# Patient Record
Sex: FEMALE | Race: WHITE | Employment: OTHER | URBAN - METROPOLITAN AREA
[De-identification: names, ages, dates, MRNs, and addresses within clinical notes are randomized per-mention and may not be internally consistent; named-entity substitution may affect disease eponyms.]

---

## 2017-01-02 ENCOUNTER — TELEPHONE (OUTPATIENT)
Dept: SURGERY | Facility: CLINIC | Age: 67
End: 2017-01-02

## 2017-01-02 NOTE — TELEPHONE ENCOUNTER
GENERAL SURGERY NURSE PHONE TRIAGE     Zoey Armstrong      MRN# [id number]  AGE:  66 year old     YOB: 1950  [phone] (home)     Surgeon: Dr. Cabrera      Surgery type: Right lumpectomy, with seed localization.  Kimberly node biopsy with sentinel node mapping.   POSTOPERATIVE DIAGNOSIS: Right breast Invasive ductal cancer       Surgery Date: December / 02 / 2016     POD: 31     CHIEF CONCERN:  Oncology appointment      HISTORY OF PRESENT ILLNESS:   Patient is calling to ask about oncology appointment.  Patient was last seen in clinic with Dr. Cabrera on 112/14/16.  At that time plan was for patient to follow- up with Oncology -Dr. Spencer and radiation oncology after that.    Spoke with Dr. Spencer's clinic, patient has not been seen by them in the past.  will follow up with Surgery Scheduling.      PLAN:   Discuss with surgery scheduling.  Cielo Dunn RN

## 2017-01-04 ENCOUNTER — OFFICE VISIT (OUTPATIENT)
Dept: INTERNAL MEDICINE | Facility: CLINIC | Age: 67
End: 2017-01-04
Payer: MEDICARE

## 2017-01-04 ENCOUNTER — TRANSFERRED RECORDS (OUTPATIENT)
Dept: SURGERY | Facility: CLINIC | Age: 67
End: 2017-01-04

## 2017-01-04 VITALS
DIASTOLIC BLOOD PRESSURE: 78 MMHG | WEIGHT: 156 LBS | BODY MASS INDEX: 26.63 KG/M2 | SYSTOLIC BLOOD PRESSURE: 106 MMHG | HEIGHT: 64 IN | HEART RATE: 88 BPM | TEMPERATURE: 97.8 F | OXYGEN SATURATION: 94 %

## 2017-01-04 DIAGNOSIS — Z01.818 PREOP GENERAL PHYSICAL EXAM: Primary | ICD-10-CM

## 2017-01-04 LAB
BASOPHILS # BLD AUTO: 0.1 10E9/L (ref 0–0.2)
BASOPHILS NFR BLD AUTO: 0.6 %
DIFFERENTIAL METHOD BLD: ABNORMAL
EOSINOPHIL # BLD AUTO: 0.2 10E9/L (ref 0–0.7)
EOSINOPHIL NFR BLD AUTO: 2.9 %
ERYTHROCYTE [DISTWIDTH] IN BLOOD BY AUTOMATED COUNT: 15.1 % (ref 10–15)
HCT VFR BLD AUTO: 45.2 % (ref 35–47)
HGB BLD-MCNC: 14.6 G/DL (ref 11.7–15.7)
LYMPHOCYTES # BLD AUTO: 1.8 10E9/L (ref 0.8–5.3)
LYMPHOCYTES NFR BLD AUTO: 21.3 %
MCH RBC QN AUTO: 31.1 PG (ref 26.5–33)
MCHC RBC AUTO-ENTMCNC: 32.3 G/DL (ref 31.5–36.5)
MCV RBC AUTO: 96 FL (ref 78–100)
MONOCYTES # BLD AUTO: 0.7 10E9/L (ref 0–1.3)
MONOCYTES NFR BLD AUTO: 8.1 %
NEUTROPHILS # BLD AUTO: 5.6 10E9/L (ref 1.6–8.3)
NEUTROPHILS NFR BLD AUTO: 67.1 %
PLATELET # BLD AUTO: 329 10E9/L (ref 150–450)
RBC # BLD AUTO: 4.7 10E12/L (ref 3.8–5.2)
WBC # BLD AUTO: 8.4 10E9/L (ref 4–11)

## 2017-01-04 PROCEDURE — 99214 OFFICE O/P EST MOD 30 MIN: CPT | Performed by: INTERNAL MEDICINE

## 2017-01-04 PROCEDURE — 85025 COMPLETE CBC W/AUTO DIFF WBC: CPT | Performed by: INTERNAL MEDICINE

## 2017-01-04 PROCEDURE — 36415 COLL VENOUS BLD VENIPUNCTURE: CPT | Performed by: INTERNAL MEDICINE

## 2017-01-04 PROCEDURE — 80048 BASIC METABOLIC PNL TOTAL CA: CPT | Performed by: INTERNAL MEDICINE

## 2017-01-04 NOTE — PROGRESS NOTES
[place] [address]  [facility] [address]  [phone]  Dept: [phone]    PRE-OP EVALUATION:  Today's date: 2017    Zoey Armstrong (: 1950) presents for pre-operative evaluation assessment as requested by Dr. Matias.  She requires evaluation and anesthesia risk assessment prior to undergoing surgery/procedure for treatment of rt foot .  Proposed procedure:   REMOVE HARDWARE FOOT Right Monitor Anesthesia Care   Hardware Removal Right Foot                 Date of Surgery/ Procedure: 17  Time of Surgery/ Procedure: 7:30am  Hospital/Surgical Facility: [facility]    Primary Physician: Marisa Hoover  Type of Anesthesia Anticipated: monitor anesthesia care    Patient has a Health Care Directive or Living Will:  NO    1. NO - Do you have a history of heart attack, stroke, stent, bypass or surgery on an artery in the head, neck, heart or legs?  2. NO - Do you ever have any pain or discomfort in your chest?  3. NO - Do you have a history of  Heart Failure?  4. NO - Are you troubled by shortness of breath when: walking on the level, up a slight hill or at night?  5. NO - Do you currently have a cold, bronchitis or other respiratory infection?  6. NO - Do you have a cough, shortness of breath or wheezing?  7. NO - Do you sometimes get pains in the calves of your legs when you walk?  8. NO - Do you or anyone in your family have previous history of blood clots?  9. NO - Do you or does anyone in your family have a serious bleeding problem such as prolonged bleeding following surgeries or cuts?  10. NO - Have you ever had problems with anemia or been told to take iron pills?  11. NO - Have you had any abnormal blood loss such as black, tarry or bloody stools, or abnormal vaginal bleeding?  12. NO - Have you ever had a blood transfusion?  13. NO - Have you or any of your relatives ever had problems with anesthesia?  14. NO - Do you have sleep apnea, excessive snoring  or daytime drowsiness?  15. NO - Do you have any prosthetic heart valves?  16. NO - Do you have prosthetic joints?  17. NO - Is there any chance that you may be pregnant?      HPI:                                                      Brief HPI related to upcoming procedure:       HYPERTENSION - Patient has longstanding history of mod-severe HTN , currently denies any symptoms referable to elevated blood pressure. Specifically denies chest pain, palpitations, dyspnea, orthopnea, PND or peripheral edema. Blood pressure readings have been in normal range. Current medication regimen is as listed below. Patient denies any side effects of medication.                                                                                                                                                                                          .  HYPERLIPIDEMIA - Patient has a long history of significant Hyperlipidemia requiring medication for treatment with recent good control. Patient reports no problems or side effects with the medication.                                                                                                                                                       .    MEDICAL HISTORY:                                                      Patient Active Problem List    Diagnosis Date Noted     Falling episodes 11/08/2016     Priority: Medium     CAD (coronary artery disease)      Priority: Medium     Chronic bronchitis (H) 02/21/2016     Priority: Medium     Abnormal electrocardiogram 07/20/2015     Priority: Medium     Fatigue 06/26/2015     Priority: Medium     Hyperlipidemia      Priority: Medium     Mild major depression (H) 06/06/2013     Priority: Medium     Preventative health care 03/29/2013     Priority: Medium     HTN (hypertension) 03/29/2013     Priority: Medium     Tobacco use 03/29/2013     Priority: Medium     Chronic rhinitis 03/29/2013     Priority: Medium     Advance Care Planning 03/29/2013      Priority: Medium     Advance Care Planning 9/8/2016: Receipt of ACP document:  Received: invalid HCD document dated 8-27-14.  Document not previously scanned. Validation form completed indicating invalid document. Copy sent to client with information and facilitation resources. Validation form sent to be scanned as notation of invalid document received.   Confirmed/documented designated decision maker(s).  Added by Mckenzie Weaver RN Advance Care Planning Liaison with Titi Yost  Advance Care Planning: ACD information given to patient.              Past Medical History   Diagnosis Date     Hypertension      Hyperlipidemia      Smoker      Depression      Back pain      CAD (coronary artery disease)      Past Surgical History   Procedure Laterality Date     Orthopedic surgery  1994     left foot surgery / bunion     Angiogram  10-     Moderate non-obstructive coronary disease involving all 3 vessels. LAD and RCA lesions are non-flow-limiting. Recommend aggressive risk factor management and possible pulmonary evaluation for dyspnea     Head & neck surgery       Jaw surgery     Reconstruct forefoot with metatarsophalangeal (mtp) fusion Right 2/24/2016     Procedure: RECONSTRUCT FOREFOOT WITH METATARSOPHALANGEAL (MTP) FUSION;  Surgeon: Levar Matias DPM;  Location: [facility]     Osteotomy foot Right 2/24/2016     Procedure: OSTEOTOMY FOOT;  Surgeon: Levar Matias DPM;  Location: [facility]     Arthroplasty toe(s) Right 2/24/2016     Procedure: ARTHROPLASTY TOE(S);  Surgeon: Levar Matias DPM;  Location: [facility]     Arthrotomy shoulder, rotator cuff repair, combined Right 7/12/2016     Procedure: COMBINED ARTHROTOMY SHOULDER, ROTATOR CUFF REPAIR;  Surgeon: Reggie Cisse MD;  Location:  OR     Lumpectomy breast, seed localization, sentinel node Right 12/2/2016     Procedure: LUMPECTOMY BREAST, SEED LOCALIZATION, SENTINEL NODE;  Surgeon: Tiffanie Cabrera MD;  Location:  OR     Current  Outpatient Prescriptions   Medication Sig Dispense Refill     FLUoxetine (PROZAC) 20 MG capsule Take 3 capsules (60 mg) by mouth daily 90 capsule 3     HYDROcodone-acetaminophen (NORCO) 5-325 MG per tablet Take 1-2 tablets by mouth every 4 hours as needed for other (Moderate to Severe Pain) 30 tablet 0     buPROPion (WELLBUTRIN SR) 150 MG 12 hr tablet Take 1 tablet (150 mg) by mouth 2 times daily 180 tablet 1     ibuprofen (ADVIL,MOTRIN) 600 MG tablet Take 1 tablet (600 mg) by mouth every 6 hours as needed for moderate pain 30 tablet 1     Amlodipine-Olmesartan (AMANDA) 10-40 MG TABS Take 1 tablet by mouth daily 90 tablet 3     rosuvastatin (CRESTOR) 10 MG tablet Take 1 tablet (10 mg) by mouth daily 90 tablet 3     senna-docusate (SENOKOT-S;PERICOLACE) 8.6-50 MG per tablet Take 1-2 tablets by mouth 2 times daily as needed for constipation Take while on oral narcotics to prevent or treat constipation. 30 tablet 0     tiotropium (SPIRIVA HANDIHALER) 18 MCG inhalation capsule Inhale contents of one capsule daily. 90 capsule 1     fluticasone-salmeterol (ADVAIR) 250-50 MCG/DOSE diskus inhaler Inhale 1 puff into the lungs 2 times daily 1 Inhaler 1     aspirin 81 MG tablet Take 81 mg by mouth daily       Multiple Vitamin CHEW Take 1 tablet by mouth daily        calcium 500 MG CHEW Take by mouth daily        OTC products: None, except as noted above    Allergies   Allergen Reactions     Betadine [Povidone Iodine] Hives     Iodine-131      No Clinical Screening - See Comments Swelling     Water softener salts     Soap      Perfumed soaps      Latex Allergy: NO    Social History   Substance Use Topics     Smoking status: Former Smoker -- 0.50 packs/day for 30 years     Types: Cigarettes     Quit date: 07/15/2015     Smokeless tobacco: Never Used     Alcohol Use: 0.0 oz/week     0 Standard drinks or equivalent per week      Comment: 3/ week     History   Drug Use No       REVIEW OF SYSTEMS:                                        "             C: NEGATIVE for fever, chills, change in weight  I: NEGATIVE for worrisome rashes, moles or lesions  E: NEGATIVE for vision changes or irritation  E/M: NEGATIVE for ear, mouth and throat problems  R: NEGATIVE for significant cough or SOB  B: NEGATIVE for masses, tenderness or discharge  CV: NEGATIVE for chest pain, palpitations or peripheral edema  GI: NEGATIVE for nausea, abdominal pain, heartburn, or change in bowel habits  : NEGATIVE for frequency, dysuria, or hematuria  M: NEGATIVE for significant arthralgias or myalgia  N: NEGATIVE for weakness, dizziness or paresthesias  E: NEGATIVE for temperature intolerance, skin/hair changes  H: NEGATIVE for bleeding problems  P: NEGATIVE for changes in mood or affect    EXAM:                                                    There were no vitals taken for this visit.   /78 mmHg  Pulse 88  Temp(Src) 97.8  F (36.6  C) (Oral)  Ht 5' 4\" (1.626 m)  Wt 156 lb (70.761 kg)  BMI 26.76 kg/m2  SpO2 94%      GENERAL APPEARANCE: healthy, alert and no distress     EYES: EOMI,- PERRL     HENT: ear canals and TM's normal and nose and mouth without ulcers or lesions     NECK: no adenopathy, no asymmetry, masses, or scars and thyroid normal to palpation     RESP: lungs clear to auscultation - no rales, rhonchi or wheezes     CV: regular rates and rhythm, normal S1 S2, no S3 or S4 and no murmur, click or rub -     ABDOMEN:  soft, nontender, no HSM or masses and bowel sounds normal     MS: extremities normal- no gross deformities noted, no evidence of inflammation in joints, FROM in all extremities.     SKIN: no suspicious lesions or rashes     NEURO: Normal strength and tone, sensory exam grossly normal, mentation intact and speech normal     PSYCH: mentation appears normal. and affect normal/bright      DIAGNOSTICS:                                                    EKG: appears normal, NSR, normal axis, normal intervals, no acute ST/T changes c/w ischemia, no " LVH by voltage criteria, unchanged from previous tracings    Recent Labs   Lab Test  11/29/16   1552  10/25/16   1117  07/11/16   1125  04/16/16   0002   09/29/15   1352   03/27/11   0750   HGB  15.1   --   14.0  14.3   < >   --    < >  15.0   PLT  321   --    --   337   < >   --    < >  283   INR   --    --    --    --    --   0.99   --   0.93   NA  140  140   --   138   < >   --    < >  148*   POTASSIUM  4.3  4.1  4.0  3.8   < >   --    < >  4.2   CR  1.03  0.92  0.86  1.02   < >   --    < >  0.63    < > = values in this interval not displayed.        IMPRESSION:                                                    Reason for surgery/procedure:   Hardware Removal Right Foot     Diagnosis/reason for consult: risk assessment    The proposed surgical procedure is considered LOW risk.    REVISED CARDIAC RISK INDEX  The patient has the following serious cardiovascular risks for perioperative complications such as (MI, PE, VFib and 3  AV Block):  No serious cardiac risks  INTERPRETATION: 0 risks: Class I (very low risk - 0.4% complication rate)    The patient has the following additional risks for perioperative complications:  No identified additional risks    No diagnosis found.    RECOMMENDATIONS:                                                        --Patient is to take all scheduled medications on the day of surgery EXCEPT for modifications listed below.  Aspirin and ibuprofen    APPROVAL GIVEN to proceed with proposed procedure, without further diagnostic evaluation       Signed Electronically by: Marisa Hoover MD    Copy of this evaluation report is provided to requesting physician.    [place] Preop Guidelines

## 2017-01-04 NOTE — NURSING NOTE
"Chief Complaint   Patient presents with     Pre-Op Exam     initial /78 mmHg  Pulse 88  Temp(Src) 97.8  F (36.6  C) (Oral)  Ht 5' 4\" (1.626 m)  Wt 156 lb (70.761 kg)  BMI 26.76 kg/m2  SpO2 94% Estimated body mass index is 26.76 kg/(m^2) as calculated from the following:    Height as of this encounter: 5' 4\" (1.626 m).    Weight as of this encounter: 156 lb (70.761 kg)..  bp completed using cuff size regular  WIILAN CORRAL LPN  "

## 2017-01-05 LAB
ANION GAP SERPL CALCULATED.3IONS-SCNC: 9 MMOL/L (ref 3–14)
BUN SERPL-MCNC: 25 MG/DL (ref 7–30)
CALCIUM SERPL-MCNC: 10 MG/DL (ref 8.5–10.1)
CHLORIDE SERPL-SCNC: 106 MMOL/L (ref 94–109)
CO2 SERPL-SCNC: 26 MMOL/L (ref 20–32)
CREAT SERPL-MCNC: 1.06 MG/DL (ref 0.52–1.04)
GFR SERPL CREATININE-BSD FRML MDRD: 51 ML/MIN/1.7M2
GLUCOSE SERPL-MCNC: 85 MG/DL (ref 70–99)
POTASSIUM SERPL-SCNC: 4.4 MMOL/L (ref 3.4–5.3)
SODIUM SERPL-SCNC: 141 MMOL/L (ref 133–144)

## 2017-01-09 ENCOUNTER — TRANSFERRED RECORDS (OUTPATIENT)
Dept: SURGERY | Facility: CLINIC | Age: 67
End: 2017-01-09

## 2017-01-18 ENCOUNTER — ANESTHESIA (OUTPATIENT)
Dept: SURGERY | Facility: CLINIC | Age: 67
End: 2017-01-18
Payer: MEDICARE

## 2017-01-18 ENCOUNTER — APPOINTMENT (OUTPATIENT)
Dept: GENERAL RADIOLOGY | Facility: CLINIC | Age: 67
End: 2017-01-18
Attending: PODIATRIST
Payer: MEDICARE

## 2017-01-18 ENCOUNTER — ANESTHESIA EVENT (OUTPATIENT)
Dept: SURGERY | Facility: CLINIC | Age: 67
End: 2017-01-18
Payer: MEDICARE

## 2017-01-18 PROCEDURE — 25800025 ZZH RX 258: Performed by: ANESTHESIOLOGY

## 2017-01-18 PROCEDURE — 25000125 ZZHC RX 250: Performed by: PODIATRIST

## 2017-01-18 PROCEDURE — 25000125 ZZHC RX 250: Performed by: NURSE ANESTHETIST, CERTIFIED REGISTERED

## 2017-01-18 PROCEDURE — 40000940 XR FOOT PORT RT 2 VW: Mod: RT

## 2017-01-18 RX ORDER — FENTANYL CITRATE 50 UG/ML
INJECTION, SOLUTION INTRAMUSCULAR; INTRAVENOUS PRN
Status: DISCONTINUED | OUTPATIENT
Start: 2017-01-18 | End: 2017-01-18

## 2017-01-18 RX ORDER — ONDANSETRON 2 MG/ML
INJECTION INTRAMUSCULAR; INTRAVENOUS PRN
Status: DISCONTINUED | OUTPATIENT
Start: 2017-01-18 | End: 2017-01-18

## 2017-01-18 RX ORDER — PROPOFOL 10 MG/ML
INJECTION, EMULSION INTRAVENOUS PRN
Status: DISCONTINUED | OUTPATIENT
Start: 2017-01-18 | End: 2017-01-18

## 2017-01-18 RX ORDER — PROPOFOL 10 MG/ML
INJECTION, EMULSION INTRAVENOUS CONTINUOUS PRN
Status: DISCONTINUED | OUTPATIENT
Start: 2017-01-18 | End: 2017-01-18

## 2017-01-18 RX ADMIN — PROPOFOL 50 MCG/KG/MIN: 10 INJECTION, EMULSION INTRAVENOUS at 07:35

## 2017-01-18 RX ADMIN — SODIUM CHLORIDE, POTASSIUM CHLORIDE, SODIUM LACTATE AND CALCIUM CHLORIDE: 600; 310; 30; 20 INJECTION, SOLUTION INTRAVENOUS at 07:23

## 2017-01-18 RX ADMIN — MIDAZOLAM HYDROCHLORIDE 1 MG: 1 INJECTION, SOLUTION INTRAMUSCULAR; INTRAVENOUS at 07:25

## 2017-01-18 RX ADMIN — ONDANSETRON 4 MG: 2 INJECTION INTRAMUSCULAR; INTRAVENOUS at 07:38

## 2017-01-18 RX ADMIN — FENTANYL CITRATE 25 MCG: 50 INJECTION, SOLUTION INTRAMUSCULAR; INTRAVENOUS at 07:29

## 2017-01-18 RX ADMIN — PROPOFOL 20 MG: 10 INJECTION, EMULSION INTRAVENOUS at 07:32

## 2017-01-18 RX ADMIN — MIDAZOLAM HYDROCHLORIDE 1 MG: 1 INJECTION, SOLUTION INTRAMUSCULAR; INTRAVENOUS at 07:27

## 2017-01-18 RX ADMIN — CEFAZOLIN SODIUM 2 G: 2 INJECTION, SOLUTION INTRAVENOUS at 07:23

## 2017-01-18 ASSESSMENT — LIFESTYLE VARIABLES: TOBACCO_USE: 1

## 2017-01-18 NOTE — ANESTHESIA CARE TRANSFER NOTE
Patient: Zoey Armstrong    REMOVE HARDWARE FOOT (Right Foot)  Additional InformationProcedure(s):  Hardware Removal Right Foot  - Wound Class: I-Clean    Diagnosis: painful hardware   Diagnosis Additional Information: No value filed.    Anesthesia Type:   MAC     Note:  Airway :Room Air  Patient transferred to:PACU  Comments: Patient meets criteria for phase 2 recovery, no RN available, to PACU, patient awake and alert, VSS.      Vitals: (Last set prior to Anesthesia Care Transfer)              Electronically Signed By: DUGLAS Fish CRNA  January 18, 2017  8:09 AM

## 2017-01-18 NOTE — ANESTHESIA POSTPROCEDURE EVALUATION
Patient: Zoey Armstrong    REMOVE HARDWARE FOOT (Right Foot)  Additional InformationProcedure(s):  Hardware Removal Right Foot  - Wound Class: I-Clean    Diagnosis:painful hardware   Diagnosis Additional Information: Pre-operative diagnosis:  painful hardware    Post-operative diagnosis  sp hardware removal right foot     Procedure:  Procedure(s):  Hardware Removal Right Foot  - Wound Class: I-Clean   Surgeon(s):  Surgeon(s) and Role:     * Levar Matias D, PM - Primary   Estimated blood loss:  0 mL       Specimens:  * No specimens in log *   Findings:  Lag screw not symptomatic, left intact.  Painful prominent dorsal plate removed without complication.                   Anesthesia Type:  MAC    Note:  Anesthesia Post Evaluation    Patient location during evaluation: PACU  Patient participation: Able to fully participate in evaluation  Level of consciousness: awake  Pain management: adequate  Airway patency: patent  Cardiovascular status: acceptable  Respiratory status: acceptable  Hydration status: acceptable  PONV: none     Anesthetic complications: None          Last vitals:  Filed Vitals:    01/18/17 0820 01/18/17 0835 01/18/17 0905   BP: 121/60 120/70 142/77   Pulse:      Temp:   97  F (36.1  C)   Resp: 16 16 16   SpO2: 93% 94% 95%       Electronically Signed By: Jim Gary MD  January 18, 2017  11:56 AM

## 2017-01-18 NOTE — ANESTHESIA PREPROCEDURE EVALUATION
Anesthesia Evaluation     . Pt has had prior anesthetic.       ROS/MED HX    ENT/Pulmonary:     (+)tobacco use, Past use , . .    Neurologic:       Cardiovascular:     (+) Dyslipidemia, hypertension--CAD, --. : . . . :. .       METS/Exercise Tolerance:     Hematologic:         Musculoskeletal:         GI/Hepatic:         Renal/Genitourinary:         Endo:         Psychiatric:     (+) psychiatric history depression      Infectious Disease:         Malignancy:         Other:               Physical Exam  Normal systems: cardiovascular and pulmonary    Airway   Mallampati: II  TM distance: >3 FB  Neck ROM: full    Dental     Cardiovascular       Pulmonary                     Anesthesia Plan      History & Physical Review  History and physical reviewed and following examination; no interval change.    ASA Status:  3 .    NPO Status:  > 8 hours    Plan for MAC Reason for MAC:  Deep or markedly invasive procedure (G8) and Chronic cardiopulmonary disease (G9)         Postoperative Care  Postoperative pain management:  IV analgesics.      Consents  Anesthetic plan, risks, benefits and alternatives discussed with:  Patient.  Use of blood products discussed: Yes.   .                          .

## 2017-01-19 ENCOUNTER — TELEPHONE (OUTPATIENT)
Dept: PODIATRY | Facility: CLINIC | Age: 67
End: 2017-01-19

## 2017-01-19 NOTE — TELEPHONE ENCOUNTER
Is patient elevating above heart level?  y  Is patient icing behind the knee?  Only some  Any abdominal pain?  no  How is pts pain/how often are they taking pain meds? Every 6 hours, 2 tablets of each. Pt feels that top of foot is hurting more than she thought it would, we discussed adjusting ace wrap as if this is too tight it could be contributing to her pain, she will try this.  Pt informed ace wrap can be loosened to help with pain.  Any questions?  Not at this time.  Pt has the Triage phone number in case of questions.  Discussed the above with Dr. Matias.

## 2017-01-24 ENCOUNTER — OFFICE VISIT (OUTPATIENT)
Dept: PODIATRY | Facility: CLINIC | Age: 67
End: 2017-01-24
Payer: MEDICARE

## 2017-01-24 VITALS
BODY MASS INDEX: 26.46 KG/M2 | WEIGHT: 155 LBS | SYSTOLIC BLOOD PRESSURE: 132 MMHG | DIASTOLIC BLOOD PRESSURE: 92 MMHG | HEIGHT: 64 IN

## 2017-01-24 DIAGNOSIS — Z98.890 S/P FOOT SURGERY, RIGHT: Primary | ICD-10-CM

## 2017-01-24 PROCEDURE — 99024 POSTOP FOLLOW-UP VISIT: CPT | Performed by: PODIATRIST

## 2017-01-24 NOTE — PROGRESS NOTES
"Foot & Ankle Surgery  January 24, 2017    S:  Patient in today approx 6 days sp hardware removal right foot.  Pain levels minimal.  Heel WB in surgical shoe without issue.  Got dressing wet yesterday in the shower, they removed the wet bandage and applied a new one.  Following post-op instructions    /92 mmHg  Ht 5' 4\" (1.626 m)  Wt 155 lb (70.308 kg)  BMI 26.59 kg/m2      ROS - positive for CC.  Patient denies current nausea, vomiting, chills, fevers, belly pain, calf pain, chest pain or SOB.  Complete remainder of ROS is otherwise neg.    PE - sutures intact, skin margins well coapted.  Healing well.  No drainage/SOI.  Minimal edema.  No change in hallux alignment.  Skin shows no trophic, color or temperature changes otherwise.  No calf redness, swelling or pain noted otherwise.    A/P - 66 year old yo patient approx 6 days sp above procedure  -reviewed procedure and surgical findings  -redressed foot  -continue all post-op orders without change; reviewed    Follow up  -  1 week for suture removal       Body mass index is 26.59 kg/(m^2).  Weight management plan: Patient was referred to their PCP to discuss a diet and exercise plan.      Levar Matias DPM   Podiatric Foot & Ankle Surgeon  [facility]  [phone]    "

## 2017-01-24 NOTE — Clinical Note
Good morning  I saw Maxine today, 6 days sp hardware removal right foot.  She's doing well.  The foot was redressed, and she'll follow up in 1 week for suture removal and transition back to regular shoe gear.  Thanks  Levar

## 2017-01-24 NOTE — NURSING NOTE
"Chief Complaint   Patient presents with     Surgical Followup       Initial /92 mmHg  Ht 5' 4\" (1.626 m)  Wt 155 lb (70.308 kg)  BMI 26.59 kg/m2 Estimated body mass index is 26.59 kg/(m^2) as calculated from the following:    Height as of this encounter: 5' 4\" (1.626 m).    Weight as of this encounter: 155 lb (70.308 kg).  BP completed using cuff size: larissa Payne CMA        "

## 2017-01-26 ENCOUNTER — TRANSFERRED RECORDS (OUTPATIENT)
Dept: HEALTH INFORMATION MANAGEMENT | Facility: CLINIC | Age: 67
End: 2017-01-26

## 2017-01-27 ENCOUNTER — RADIANT APPOINTMENT (OUTPATIENT)
Dept: BONE DENSITY | Facility: CLINIC | Age: 67
End: 2017-01-27
Attending: INTERNAL MEDICINE
Payer: MEDICARE

## 2017-01-27 DIAGNOSIS — Z78.0 ASYMPTOMATIC MENOPAUSAL STATE: ICD-10-CM

## 2017-01-27 PROCEDURE — 77080 DXA BONE DENSITY AXIAL: CPT | Performed by: INTERNAL MEDICINE

## 2017-01-31 ENCOUNTER — OFFICE VISIT (OUTPATIENT)
Dept: PODIATRY | Facility: CLINIC | Age: 67
End: 2017-01-31
Payer: MEDICARE

## 2017-01-31 ENCOUNTER — OFFICE VISIT (OUTPATIENT)
Dept: CARDIOLOGY | Facility: CLINIC | Age: 67
End: 2017-01-31
Attending: INTERNAL MEDICINE
Payer: MEDICARE

## 2017-01-31 VITALS — RESPIRATION RATE: 16 BRPM | BODY MASS INDEX: 26.46 KG/M2 | HEART RATE: 86 BPM | WEIGHT: 155 LBS | HEIGHT: 64 IN

## 2017-01-31 VITALS
HEIGHT: 64 IN | DIASTOLIC BLOOD PRESSURE: 84 MMHG | BODY MASS INDEX: 26.96 KG/M2 | WEIGHT: 157.9 LBS | HEART RATE: 96 BPM | SYSTOLIC BLOOD PRESSURE: 116 MMHG

## 2017-01-31 DIAGNOSIS — E78.2 MIXED HYPERLIPIDEMIA: ICD-10-CM

## 2017-01-31 DIAGNOSIS — R29.6 FALLING EPISODES: ICD-10-CM

## 2017-01-31 DIAGNOSIS — Z98.890 S/P HARDWARE REMOVAL: ICD-10-CM

## 2017-01-31 DIAGNOSIS — I25.10 CORONARY ARTERY DISEASE INVOLVING NATIVE CORONARY ARTERY OF NATIVE HEART WITHOUT ANGINA PECTORIS: ICD-10-CM

## 2017-01-31 DIAGNOSIS — Z98.890 S/P FOOT SURGERY, RIGHT: Primary | ICD-10-CM

## 2017-01-31 PROCEDURE — 99024 POSTOP FOLLOW-UP VISIT: CPT | Performed by: PODIATRIST

## 2017-01-31 PROCEDURE — 99214 OFFICE O/P EST MOD 30 MIN: CPT | Mod: 24 | Performed by: NURSE PRACTITIONER

## 2017-01-31 NOTE — Clinical Note
"1/31/2017    Marisa Hoover MD  [facility]   [address] [facility] [address]    RE: Zoey Armstrong       Dear Colleague,    I had the pleasure of seeing Zoey Armstrong in the [facility].    This is a 66-year-old female who presents to [facility] today for a followup visit.  She is a patient of Dr. Rutledge's seen in our clinic for hypertension, hyperlipidemia and coronary artery disease.      Zoey first saw Dr. Rutledge in 07/2015 after she was complaining of some intermittent lightheadedness, wobbliness and leg tingling.  Over the past year she has had unpredictable lightheadedness and multiple falls with unclear etiology.  She has not experienced syncope.  In 10/2015, she underwent coronary angiography revealing moderate nonobstructive coronary artery disease.  Subsequently, she has been followed by Neurology.  CT scan of her head showed possibility of NPH and a followup MRI of her brain has been ordered.  It was noted that she was having symptoms suggestive of neurogenic claudication.  In addition, Zoey has chronic back pain and evidence of severe central stenosis in L4-L5.  Two months ago she was also recently diagnosed with stage I breast cancer and underwent a lumpectomy and is currently undergoing radiation.  She returns today for a 3-month reassessment.      Overall, Zoey tells me she is doing well.  She has been seen at [facility] which has helped her with her gait instability.  She is now told to walk slowly and turn slowly.  Since then she has not had any recurrent falls.  Recently, she underwent a spinal injection and does have an orthopedic surgeon consultation in regards to her severe L4-L5 stenosis.  She is yet to make her followup brain MRI appointment and is trying to tackle \"one thing at a time.\"  She is near completing her radiation for her breast cancer.      All in all, Zoey tells me she has " no chest pain with activity or at rest.  She is not short of breath.  She denies any palpitations or near-syncope.  She denies orthopnea or peripheral edema.      PHYSICAL EXAMINATION:  Her blood pressure today is 116/84, heart rate is 96 beats per minute and is regular.  Her lungs are clear.  There is no peripheral edema.  Her weight is stable at 157 pounds.   Outpatient Encounter Prescriptions as of 1/31/2017   Medication Sig Dispense Refill     FLUoxetine (PROZAC) 20 MG capsule Take 3 capsules (60 mg) by mouth daily 90 capsule 3     buPROPion (WELLBUTRIN SR) 150 MG 12 hr tablet Take 1 tablet (150 mg) by mouth 2 times daily 180 tablet 1     Amlodipine-Olmesartan (AMANDA) 10-40 MG TABS Take 1 tablet by mouth daily 90 tablet 3     rosuvastatin (CRESTOR) 10 MG tablet Take 1 tablet (10 mg) by mouth daily 90 tablet 3     Multiple Vitamin CHEW Take 1 tablet by mouth daily        calcium 500 MG CHEW Take by mouth daily        aspirin 81 MG tablet Take 81 mg by mouth daily       No facility-administered encounter medications on file as of 1/31/2017.      IMPRESSION AND PLAN:   1.  Unpredictable lightheadedness and multiple falls for the past 2 years.  Likely neurological cause, with possible concern of NPH, in addition to severe central lumbar stenosis.   2.  Moderate nonobstructive coronary artery disease.  She is free from any angina.   3.  Hypertension.  Blood pressure well-controlled on Amanda.   4.  Treated hyperlipidemia.      Thank you for allowing me to participate in this patient's care.  We will have her return for followup with Dr. Rutledge in the future.  She is to notify our clinic with a sustained systolic blood pressure over 140 mmHg or other concerns she may have during the interim.     Sincerely,    DUGLAS Chen [facility]

## 2017-01-31 NOTE — PROGRESS NOTES
"Foot & Ankle Surgery  January 31, 2017    S:  Patient in today approx 2 weeks sp hardware removal right foot.  Pain levels minimal.  Following post-op instructions    Pulse 86  Resp 16  Ht 5' 4\" (1.626 m)  Wt 155 lb (70.308 kg)  BMI 26.59 kg/m2      ROS - positive for CC.  Patient denies current nausea, vomiting, chills, fevers, belly pain, calf pain, chest pain or SOB.  Complete remainder of ROS is otherwise neg.    PE - sutures removed, incision healing quite well, no s-s needed.  Minimal edema, no drainage, no SOI.  No 1st MPJ pain.  Skin shows no trophic, color or temperature changes otherwise.  No calf redness, swelling or pain noted otherwise.      A/P - 66 year old yo patient approx 2 weeks sp above procedure  -sutures out, no s-s needed  -continue - compression, tensogrip dispensed; DVT exercises until back to regular shoe gear  -change - ok to return to regular shoe gear as tolerated; ice/elevation prn; ok to get the foot wet starting tomorrow    Follow up  -  4 weeks for final check/films     Body mass index is 26.59 kg/(m^2).        Levar Matias DPM   Podiatric Foot & Ankle Surgeon  [facility]  [phone]    "

## 2017-01-31 NOTE — NURSING NOTE
"Chief Complaint   Patient presents with     Surgical Followup     DOS 1/18/17 R foot hrdwr removal, 2 weeks, suture removal       Initial Pulse 86  Resp 16  Ht 5' 4\" (1.626 m)  Wt 155 lb (70.308 kg)  BMI 26.59 kg/m2 Estimated body mass index is 26.59 kg/(m^2) as calculated from the following:    Height as of this encounter: 5' 4\" (1.626 m).    Weight as of this encounter: 155 lb (70.308 kg).    Ivan Farias CMA ([facility])  Podiatry/Foot & Ankle Surgery  [facility]      "

## 2017-01-31 NOTE — PROGRESS NOTES
"HISTORY OF PRESENT ILLNESS:  This is a 66-year-old female who presents to [facility] today for a followup visit.  She is a patient of Dr. Rutledge's seen in our clinic for hypertension, hyperlipidemia and coronary artery disease.      Zoey first saw Dr. Rutledge in 07/2015 after she was complaining of some intermittent lightheadedness, wobbliness and leg tingling.  Over the past year she has had unpredictable lightheadedness and multiple falls with unclear etiology.  She has not experienced syncope.  In 10/2015, she underwent coronary angiography revealing moderate nonobstructive coronary artery disease.  Subsequently, she has been followed by Neurology.  CT scan of her head showed possibility of NPH and a followup MRI of her brain has been ordered.  It was noted that she was having symptoms suggestive of neurogenic claudication.  In addition, Zoey has chronic back pain and evidence of severe central stenosis in L4-L5.  Two months ago she was also recently diagnosed with stage I breast cancer and underwent a lumpectomy and is currently undergoing radiation.  She returns today for a 3-month reassessment.      Overall, Zoey tells me she is doing well.  She has been [facility] which has helped her with her gait instability.  She is now told to walk slowly and turn slowly.  Since then she has not had any recurrent falls.  Recently, she underwent a spinal injection and does have an orthopedic surgeon consultation in regards to her severe L4-L5 stenosis.  She is yet to make her followup brain MRI appointment and is trying to tackle \"one thing at a time.\"  She is near completing her radiation for her breast cancer.      All in all, Zoey tells me she has no chest pain with activity or at rest.  She is not short of breath.  She denies any palpitations or near-syncope.  She denies orthopnea or peripheral edema.      PHYSICAL EXAMINATION:  Her blood pressure today is " 116/84, heart rate is 96 beats per minute and is regular.  Her lungs are clear.  There is no peripheral edema.  Her weight is stable at 157 pounds.      IMPRESSION AND PLAN:   1.  Unpredictable lightheadedness and multiple falls for the past 2 years.  Likely neurological cause, with possible concern of NPH, in addition to severe central lumbar stenosis.   2.  Moderate nonobstructive coronary artery disease.  She is free from any angina.   3.  Hypertension.  Blood pressure well-controlled on Cesar.   4.  Treated hyperlipidemia.      Thank you for allowing me to participate in this patient's care.  We will have her return for followup with Dr. Rutledge in the future.  She is to notify our clinic with a sustained systolic blood pressure over 140 mmHg or other concerns she may have during the interim.         DUGLAS POSADA, CNP             D: 2017 15:49   T: 2017 16:42   MT: al      Name:     ROSY CANNON   MRN:      [id number]        Account:      [id number]   :      1950           Service Date: 2017      Document: [id number]

## 2017-01-31 NOTE — Clinical Note
Good afternoon  I saw Zoey today, 2 weeks sp hardware removal right foot.  She's been doing quite well.  The sutures were removed, and she'll be returning to regular shoe gear as tolerated.  She'll follow up in 4 weeks for a final check and for final films.  Thanks  Levar

## 2017-01-31 NOTE — PROGRESS NOTES
HPI and Plan: #[id number]  See dictation    No orders of the defined types were placed in this encounter.       No orders of the defined types were placed in this encounter.       There are no discontinued medications.      Encounter Diagnoses   Name Primary?     Coronary artery disease involving native coronary artery of native heart without angina pectoris      Mixed hyperlipidemia      Falling episodes        CURRENT MEDICATIONS:  Current Outpatient Prescriptions   Medication Sig Dispense Refill     FLUoxetine (PROZAC) 20 MG capsule Take 3 capsules (60 mg) by mouth daily 90 capsule 3     buPROPion (WELLBUTRIN SR) 150 MG 12 hr tablet Take 1 tablet (150 mg) by mouth 2 times daily 180 tablet 1     Amlodipine-Olmesartan (AMANDA) 10-40 MG TABS Take 1 tablet by mouth daily 90 tablet 3     rosuvastatin (CRESTOR) 10 MG tablet Take 1 tablet (10 mg) by mouth daily 90 tablet 3     Multiple Vitamin CHEW Take 1 tablet by mouth daily        calcium 500 MG CHEW Take by mouth daily        aspirin 81 MG tablet Take 81 mg by mouth daily         ALLERGIES     Allergies   Allergen Reactions     Betadine [Povidone Iodine] Hives     Iodine-131      No Clinical Screening - See Comments Swelling     Water softener salts     Soap      Perfumed soaps       PAST MEDICAL HISTORY:  Past Medical History   Diagnosis Date     Hypertension      Hyperlipidemia      Smoker      Depression      Back pain      CAD (coronary artery disease)        PAST SURGICAL HISTORY:  Past Surgical History   Procedure Laterality Date     Orthopedic surgery  1994     left foot surgery / bunion     Angiogram  10-     Moderate non-obstructive coronary disease involving all 3 vessels. LAD and RCA lesions are non-flow-limiting. Recommend aggressive risk factor management and possible pulmonary evaluation for dyspnea     Head & neck surgery       Jaw surgery     Reconstruct forefoot with metatarsophalangeal (mtp) fusion Right 2/24/2016     Procedure: RECONSTRUCT  FOREFOOT WITH METATARSOPHALANGEAL (MTP) FUSION;  Surgeon: Levar Matias DPM;  Location: [facility]     Osteotomy foot Right 2/24/2016     Procedure: OSTEOTOMY FOOT;  Surgeon: Levar Matias DPM;  Location: [facility]     Arthroplasty toe(s) Right 2/24/2016     Procedure: ARTHROPLASTY TOE(S);  Surgeon: Levar Matias DPM;  Location: [facility]     Arthrotomy shoulder, rotator cuff repair, combined Right 7/12/2016     Procedure: COMBINED ARTHROTOMY SHOULDER, ROTATOR CUFF REPAIR;  Surgeon: Reggie Cisse MD;  Location: [facility]     Lumpectomy breast, seed localization, sentinel node Right 12/2/2016     Procedure: LUMPECTOMY BREAST, SEED LOCALIZATION, SENTINEL NODE;  Surgeon: Tiffanie Cabrera MD;  Location: [facility]     Remove hardware foot Right 1/18/2017     Procedure: REMOVE HARDWARE FOOT;  Surgeon: Levar Matias DPM;  Location: [facility]       FAMILY HISTORY:  Family History   Problem Relation Age of Onset     Breast Cancer Mother      C.A.D. Father      MI     Lipids Father      Coronary Artery Disease Father      Hyperlipidemia Father      Breast Cancer Other      maternal aunt     CANCER Other      skin       SOCIAL HISTORY:  Social History     Social History     Marital Status:      Spouse Name: N/A     Number of Children: N/A     Years of Education: N/A     Social History Main Topics     Smoking status: Former Smoker -- 0.50 packs/day for 30 years     Types: Cigarettes     Quit date: 07/15/2015     Smokeless tobacco: Never Used     Alcohol Use: 0.0 oz/week     0 Standard drinks or equivalent per week      Comment: 3/ week     Drug Use: No     Sexual Activity:     Partners: Male     Other Topics Concern     Caffeine Concern No     1 diet coke, coffee occ     Sleep Concern No     Special Diet No     regular     Exercise No     none     Social History Narrative       Review of Systems:  Skin:  Negative       Eyes:  Negative      ENT:  Negative      Respiratory:  Positive for dyspnea on exertion    "  Cardiovascular:    dizziness;lightheadedness;fatigue;Positive for tingling feeling in both knees up to the hips, dizzinesss follows episodes -  has had only 2 episodes in last  5 days - since getting injection in lower back   Gastroenterology: Negative      Genitourinary:  Positive for retention \"lighter in amount of  urination as the day goes on\" per pt  Musculoskeletal:  Positive for back pain back pain decreased since injection 1/27/17  Neurologic:  Negative      Psychiatric:  Positive for anxiety;depression    Heme/Lymph/Imm:  Positive for allergies RX allergies   Endocrine:  Negative        Physical Exam:  Vitals: /84 mmHg  Pulse 96  Ht 1.626 m (5' 4\")  Wt 71.623 kg (157 lb 14.4 oz)  BMI 27.09 kg/m2    Constitutional:  cooperative;alert and oriented;well developed   sit: BP = 114/   ,  1 minute standing  BP = 114,   standing 2  minutes = 118/  .    Skin:  warm and dry to the touch        Head:  normocephalic        Eyes:  pupils equal and round        ENT:  no pallor or cyanosis        Neck:  JVP normal;no carotid bruit        Chest:  clear to auscultation;normal symmetry;normal respiratory excursion          Cardiac: regular rhythm;normal S1 and S2     no presence of murmur            Abdomen:  abdomen soft;non-tender;no bruits        Vascular: pulses full and equal                                        Extremities and Back:  no deformities, clubbing, cyanosis, erythema observed;no edema              Neurological:  affect appropriate, oriented to time, person and place;no gross motor deficits   normal mild nystagmus, wobbly with walking          CC  Gabriele Rutledge MD   [facility]  [address]  [place] [id number]                    "

## 2017-02-03 ENCOUNTER — TELEPHONE (OUTPATIENT)
Dept: BONE DENSITY | Facility: CLINIC | Age: 67
End: 2017-02-03

## 2017-02-03 NOTE — TELEPHONE ENCOUNTER
dexa scan and report mailed to [facility] [address] [facility] [address] pn

## 2017-02-07 ENCOUNTER — TRANSFERRED RECORDS (OUTPATIENT)
Dept: SURGERY | Facility: CLINIC | Age: 67
End: 2017-02-07

## 2017-02-23 ENCOUNTER — MEDICAL CORRESPONDENCE (OUTPATIENT)
Dept: HEALTH INFORMATION MANAGEMENT | Facility: CLINIC | Age: 67
End: 2017-02-23

## 2017-02-28 ENCOUNTER — OFFICE VISIT (OUTPATIENT)
Dept: PODIATRY | Facility: CLINIC | Age: 67
End: 2017-02-28
Payer: MEDICARE

## 2017-02-28 ENCOUNTER — RADIANT APPOINTMENT (OUTPATIENT)
Dept: GENERAL RADIOLOGY | Facility: CLINIC | Age: 67
End: 2017-02-28
Attending: PODIATRIST
Payer: MEDICARE

## 2017-02-28 VITALS — HEIGHT: 64 IN | BODY MASS INDEX: 26.8 KG/M2 | WEIGHT: 157 LBS | RESPIRATION RATE: 14 BRPM

## 2017-02-28 DIAGNOSIS — Z98.890 POST-OPERATIVE STATE: Primary | ICD-10-CM

## 2017-02-28 DIAGNOSIS — Z98.890 POST-OPERATIVE STATE: ICD-10-CM

## 2017-02-28 PROCEDURE — 73630 X-RAY EXAM OF FOOT: CPT | Mod: RT

## 2017-02-28 PROCEDURE — 99024 POSTOP FOLLOW-UP VISIT: CPT | Performed by: PODIATRIST

## 2017-02-28 NOTE — Clinical Note
Good morning  I saw Zoey today, 6 weeks sp plate removal from her right foot.  She's doing well, has returned to shoes/activities.  Films show no changes to the previous fusion site.  She'll follow up prn.  Thanks  Levar

## 2017-02-28 NOTE — MR AVS SNAPSHOT
After Visit Summary   2/28/2017    Zoey Armstrong    MRN: [id number]           Patient Information     Date Of Birth          1950        Visit Information        Provider Department      2/28/2017 8:00 AM Levar Matias DPM [facility]        Today's Diagnoses     Post-operative state    -  1      Care Instructions    Follow Up -     Dr. Matias's Clinic Locations:         Monday Tuesday   [facility]   [address] [address] [address], [address]   [place] [address] [place] [address]   [phone]       Wednesday:  Surgery Day    Surgery Scheduling Line - [phone]       Thursday Morning Thursday Afternoon   [facility]   [address]   [place] [address] [place], [place] [address] [phone]       Friday Morning To Schedule an Appointment    [facility] Call: [phone] [address] [place] [address]  [phone] PLEASE FAX ALL FORMS TO: [phone]     Body Mass Index (BMI)    Many things can cause foot and ankle problems. Foot structure, activity level, foot mechanics and injuries are common causes of pain.    One very important issue that often goes unmentioned, is body weight.  Extra weight can cause increased stress on muscles, ligaments, bones and tendons. Sometimes just a few extra pounds is all it takes to put one over her/his threshold. Without reducing that stress, it can be difficult to alleviate pain.      Some people are uncomfortable addressing this issue, but we feel it is important for you to think about it. As Foot & Ankle specialists, our job is addressing the lower extremity problem and possible causes.     Regarding extra body weight, we encourage patients to discuss diet and weight management plans with their primary care doctors. It is this team approach that  "gives you the best opportunity for pain relief and getting you back on your feet.            Follow-ups after your visit        Your next 10 appointments already scheduled     May 04, 2017  8:15 AM CDT   Return Visit with Gabriele Rutledge MD   [facility] [place] ([facility])    [address]  [facility] [address] [phone]              Who to contact     If you have questions or need follow up information about today's clinic visit or your schedule please contact [facility] directly at [phone].  Normal or non-critical lab and imaging results will be communicated to you by MyChart, letter or phone within 4 business days after the clinic has received the results. If you do not hear from us within 7 days, please contact the clinic through [employer] or phone. If you have a critical or abnormal lab result, we will notify you by phone as soon as possible.  Submit refill requests through [employer] or call your pharmacy and they will forward the refill request to us. Please allow 3 business days for your refill to be completed.          Additional Information About Your Visit        MyChart Information     [employer] gives you secure access to your electronic health record. If you see a primary care provider, you can also send messages to your care team and make appointments. If you have questions, please call your primary care clinic.  If you do not have a primary care provider, please call [phone] and they will assist you.        Care EveryWhere ID     This is your Care EveryWhere ID. This could be used by other organizations to access your [place] medical records  [id number]        Your Vitals Were     Respirations Height BMI (Body Mass Index)             14 5' 4\" (1.626 m) 26.95 kg/m2          Blood Pressure from Last 3 Encounters:   01/31/17 116/84   01/24/17 (!) 132/92   01/18/17 142/77    Weight from Last 3 Encounters:   02/28/17 157 " lb (71.2 kg)   01/31/17 157 lb 14.4 oz (71.6 kg)   01/31/17 155 lb (70.3 kg)               Primary Care Provider Office Phone # Fax #    Marisa Hoover [phone]       [facility] [address]  [facility] [address]        Thank you!     Thank you for choosing [facility]  for your care. Our goal is always to provide you with excellent care. Hearing back from our patients is one way we can continue to improve our services. Please take a few minutes to complete the written survey that you may receive in the mail after your visit with us. Thank you!             Your Updated Medication List - Protect others around you: Learn how to safely use, store and throw away your medicines at www.disposemymeds.org.          This list is accurate as of: 2/28/17  8:58 AM.  Always use your most recent med list.                   Brand Name Dispense Instructions for use    aspirin 81 MG tablet      Take 81 mg by mouth daily       AMANDA 10-40 MG Tabs   Generic drug:  Amlodipine-Olmesartan     90 tablet    Take 1 tablet by mouth daily       buPROPion 150 MG 12 hr tablet    WELLBUTRIN SR    180 tablet    Take 1 tablet (150 mg) by mouth 2 times daily       calcium 500 MG Chew      Take by mouth daily       FLUoxetine 20 MG capsule    PROzac    90 capsule    Take 3 capsules (60 mg) by mouth daily       Multiple Vitamin Chew      Take 1 tablet by mouth daily       rosuvastatin 10 MG tablet    CRESTOR    90 tablet    Take 1 tablet (10 mg) by mouth daily

## 2017-02-28 NOTE — PROGRESS NOTES
"Foot & Ankle Surgery  February 28, 2017    S:  Patient in today approx 6 weeks sp hardware removal R foot.  Pain levels minimal.  Back to shoes/activities.  Can swell with increased activities    Resp 14  Ht 5' 4\" (1.626 m)  Wt 157 lb (71.2 kg)  BMI 26.95 kg/m2      ROS - positive for CC.  Patient denies current nausea, vomiting, chills, fevers, belly pain, calf pain, chest pain or SOB.  Complete remainder of ROS is otherwise neg.    PE - stressing of fusion site shows no pain; \"I can feel it\".  Skin shows no trophic, color or temperature changes otherwise.  No calf redness, swelling or pain noted otherwise.    Imaging - 4 views WB shows removed plate, fusion site fully healed without change in alignment      A/P - 67 year old yo patient approx 6 weeks sp above procedure  -personally reviewed imaging  -shoes/activities as tolerated  -RICE/NSAID prn    Follow up  -  Prn        Body mass index is 26.95 kg/(m^2).  Weight management plan: Patient was referred to their PCP to discuss a diet and exercise plan.      Levar Matias DPM   Podiatric Foot & Ankle Surgeon  [facility]  [phone]    "

## 2017-02-28 NOTE — NURSING NOTE
"Chief Complaint   Patient presents with     Surgical Followup       Initial Resp 14  Ht 5' 4\" (1.626 m)  Wt 157 lb (71.2 kg)  BMI 26.95 kg/m2 Estimated body mass index is 26.95 kg/(m^2) as calculated from the following:    Height as of this encounter: 5' 4\" (1.626 m).    Weight as of this encounter: 157 lb (71.2 kg).  Medication Reconciliation: complete  "

## 2017-02-28 NOTE — PATIENT INSTRUCTIONS
Follow Up -     Dr. Matias's Clinic Locations:         Monday Tuesday   [facility]   [address] [address] [address]   [place] [address] [place] [address]   [phone]       Wednesday:  Surgery Day    Surgery Scheduling Line - [phone]       Thursday Morning Thursday Afternoon   [facility]   [address] [facility] [address]   [place] [address] [place] [address]   [phone]       Friday Morning To Schedule an Appointment    [facility] Call: [phone] [address] [place] [address]  [phone] PLEASE FAX ALL FORMS TO: [phone]     Body Mass Index (BMI)    Many things can cause foot and ankle problems. Foot structure, activity level, foot mechanics and injuries are common causes of pain.    One very important issue that often goes unmentioned, is body weight.  Extra weight can cause increased stress on muscles, ligaments, bones and tendons. Sometimes just a few extra pounds is all it takes to put one over her/his threshold. Without reducing that stress, it can be difficult to alleviate pain.      Some people are uncomfortable addressing this issue, but we feel it is important for you to think about it. As Foot & Ankle specialists, our job is addressing the lower extremity problem and possible causes.     Regarding extra body weight, we encourage patients to discuss diet and weight management plans with their primary care doctors. It is this team approach that gives you the best opportunity for pain relief and getting you back on your feet.

## 2017-03-08 ENCOUNTER — TRANSFERRED RECORDS (OUTPATIENT)
Dept: SURGERY | Facility: CLINIC | Age: 67
End: 2017-03-08

## 2017-03-14 DIAGNOSIS — I10 ESSENTIAL HYPERTENSION: Primary | ICD-10-CM

## 2017-03-14 NOTE — TELEPHONE ENCOUNTER
Amlodipine      Last Written Prescription Date: 05/18/16  Last Fill Quantity: 90, # refills: 3    Last Office Visit with [facility] or [facility] prescribing provider:  01/13/17   Future Office Visit:    Next 5 appointments (look out 90 days)     May 04, 2017  8:15 AM CDT   Return Visit with Gabriele Rutledge MD   [facility] [place] ([facility])    [address] [address] [address] [phone]                    BP Readings from Last 3 Encounters:   01/31/17 116/84   01/24/17 (!) 132/92   01/18/17 142/77

## 2017-03-15 ENCOUNTER — TELEPHONE (OUTPATIENT)
Dept: INTERNAL MEDICINE | Facility: CLINIC | Age: 67
End: 2017-03-15

## 2017-03-15 DIAGNOSIS — I10 ESSENTIAL HYPERTENSION: Primary | ICD-10-CM

## 2017-03-15 RX ORDER — AMLODIPINE AND OLMESARTAN MEDOXOMIL 10; 40 MG/1; MG/1
1 TABLET ORAL DAILY
Qty: 90 TABLET | Refills: 3 | Status: SHIPPED | OUTPATIENT
Start: 2017-03-15 | End: 2017-03-17 | Stop reason: ALTCHOICE

## 2017-03-15 NOTE — TELEPHONE ENCOUNTER
Prior Auth request faxed from [employer] in [place] for Amlodipine  [employer]: [phone]    Plan: [phone]     i.d: [id number]    No alternatives given.   Copy of PA in folder    Please advise

## 2017-03-16 NOTE — TELEPHONE ENCOUNTER
Patient will call insurance company to find out what is covered and call us back with information.  KATHRYN MICHAEL CMA

## 2017-03-17 RX ORDER — AMLODIPINE BESYLATE 10 MG/1
10 TABLET ORAL DAILY
Qty: 90 TABLET | Refills: 1 | Status: SHIPPED | OUTPATIENT
Start: 2017-03-17 | End: 2017-12-15

## 2017-03-17 RX ORDER — OLMESARTAN MEDOXOMIL 40 MG/1
40 TABLET ORAL DAILY
Qty: 90 TABLET | Refills: 1 | Status: SHIPPED | OUTPATIENT
Start: 2017-03-17 | End: 2017-12-15

## 2017-03-17 NOTE — TELEPHONE ENCOUNTER
Pt called. Stated she called her ins comp and got the name of 3 meds that are formulary-      1. Olmesartan  2. Desolate  3. Medoxomil

## 2017-03-17 NOTE — TELEPHONE ENCOUNTER
The medication is a combination of olmesartan and amlodipine so she has to take them separately. Orders done.

## 2017-03-21 ENCOUNTER — TRANSFERRED RECORDS (OUTPATIENT)
Dept: HEALTH INFORMATION MANAGEMENT | Facility: CLINIC | Age: 67
End: 2017-03-21

## 2017-04-13 ENCOUNTER — PRE VISIT (OUTPATIENT)
Dept: NEUROLOGY | Facility: CLINIC | Age: 67
End: 2017-04-13

## 2017-04-13 NOTE — TELEPHONE ENCOUNTER
1.  Date/reason for appt:  4/20/17   Normal Pressure Hydrocephalus    2.  Referring provider:  Dr Stahl, [facility]    3.  Call to patient (Yes / No - short description):  Yes, left pt vmsg to return call.    [facility] OV 11/10/16 is in EPIC; MRI L Spine and CT Head are in PACS  Did she have any other visits anywhere or have any other imaging done not listed above?

## 2017-04-17 ASSESSMENT — ENCOUNTER SYMPTOMS
MUSCLE CRAMPS: 0
WEIGHT GAIN: 0
HEADACHES: 0
ARTHRALGIAS: 0
ALTERED TEMPERATURE REGULATION: 0
POLYPHAGIA: 0
LOSS OF CONSCIOUSNESS: 1
CHILLS: 0
DECREASED APPETITE: 0
MEMORY LOSS: 1
POLYDIPSIA: 0
SPEECH CHANGE: 0
SEIZURES: 0
DIZZINESS: 1
HALLUCINATIONS: 0
PARALYSIS: 0
STIFFNESS: 0
NIGHT SWEATS: 0
TREMORS: 0
WEIGHT LOSS: 0
DISTURBANCES IN COORDINATION: 0
BACK PAIN: 1
INCREASED ENERGY: 0
MYALGIAS: 0
NUMBNESS: 1
FATIGUE: 1
WEAKNESS: 1
TINGLING: 1
JOINT SWELLING: 0
FEVER: 0
MUSCLE WEAKNESS: 0
NECK PAIN: 0

## 2017-04-18 NOTE — TELEPHONE ENCOUNTER
Yes, pt has also been to [facility].  Records are in [facility].

## 2017-04-20 ENCOUNTER — OFFICE VISIT (OUTPATIENT)
Dept: NEUROLOGY | Facility: CLINIC | Age: 67
End: 2017-04-20

## 2017-04-20 VITALS — HEART RATE: 79 BPM | SYSTOLIC BLOOD PRESSURE: 144 MMHG | HEIGHT: 64 IN | DIASTOLIC BLOOD PRESSURE: 79 MMHG

## 2017-04-20 DIAGNOSIS — R42 DIZZINESS: Primary | ICD-10-CM

## 2017-04-20 DIAGNOSIS — G91.2 NPH (NORMAL PRESSURE HYDROCEPHALUS) (H): ICD-10-CM

## 2017-04-20 ASSESSMENT — PAIN SCALES - GENERAL: PAINLEVEL: NO PAIN (0)

## 2017-04-20 NOTE — PROGRESS NOTES
Answers for HPI/ROS submitted by the patient on 4/17/2017   General Symptoms: Yes  Skin Symptoms: No  HENT Symptoms: No  EYE SYMPTOMS: No  HEART SYMPTOMS: No  LUNG SYMPTOMS: No  INTESTINAL SYMPTOMS: No  URINARY SYMPTOMS: No  GYNECOLOGIC SYMPTOMS: No  BREAST SYMPTOMS: No  SKELETAL SYMPTOMS: Yes  BLOOD SYMPTOMS: No  NERVOUS SYSTEM SYMPTOMS: Yes  MENTAL HEALTH SYMPTOMS: No  Fever: No  Loss of appetite: No  Weight loss: No  Weight gain: No  Fatigue: Yes  Night sweats: No  Chills: No  Increased stress: Yes  Excessive hunger: No  Excessive thirst: No  Feeling hot or cold when others believe the temperature is normal: No  Loss of height: No  Post-operative complications: No  Surgical site pain: No  Hallucinations: No  Change in or Loss of Energy: No  Hyperactivity: No  Confusion: No  Back pain: Yes  Muscle aches: No  Neck pain: No  Swollen joints: No  Joint pain: No  Bone pain: No  Muscle cramps: No  Muscle weakness: No  Joint stiffness: No  Bone fracture: No  Trouble with coordination: No  Dizziness or trouble with balance: Yes  Fainting or black-out spells: Yes  Memory loss: Yes  Headache: No  Seizures: No  Speech problems: No  Tingling: Yes  Tremor: No  Weakness: Yes  Difficulty walking: Yes  Paralysis: No  Numbness: Yes

## 2017-04-20 NOTE — NURSING NOTE
Chief Complaint   Patient presents with     Consult     new pressure hydrocephalus    Dejuan Lamar CMA

## 2017-04-20 NOTE — MR AVS SNAPSHOT
After Visit Summary   4/20/2017    Zoey Armstrong    MRN: [id number]           Patient Information     Date Of Birth          1950        Visit Information        Provider Department      4/20/2017 12:50 PM Praveen Arzola MD [facility] Neurology        Today's Diagnoses     Dizziness    -  1    NPH (normal pressure hydrocephalus)           Follow-ups after your visit        Additional Services     PHYSICAL THERAPY REFERRAL       *This therapy referral will be filtered to a centralized scheduling office at [facility] and the patient will receive a call to schedule an appointment at a [facility] location most convenient for them. *     [facility] provides Physical Therapy evaluation and treatment and many specialty services across the [facility] system.  If requesting a specialty program, please choose from the list below.    If you have not heard from the scheduling office within 2 business days, please call [phone] for all locations, with the exception of [place], please call [phone].  Treatment: Evaluation & Treatment  Special Instructions/Modalities: Pre and post-lumbar puncture to evaluate possible NPH  Special Programs: Balance/Vestibular    Please be aware that coverage of these services is subject to the terms and limitations of your health insurance plan.  Call member services at your health plan with any benefit or coverage questions.      **Note to Provider:  If you are referring outside of [facility] for the therapy appointment, please list the name of the location in the  special instructions  above, print the referral and give to the patient to schedule the appointment.                  Your next 10 appointments already scheduled     May 04, 2017  8:15 AM CDT   Return Visit with Gabriele Rutledge MD   [facility] PHYSICIANS [facility] AT [place] ([facility])    [address] [place] [id number]    [phone]            May 12, 2017  8:30 AM CDT   (Arrive by 8:15 AM)   Lumbar Puncture with DUGLAS Burgos [facility] Neurology ([facility] [place])    [address]  [address] [facility] [address] [phone]           Lumbar Puncture  A lumbar puncture is also called a spinal tap. A lumbar puncture may be used to look for problems in your brain, spinal cord, and related structures.   What Is a Lumbar Puncture?  A needle is used to remove and test cerebrospinal (spinal) fluid from the sac that contains your spinal cord. The spinal cord runs through most of your spine, and carries messages between your brain and the rest of your body. A lumbar puncture is done near the base of your spine, below where the spinal cord has ended. The needle enters the sac but does not touch the spinal cord. There is a risk of headache and backache, and a very slight risk of bleeding or infection. There can also be a risk of transient post-spinal tap headache, but this risk can be decreased with bedrest and adequate hydration. But the benefits of a lumbar puncture far outweigh the risks, and it is essential for diagnosing multiple neurologic conditions.   Before Your Lumbar Puncture  Prepare for your lumbar puncture as instructed. After you check in, you will need to sign a form stating that the procedure has been explained to you. If you have questions, be sure to ask them before you sign the form. You may also be asked to put on a hospital gown. Your procedure will take 30-60 minutes. But allow extra time to check in.   For your safety and the success of your procedure, tell the doctor or nurse if you: Have any bleeding disorders Take blood thinners  *You will need to be off blood thinners for 5 days prior to your lumbar puncture. Be sure to check with whichever physician is prescribing the blood thinner to make sure it is safe to be off this medication and if so,  directions on how to safely stop and restart the blood thinner. Have any immune system problems Have had any back surgery Are allergic to medications or iodine    0041-2033 [employer]. [address] [place] [address], [place] [address]. All rights reserved. This information is not intended as a substitute for professional medical care. Always follow your healthcare professional's instructions.               Future tests that were ordered for you today     Open Future Orders        Priority Expected Expires Ordered    MRI Brain w & w/o contrast Routine  4/20/2018 4/20/2017            Who to contact     Please call your clinic at [phone] to:    Ask questions about your health    Make or cancel appointments    Discuss your medicines    Learn about your test results    Speak to your doctor   If you have compliments or concerns about an experience at your clinic, or if you wish to file a complaint, please contact [facility] Patient Relations at [phone] or email us at [email or web address]         Additional Information About Your Visit        [employer] Information     [employer] gives you secure access to your electronic health record. If you see a primary care provider, you can also send messages to your care team and make appointments. If you have questions, please call your primary care clinic.  If you do not have a primary care provider, please call [phone] and they will assist you.      [employer] is an electronic gateway that provides easy, online access to your medical records. With [employer], you can request a clinic appointment, read your test results, renew a prescription or communicate with your care team.     To access your existing account, please contact your [facility] or call [phone] for assistance.        Care EveryWhere ID     This is your Care EveryWhere ID. This could be used by other organizations to access your  "[facility] medical records  [id number]        Your Vitals Were     Pulse Height                79 1.626 m (5' 4\")           Blood Pressure from Last 3 Encounters:   04/20/17 144/79   01/31/17 116/84   01/24/17 (!) 132/92    Weight from Last 3 Encounters:   02/28/17 71.2 kg (157 lb)   01/31/17 71.6 kg (157 lb 14.4 oz)   01/31/17 70.3 kg (155 lb)              We Performed the Following     Cell count with differential CSF: Tube 1     CSF Culture Aerobic Bacterial     Glucose CSF: Tube 3     Gram stain     PHYSICAL THERAPY REFERRAL     Protein total CSF: Tube 1     SPINAL PUNCTURE, LUMBAR DIAGNOSTIC        Primary Care Provider Office Phone # Fax #    Marisa Hoover [phone]       [facility] [address] [place] [address]        Thank you!     Thank you for choosing [facility] NEUROLOGY  for your care. Our goal is always to provide you with excellent care. Hearing back from our patients is one way we can continue to improve our services. Please take a few minutes to complete the written survey that you may receive in the mail after your visit with us. Thank you!             Your Updated Medication List - Protect others around you: Learn how to safely use, store and throw away your medicines at www.disposemymeds.org.          This list is accurate as of: 4/20/17  2:30 PM.  Always use your most recent med list.                   Brand Name Dispense Instructions for use    amLODIPine 10 MG tablet    NORVASC    90 tablet    Take 1 tablet (10 mg) by mouth daily       aspirin 81 MG tablet      Take 81 mg by mouth daily       buPROPion 150 MG 12 hr tablet    WELLBUTRIN SR    180 tablet    Take 1 tablet (150 mg) by mouth 2 times daily       calcium 500 MG Chew      Take by mouth daily       FLUoxetine 20 MG capsule    PROzac    90 capsule    Take 3 capsules (60 mg) by mouth daily       Multiple Vitamin Chew      Take 1 tablet by mouth daily       olmesartan 40 MG tablet    BENICAR "    90 tablet    Take 1 tablet (40 mg) by mouth daily       rosuvastatin 10 MG tablet    CRESTOR    90 tablet    Take 1 tablet (10 mg) by mouth daily

## 2017-04-20 NOTE — LETTER
2017       RE: Zoey Armstrong  [address]  [place] [address]     Dear Colleague,    Thank you for referring your patient, Zoey Armstrong, to the [facility] NEUROLOGY at [facility]. Please see a copy of my visit note below.    DEPARTMENT OF NEUROLOGY    Patient Name:  Zoey Armstrong  MRN:  [id number]    :  1950  Date of Clinic Visit:  2017  Primary Care Provider:  Marisa Hoover      REASON FOR CONSULTATION:  Abnormal MRI.      HISTORY OF PRESENT ILLNESS:  Ms. Armstrong is a 66-year-old woman who presents via referral for an abnormal MRI.  She states that the concern is for potential normal pressure hydrocephalus; however, she has not officially been given this diagnosis.  She states that roughly 1 year ago she began having the onset of dizziness.  She describes the dizziness as a potential lightheadedness or presyncopal feeling.  She says that this has led to several falls, whereby she does not think she completely loses consciousness, however, she is unable to account for the period during which she will be walking or standing and the period at which she finds herself on the ground mere seconds later.  She does state that she feels that this has been progressive, however, she realized in the last couple of months that sharp turns of her head can bring this on.  She also noticed that if she pushes herself, particularly hard, in an exercise capacity that this may also bring on this dizziness.  These symptoms as well as neurogenic claudication which is discussed further below led her to the care of a neurologist.  Both the CT and MRI were obtained that noted enlarged ventricles that could indicate normal pressure hydrocephalus.  She says that she has not noted any personality changes or incontinence.  In fact, she states the opposite that she actually has difficulty with voiding.  Her  is present and states also that he has noted no  personality changes, no memory changes and no other changes of any kind aside from the dizziness,      The patient has known neurogenic claudication.  She says she has had pain in her legs going back 8 years.  She is followed by Neurosurgery and is in fact scheduled to have a surgical intervention in June.      The patient denies any other symptoms at this visit.  She denies fever, nausea, vomiting, constipation, diarrhea, chest pain, difficulties breathing, heat or cold intolerance.      REVIEW OF SYMPTOMS:  A 10-point review of symptoms was negative except as indicated above or in the patient's self-reported answers located at the end of this note.       ALLERGIES:  Allergies   Allergen Reactions     Betadine [Povidone Iodine] Hives     Iodine-131      No Clinical Screening - See Comments Swelling     Water softener salts     Soap      Perfumed soaps     MEDICATIONS:  Current Outpatient Prescriptions   Medication Sig Dispense Refill     amLODIPine (NORVASC) 10 MG tablet Take 1 tablet (10 mg) by mouth daily 90 tablet 1     olmesartan (BENICAR) 40 MG tablet Take 1 tablet (40 mg) by mouth daily 90 tablet 1     FLUoxetine (PROZAC) 20 MG capsule Take 3 capsules (60 mg) by mouth daily 90 capsule 3     buPROPion (WELLBUTRIN SR) 150 MG 12 hr tablet Take 1 tablet (150 mg) by mouth 2 times daily 180 tablet 1     rosuvastatin (CRESTOR) 10 MG tablet Take 1 tablet (10 mg) by mouth daily 90 tablet 3     aspirin 81 MG tablet Take 81 mg by mouth daily       Multiple Vitamin CHEW Take 1 tablet by mouth daily        calcium 500 MG CHEW Take by mouth daily        PAST MEDICAL HISTORY:  Past Medical History:   Diagnosis Date     Back pain      CAD (coronary artery disease)      Depression      Hyperlipidemia      Hypertension      Smoker      PAST SURGICAL HISTORY:  Past Surgical History:   Procedure Laterality Date     ANGIOGRAM  10-    Moderate non-obstructive coronary disease involving all 3 vessels. LAD and RCA lesions are  non-flow-limiting. Recommend aggressive risk factor management and possible pulmonary evaluation for dyspnea     ARTHROPLASTY TOE(S) Right 2/24/2016    Procedure: ARTHROPLASTY TOE(S);  Surgeon: Levar Matias DPM;  Location: [facility]     ARTHROTOMY SHOULDER, ROTATOR CUFF REPAIR, COMBINED Right 7/12/2016    Procedure: COMBINED ARTHROTOMY SHOULDER, ROTATOR CUFF REPAIR;  Surgeon: Reggie Cisse MD;  Location:  OR     HEAD & NECK SURGERY      Jaw surgery     LUMPECTOMY BREAST, SEED LOCALIZATION, SENTINEL NODE Right 12/2/2016    Procedure: LUMPECTOMY BREAST, SEED LOCALIZATION, SENTINEL NODE;  Surgeon: Tiffanie Cabrera MD;  Location:  OR     ORTHOPEDIC SURGERY  1994    left foot surgery / bunion     OSTEOTOMY FOOT Right 2/24/2016    Procedure: OSTEOTOMY FOOT;  Surgeon: Levar Matias DPM;  Location: [facility]     RECONSTRUCT FOREFOOT WITH METATARSOPHALANGEAL (MTP) FUSION Right 2/24/2016    Procedure: RECONSTRUCT FOREFOOT WITH METATARSOPHALANGEAL (MTP) FUSION;  Surgeon: Lvear Matias DPM;  Location: [facility]     REMOVE HARDWARE FOOT Right 1/18/2017    Procedure: REMOVE HARDWARE FOOT;  Surgeon: Levar Matias DPM;  Location:  OR     SOCIAL HISTORY:  Social History     Social History     Marital status:      Spouse name: N/A     Number of children: N/A     Years of education: N/A     Occupational History     Not on file.     Social History Main Topics     Smoking status: Former Smoker     Packs/day: 0.50     Years: 30.00     Types: Cigarettes     Quit date: 7/15/2015     Smokeless tobacco: Never Used     Alcohol use 0.0 oz/week     0 Standard drinks or equivalent per week      Comment: 3/ week     Drug use: No     Sexual activity: Yes     Partners: Male     Other Topics Concern     Caffeine Concern No     1 diet coke, coffee occ     Sleep Concern No     Special Diet No     regular     Exercise No     none     Social History Narrative     FAMILY HISTORY:  Family History   Problem Relation Age  "of Onset     Breast Cancer Mother      C.A.D. Father      MI     Lipids Father      Coronary Artery Disease Father      Hyperlipidemia Father      Breast Cancer Other      maternal aunt     CANCER Other      skin         PHYSICAL EXAMINATION:    Vitals:   /79  Pulse 79  Ht 1.626 m (5' 4\")    -General: Woman sitting comfortably on the chair. No acute distress    -HEENT: No skin discolorations noted, no carotid bruits     -Chest: RRR without murmurs or bruits     -Abdomen: Positive bowel sounds, soft, non-tender, no organomegaly    -Musculoskeletal: No abnormalities noted     -Neurological:     --MS: Patient is alert, attentive, and oriented. Speech is clear and fluid. Names normally. Registration, recall and remote memory intact. Mental math normal.     --CNs: Visual fields are full to confrontation. Normal fundoscopic exam with no visualized vascular changes. Pupils are briskly reactive to light. Visual fields full. Ocular motility full without nystagmus, facial sensation intact, muscles of mastication and facial expression normal, hearing intact, gag and palate elevation normal, sternomastoid and trapezius function normal, tongue motions normal     --Motor: Normal muscle tone and bulk. 5/5 muscle strength bilaterally     --Reflexes: Brisk reflexes at knees and biceps and ankles. Plantar responses flexorbilaterally    --Sensory: Light touch, vibration and PP intact bilaterally in upper and lower extremities     --Coordination: Rapidly alternating movements of fingers intact. Heel-shin and finger-nose-finger is intact. Negative Romberg.     --Gait: Stands with feet normally spaced. Gait is slow but steady.       INVESTIGATIONS:  All available labs and imaging were reviewed with the patient and her  at this visit.      IMPRESSION AND RECOMMENDATIONS:  A 58-year-old woman with a history of  neurogenic claudication presenting with 1 year of dizziness and an abnormal MRI that indicated  possible concern " for NPH.  Of note, the patient does have notably enlarged ventricles on the images which were reviewed together with the patient at this visit.  However, she is also describing a lack of some other symptoms that would be typical of NPH such as memory difficulties, personality changes, urinary incontinence and other gait difficulties.  She has a slow but steady gait on exam, but does not appear to be grossly ataxic.  There is also no noted papilledema or other outward signs that would point towards a current NPH.  However, given the aforementioned MRI, it is thought prudent that this be explored a bit further.  We will repeat MRI to assess any changes that may have occurred since her last one, which occurred in 11/2016.  We also suggested a high volume lumbar puncture with physical therapy done both before and 1-2 days after to assess if that improved her functional status in any noticeable way.  We discussed this with the patient who registered her understanding and agreement with the plan.  She will make arrangements with the clinic here to have this done and we will see her in followup after.         This patient was seen and discussed with attending neurologist, Dr. Areli Cazares who agrees with my assessment and plan.      Dictated by Praveen Arzola MD, Resident         ARELI GORDON MD       As dictated by PRAVEEN ARZOLA MD     I saw and examined the patient and discussed plan of care with the resident.  I agree with the findings, assessment and plan as reported above.    MD Praveen Umanzor MD

## 2017-04-21 NOTE — PROGRESS NOTES
DEPARTMENT OF NEUROLOGY    Patient Name:  Zoey Armstrong  MRN:  [id number]    :  1950  Date of Clinic Visit:  2017  Primary Care Provider:  Marisa Hoover      REASON FOR CONSULTATION:  Abnormal MRI.      HISTORY OF PRESENT ILLNESS:  Ms. Armstrong is a 66-year-old woman who presents via referral for an abnormal MRI.  She states that the concern is for potential normal pressure hydrocephalus; however, she has not officially been given this diagnosis.  She states that roughly 1 year ago she began having the onset of dizziness.  She describes the dizziness as a potential lightheadedness or presyncopal feeling.  She says that this has led to several falls, whereby she does not think she completely loses consciousness, however, she is unable to account for the period during which she will be walking or standing and the period at which she finds herself on the ground mere seconds later.  She does state that she feels that this has been progressive, however, she realized in the last couple of months that sharp turns of her head can bring this on.  She also noticed that if she pushes herself, particularly hard, in an exercise capacity that this may also bring on this dizziness.  These symptoms as well as neurogenic claudication which is discussed further below led her to the care of a neurologist.  Both the CT and MRI were obtained that noted enlarged ventricles that could indicate normal pressure hydrocephalus.  She says that she has not noted any personality changes or incontinence.  In fact, she states the opposite that she actually has difficulty with voiding.  Her  is present and states also that he has noted no personality changes, no memory changes and no other changes of any kind aside from the dizziness,      The patient has known neurogenic claudication.  She says she has had pain in her legs going back 8 years.  She is followed by Neurosurgery and is in fact scheduled to have a  surgical intervention in June.      The patient denies any other symptoms at this visit.  She denies fever, nausea, vomiting, constipation, diarrhea, chest pain, difficulties breathing, heat or cold intolerance.      REVIEW OF SYMPTOMS:  A 10-point review of symptoms was negative except as indicated above or in the patient's self-reported answers located at the end of this note.       ALLERGIES:  Allergies   Allergen Reactions     Betadine [Povidone Iodine] Hives     Iodine-131      No Clinical Screening - See Comments Swelling     Water softener salts     Soap      Perfumed soaps     MEDICATIONS:  Current Outpatient Prescriptions   Medication Sig Dispense Refill     amLODIPine (NORVASC) 10 MG tablet Take 1 tablet (10 mg) by mouth daily 90 tablet 1     olmesartan (BENICAR) 40 MG tablet Take 1 tablet (40 mg) by mouth daily 90 tablet 1     FLUoxetine (PROZAC) 20 MG capsule Take 3 capsules (60 mg) by mouth daily 90 capsule 3     buPROPion (WELLBUTRIN SR) 150 MG 12 hr tablet Take 1 tablet (150 mg) by mouth 2 times daily 180 tablet 1     rosuvastatin (CRESTOR) 10 MG tablet Take 1 tablet (10 mg) by mouth daily 90 tablet 3     aspirin 81 MG tablet Take 81 mg by mouth daily       Multiple Vitamin CHEW Take 1 tablet by mouth daily        calcium 500 MG CHEW Take by mouth daily        PAST MEDICAL HISTORY:  Past Medical History:   Diagnosis Date     Back pain      CAD (coronary artery disease)      Depression      Hyperlipidemia      Hypertension      Smoker      PAST SURGICAL HISTORY:  Past Surgical History:   Procedure Laterality Date     ANGIOGRAM  10-    Moderate non-obstructive coronary disease involving all 3 vessels. LAD and RCA lesions are non-flow-limiting. Recommend aggressive risk factor management and possible pulmonary evaluation for dyspnea     ARTHROPLASTY TOE(S) Right 2/24/2016    Procedure: ARTHROPLASTY TOE(S);  Surgeon: Levar Matias DPM;  Location: [facility]     ARTHROTOMY SHOULDER, ROTATOR CUFF  REPAIR, COMBINED Right 7/12/2016    Procedure: COMBINED ARTHROTOMY SHOULDER, ROTATOR CUFF REPAIR;  Surgeon: Reggie Cisse MD;  Location:  OR     HEAD & NECK SURGERY      Jaw surgery     LUMPECTOMY BREAST, SEED LOCALIZATION, SENTINEL NODE Right 12/2/2016    Procedure: LUMPECTOMY BREAST, SEED LOCALIZATION, SENTINEL NODE;  Surgeon: Tiffanie Cabrera MD;  Location:  OR     ORTHOPEDIC SURGERY  1994    left foot surgery / bunion     OSTEOTOMY FOOT Right 2/24/2016    Procedure: OSTEOTOMY FOOT;  Surgeon: Levar Matias DPM;  Location: [facility]     RECONSTRUCT FOREFOOT WITH METATARSOPHALANGEAL (MTP) FUSION Right 2/24/2016    Procedure: RECONSTRUCT FOREFOOT WITH METATARSOPHALANGEAL (MTP) FUSION;  Surgeon: Levar Matias DPM;  Location:  [facility]     REMOVE HARDWARE FOOT Right 1/18/2017    Procedure: REMOVE HARDWARE FOOT;  Surgeon: Levar Matias DPM;  Location:  OR     SOCIAL HISTORY:  Social History     Social History     Marital status:      Spouse name: N/A     Number of children: N/A     Years of education: N/A     Occupational History     Not on file.     Social History Main Topics     Smoking status: Former Smoker     Packs/day: 0.50     Years: 30.00     Types: Cigarettes     Quit date: 7/15/2015     Smokeless tobacco: Never Used     Alcohol use 0.0 oz/week     0 Standard drinks or equivalent per week      Comment: 3/ week     Drug use: No     Sexual activity: Yes     Partners: Male     Other Topics Concern     Caffeine Concern No     1 diet coke, coffee occ     Sleep Concern No     Special Diet No     regular     Exercise No     none     Social History Narrative     FAMILY HISTORY:  Family History   Problem Relation Age of Onset     Breast Cancer Mother      C.A.D. Father      MI     Lipids Father      Coronary Artery Disease Father      Hyperlipidemia Father      Breast Cancer Other      maternal aunt     CANCER Other      skin         PHYSICAL EXAMINATION:    Vitals:   /79  Pulse  "79  Ht 1.626 m (5' 4\")    -General: Woman sitting comfortably on the chair. No acute distress    -HEENT: No skin discolorations noted, no carotid bruits     -Chest: RRR without murmurs or bruits     -Abdomen: Positive bowel sounds, soft, non-tender, no organomegaly    -Musculoskeletal: No abnormalities noted     -Neurological:     --MS: Patient is alert, attentive, and oriented. Speech is clear and fluid. Names normally. Registration, recall and remote memory intact. Mental math normal.     --CNs: Visual fields are full to confrontation. Normal fundoscopic exam with no visualized vascular changes. Pupils are briskly reactive to light. Visual fields full. Ocular motility full without nystagmus, facial sensation intact, muscles of mastication and facial expression normal, hearing intact, gag and palate elevation normal, sternomastoid and trapezius function normal, tongue motions normal     --Motor: Normal muscle tone and bulk. 5/5 muscle strength bilaterally     --Reflexes: Brisk reflexes at knees and biceps and ankles. Plantar responses flexorbilaterally    --Sensory: Light touch, vibration and PP intact bilaterally in upper and lower extremities     --Coordination: Rapidly alternating movements of fingers intact. Heel-shin and finger-nose-finger is intact. Negative Romberg.     --Gait: Stands with feet normally spaced. Gait is slow but steady.       INVESTIGATIONS:  All available labs and imaging were reviewed with the patient and her  at this visit.      IMPRESSION AND RECOMMENDATIONS:  A 58-year-old woman with a history of  neurogenic claudication presenting with 1 year of dizziness and an abnormal MRI that indicated  possible concern for NPH.  Of note, the patient does have notably enlarged ventricles on the images which were reviewed together with the patient at this visit.  However, she is also describing a lack of some other symptoms that would be typical of NPH such as memory difficulties, personality " changes, urinary incontinence and other gait difficulties.  She has a slow but steady gait on exam, but does not appear to be grossly ataxic.  There is also no noted papilledema or other outward signs that would point towards a current NPH.  However, given the aforementioned MRI, it is thought prudent that this be explored a bit further.  We will repeat MRI to assess any changes that may have occurred since her last one, which occurred in 2016.  We also suggested a high volume lumbar puncture with physical therapy done both before and 1-2 days after to assess if that improved her functional status in any noticeable way.  We discussed this with the patient who registered her understanding and agreement with the plan.  She will make arrangements with the clinic here to have this done and we will see her in followup after.         This patient was seen and discussed with attending neurologist, Dr. Areli Cazares who agrees with my assessment and plan.      Dictated by Praveen Arzola MD, Resident         ARELI GORDON MD       As dictated by MD Praveen OSPINA MD  [facility] Department of Neurology PGY2  Pager: [phone]     I saw and examined the patient and discussed plan of care with the resident.  I agree with the findings, assessment and plan as reported above.    Areli Gordon MD          D: 2017 17:19   T: 2017 06:44   MT: nh      Name:     ROSY CANNON   MRN:      [id number]        Account:      [id number]   :      1950           Service Date: 2017      Document: [id number]              Answers for HPI/ROS submitted by the patient on 2017   General Symptoms: Yes  Skin Symptoms: No  HENT Symptoms: No  EYE SYMPTOMS: No  HEART SYMPTOMS: No  LUNG SYMPTOMS: No  INTESTINAL SYMPTOMS: No  URINARY SYMPTOMS: No  GYNECOLOGIC SYMPTOMS: No  BREAST SYMPTOMS: No  SKELETAL SYMPTOMS: Yes  BLOOD SYMPTOMS: No  NERVOUS SYSTEM  SYMPTOMS: Yes  MENTAL HEALTH SYMPTOMS: No  Fever: No  Loss of appetite: No  Weight loss: No  Weight gain: No  Fatigue: Yes  Night sweats: No  Chills: No  Increased stress: Yes  Excessive hunger: No  Excessive thirst: No  Feeling hot or cold when others believe the temperature is normal: No  Loss of height: No  Post-operative complications: No  Surgical site pain: No  Hallucinations: No  Change in or Loss of Energy: No  Hyperactivity: No  Confusion: No  Back pain: Yes  Muscle aches: No  Neck pain: No  Swollen joints: No  Joint pain: No  Bone pain: No  Muscle cramps: No  Muscle weakness: No  Joint stiffness: No  Bone fracture: No  Trouble with coordination: No  Dizziness or trouble with balance: Yes  Fainting or black-out spells: Yes  Memory loss: Yes  Headache: No  Seizures: No  Speech problems: No  Tingling: Yes  Tremor: No  Weakness: Yes  Difficulty walking: Yes  Paralysis: No  Numbness: Yes        Answers for HPI/ROS submitted by the patient on 4/17/2017   General Symptoms: Yes  Skin Symptoms: No  HENT Symptoms: No  EYE SYMPTOMS: No  HEART SYMPTOMS: No  LUNG SYMPTOMS: No  INTESTINAL SYMPTOMS: No  URINARY SYMPTOMS: No  GYNECOLOGIC SYMPTOMS: No  BREAST SYMPTOMS: No  SKELETAL SYMPTOMS: Yes  BLOOD SYMPTOMS: No  NERVOUS SYSTEM SYMPTOMS: Yes  MENTAL HEALTH SYMPTOMS: No  Fever: No  Loss of appetite: No  Weight loss: No  Weight gain: No  Fatigue: Yes  Night sweats: No  Chills: No  Increased stress: Yes  Excessive hunger: No  Excessive thirst: No  Feeling hot or cold when others believe the temperature is normal: No  Loss of height: No  Post-operative complications: No  Surgical site pain: No  Hallucinations: No  Change in or Loss of Energy: No  Hyperactivity: No  Confusion: No  Back pain: Yes  Muscle aches: No  Neck pain: No  Swollen joints: No  Joint pain: No  Bone pain: No  Muscle cramps: No  Muscle weakness: No  Joint stiffness: No  Bone fracture: No  Trouble with coordination: No  Dizziness or trouble with balance:  Yes  Fainting or black-out spells: Yes  Memory loss: Yes  Headache: No  Seizures: No  Speech problems: No  Tingling: Yes  Tremor: No  Weakness: Yes  Difficulty walking: Yes  Paralysis: No  Numbness: Yes

## 2017-04-24 NOTE — PATIENT INSTRUCTIONS
Please make appointment to have MRI    Please make arrangements to have physical therapy done before an after a scheduled lumbar puncture.    Please follow-up in clinic after the above.

## 2017-04-27 ENCOUNTER — HOSPITAL ENCOUNTER (OUTPATIENT)
Dept: PHYSICAL THERAPY | Facility: CLINIC | Age: 67
Setting detail: THERAPIES SERIES
End: 2017-04-27
Attending: PSYCHIATRY & NEUROLOGY
Payer: MEDICARE

## 2017-04-27 PROCEDURE — 40000840 ZZHC STATISTIC PT VESTIBULAR VISIT: Performed by: PHYSICAL THERAPIST

## 2017-04-27 PROCEDURE — 97163 PT EVAL HIGH COMPLEX 45 MIN: CPT | Mod: GP | Performed by: PHYSICAL THERAPIST

## 2017-04-27 PROCEDURE — G8979 MOBILITY GOAL STATUS: HCPCS | Mod: GP,CJ | Performed by: PHYSICAL THERAPIST

## 2017-04-27 PROCEDURE — 97162 PT EVAL MOD COMPLEX 30 MIN: CPT | Mod: GP | Performed by: PHYSICAL THERAPIST

## 2017-04-27 PROCEDURE — G8978 MOBILITY CURRENT STATUS: HCPCS | Mod: GP,CK | Performed by: PHYSICAL THERAPIST

## 2017-04-27 NOTE — PROGRESS NOTES
"                                                                                              [facility]        OUTPATIENT PHYSICAL THERAPY FUNCTIONAL EVALUATION  PLAN OF TREATMENT FOR OUTPATIENT REHABILITATION  (COMPLETE FOR INITIAL CLAIMS ONLY)  Patient's Last Name, First Name, M.I.  YOB: 1950  Zoey Armstrong     Provider's Name   [facility]   Medical Record No.  [id number]     Start of Care Date:  04/27/17   Onset Date:  04/20/17   Type:     _X__PT   ____OT  ____SLP Medical Diagnosis:  Dizziness, normal pressure hydrocephalus     PT Diagnosis:  gait instability Visits from SOC:  1                              __________________________________________________________________________________  Plan of Treatment/Functional Goals:  balance training, gait training, neuromuscular re-education, strengthening, transfer training  post lumbar puncture balance assessment        GOALS  1  Patient will score at least 19/24 on DGI without an AD for greater safety with walking in the community to be able to meet friends for coffee.  06/22/17    2  Patient will demonstrate a more normal gait pattern with increased step length and normal recip arm swing to improve gait speed to at least 3ft/sec on 25ft walk indicating improved gait stability and reduce fall risk for community mobility ie to go shopping.  06/22/17    3  Patient will have improved postural stability with her eyes closed, able to  narrow FRANK for at least 30\" for greater safety in the shower.  06/22/17    4  Patient will be able to perform head turns while walking and standing without c/o dizziness and no LOB to reduce fall risk when walking and talking out in the community.  06/22/17    5  Patient will score 39 or less on DHI indicating reduced dizziness for greater participation in normal daily household and social activities.  06/22/17      Therapy Frequency:  2 times/Week (beginning post LP, " frequency reduced as indicated)   Predicted Duration of Therapy Intervention:  8 weeks    Michelle Byrnes, PT                                    I CERTIFY THE NEED FOR THESE SERVICES FURNISHED UNDER        THIS PLAN OF TREATMENT AND WHILE UNDER MY CARE     (Physician co-signature of this document indicates review and certification of the therapy plan).                Certification Date From:  04/27/17   Certification Date To:  06/22/17    Referring Provider:  Praveen Arzola MD    Initial Assessment  See Epic Evaluation- Start of Care Date: 04/27/17

## 2017-04-27 NOTE — PROGRESS NOTES
04/27/17 0825   Quick Adds   Quick Adds Certification;Vestibular Eval   Type of Visit Initial OP PT Evaluation   General Information   Start of Care Date 04/27/17   Referring Physician Praveen Arzola MD   Orders Evaluate and Treat as Indicated   Additional Orders pre and post-lumbar puncture assessment. LP scheduled for Friday 5/5.   Order Date 04/20/17   Medical Diagnosis Dizziness, normal pressure hydrocephalus   Onset of illness/injury or Date of Surgery 04/20/17   Precautions/Limitations fall precautions   Surgical/Medical history reviewed Yes   Pertinent history of current vestibular problem (include personal factors and/or comorbidities that impact the POC)  Depression   Pertinent history of current problem (include personal factors and/or comorbidities that impact the POC) Patient reports a sudden decline in balance and onset of dizziness 5 years ago. Symptoms reportedly have worsened over time. In Aug/Sept 2016 she was evaluated at the [facility] with full audiologic and vestibular function testing, and received PT treatment for balance and vestibular rehab. At that time there were no indications of peripheral vestibular dysfunciton but there were some central vestibular findings. Patient had a limited number of sessions due to a fall and shoulder injury requiring surgery. She has had no additonal intervention for balance. An MRI performed in November 2016 reveal enlarged ventricals in thr brain. MD now requesting PT assessment of balance, gait, dizziness pre and post lumbar puncture to see if there is a change in signs and symptoms. Patient is very fearful of falling due to multiple falls and injuries over the years. She uses a walker intermittently, mostly in the evening and at night. She feels best in the AM but by evening seems to decline with balance and gait. She does not walk outside on her own. She is unable to go shopping, out to coffee with friends alone as she is not comfortable walking even short  "distances out of the house by herself. She is driving short distances only. She feels most of her falls have occurred due to rapid head movements. She is not sure if she experiences dizziness prior to falling. She denies any hearing or vision changes. She reports her handwriting seems to be getting worse, illegible by evening. She feels she also has a harder time with speech and word finding towards the end of the day but spouse has not noticed. They both deny memory problems. Patient reports she is (I) in her self cares but is careful. She is unable to perform household chores due to a combination of imbalance and back/LE symptoms. In this past year, patient also has had rotator cuff surgery s/p fall, a shoulder fracture due to a fall and a lumpectomy with finding of breast CA. She also has a h/o HTN controlled with medications and depression.   Pertinent Visual History  no change, wears \"cheaters\"   Prior level of function comment patient has been walking without an AD with the exception being at night. She does minimal ambulation in the community and only with assist. She is (I) in ADLs but needs assist for cooking and cleaning.   Diagnostic Tests MRI   MRI Results Results   MRI results enlarged ventricals   Previous/Current Treatment Physical Therapy  (vestibular rehab)   Improvement after PT Moderate  (improved gait speed and DGI)   Current Community Support Family/friend caregiver  (spouse)   Patient role/Employment history Retired   Living environment [place]   Home/Community Accessibility Comments uncomfortable on stairs so dont use them often. BR on mainlevel. Drives only short distances.   Current Assistive Devices Front Wheeled Walker   ADL Devices Shower/Tub Grab Bar   Patient/Family Goals Statement improve balance and walking, prevent falls, be able to go out in the community by herself ie to meet friends, shop   General Information Comments Patient scores on timed 25 ft walk improved from 13.8\" " "(1.8 ft/s) to 8.77\" (2.85 ft/s) and DGI from 12/24 to 18/24 with vestibular and balance rehab at [facility] in summer/fall 2016.   Fall Risk Screen   Fall screen completed by PT   Per patient - Fall 2 or more times in past year? Yes   Per patient - Fall with injury in past year? Yes   Is patient a fall risk? Yes   Functional Scales   Functional Scales and Outcomes DHI 70/100 severe perception of handicap   Pain   Patient currently in pain No   Pain comments patient does report h/o lumbar spinal stenosis with intermittent back pain and numbness and tingling in her thighs to knees. She is scheduled for spinal surgery in June.   Cognitive Status Examination   Orientation orientation to person, place and time   Level of Consciousness alert;other (see comments)  (flat affect)   Follows Commands and Answers Questions 100% of the time   Personal Safety and Judgment intact   Posture   Posture Normal   Strength   Strength Comments functional weakness noted in LEs with transfers and ambulation   Bed Mobility   Bed Mobility Comments independent   Transfer Skills   Transfer Comments independent with UE assist and slow movements   Gait   Gait Comments Patient ambulating with an AD. Gait is slow, short steps B with each foot barely passing the next, landing foot flat with each step. She has minimal arm swing on left and lacking on the right. right arm held more rigid with hand in a fist. posture is slouched forward, looking down.    Gait Special Tests   Gait Special Tests 25 FOOT TIMED WALK;DYNAMIC GAIT INDEX   Gait Special Tests 25 Foot Timed Walk   Seconds 16 with no AD   Comments 1.5 ft/s. This is a decline from time of testing at [facility] in 9/2016 when it was 8.77\" or 2.85 ft/s. 5.86\" or less is norm for age and gender.   Gait Special Tests Dynamic Gait Index   Score out of 24 15   Comments no AD. This is a decline since last assessed at [facility] in 9/2016 when it was 18/24.   Balance Special Tests   Balance Special Tests Modified CTSIB " Conditions   Balance Special Tests Modified CTSIB Conditions   Condition 1, seconds 30 Seconds   Condition 2, seconds 5 Seconds   Condition 4, seconds 30 Seconds   Condition 5, seconds 0 Seconds   Modified CTSIB Comments pt is reliance on vision for balance   Sensory Examination   Sensory Perception no deficits were identified   Cervicogenic Screen   Neck ROM sufficient for positional testing   Oculomotor Exam   Smooth Pursuit Other   Smooth Pursuit Comment occassional saccadic intrusions   Saccades Other   Saccades Comments often hypometric   VOR Normal   VOR Comments with slow head movements   Rapid Head Thrust Normal   Infrared Goggle Exam or Frenzel Lense Exam   Vestibular Suppressant in Last 24 Hours? No   Exam completed with Infrared Goggles   Spontaneous Nystagmus Other   Spontaneous Nystagmus comments question a mild pendular nystagmus   Gaze Evoked Nystagmus Negative   Head Shake Horizontal Nystagmus Negative   Elgin-Hallpike (right) Negative   Phillip-Hallpike (Left) Negative   HSCC Supine Roll Test (Right) Negative   HSCC Supine Roll Test (Left) Negative   Planned Therapy Interventions   Planned Therapy Interventions balance training;gait training;neuromuscular re-education;strengthening;transfer training   Planned Therapy Interventions Comment post lumbar puncture balance assessment   Clinical Impression   Criteria for Skilled Therapeutic Interventions Met yes, treatment indicated   PT Diagnosis gait instability   Influenced by the following impairments functional weakness in LEs, imbalance with reliance on vision, dizziness, reduced confidence in balance/gait, impaired gait, slow gait speed, back pain, LE numbness and tingling   Functional limitations due to impairments ambulation short and long community distances, stairs, social outings with friends, freeway driving or longer distances in community, household chores   Clinical Presentation Unstable/Unpredictable   Clinical Presentation Rationale balance and  "gait seem to be getting worse, pt has frequent falls that occur rapidly without an ability to react.   Clinical Decision Making (Complexity) High complexity   Therapy Frequency 2 times/Week  (beginning post LP, frequency reduced as indicated)   Predicted Duration of Therapy Intervention (days/wks) 8 weeks   Risk & Benefits of therapy have been explained Yes   Patient, Family & other staff in agreement with plan of care Yes   Education Assessment   Barriers to Learning No barriers   GOALS   PT Eval Goals 1;2;3;4;5   Goal 1   Goal Identifier 1   Goal Description Patient will score at least 19/24 on DGI without an AD for greater safety with walking in the community to be able to meet friends for coffee.   Target Date 06/22/17   Goal 2   Goal Identifier 2   Goal Description Patient will demonstrate a more normal gait pattern with increased step length and normal recip arm swing to improve gait speed to at least 3ft/sec on 25ft walk indicating improved gait stability and reduce fall risk for community mobility ie to go shopping.   Target Date 06/22/17   Goal 3   Goal Identifier 3   Goal Description Patient will have improved postural stability with her eyes closed, able to  narrow FRANK for at least 30\" for greater safety in the shower.   Target Date 06/22/17   Goal 4   Goal Identifier 4   Goal Description Patient will be able to perform head turns while walking and standing without c/o dizziness and no LOB to reduce fall risk when walking and talking out in the community.   Target Date 06/22/17   Goal 5   Goal Identifier 5   Goal Description Patient will score 39 or less on DHI indicating reduced dizziness for greater participation in normal daily household and social activities.   Target Date 06/22/17   Total Evaluation Time   Total Evaluation Time (Minutes) 60   Therapy Certification   Certification date from 04/27/17   Certification date to 06/22/17   Medical Diagnosis Dizziness, normal pressure hydrocephalus "

## 2017-05-01 ENCOUNTER — PRE VISIT (OUTPATIENT)
Dept: CARDIOLOGY | Facility: CLINIC | Age: 67
End: 2017-05-01

## 2017-05-04 ENCOUNTER — OFFICE VISIT (OUTPATIENT)
Dept: CARDIOLOGY | Facility: CLINIC | Age: 67
End: 2017-05-04
Attending: INTERNAL MEDICINE
Payer: MEDICARE

## 2017-05-04 VITALS
HEIGHT: 64 IN | HEART RATE: 84 BPM | DIASTOLIC BLOOD PRESSURE: 76 MMHG | SYSTOLIC BLOOD PRESSURE: 110 MMHG | WEIGHT: 155 LBS | BODY MASS INDEX: 26.46 KG/M2

## 2017-05-04 DIAGNOSIS — R29.6 FALLING EPISODES: ICD-10-CM

## 2017-05-04 DIAGNOSIS — I25.10 CORONARY ARTERY DISEASE INVOLVING NATIVE CORONARY ARTERY OF NATIVE HEART WITHOUT ANGINA PECTORIS: ICD-10-CM

## 2017-05-04 DIAGNOSIS — E78.2 MIXED HYPERLIPIDEMIA: ICD-10-CM

## 2017-05-04 DIAGNOSIS — I10 ESSENTIAL HYPERTENSION, BENIGN: ICD-10-CM

## 2017-05-04 PROCEDURE — 99214 OFFICE O/P EST MOD 30 MIN: CPT | Performed by: INTERNAL MEDICINE

## 2017-05-04 RX ORDER — LETROZOLE 2.5 MG/1
2.5 TABLET, FILM COATED ORAL DAILY
COMMUNITY
End: 2019-01-01

## 2017-05-04 NOTE — MR AVS SNAPSHOT
After Visit Summary   5/4/2017    Zoey Armstrong    MRN: [id number]           Patient Information     Date Of Birth          1950        Visit Information        Provider Department      5/4/2017 8:15 AM Gabriele Rutledge MD [facility] [place]        Today's Diagnoses     Coronary artery disease involving native coronary artery of native heart without angina pectoris        Mixed hyperlipidemia        Falling episodes        Essential hypertension, benign           Follow-ups after your visit        Your next 10 appointments already scheduled     May 12, 2017  8:30 AM CDT   (Arrive by 8:15 AM)   Lumbar Puncture with DUGLAS Burgos [facility] ([facility])    [address]  3rd Floor  [facility] [address] [phone]           Lumbar Puncture  A lumbar puncture is also called a spinal tap. A lumbar puncture may be used to look for problems in your brain, spinal cord, and related structures.   What Is a Lumbar Puncture?  A needle is used to remove and test cerebrospinal (spinal) fluid from the sac that contains your spinal cord. The spinal cord runs through most of your spine, and carries messages between your brain and the rest of your body. A lumbar puncture is done near the base of your spine, below where the spinal cord has ended. The needle enters the sac but does not touch the spinal cord. There is a risk of headache and backache, and a very slight risk of bleeding or infection. There can also be a risk of transient post-spinal tap headache, but this risk can be decreased with bedrest and adequate hydration. But the benefits of a lumbar puncture far outweigh the risks, and it is essential for diagnosing multiple neurologic conditions.   Before Your Lumbar Puncture  Prepare for your lumbar puncture as instructed. After you check in, you will need to sign a form stating that the procedure  has been explained to you. If you have questions, be sure to ask them before you sign the form. You may also be asked to put on a hospital gown. Your procedure will take 30-60 minutes. But allow extra time to check in.   For your safety and the success of your procedure, tell the doctor or nurse if you: Have any bleeding disorders Take blood thinners  *You will need to be off blood thinners for 5 days prior to your lumbar puncture. Be sure to check with whichever physician is prescribing the blood thinner to make sure it is safe to be off this medication and if so, directions on how to safely stop and restart the blood thinner. Have any immune system problems Have had any back surgery Are allergic to medications or iodine    9890-3813 The "[employer]. [address] [place] [address], [place] [address]. All rights reserved. This information is not intended as a substitute for professional medical care. Always follow your healthcare professional's instructions.             May 15, 2017 10:00 AM CDT   Treatment 60 with Michelle Byrnes, PT   [facility] ([facility])    [address] [place] [id number] [phone]              Who to contact     If you have questions or need follow up information about today's clinic visit or your schedule please contact [facility] [place] directly at [phone].  Normal or non-critical lab and imaging results will be communicated to you by MyChart, letter or phone within 4 business days after the clinic has received the results. If you do not hear from us within 7 days, please contact the clinic through MyChart or phone. If you have a critical or abnormal lab result, we will notify you by phone as soon as possible.  Submit refill requests through [employer] or call your pharmacy and they will forward the refill request to us. Please allow 3 business days for your refill to be completed.           "Additional Information About Your Visit        MyChart Information     [employer] gives you secure access to your electronic health record. If you see a primary care provider, you can also send messages to your care team and make appointments. If you have questions, please call your primary care clinic.  If you do not have a primary care provider, please call [phone] and they will assist you.        Care EveryWhere ID     This is your Care EveryWhere ID. This could be used by other organizations to access your [facility] medical records  [id number]        Your Vitals Were     Pulse Height BMI (Body Mass Index)             84 1.626 m (5' 4\") 26.61 kg/m2          Blood Pressure from Last 3 Encounters:   05/04/17 110/76   04/20/17 144/79   01/31/17 116/84    Weight from Last 3 Encounters:   05/04/17 70.3 kg (155 lb)   02/28/17 71.2 kg (157 lb)   01/31/17 71.6 kg (157 lb 14.4 oz)              We Performed the Following     Follow-Up with Cardiologist        Primary Care Provider Office Phone # Fax #    Marisa Hoover [phone]       [facility] [address] [place] [address]        Thank you!     Thank you for choosing [facility] [place]  for your care. Our goal is always to provide you with excellent care. Hearing back from our patients is one way we can continue to improve our services. Please take a few minutes to complete the written survey that you may receive in the mail after your visit with us. Thank you!             Your Updated Medication List - Protect others around you: Learn how to safely use, store and throw away your medicines at www.disposemymeds.org.          This list is accurate as of: 5/4/17  9:08 AM.  Always use your most recent med list.                   Brand Name Dispense Instructions for use    amLODIPine 10 MG tablet    NORVASC    90 tablet    Take 1 tablet (10 mg) by mouth daily       aspirin 81 MG tablet      " Take 81 mg by mouth daily       buPROPion 150 MG 12 hr tablet    WELLBUTRIN SR    180 tablet    Take 1 tablet (150 mg) by mouth 2 times daily       calcium 500 MG Chew      Take by mouth daily       FLUoxetine 20 MG capsule    PROzac    90 capsule    Take 3 capsules (60 mg) by mouth daily       letrozole 2.5 MG tablet    FEMARA     Take 2.5 mg by mouth daily       Multiple Vitamin Chew      Take 1 tablet by mouth daily       olmesartan 40 MG tablet    BENICAR    90 tablet    Take 1 tablet (40 mg) by mouth daily       rosuvastatin 10 MG tablet    CRESTOR    90 tablet    Take 1 tablet (10 mg) by mouth daily

## 2017-05-04 NOTE — PROGRESS NOTES
2015:  The patient has moderate coronary artery disease. Hemodynamic  assessment of the LAD and RCA lesions revealed the lesions to be  non-flow-limiting. Her dyspnea could be due to emphysema given her  long-standing smoking history. Recommend aggressive risk factor  management and possible pulmonary evaluation for dyspnea.    HPI and Plan:   See dictation  I recommend continuing current treatment plans in the chart.    No orders of the defined types were placed in this encounter.    Orders Placed This Encounter   Medications     letrozole (FEMARA) 2.5 MG tablet     Sig: Take 2.5 mg by mouth daily     There are no discontinued medications.      Encounter Diagnoses   Name Primary?     Coronary artery disease involving native coronary artery of native heart without angina pectoris      Mixed hyperlipidemia      Falling episodes      Essential hypertension, benign        CURRENT MEDICATIONS:  Current Outpatient Prescriptions   Medication Sig Dispense Refill     letrozole (FEMARA) 2.5 MG tablet Take 2.5 mg by mouth daily       amLODIPine (NORVASC) 10 MG tablet Take 1 tablet (10 mg) by mouth daily 90 tablet 1     olmesartan (BENICAR) 40 MG tablet Take 1 tablet (40 mg) by mouth daily 90 tablet 1     FLUoxetine (PROZAC) 20 MG capsule Take 3 capsules (60 mg) by mouth daily 90 capsule 3     buPROPion (WELLBUTRIN SR) 150 MG 12 hr tablet Take 1 tablet (150 mg) by mouth 2 times daily 180 tablet 1     rosuvastatin (CRESTOR) 10 MG tablet Take 1 tablet (10 mg) by mouth daily 90 tablet 3     aspirin 81 MG tablet Take 81 mg by mouth daily       Multiple Vitamin CHEW Take 1 tablet by mouth daily        calcium 500 MG CHEW Take by mouth daily          ALLERGIES     Allergies   Allergen Reactions     Betadine [Povidone Iodine] Hives     Iodine-131      No Clinical Screening - See Comments Swelling     Water softener salts     Soap      Perfumed soaps       PAST MEDICAL HISTORY:  Past Medical History:   Diagnosis Date     Back pain       CAD (coronary artery disease)      Depression      Falls      Hyperlipidemia      Hypertension      Smoker        PAST SURGICAL HISTORY:  Past Surgical History:   Procedure Laterality Date     ANGIOGRAM  10-    Moderate non-obstructive coronary disease involving all 3 vessels. LAD and RCA lesions are non-flow-limiting. Recommend aggressive risk factor management and possible pulmonary evaluation for dyspnea     ARTHROPLASTY TOE(S) Right 2/24/2016    Procedure: ARTHROPLASTY TOE(S);  Surgeon: Levar Matias DPM;  Location: [facility]     ARTHROTOMY SHOULDER, ROTATOR CUFF REPAIR, COMBINED Right 7/12/2016    Procedure: COMBINED ARTHROTOMY SHOULDER, ROTATOR CUFF REPAIR;  Surgeon: Reggie Cisse MD;  Location:  OR     HEAD & NECK SURGERY      Jaw surgery     LUMPECTOMY BREAST, SEED LOCALIZATION, SENTINEL NODE Right 12/2/2016    Procedure: LUMPECTOMY BREAST, SEED LOCALIZATION, SENTINEL NODE;  Surgeon: Tiffanie Cabrera MD;  Location:  OR     ORTHOPEDIC SURGERY  1994    left foot surgery / bunion     OSTEOTOMY FOOT Right 2/24/2016    Procedure: OSTEOTOMY FOOT;  Surgeon: Levar Matias DPM;  Location: [facility]     RECONSTRUCT FOREFOOT WITH METATARSOPHALANGEAL (MTP) FUSION Right 2/24/2016    Procedure: RECONSTRUCT FOREFOOT WITH METATARSOPHALANGEAL (MTP) FUSION;  Surgeon: Levar Matias DPM;  Location: [facility]     REMOVE HARDWARE FOOT Right 1/18/2017    Procedure: REMOVE HARDWARE FOOT;  Surgeon: Levar Matias DPM;  Location:  OR       FAMILY HISTORY:  Family History   Problem Relation Age of Onset     Breast Cancer Mother      C.A.D. Father      MI     Lipids Father      Coronary Artery Disease Father      Hyperlipidemia Father      Breast Cancer Other      maternal aunt     CANCER Other      skin       SOCIAL HISTORY:  Social History     Social History     Marital status:      Spouse name: N/A     Number of children: N/A     Years of education: N/A     Social History Main Topics      "Smoking status: Former Smoker     Packs/day: 0.50     Years: 30.00     Types: Cigarettes     Quit date: 7/15/2015     Smokeless tobacco: Never Used     Alcohol use 0.0 oz/week     0 Standard drinks or equivalent per week      Comment: 3/ week     Drug use: No     Sexual activity: Yes     Partners: Male     Other Topics Concern     Caffeine Concern No     1 diet coke, coffee occ     Sleep Concern No     Special Diet No     regular     Exercise No     none     Social History Narrative         Review of Systems:  Skin:  Negative       Eyes:  Negative      ENT:  Negative      Respiratory:  Negative for dyspnea on exertion     Cardiovascular:    Positive for;fatigue;dizziness;lightheadedness    Gastroenterology: Negative      Genitourinary:  Positive for retention \"lighter in amount of  urination as the day goes on\" per pt  Musculoskeletal:  Positive for back pain;muscular weakness back surgery scheduled 6/2/ with Dr López,   Neurologic:  Positive for headaches balance issues   Psychiatric:  Positive for anxiety;depression    Heme/Lymph/Imm:  Positive for allergies RX allergies   Endocrine:  Negative        Physical Exam:  Vitals: /76 (BP Location: Right arm, Patient Position: Chair, Cuff Size: Adult Regular)  Pulse 84  Ht 1.626 m (5' 4\")  Wt 70.3 kg (155 lb)  BMI 26.61 kg/m2    Constitutional:  cooperative;alert and oriented;well developed   sit: BP = 114/   ,  1 minute standing  BP = 114,   standing 2  minutes = 118/  .    Skin:  warm and dry to the touch        Head:  normocephalic        Eyes:  pupils equal and round        ENT:  no pallor or cyanosis        Neck:  JVP normal;no carotid bruit        Chest:  clear to auscultation;normal symmetry;normal respiratory excursion          Cardiac: regular rhythm;normal S1 and S2     no presence of murmur            Abdomen:  abdomen soft;non-tender;no bruits        Vascular: pulses full and equal                                        Extremities and Back:  no " deformities, clubbing, cyanosis, erythema observed;no edema              Neurological:  affect appropriate, oriented to time, person and place;no gross motor deficits   normal mild nystagmus, wobbly with walking      Recent Lab Results:  LIPID RESULTS:  Lab Results   Component Value Date    CHOL 195 09/28/2015    HDL 74 09/28/2015    LDL 87 09/28/2015    TRIG 170 (H) 09/28/2015    CHOLHDLRATIO 2.6 09/28/2015       LIVER ENZYME RESULTS:  Lab Results   Component Value Date    AST 26 09/28/2015    ALT 29 09/28/2015       CBC RESULTS:  Lab Results   Component Value Date    WBC 8.4 01/04/2017    RBC 4.70 01/04/2017    HGB 14.6 01/04/2017    HCT 45.2 01/04/2017    MCV 96 01/04/2017    MCH 31.1 01/04/2017    MCHC 32.3 01/04/2017    RDW 15.1 (H) 01/04/2017     01/04/2017       BMP RESULTS:  Lab Results   Component Value Date     01/04/2017    POTASSIUM 4.4 01/04/2017    CHLORIDE 106 01/04/2017    CO2 26 01/04/2017    ANIONGAP 9 01/04/2017    GLC 85 01/04/2017    BUN 25 01/04/2017    CR 1.06 (H) 01/04/2017    GFRESTIMATED 51 (L) 01/04/2017    GFRESTBLACK 62 01/04/2017    CARMINE 10.0 01/04/2017        A1C RESULTS:  No results found for: A1C    INR RESULTS:  Lab Results   Component Value Date    INR 0.99 09/29/2015    INR 0.93 03/27/2011           CC  Gabrilee Rutledge MD   [facility]  [address]  [place] [id number]

## 2017-05-04 NOTE — LETTER
5/4/2017    Marisa Hoover MD  [facility]   [address] [facility] [address]    RE: Zoey Armstrong       Dear Colleague,    I had the pleasure of seeing Zoey Armstrong in the [facility].    I saw Zoey Armstrong today.  In 2015 we did coronary angiography revealing mild noncritical coronary disease.  She had had a stress test that was vaguely abnormal, but it turned out that she had nonocclusive disease.      She stopped smoking 3 years ago and subsequently has taken great care of herself.  She has been on aspirin, statin and amlodipine -- ARB Benicar therapy since that time.  Her blood pressures have been normal, and with the oral Crestor 10 mg at bedtime, the last LDL I have on the chart is 87.  If she is anywhere close to that, I would be happy.  Triglycerides tend to run a little high in the 170 range.      She has been more recently hassled by a sense of falling.  Multiple evaluations have been done.  It is my clinical impression that her falling has nothing to do with cardiovascular dysfunction or dizziness.  She has manifested some unsteadiness of gait, some weakness of the legs.  She has been evaluated at the [facility] from a Neurology standpoint.  I believe that ultimately her falling is more related to a neuromuscular mechanism than cardiovascular.      An echocardiogram showed an entirely normal heart with an ejection fraction at rest of 55%-60% and no obvious valvular issues.  There was a question of a wall motion abnormality by echo at rest, but nothing was confirmed with her coronary angiogram.      PHYSICAL EXAMINATION:     GENERAL:  Physical exam today shows that she stood up slightly halteringly but did not lose her balance.   VITAL SIGNS:  Blood pressure was 110/70, heart rate 80 beats a minute and regular.  She weighs 155 pounds.   HEAD AND NECK:  Normal.  Carotids were equal, no bruits heard.   HEART:  Regular without gallop or  murmur.   LUNGS:  Clear to auscultation.   ABDOMEN:  Soft without organomegaly.   EXTREMITIES:  Free of edema.      Outpatient Encounter Prescriptions as of 5/4/2017   Medication Sig Dispense Refill     letrozole (FEMARA) 2.5 MG tablet Take 2.5 mg by mouth daily       amLODIPine (NORVASC) 10 MG tablet Take 1 tablet (10 mg) by mouth daily 90 tablet 1     olmesartan (BENICAR) 40 MG tablet Take 1 tablet (40 mg) by mouth daily 90 tablet 1     FLUoxetine (PROZAC) 20 MG capsule Take 3 capsules (60 mg) by mouth daily 90 capsule 3     buPROPion (WELLBUTRIN SR) 150 MG 12 hr tablet Take 1 tablet (150 mg) by mouth 2 times daily 180 tablet 1     rosuvastatin (CRESTOR) 10 MG tablet Take 1 tablet (10 mg) by mouth daily 90 tablet 3     aspirin 81 MG tablet Take 81 mg by mouth daily       Multiple Vitamin CHEW Take 1 tablet by mouth daily        calcium 500 MG CHEW Take by mouth daily        No facility-administered encounter medications on file as of 5/4/2017.      IMPRESSION:   1.  Asymptomatic coronary artery disease.   2.  Falling not related to cardiovascular dysfunction.   3.  No signs of significant documented heart disease but coronary artery disease confirmed.   4.  Treated hyperlipidemia on rosuvastatin.      PLAN:  Continue as we are.  I asked to see her in 2 years.  If she has problems, let me know.        Warm regards.  If you have any questions or concerns, let me know.      Over 35 minutes was spent reviewing old records, imaging, cardiac catheterization, echo, laboratory work, laboratory results, EKG, medications, medication side effects and medication indications.      Again, thank you for allowing me to participate in the care of your patient.      Sincerely,    LAKEISHA WEISS MD     [facility]

## 2017-05-05 NOTE — PROGRESS NOTES
HISTORY OF PRESENT ILLNESS:  I saw Zoey Armstrong today.  In 2015 we did coronary angiography revealing mild noncritical coronary disease.  She had had a stress test that was vaguely abnormal, but it turned out that she had nonocclusive disease.      She stopped smoking 3 years ago and subsequently has taken great care of herself.  She has been on aspirin, statin and amlodipine -- ARB Benicar therapy since that time.  Her blood pressures have been normal, and with the oral Crestor 10 mg at bedtime, the last LDL I have on the chart is 87.  If she is anywhere close to that, I would be happy.  Triglycerides tend to run a little high in the 170 range.      She has been more recently hassled by a sense of falling.  Multiple evaluations have been done.  It is my clinical impression that her falling has nothing to do with cardiovascular dysfunction or dizziness.  She has manifested some unsteadiness of gait, some weakness of the legs.  She has been evaluated at the [facility] from a Neurology standpoint.  I believe that ultimately her falling is more related to a neuromuscular mechanism than cardiovascular.      An echocardiogram showed an entirely normal heart with an ejection fraction at rest of 55%-60% and no obvious valvular issues.  There was a question of a wall motion abnormality by echo at rest, but nothing was confirmed with her coronary angiogram.      PHYSICAL EXAMINATION:     GENERAL:  Physical exam today shows that she stood up slightly halteringly but did not lose her balance.   VITAL SIGNS:  Blood pressure was 110/70, heart rate 80 beats a minute and regular.  She weighs 155 pounds.   HEAD AND NECK:  Normal.  Carotids were equal, no bruits heard.   HEART:  Regular without gallop or murmur.   LUNGS:  Clear to auscultation.   ABDOMEN:  Soft without organomegaly.   EXTREMITIES:  Free of edema.      IMPRESSION:   1.  Asymptomatic coronary artery disease.   2.  Falling not related to cardiovascular  dysfunction.   3.  No signs of significant documented heart disease but coronary artery disease confirmed.   4.  Treated hyperlipidemia on rosuvastatin.      PLAN:  Continue as we are.  I asked to see her in 2 years.  If she has problems, let me know.        Warm regards.  If you have any questions or concerns, let me know.      Over 35 minutes was spent reviewing old records, imaging, cardiac catheterization, echo, laboratory work, laboratory results, EKG, medications, medication side effects and medication indications.      cc:   Marisa Hoover MD    [facility]    [address] [place]  [address]         LAKEISHA WEISS MD, FACC             D: 2017 09:06   T: 2017 19:06   MT: JASKARAN      Name:     ROSY CANNON   MRN:      [id number]        Account:      [id number]   :      1950           Service Date: 2017      Document: [id number]

## 2017-05-09 ENCOUNTER — CARE COORDINATION (OUTPATIENT)
Dept: NEUROLOGY | Facility: CLINIC | Age: 67
End: 2017-05-09

## 2017-05-09 NOTE — PROGRESS NOTES
No, PT eval should be done before and after LP and 1-2 days after LP.     Thanks,   Areli Cazares            Previous Messages       ----- Message -----      From: Chen Guerrier RN      Sent: 5/9/2017   8:28 AM        To: Areli Llamas MD, *   Subject: LP                                               This patient is scheduled for her LP on 5/12 and her PT assessment after the LP is scheduled for 5/15.  Is this OK?  Please let me know as soon as possible.   Thank you,   Chen         5/9/17:  Rescheduled patient's LP to 5/25 at 2:30.  Patient will have gait evaluation on 5/24/17 at 9:00 am, 5/25/17 at 4:45, and 5/26/17 at 8:30.  I spoke to Zoey about this and she is available and ready to get this all done.  She did not have any questions or concerns at this time.

## 2017-05-10 NOTE — PROGRESS NOTES
Linda would like to do LP at 0830 on 5/25. This will give patient more time to get to her PT appt at 4:45 that same day. Spoke to pt who is okay with this. Pt is not taking any blood thinners. Instructed her to wear comfortable clothing and to increase fluid intake day of LP. Pt verbalized understanding and agreement with plan.

## 2017-05-11 ENCOUNTER — CARE COORDINATION (OUTPATIENT)
Dept: NEUROLOGY | Facility: CLINIC | Age: 67
End: 2017-05-11

## 2017-05-11 DIAGNOSIS — G91.2 NPH (NORMAL PRESSURE HYDROCEPHALUS) (H): Primary | ICD-10-CM

## 2017-05-11 NOTE — PROGRESS NOTES
Pt originally scheduled for her high volume lumbar puncture in clinic, however, she has significant back issues/stenosis/bulging and it was felt that it would be better for her to have the LP in radiology. Called pt about this and she is okay with it. Pt now scheduled for lumbar puncture in radiology here at [facility] on 5/25 at 0800 (arrival time of 0745). NPO 3 hrs prior to procedure. Patient must have a . Called pt with these instructions. Her physical therapy appointments have not changed. Pt verbalized understanding and agreement with plan.

## 2017-05-22 ENCOUNTER — TELEPHONE (OUTPATIENT)
Dept: GENERAL RADIOLOGY | Facility: CLINIC | Age: 67
End: 2017-05-22

## 2017-05-24 ENCOUNTER — HOSPITAL ENCOUNTER (OUTPATIENT)
Dept: PHYSICAL THERAPY | Facility: CLINIC | Age: 67
Setting detail: THERAPIES SERIES
End: 2017-05-24
Payer: MEDICARE

## 2017-05-24 PROCEDURE — 40000719 ZZHC STATISTIC PT DEPARTMENT NEURO VISIT: Performed by: PHYSICAL THERAPIST

## 2017-05-24 PROCEDURE — 97750 PHYSICAL PERFORMANCE TEST: CPT | Mod: GP | Performed by: PHYSICAL THERAPIST

## 2017-05-24 PROCEDURE — 97116 GAIT TRAINING THERAPY: CPT | Mod: GP | Performed by: PHYSICAL THERAPIST

## 2017-05-24 NOTE — PROGRESS NOTES
05/24/17 0900   Signing Clinician's Name / Credentials   Signing clinician's name / credentials Negra Mei PT   Dynamic Gait Index (Favio and Nunez Pine, 1995)   Gait Level Surface 1   Change in Gait Speed 1  (very minimal change to speed)   Gait and Horizontal Head Turns 1  (minimal head movement, slows speed considerably)   Gait with Vertical Head Turns 2   Gait and Pivot Turns 1  (very slow)   Step Over Obstacle 0   Step Around Obstacles 2   Steps 2   Total Dynamic Gait Index Score  (A score of 19 or less has been correlated to an increased risk of falls in community dwelling older adults, patients with vestibular disorders, and patients with MS.)   Total Score (out of 24) 10     Dynamic Gait Index (DGI):The DGI is a measure of balance during gait that is reliable and valid for the elderly and individuals with Parkinson's disease, MS, vestibular disorders, or s/p stroke. Gait assistive device used: none    Patient score: 10/24  Scores ?19/24 indicate an increased risk for falls according to Orly et al 2000  Minimal Detectable Change = 2.9 in community dwelling elderly according to Chicho et al 2011    Assessment (rationale for performing, application to patient s function & care plan): Pre Test prior to lumbar puncture scheduled tomorrow.   Minutes billed as physical performance test: 15

## 2017-05-25 PROCEDURE — 84157 ASSAY OF PROTEIN OTHER: CPT | Performed by: PSYCHIATRY & NEUROLOGY

## 2017-05-25 PROCEDURE — 89050 BODY FLUID CELL COUNT: CPT | Performed by: PSYCHIATRY & NEUROLOGY

## 2017-05-25 PROCEDURE — 87070 CULTURE OTHR SPECIMN AEROBIC: CPT | Performed by: PSYCHIATRY & NEUROLOGY

## 2017-05-25 PROCEDURE — 82945 GLUCOSE OTHER FLUID: CPT | Performed by: PSYCHIATRY & NEUROLOGY

## 2017-05-25 PROCEDURE — 87205 SMEAR GRAM STAIN: CPT | Performed by: PSYCHIATRY & NEUROLOGY

## 2017-05-26 ENCOUNTER — HOSPITAL ENCOUNTER (OUTPATIENT)
Dept: PHYSICAL THERAPY | Facility: CLINIC | Age: 67
Setting detail: THERAPIES SERIES
End: 2017-05-26
Payer: MEDICARE

## 2017-05-26 PROCEDURE — G8980 MOBILITY D/C STATUS: HCPCS | Mod: GP,CK | Performed by: PHYSICAL THERAPIST

## 2017-05-26 PROCEDURE — 40000719 ZZHC STATISTIC PT DEPARTMENT NEURO VISIT: Performed by: PHYSICAL THERAPIST

## 2017-05-26 PROCEDURE — 97116 GAIT TRAINING THERAPY: CPT | Mod: GP | Performed by: PHYSICAL THERAPIST

## 2017-05-26 PROCEDURE — 97750 PHYSICAL PERFORMANCE TEST: CPT | Mod: GP | Performed by: PHYSICAL THERAPIST

## 2017-05-26 PROCEDURE — G8979 MOBILITY GOAL STATUS: HCPCS | Mod: GP,CJ | Performed by: PHYSICAL THERAPIST

## 2017-05-26 NOTE — PROGRESS NOTES
Outpatient Physical Therapy Discharge Note     Patient: Zoey Armstrong  : 1950    Beginning/End Dates of Reporting Period:  2017 to 2017    Referring Provider: Praveen Arzola MD    Therapy Diagnosis: gait instability.  pre and post-lumbar puncture assessment.     Client Self Report: Feeling better this morning.  States mornings are always better for her gait and balance.  Still feels lightheaded and dizzy, however.   Back surgery was rescheduled for beginning of July - pt states they did not want to do surgery so close to her lumbar puncture.      Objective Measurements:    Eval:  Objective Measure: DGI no AD  Details: 15/24    Objective Measure: 25 foot walk no AD  Details: 16 sec    Objective Measure: stand on Foam EC feet together arms crossed  Details: 0 sec      Testing 17:  Objective Measure: DGI no AD  Details: 10/24    Objective Measure: 25 foot walk no AD  Details: 13.6 sec, 26 steps (average of 2 trials)    Objective Measure: stand on Foam EC feet together arms crossed  Details: 3 sec      Testing 2017:  Objective Measure: DGI no AD  Details:     Objective Measure: 25 foot walk no AD  Details: 12.6 sec, 23 steps (average of 2 trials)    Objective Measure: stand on Foam EC feet together arms crossed  Details: 2 sec      Testing 17:  Objective Measure: DGI no AD  Details:     Objective Measure: 25 foot walk no AD  Details: 11.4 sec, average of 2 trials  20 steps    Objective Measure: stand on Foam EC feet together arms crossed  Details: 2.6 sec           Goals:  Goal Identifier 1   Goal Description Patient will score at least 19/24 on DGI without an AD for greater safety with walking in the community to be able to meet friends for coffee.   Target Date 17   Date Met   (not met)   Progress:     Goal Identifier 2   Goal Description Patient will demonstrate a more normal gait pattern with increased step length and normal recip arm swing to improve gait speed  "to at least 3ft/sec on 25ft walk indicating improved gait stability and reduce fall risk for community mobility ie to go shopping.   Target Date 06/22/17   Date Met   (2.2 ft/sec.  Improved step length but pattern not normal)   Progress:     Goal Identifier 3   Goal Description Patient will have improved postural stability with her eyes closed, able to  narrow FRANK for at least 30\" for greater safety in the shower.   Target Date 06/22/17   Date Met   (able to stand 30 sec on foam with effort)   Progress:     Goal Identifier 4   Goal Description Patient will be able to perform head turns while walking and standing without c/o dizziness and no LOB to reduce fall risk when walking and talking out in the community.   Target Date 06/22/17   Date Met   (staggers at times with horiz head turns)   Progress:     Goal Identifier 5   Goal Description Patient will score 39 or less on DHI indicating reduced dizziness for greater participation in normal daily household and social activities.   Target Date 06/22/17   Date Met   (not tested)   Progress:     Progress Toward Goals:   Progress this reporting period: Fluctuations noted in standardized tests - see objective measures.  Testing on 5/25 shows significant drop in DGI from eval.  It is important to note that pt's appointment time on this day was in the afternoon which she states is normally a very difficult time for her. She did increased her DGI score signficantly with testing on 5/26, which was in the morning, however, this is not a significant change from testing at the initial evaluation on 4/27/17.  She did demonstrate signifianat improvement in gait speed from initial eval to post- LP puncture but did not achieve goal #2.      Progress limited due to limited treatment.  Pt declines to continue PT for additional gait/balance training due to needing spine surgery soon and wanting to focus on that.      Plan:  Discharge from therapy.    Discharge:    Reason for " Discharge: Patient chooses to discontinue therapy.    Equipment Issued: none    Discharge Plan: other services per MD.

## 2017-05-30 NOTE — ADDENDUM NOTE
Encounter addended by: Negra Mei, PT on: 5/30/2017  1:55 PM<BR>     Actions taken: Sign clinical note, Episode resolved

## 2017-06-02 NOTE — PATIENT INSTRUCTIONS
Do not take olmesartan the day before or the day of surgery.   The morning of surgery take only the Amlodipine with sips of water when you first get up.       Before Your Surgery      Call your surgeon if there is any change in your health. This includes signs of a cold or flu (such as a sore throat, runny nose, cough, rash or fever).    Do not smoke, drink alcohol or take over the counter medicine (unless your surgeon or primary care doctor tells you to) for the 24 hours before and after surgery.    If you take prescribed drugs: Follow your doctor s orders about which medicines to take and which to stop until after surgery.    Eating and drinking prior to surgery: follow the instructions from your surgeon    Take a shower or bath the night before surgery. Use the soap your surgeon gave you to gently clean your skin. If you do not have soap from your surgeon, use your regular soap. Do not shave or scrub the surgery site.  Wear clean pajamas and have clean sheets on your bed.

## 2017-06-02 NOTE — PROGRESS NOTES
[place] [address]  [facility] [address]  [phone]  Dept: [phone]    PRE-OP EVALUATION:  Today's date: 2017    Zoey Armstrong (: 1950) presents for pre-operative evaluation assessment as requested by Dr. López.  She requires evaluation and anesthesia risk assessment prior to undergoing surgery/procedure for treatment of spondylolithesis, degenerative disk disease ? .  Proposed procedure: lumbar spinal fusion unknown levels, no information provided by surgeon    Date of Surgery/ Procedure: 17  Time of Surgery/ Procedure: 7:30 am  Hospital/Surgical Facility: [facility]  Fax number for surgical facility: [phone]  Primary Physician: Marisa Hoover  preop performed by Ramona Sullivan MD  Type of Anesthesia Anticipated: to be determined    Patient has a Health Care Directive or Living Will:  NO    1. NO - Do you have a history of heart attack, stroke, stent, bypass or surgery on an artery in the head, neck, heart or legs?  2. NO - Do you ever have any pain or discomfort in your chest?  3. NO - Do you have a history of  Heart Failure?  4. YES - ARE YOUR TROUBLED BY SHORTNESS OF BREATH WHEN WALKING ON THE LEVEL, UP A SLIGHT HILL OR AT NIGHT? Dyspnea going up a hill  5. NO - Do you currently have a cold, bronchitis or other respiratory infection?  6. YES - DO YOU HAVE A COUGH, SHORTNESS OF BREATH OR WHEEZING? Just dyspnea going up a hill.   7. NO - Do you sometimes get pains in the calves of your legs when you walk?  8. NO - Do you or anyone in your family have previous history of blood clots?  9. NO - Do you or does anyone in your family have a serious bleeding problem such as prolonged bleeding following surgeries or cuts?  10. NO - Have you ever had problems with anemia or been told to take iron pills?  11. NO - Have you had any abnormal blood loss such as black, tarry or bloody stools, or abnormal vaginal bleeding?  12. NO - Have you ever had a blood  transfusion?  13. NO - Have you or any of your relatives ever had problems with anesthesia?  14. NO - Do you have sleep apnea, excessive snoring or daytime drowsiness?  15. NO - Do you have any prosthetic heart valves?  16. NO - Do you have prosthetic joints?  17. NO - Is there any chance that you may be pregnant?      HPI:                                                      Brief HPI related to upcoming procedure: she has chronic low back problems, having surgery for possible spondylithesis?       See problem list for active medical problems.  Problems all longstanding and stable, except as noted/documented.  See ROS for pertinent symptoms related to these conditions.                                                                                                  .    MEDICAL HISTORY:                                                      Past Medical History:   Diagnosis Date     Back pain      CAD (coronary artery disease)      COPD (chronic obstructive pulmonary disease) (H) 02/06/2016     Depression      Falls      Hyperlipidemia      Hypertension      Smoker      Patient Active Problem List   Diagnosis     HTN (hypertension)     Chronic rhinitis     Advance Care Planning     Mild major depression (H)     Hyperlipidemia     CAD (coronary artery disease)     Falling episodes     COPD (chronic obstructive pulmonary disease) (H)         Past Surgical History:   Procedure Laterality Date     ANGIOGRAM  10-    Moderate non-obstructive coronary disease involving all 3 vessels. LAD and RCA lesions are non-flow-limiting. Recommend aggressive risk factor management and possible pulmonary evaluation for dyspnea     ARTHROPLASTY TOE(S) Right 2/24/2016    Procedure: ARTHROPLASTY TOE(S);  Surgeon: Levar Matias DPM;  Location: [facility]     ARTHROTOMY SHOULDER, ROTATOR CUFF REPAIR, COMBINED Right 7/12/2016    Procedure: COMBINED ARTHROTOMY SHOULDER, ROTATOR CUFF REPAIR;  Surgeon: Reggie Cisse MD;  Location:   OR     HEAD & NECK SURGERY      Jaw surgery     LUMPECTOMY BREAST, SEED LOCALIZATION, SENTINEL NODE Right 12/2/2016    Procedure: LUMPECTOMY BREAST, SEED LOCALIZATION, SENTINEL NODE;  Surgeon: Tiffanie Cabrera MD;  Location:  OR     ORTHOPEDIC SURGERY  1994    left foot surgery / bunion     OSTEOTOMY FOOT Right 2/24/2016    Procedure: OSTEOTOMY FOOT;  Surgeon: Levar Matias DPM;  Location:  [facility]     RECONSTRUCT FOREFOOT WITH METATARSOPHALANGEAL (MTP) FUSION Right 2/24/2016    Procedure: RECONSTRUCT FOREFOOT WITH METATARSOPHALANGEAL (MTP) FUSION;  Surgeon: Levar Matias DPM;  Location:  [facility]     REMOVE HARDWARE FOOT Right 1/18/2017    Procedure: REMOVE HARDWARE FOOT;  Surgeon: Levar Matias DPM;  Location:  OR     Current Outpatient Prescriptions   Medication Sig Dispense Refill     letrozole (FEMARA) 2.5 MG tablet Take 2.5 mg by mouth daily       amLODIPine (NORVASC) 10 MG tablet Take 1 tablet (10 mg) by mouth daily 90 tablet 1     olmesartan (BENICAR) 40 MG tablet Take 1 tablet (40 mg) by mouth daily 90 tablet 1     FLUoxetine (PROZAC) 20 MG capsule Take 3 capsules (60 mg) by mouth daily 90 capsule 3     buPROPion (WELLBUTRIN SR) 150 MG 12 hr tablet Take 1 tablet (150 mg) by mouth 2 times daily 180 tablet 1     rosuvastatin (CRESTOR) 10 MG tablet Take 1 tablet (10 mg) by mouth daily 90 tablet 3     aspirin 81 MG tablet Take 81 mg by mouth daily       Multiple Vitamin CHEW Take 1 tablet by mouth daily        calcium 500 MG CHEW Take by mouth daily      ASA on hold per surgeon     OTC products: None, except as noted above    Allergies   Allergen Reactions     Betadine [Povidone Iodine] Hives     Iodine-131      No Clinical Screening - See Comments Swelling     Water softener salts     Soap      Perfumed soaps      Latex Allergy: NO    Social History   Substance Use Topics     Smoking status: Former Smoker     Packs/day: 0.50     Years: 30.00     Types: Cigarettes     Quit date: 7/15/2015  "    Smokeless tobacco: Never Used     Alcohol use 0.0 oz/week     0 Standard drinks or equivalent per week      Comment: 3/ week     History   Drug Use No       REVIEW OF SYSTEMS:                                                    GENERAL: negative for, fever, chills, weight loss, weight gain  EYES: negative  ENT: negative  RESPIRATORY: stable dyspnea on exertion with heavy exertion  CARDIOVASCULAR: negative for, palpitations, tachycardia, irregular heart beat, chest pain and lower extremity edema  GI: negative for, nausea, vomiting, abdominal pain, melena and hematochezia  : negative for, dysuria and hematuria  MUSCULOSKELETAL: back pain and right shoulder pain  NEUROLOGIC: negative for, headaches, seizures, local weakness, numbness or tingling of hands and lower leg tingling, right more than left  SKIN: negative  ENDOCRINE: negative    EXAM:                                                        Patient is alert, oriented, cooperative in no acute distress.  /79  Pulse 78  Temp 97.6  F (36.4  C) (Oral)  Resp 16  Ht 5' 4\" (1.626 m)  Wt 156 lb 3.2 oz (70.9 kg)  SpO2 95%  BMI 26.81 kg/m2    HEENT: PERRL, EOMI, TM's are normal. Oropharynx is clear.  NECK: No lymphadenopathy or thyromegaly. Carotid pulses full without bruits.  LUNGS: clear  CV: normal S1, S2 without murmur, S3 or S4 present. Pulses are 2/2 throughout. No JVD.  ABDOMEN: Bowel sounds present, nontender without hepatosplenomegaly. Liver is normal size to percussion.  EXTREMITIES: no edema present, unremarkable joints  NEUROLOGIC: Cranial nerves II-XII intact, reflexes 2/4 throughout, strength 5/5, sensation grossly intact, gait limping  SKIN: without rashes or significant lesions     DIAGNOSTICS:                                                    EKG: Normal Sinus Rhythm, old IWMI, leads V2 and V3 were reversed, normal axis, normal intervals, no acute ST/T changes c/w ischemia, no LVH by voltage criteria, unchanged from previous " tracings  Labs: see attached    Recent Labs   Lab Test  01/04/17   0900  11/29/16   1552   09/29/15   1352   03/27/11   0750   HGB  14.6  15.1   < >   --    < >  15.0   PLT  329  321   < >   --    < >  283   INR   --    --    --   0.99   --   0.93   NA  141  140   < >   --    < >  148*   POTASSIUM  4.4  4.3   < >   --    < >  4.2   CR  1.06*  1.03   < >   --    < >  0.63    < > = values in this interval not displayed.        IMPRESSION:                                                    Reason for surgery/procedure: lumbar disease  Diagnosis/reason for consult: preop    The proposed surgical procedure is considered INTERMEDIATE risk.    REVISED CARDIAC RISK INDEX  The patient has the following serious cardiovascular risks for perioperative complications such as (MI, PE, VFib and 3  AV Block):  Coronary Artery Disease (MI, positive stress test, angina, Qs on EKG)  INTERPRETATION: 1 risks: Class II (low risk - 0.9% complication rate)    The patient has the following additional risks for perioperative complications:  No identified additional risks      ICD-10-CM    1. Preop general physical exam Z01.818 EKG 12-lead complete w/read - Clinics     CBC with platelets     INR     Basic metabolic panel   2. Degeneration of lumbar intervertebral disc M51.36        RECOMMENDATIONS:                                                      --Consult hospital rounder / IM to assist post-op medical management    --Patient is to take all scheduled medications on the day of surgery EXCEPT for modifications listed below.    ACE Inhibitor or Angiotensin Receptor Blocker (ARB) Use  Ace inhibitor or Angiotensin Receptor Blocker (ARB) and should HOLD this medication for the 24 hours prior to surgery.  She will take only Norvasc the am of surgery      APPROVAL GIVEN to proceed with proposed procedure, without further diagnostic evaluation       Signed Electronically by: Ramona Sullivan MD    Copy of this evaluation report is provided to  requesting physician.    [facility] Preop Guidelines

## 2017-06-05 ENCOUNTER — OFFICE VISIT (OUTPATIENT)
Dept: INTERNAL MEDICINE | Facility: CLINIC | Age: 67
End: 2017-06-05
Payer: MEDICARE

## 2017-06-05 VITALS
OXYGEN SATURATION: 95 % | SYSTOLIC BLOOD PRESSURE: 122 MMHG | TEMPERATURE: 97.6 F | HEART RATE: 78 BPM | WEIGHT: 156.2 LBS | BODY MASS INDEX: 26.67 KG/M2 | DIASTOLIC BLOOD PRESSURE: 79 MMHG | RESPIRATION RATE: 16 BRPM | HEIGHT: 64 IN

## 2017-06-05 DIAGNOSIS — M51.369 DEGENERATION OF LUMBAR INTERVERTEBRAL DISC: ICD-10-CM

## 2017-06-05 DIAGNOSIS — Z01.818 PREOP GENERAL PHYSICAL EXAM: Primary | ICD-10-CM

## 2017-06-05 LAB
ERYTHROCYTE [DISTWIDTH] IN BLOOD BY AUTOMATED COUNT: 15 % (ref 10–15)
HCT VFR BLD AUTO: 45.1 % (ref 35–47)
HGB BLD-MCNC: 14.8 G/DL (ref 11.7–15.7)
INR PPP: 0.99 (ref 0.86–1.14)
MCH RBC QN AUTO: 31.5 PG (ref 26.5–33)
MCHC RBC AUTO-ENTMCNC: 32.8 G/DL (ref 31.5–36.5)
MCV RBC AUTO: 96 FL (ref 78–100)
PLATELET # BLD AUTO: 290 10E9/L (ref 150–450)
RBC # BLD AUTO: 4.7 10E12/L (ref 3.8–5.2)
WBC # BLD AUTO: 7.6 10E9/L (ref 4–11)

## 2017-06-05 PROCEDURE — 36415 COLL VENOUS BLD VENIPUNCTURE: CPT | Performed by: INTERNAL MEDICINE

## 2017-06-05 PROCEDURE — 93000 ELECTROCARDIOGRAM COMPLETE: CPT | Performed by: INTERNAL MEDICINE

## 2017-06-05 PROCEDURE — 80048 BASIC METABOLIC PNL TOTAL CA: CPT | Performed by: INTERNAL MEDICINE

## 2017-06-05 PROCEDURE — 85610 PROTHROMBIN TIME: CPT | Performed by: INTERNAL MEDICINE

## 2017-06-05 PROCEDURE — 99215 OFFICE O/P EST HI 40 MIN: CPT | Performed by: INTERNAL MEDICINE

## 2017-06-05 PROCEDURE — 85027 COMPLETE CBC AUTOMATED: CPT | Performed by: INTERNAL MEDICINE

## 2017-06-05 NOTE — MR AVS SNAPSHOT
After Visit Summary   6/5/2017    Zoey Armstrong    MRN: [id number]           Patient Information     Date Of Birth          1950        Visit Information        Provider Department      6/5/2017 1:20 PM Ramona Sullivan MD [facility]        Today's Diagnoses     Preop general physical exam    -  1    Degeneration of lumbar intervertebral disc          Care Instructions    Do not take olmesartan the day before or the day of surgery.   The morning of surgery take only the Amlodipine with sips of water when you first get up.       Before Your Surgery      Call your surgeon if there is any change in your health. This includes signs of a cold or flu (such as a sore throat, runny nose, cough, rash or fever).    Do not smoke, drink alcohol or take over the counter medicine (unless your surgeon or primary care doctor tells you to) for the 24 hours before and after surgery.    If you take prescribed drugs: Follow your doctor s orders about which medicines to take and which to stop until after surgery.    Eating and drinking prior to surgery: follow the instructions from your surgeon    Take a shower or bath the night before surgery. Use the soap your surgeon gave you to gently clean your skin. If you do not have soap from your surgeon, use your regular soap. Do not shave or scrub the surgery site.  Wear clean pajamas and have clean sheets on your bed.           Follow-ups after your visit        Who to contact     If you have questions or need follow up information about today's clinic visit or your schedule please contact [facility] directly at [phone].  Normal or non-critical lab and imaging results will be communicated to you by MyChart, letter or phone within 4 business days after the clinic has received the results. If you do not hear from us within 7 days, please contact the clinic through MyChart or phone. If you have a critical or abnormal lab result, we will  "notify you by phone as soon as possible.  Submit refill requests through [employer] or call your pharmacy and they will forward the refill request to us. Please allow 3 business days for your refill to be completed.          Additional Information About Your Visit        "[employer] Information     [employer] gives you secure access to your electronic health record. If you see a primary care provider, you can also send messages to your care team and make appointments. If you have questions, please call your primary care clinic.  If you do not have a primary care provider, please call [phone] and they will assist you.        Care EveryWhere ID     This is your Care EveryWhere ID. This could be used by other organizations to access your [facility] medical records  [id number]        Your Vitals Were     Pulse Temperature Respirations Height Pulse Oximetry BMI (Body Mass Index)    78 97.6  F (36.4  C) (Oral) 16 5' 4\" (1.626 m) 95% 26.81 kg/m2       Blood Pressure from Last 3 Encounters:   06/05/17 122/79   05/25/17 118/72   05/04/17 110/76    Weight from Last 3 Encounters:   06/05/17 156 lb 3.2 oz (70.9 kg)   05/04/17 155 lb (70.3 kg)   02/28/17 157 lb (71.2 kg)              We Performed the Following     Basic metabolic panel     CBC with platelets     EKG 12-lead complete w/read - Clinics     [facility]        Primary Care Provider Office Phone # Fax #    Marisa Hoover [phone]       [facility] [address] [place] [address]        Thank you!     Thank you for choosing [facility]  for your care. Our goal is always to provide you with excellent care. Hearing back from our patients is one way we can continue to improve our services. Please take a few minutes to complete the written survey that you may receive in the mail after your visit with us. Thank you!             Your Updated Medication List - Protect others around you: Learn how to safely use, store and throw " away your medicines at www.disposemymeds.org.          This list is accurate as of: 6/5/17  1:50 PM.  Always use your most recent med list.                   Brand Name Dispense Instructions for use    amLODIPine 10 MG tablet    NORVASC    90 tablet    Take 1 tablet (10 mg) by mouth daily       aspirin 81 MG tablet      Take 81 mg by mouth daily       buPROPion 150 MG 12 hr tablet    WELLBUTRIN SR    180 tablet    Take 1 tablet (150 mg) by mouth 2 times daily       calcium 500 MG Chew      Take by mouth daily       FLUoxetine 20 MG capsule    PROzac    90 capsule    Take 3 capsules (60 mg) by mouth daily       letrozole 2.5 MG tablet    FEMARA     Take 2.5 mg by mouth daily       Multiple Vitamin Chew      Take 1 tablet by mouth daily       olmesartan 40 MG tablet    BENICAR    90 tablet    Take 1 tablet (40 mg) by mouth daily       rosuvastatin 10 MG tablet    CRESTOR    90 tablet    Take 1 tablet (10 mg) by mouth daily

## 2017-06-05 NOTE — NURSING NOTE
"Chief Complaint   Patient presents with     Pre-Op Exam       Initial /79  Pulse 78  Temp 97.6  F (36.4  C) (Oral)  Resp 16  Ht 5' 4\" (1.626 m)  Wt 156 lb 3.2 oz (70.9 kg)  SpO2 95%  BMI 26.81 kg/m2 Estimated body mass index is 26.81 kg/(m^2) as calculated from the following:    Height as of this encounter: 5' 4\" (1.626 m).    Weight as of this encounter: 156 lb 3.2 oz (70.9 kg).  Medication Reconciliation: lorne Prasad CMA    Discussed Health Maintenance:    Patient agreed to schedule FIT test. Order have been place and information given to patient.       "

## 2017-06-06 LAB
ANION GAP SERPL CALCULATED.3IONS-SCNC: 7 MMOL/L (ref 3–14)
BUN SERPL-MCNC: 31 MG/DL (ref 7–30)
CALCIUM SERPL-MCNC: 9.8 MG/DL (ref 8.5–10.1)
CHLORIDE SERPL-SCNC: 106 MMOL/L (ref 94–109)
CO2 SERPL-SCNC: 28 MMOL/L (ref 20–32)
CREAT SERPL-MCNC: 1.06 MG/DL (ref 0.52–1.04)
GFR SERPL CREATININE-BSD FRML MDRD: 51 ML/MIN/1.7M2
GLUCOSE SERPL-MCNC: 84 MG/DL (ref 70–99)
POTASSIUM SERPL-SCNC: 5.1 MMOL/L (ref 3.4–5.3)
SODIUM SERPL-SCNC: 141 MMOL/L (ref 133–144)

## 2017-06-06 ASSESSMENT — PATIENT HEALTH QUESTIONNAIRE - PHQ9: SUM OF ALL RESPONSES TO PHQ QUESTIONS 1-9: 8

## 2017-06-07 ENCOUNTER — TRANSFERRED RECORDS (OUTPATIENT)
Dept: HEALTH INFORMATION MANAGEMENT | Facility: CLINIC | Age: 67
End: 2017-06-07

## 2017-06-07 ENCOUNTER — TRANSFERRED RECORDS (OUTPATIENT)
Dept: SURGERY | Facility: CLINIC | Age: 67
End: 2017-06-07

## 2017-06-21 DIAGNOSIS — R29.6 FALLS FREQUENTLY: Primary | ICD-10-CM

## 2017-06-21 NOTE — PROGRESS NOTES
Reviewed patient results from last visit where she had an LP and physical therapy ordered. Testing was equivocal. Will order neurosurgery consult with Dr. Cruz for opinion on potential shunt testing followed by physical therapy to further investigate possibility of NPH.     This is not a clinic visit. This patient was initially seen in clinic with Dr. Cazares who agrees with my assessment and plan.     Praveen Arzola MD  Neurology PGY2  P: [phone]

## 2017-06-22 ENCOUNTER — CARE COORDINATION (OUTPATIENT)
Dept: NEUROLOGY | Facility: CLINIC | Age: 67
End: 2017-06-22

## 2017-06-22 DIAGNOSIS — G91.2 IDIOPATHIC NORMAL PRESSURE HYDROCEPHALUS (INPH) (H): Primary | ICD-10-CM

## 2017-06-22 NOTE — PROGRESS NOTES
Caity Osei Angela, JOIE                   Sent referral to Timo to call pt.            Previous Messages       ----- Message -----      From: Linda Loya RN      Sent: 6/22/2017   8:56 AM        To: [id number]     Please help patient arrange appointment with Dr Cruz in neurosurgery. Referral is in from Dr Arzola. Patient is aware.     Thanks!   lizandro   ----- Message -----      From: Praveen Arzola MD      Sent: 6/21/2017   3:38 PM        To: Linda oLya, JOIE, *     Lloyd Mckeon     The testing was equivocal. I talked it over with my attending. Thought is that next step would be to refer to Dr. Cruz in neurosurgery for potential shunt placement and followed by repeating this testing.     Rami     ----- Message -----      From: Linda Loya RN      Sent: 6/21/2017   2:20 PM        To: Praveen Arzola MD     Did you check on this?     Thanks,   Lizandro   ----- Message -----      From: Praveen Arzola MD      Sent: 6/20/2017  10:24 AM        To: Areli Llamas MD, Chen Guerrier RN     Will check on it early this afternoon and get back to you.     Thanks!   Dedrick       ----- Message -----      From: Chen Guerrier RN      Sent: 6/20/2017   9:16 AM        To: Areli Llamas MD, *     Zoey would like her LP results and a plan of care.  Please let me know what I should tell her.     Thank you!   Chen              6/22/17: Spoke with Zoey regarding Dr Arzola's recommendations. She is agreeable to proceeding with the neurosurgery evaluation. I explained to her that someone from the neurosurgery office will contact her to arrange this appointment. Patient verbalized understanding.

## 2017-06-23 ENCOUNTER — TRANSFERRED RECORDS (OUTPATIENT)
Dept: HEALTH INFORMATION MANAGEMENT | Facility: CLINIC | Age: 67
End: 2017-06-23

## 2017-06-27 ENCOUNTER — NURSING HOME VISIT (OUTPATIENT)
Dept: GERIATRICS | Facility: CLINIC | Age: 67
End: 2017-06-27
Payer: MEDICARE

## 2017-06-27 VITALS
RESPIRATION RATE: 16 BRPM | HEIGHT: 64 IN | OXYGEN SATURATION: 71 % | HEART RATE: 112 BPM | WEIGHT: 166 LBS | TEMPERATURE: 99.1 F | SYSTOLIC BLOOD PRESSURE: 103 MMHG | DIASTOLIC BLOOD PRESSURE: 68 MMHG | BODY MASS INDEX: 28.34 KG/M2

## 2017-06-27 DIAGNOSIS — R53.83 LETHARGY: ICD-10-CM

## 2017-06-27 DIAGNOSIS — J44.9 CHRONIC OBSTRUCTIVE PULMONARY DISEASE, UNSPECIFIED COPD TYPE (H): ICD-10-CM

## 2017-06-27 DIAGNOSIS — I10 BENIGN ESSENTIAL HYPERTENSION: ICD-10-CM

## 2017-06-27 DIAGNOSIS — E78.2 MIXED HYPERLIPIDEMIA: ICD-10-CM

## 2017-06-27 DIAGNOSIS — R53.81 PHYSICAL DECONDITIONING: ICD-10-CM

## 2017-06-27 DIAGNOSIS — M43.16 SPONDYLOLISTHESIS OF LUMBAR REGION: Primary | ICD-10-CM

## 2017-06-27 DIAGNOSIS — M48.061 SPINAL STENOSIS OF LUMBAR REGION WITHOUT NEUROGENIC CLAUDICATION: ICD-10-CM

## 2017-06-27 DIAGNOSIS — Z98.1 S/P SPINAL FUSION: ICD-10-CM

## 2017-06-27 DIAGNOSIS — Z98.890 STATUS POST LUMBAR SPINE SURGERY FOR DECOMPRESSION OF SPINAL CORD: ICD-10-CM

## 2017-06-27 DIAGNOSIS — F32.A DEPRESSION, UNSPECIFIED DEPRESSION TYPE: ICD-10-CM

## 2017-06-27 DIAGNOSIS — D62 ANEMIA DUE TO BLOOD LOSS, ACUTE: ICD-10-CM

## 2017-06-27 DIAGNOSIS — I25.10 CORONARY ARTERY DISEASE INVOLVING NATIVE CORONARY ARTERY OF NATIVE HEART WITHOUT ANGINA PECTORIS: ICD-10-CM

## 2017-06-27 DIAGNOSIS — R09.02 HYPOXIA: ICD-10-CM

## 2017-06-27 DIAGNOSIS — I95.81 POSTOPERATIVE HYPOTENSION: ICD-10-CM

## 2017-06-27 DIAGNOSIS — C50.911 MALIGNANT NEOPLASM OF RIGHT FEMALE BREAST, UNSPECIFIED ESTROGEN RECEPTOR STATUS, UNSPECIFIED SITE OF BREAST (H): ICD-10-CM

## 2017-06-27 PROCEDURE — 99207 ZZC CDG-CORRECTLY CODED, REVIEWED AND AGREE: CPT | Performed by: NURSE PRACTITIONER

## 2017-06-27 PROCEDURE — 99310 SBSQ NF CARE HIGH MDM 45: CPT | Performed by: NURSE PRACTITIONER

## 2017-06-27 RX ORDER — POLYETHYLENE GLYCOL 3350 17 G/17G
17 POWDER, FOR SOLUTION ORAL DAILY
COMMUNITY
End: 2019-01-01

## 2017-06-27 RX ORDER — AMOXICILLIN 250 MG
2 CAPSULE ORAL DAILY
COMMUNITY
End: 2019-01-01

## 2017-06-27 NOTE — PROGRESS NOTES
[place] [facility]  PRIMARY CARE PROVIDER AND CLINIC:  [facility] [address] [facility]   Chief Complaint   Patient presents with     Hospital F/U       HPI:    Zoey Armstrong is a 67 year old  (1950),[facility] .  Hospital stay 6/23/17 through 6/26/17.  Admitted to this facility for  rehab, medical management and nursing care.  Current issues are:        HOSPITAL SUMMARY (from hospitalist's last progress note)  Ms. Zoey Armstrong is a 67 y.o. with a history of Spinal stenosis, lumbar region, without neurogenic claudication, spondylolisthesis, postoperative hypotension, hyperlipidemia, hypertension, COPD, CAD, and depression, who underwent DECOMPRESSION FORAMINAL/LATERAL RECESS BILATERAL L2-S1, POSTERIOR SPINE FUSION L3-S1 on 6/23/2017 by Dr. López, Yoel Polanco MD; anesthesia General,  ml, OR time 4 Hr 1 Min 46 Sec. During surgery, the patient had 300 ml of blood loss and received 3400 IVF.     The patient complained of pain at site she received 25 mcg IV fentanyl. Shortly after, the patient was able to respond to verbal stimuli but quickly goes back to sleep. Her respiratory rate dropped to low normals, 9-14, and BP ranged from  systolic and 50-56 diastolic with o-sats in the low 90's. The patients sleepiness may be attributed to narcotic vs midazolam sensitivity (In total the patient was given 1 mg IV Dilaudid, 200 mcg Fentanyl IV intra-op, and the 25 mcg of IV fentanyl as above). Placed on BiPAP after respiratory consult. She then received flumazenil, Nubain, and two doses of .16 mg narcan. After the second narcan the patient became significantly more verbal and reported to have good pain control.    Due to this sensitivity to narcotics, the patient will be admitted to the ICU for further observation. BiPAP was stopped and put on NC. Given  "20,000 mcg phenylephrine in normal saline. Patient stable and transferred to the floor on 06/24/17.     Her mentation improved and pain was controlled on low dose oxycodone and tylenol. She remained on oxygen. She was discharged to TCU on 6/27 for further rehab and medical management.     Spondylolisthesis of lumbar region  Spinal stenosis of lumbar region without neurogenic claudication  Status post lumbar spine surgery for decompression foraminal/lateral recess L2-S1, 6/23/17  S/P spinal fusion, posterior, L3-S1, 6/23/17  Physical deconditioning  Patient hypotensive and poorly responsive following surgery. Moderate blood loss reported. Appears to be extremely sensitive to narcotics, as she responded to naloxone in the PACU.  Discharged to TCU on PRN oxycodone, tylenol and flexeril. Reports 8/10 pain this AM, but did not get any pain medication since arrival to TCU. Does have some numbness to LLE, which is new since surgery. She reports surgeon is aware and told her it was \"normal.\" She denies any issues with bowel or bladder, but has not had BM in a couple of days. She is on senna-s BID. She wears TLSO brace when OOB. Is working with PT and OT in TCU. Low grade fever of 99.1 in TCU, mildly tachycardic, suspect likely related to pain. Denies any chills or night sweats.     Acute blood loss anemia  Hgb dropped to 9.7 after surgery,  cc. Improved to 10.5 prior to discharge.     Lethargy  Hypoxia  Possible hydrocephalus   Assessment: obtained head CT, which did not show acute findings. Ventricles are quite enlarged. Suspect somnolence due to anesthesia and narcotics.   Plan: defer consideration of outpatient neurosurg referral to patient's PCP  Alert and oriented in TCU. Per patient and spouse she has been tolerating low dose oxycodone post-operatively. She remains on supplemental oxygen, does not use oxygen at home. Thought likely to be related to narcotic use, decreased mobility.     Benign essential " hypertension  Postoperative hypotension  patient requiring phenylephrine. Suspect combination of sedation and hypovolemia. Normalized 6/24.   PTA Norvasc and olmesartan resumed upon discharge to TCU. She does report some lightheadedness if sitting up long periods of time, but reports significant pain this AM. She denies any chest pain, palpitations, dizziness, or headaches.     Chronic obstructive pulmonary disease, unspecified COPD type (H)  Not medicated. History unknown. She used to smoke and uses bupropion for cessation maintenance  Denies any use of inhalers at home. Reports SOB with activity, which has been an on-going issue for since before surgery she thinks likely to poor mobility due to back problems. Occasional, non-productive cough. Continues to require supplemental oxygen.     Coronary artery disease involving native coronary artery of native heart without angina pectoris  Mixed hyperlipidemia  ASA held while IP, but resumed on discharge. She is also on Crestor, Norvasc, and olmesartan. She denies any chest pain, palpitations, othropnea.     Depression, unspecified depression type  PTA on Prozac 60 mg daily and Buproban 150 mg BID  Remains on PTA medications. Feels that her symptoms are well controlled.      Right breast CA  S/p lumpectomy 12/2/16, currently on daily letrozole. Denies any pain or discomfort in breast. Follows with oncology.  Last 3 BPs:103/68, 111/67, 114/64  Admission Weight: 6/23/17 166 lbs       CODE STATUS/ADVANCE DIRECTIVES DISCUSSION:   CPR/Full code   Patient's living condition: lives with spouse    ALLERGIES:Betadine [povidone iodine]; Iodine-131; No clinical screening - see comments; Soap; and Sodium chloride  PAST MEDICAL HISTORY:  has a past medical history of Back pain; CAD (coronary artery disease); COPD (chronic obstructive pulmonary disease) (H) (02/06/2016); Depression; Falls; Hyperlipidemia; Hypertension; and Smoker. She also has no past medical history of History of  blood transfusion or Malignant hyperthermia.  PAST SURGICAL HISTORY:  has a past surgical history that includes orthopedic surgery (1994); angiogram (10-); Head and neck surgery; Reconstruct forefoot with metatarsophalangeal (MTP) fusion (Right, 2/24/2016); Osteotomy foot (Right, 2/24/2016); Arthroplasty toe(s) (Right, 2/24/2016); Arthrotomy shoulder, rotator cuff repair, combined (Right, 7/12/2016); Lumpectomy Breast, Seed Localization, [place] Node (Right, 12/2/2016); and Remove hardware foot (Right, 1/18/2017).  FAMILY HISTORY: family history includes Breast Cancer in her mother and another family member; C.A.D. in her father; CANCER in an other family member; Coronary Artery Disease in her father; Hyperlipidemia in her father; Lipids in her father.  SOCIAL HISTORY:  reports that she quit smoking about 1 years ago. Her smoking use included Cigarettes. She has a 15.00 pack-year smoking history. She has never used smokeless tobacco. She reports that she drinks alcohol. She reports that she does not use illicit drugs.    Post Discharge Medication Reconciliation Status: discharge medications reconciled and changed, per note/orders (see AVS).  Current Outpatient Prescriptions   Medication Sig Dispense Refill     CYCLOBENZAPRINE HCL PO Take 5 mg by mouth every 8 hours as needed for muscle spasms       senna-docusate (SENOKOT-S;PERICOLACE) 8.6-50 MG per tablet Take 2 tablets by mouth 2 times daily       OXYCODONE HCL PO Take 2.5 mg by mouth every 4 hours as needed And 2.5mg PO TID at 0800, 1200, and 2000       Acetaminophen (TYLENOL PO) Take 1,000 mg by mouth every 8 hours AND every 4 hours PRN        polyethylene glycol (MIRALAX/GLYCOLAX) powder Take 17 g by mouth daily as needed for constipation       letrozole (FEMARA) 2.5 MG tablet Take 2.5 mg by mouth daily       amLODIPine (NORVASC) 10 MG tablet Take 1 tablet (10 mg) by mouth daily 90 tablet 1     olmesartan (BENICAR) 40 MG tablet Take 1 tablet (40 mg) by  "mouth daily 90 tablet 1     FLUoxetine (PROZAC) 20 MG capsule Take 3 capsules (60 mg) by mouth daily 90 capsule 3     buPROPion (WELLBUTRIN SR) 150 MG 12 hr tablet Take 1 tablet (150 mg) by mouth 2 times daily 180 tablet 1     rosuvastatin (CRESTOR) 10 MG tablet Take 1 tablet (10 mg) by mouth daily 90 tablet 3     aspirin 81 MG tablet Take 81 mg by mouth daily       Multiple Vitamin CHEW Take 1 tablet by mouth daily        calcium 500 MG CHEW Take by mouth daily          ROS:  10 point ROS of systems including Constitutional, Eyes, Respiratory, Cardiovascular, Gastroenterology, Genitourinary, Integumentary, Muscularskeletal, Psychiatric were all negative except for pertinent positives noted in my HPI.    Exam:  /68  Pulse 112  Temp 99.1  F (37.3  C)  Resp 16  Ht 5' 4\" (1.626 m)  Wt 166 lb (75.3 kg)  SpO2 (!) 71%  BMI 28.49 kg/m2  GENERAL APPEARANCE:  Alert, in no distress, cooperative  ENT:  Mouth and posterior oropharynx normal, moist mucous membranes, normal hearing acuity  EYES:  EOM, conjunctivae, lids, pupils and irises normal, PERRL  RESP:  respiratory effort and palpation of chest normal, lungs clear to auscultation , diminished breath sounds bilat bases, cough absent  CV:  Palpation and auscultation of heart done , regular rate and rhythm, no murmur, rub, or gallop, no edema, +2 pedal pulses  ABDOMEN:  normal bowel sounds, soft, nontender, no hepatosplenomegaly or other masses, no guarding or rebound  M/S:   Gait and station abnormal generalized weakness, difficulty lifting legs onto bed, BLE strength 4/5, wears LSO brace when OOB,   SKIN:  surgical wound incisions to lumbar spine C/D/I, no erythmea, steristrips in place  NEURO:   Cranial nerves 2-12 are normal tested and grossly at patient's baseline, Examination of sensation by touch normal  PSYCH:  oriented X 3, normal insight, judgement and memory, insight and judgement impaired, impulsive/anxious at times    Lab/Diagnostic data:     CBC " RESULTS:   Recent Labs   Lab Test  06/05/17   1351  01/04/17   0900   WBC  7.6  8.4   RBC  4.70  4.70   HGB  14.8  14.6   HCT  45.1  45.2   MCV  96  96   MCH  31.5  31.1   MCHC  32.8  32.3   RDW  15.0  15.1*   PLT  290  329       Last Basic Metabolic Panel:  Recent Labs   Lab Test  06/05/17   1351  01/04/17   0900   NA  141  141   POTASSIUM  5.1  4.4   CHLORIDE  106  106   CARMINE  9.8  10.0   CO2  28  26   BUN  31*  25   CR  1.06*  1.06*   GLC  84  85       Recent Labs   06/25/17  0850 06/24/17  0525   HGB 10.5 L 9.7 L   MCV 99 98     Recent Labs   06/24/17  0525   SODIUM 139   POTASSIUM 4.8   CHLORIDE 110   [id number] 20 L   BUN 20   CREATININE 1.00   CALCIUM 8.1 L   GLUCOSE 121 H       ASSESSMENT/PLAN:  (M43.16) Spondylolisthesis of lumbar region  (primary encounter diagnosis)  (M48.06) Spinal stenosis of lumbar region without neurogenic claudication  (Z98.890) Status post lumbar spine surgery for decompression foraminal/lateral recess L2-S1, 6/23/17  (Z98.1) S/P spinal fusion, posterior, L3-S1, 6/23/17  (R53.81) Physical deconditioning  Comment: Pain not currently, controlled, no recent BM, no s/s of infection  Plan: PT/OT, brace when OOB, change tylenol for scheduled, add scheduled oxycodone, hold for sedation, f/u with spinal surgeon in 4-6 weeks, 5lb lifting limit, ambulate with assistive device. Add PRN miralax for constipation    (R53.83) Lethargy  (R09.02) Hypoxia  Comment: Suspect r/t narcotics use, decreased mobility and shallow breathing tho head CT did show enlarged ventricles.  Plan: IS every hour while awake, activity as tolerated. Wean oxygen to SPO2 >90% as able, f/u with PCP over concerns for hydrocephalous.  Monitor mental status in TCU. Wean off narcotics as able.     (I10) Benign essential hypertension  (I95.89) Postoperative hypotension  Comment: Controlled in TCU, but SBP consistently <110.  Plan: Continue Norvasc and benicar with hold parameters. Monitor VS and adjust as needed. BMP on  6/30    (J44.9) Chronic obstructive pulmonary disease, unspecified COPD type (H)  Comment: No s/s of exacerbation, but is requiring oxygen.   Plan: Continue Wellbutrin for smoking cessation, wean oxygen as able, IS while awake, if becomes more symptomatic will consider CXR    (I25.10) Coronary artery disease involving native coronary artery of native heart without angina pectoris  (E78.2) Mixed hyperlipidemia  Comment: Chronic, asymptomatic,   Plan: Continue medications as above, monitor VS and adjust as needed.    (F32.9) Depression, unspecified depression type  Comment: Chronic, stable  Plan: Continue medications as above, in-house psych to see PRN if symptoms worsen.    (D62) Anemia due to blood loss, acute  Comment: Stable, no s/s of bleeding currently.   Plan: Monitor VS, monitor for bleeding, CBC on 6/30    Breast CA  Comment: Stable  Plan: continue letrozole, f/u with oncology prn/per their recommendation    Information reviewed:  Medications, vital signs, orders, nursing notes, problem list, hospital information. Total time spent with patient visit was 45 min including patient visit, review of past records and bedside discussion with spouse. Greater than 50% of total time spent with counseling and coordinating care.    Electronically signed by:  DUGLAS Littlejohn CNP

## 2017-06-28 ENCOUNTER — TRANSFERRED RECORDS (OUTPATIENT)
Dept: HEALTH INFORMATION MANAGEMENT | Facility: CLINIC | Age: 67
End: 2017-06-28

## 2017-06-29 ENCOUNTER — NURSING HOME VISIT (OUTPATIENT)
Dept: GERIATRICS | Facility: CLINIC | Age: 67
End: 2017-06-29
Payer: MEDICARE

## 2017-06-29 VITALS
HEIGHT: 65 IN | SYSTOLIC BLOOD PRESSURE: 116 MMHG | RESPIRATION RATE: 18 BRPM | BODY MASS INDEX: 28.16 KG/M2 | HEART RATE: 91 BPM | OXYGEN SATURATION: 90 % | WEIGHT: 169 LBS | DIASTOLIC BLOOD PRESSURE: 77 MMHG | TEMPERATURE: 98.3 F

## 2017-06-29 DIAGNOSIS — Z98.890 STATUS POST LUMBAR SPINE SURGERY FOR DECOMPRESSION OF SPINAL CORD: ICD-10-CM

## 2017-06-29 DIAGNOSIS — K59.01 SLOW TRANSIT CONSTIPATION: ICD-10-CM

## 2017-06-29 DIAGNOSIS — R09.02 HYPOXIA: ICD-10-CM

## 2017-06-29 DIAGNOSIS — Z98.1 S/P SPINAL FUSION: Primary | ICD-10-CM

## 2017-06-29 DIAGNOSIS — J98.11 ATELECTASIS OF BOTH LUNGS: ICD-10-CM

## 2017-06-29 DIAGNOSIS — M48.061 SPINAL STENOSIS OF LUMBAR REGION WITHOUT NEUROGENIC CLAUDICATION: ICD-10-CM

## 2017-06-29 PROCEDURE — 99309 SBSQ NF CARE MODERATE MDM 30: CPT | Performed by: NURSE PRACTITIONER

## 2017-06-29 RX ORDER — ALBUTEROL SULFATE 0.83 MG/ML
1 SOLUTION RESPIRATORY (INHALATION)
COMMUNITY
End: 2017-07-19

## 2017-06-29 NOTE — PROGRESS NOTES
[place] [facility]    Chief Complaint   Patient presents with     Nursing Home Acute       HPI:    Zoey Armstrong is a 67 year old  (1950), who is being seen today for an episodic care visit at [facility].  HPI information obtained from: facility chart records, facility staff, patient report and [facility] chart review.Today's concern is:  S/P spinal fusion, posterior, L3-S1, 6/23/17  Status post lumbar spine surgery for decompression foraminal/lateral recess L2-S1, 6/23/17Spinal stenosis of lumbar region without neurogenic claudication  Currently on oxycodone PRN and scheduled, scheduled APAP, and PRN flexeril. Reports significant left hip pain. Does get some relief with ice, but mostly at rest. She reports pain is an intermittent, burning pain that runs through her left buttock. Does have some numbness to LLE, but feels that this is improved. She wears LSO brace when OOB. No further fevers noted. Incision is intact with steri-strips in place. She is on ASA.    Slow transit constipation  Has not had a BM since TCU admission, reports she feels very constipated. She is currently on senna-s 2 tabs BID, miralax PRN. Discussed with nursing staff and if no BM by late morning will administer suppository.     Hypoxia  Atelectasis of both lungs  Continues to require 4L NC, satnig 90% this AM. CXR done last evening and shows atelectasis at lung bases. Has PRN albuterol available. Again discussed with patient the importance of using IS and provided instruction on how to properly use device. Have asked nursing staff as well as therapy to encourage patient to us IS frequently. Denies any chest pain, afebrile, mildly tachycardic tho suspect likely related to pain.     Last 3 BPs:116/77, 118/74, 119/77  Admission Weight: 169 lbs    ALLERGIES: Betadine [povidone iodine]; Iodine-131; No clinical screening - see comments; Soap; and Sodium chloride  Past Medical, Surgical,  Family and Social History reviewed and updated in [facility].    Current Outpatient Prescriptions   Medication Sig Dispense Refill     albuterol (2.5 MG/3ML) 0.083% neb solution Take 1 vial by nebulization every 2 hours as needed for shortness of breath / dyspnea or wheezing       CYCLOBENZAPRINE HCL PO Take 5 mg by mouth every 8 hours as needed for muscle spasms       senna-docusate (SENOKOT-S;PERICOLACE) 8.6-50 MG per tablet Take 2 tablets by mouth 2 times daily       OXYCODONE HCL PO Take 2.5 mg by mouth every 4 hours as needed And 2.5mg PO TID at 0800, 1200, and 2000       Acetaminophen (TYLENOL PO) Take 1,000 mg by mouth every 8 hours        polyethylene glycol (MIRALAX/GLYCOLAX) powder Take 17 g by mouth daily as needed for constipation       letrozole (FEMARA) 2.5 MG tablet Take 2.5 mg by mouth daily       amLODIPine (NORVASC) 10 MG tablet Take 1 tablet (10 mg) by mouth daily 90 tablet 1     olmesartan (BENICAR) 40 MG tablet Take 1 tablet (40 mg) by mouth daily 90 tablet 1     FLUoxetine (PROZAC) 20 MG capsule Take 3 capsules (60 mg) by mouth daily 90 capsule 3     buPROPion (WELLBUTRIN SR) 150 MG 12 hr tablet Take 1 tablet (150 mg) by mouth 2 times daily 180 tablet 1     rosuvastatin (CRESTOR) 10 MG tablet Take 1 tablet (10 mg) by mouth daily 90 tablet 3     aspirin 81 MG tablet Take 81 mg by mouth daily       Multiple Vitamin CHEW Take 1 tablet by mouth daily        calcium 500 MG CHEW Take by mouth daily        Medications reviewed:  Medications reconciled to facility chart and changes were made to reflect current medications as identified as above med list. Below are the changes that were made:   Medications stopped since last EPIC medication reconciliation:   There are no discontinued medications.    Medications started since last [facility] medication reconciliation:  Orders Placed This Encounter   Medications     albuterol (2.5 MG/3ML) 0.083% neb solution     Sig: Take 1 vial by nebulization every 2 hours as  "needed for shortness of breath / dyspnea or wheezing       REVIEW OF SYSTEMS:  10 point ROS of systems including Constitutional, Eyes, Respiratory, Cardiovascular, Gastroenterology, Genitourinary, Integumentary, Muscularskeletal, Psychiatric were all negative except for pertinent positives noted in my HPI.    Physical Exam:  /77  Pulse 91  Temp 98.3  F (36.8  C)  Resp 18  Ht 5' 5\" (1.651 m)  Wt 169 lb (76.7 kg)  SpO2 90%  BMI 28.12 kg/m2  GENERAL APPEARANCE:  Alert, oriented, in moderate distress due to pain  ENT:  Mouth and posterior oropharynx normal, moist mucous membranes  EYES:  EOM, conjunctivae, lids, pupils and irises normal, PERRL  RESP:  respiratory effort and palpation of chest normal, lungs clear to auscultation , diminished breath sounds bilat bases, no crackles, cough abset  CV:  Palpation and auscultation of heart done , regular rate and rhythm, no murmur, rub, or gallop, no edema, +2 pedal pulses  ABDOMEN:  normal bowel sounds, soft, nontender, no hepatosplenomegaly or other masses, no guarding or rebound, mild distension  M/S:   Gait and station abnormal generalized weakness, ambulates with small steps using walker  SKIN:  surgical incision C/D/I, no edema or erythema  NEURO:   Cranial nerves 2-12 are normal tested and grossly at patient's baseline, Examination of sensation by touch is decrease LLE, BLE strength 4/5,   PSYCH:  oriented X 3, insight and judgement impaired, memory impaired , affect and mood normal, anxious/impulsive at times    Recent Labs:      CBC RESULTS:   Recent Labs   Lab Test  06/05/17   1351  01/04/17   0900   WBC  7.6  8.4   RBC  4.70  4.70   HGB  14.8  14.6   HCT  45.1  45.2   MCV  96  96   MCH  31.5  31.1   MCHC  32.8  32.3   RDW  15.0  15.1*   PLT  290  329       Last Basic Metabolic Panel:  Recent Labs   Lab Test  06/05/17   1351  01/04/17   0900   NA  141  141   POTASSIUM  5.1  4.4   CHLORIDE  106  106   CARMINE  9.8  10.0   CO2  28  26   BUN  31*  25   CR  " 1.06*  1.06*   GLC  84  85       Assessment/Plan:  (Z98.1) S/P spinal fusion, posterior, L3-S1, 6/23/17  (primary encounter diagnosis)  (Z98.890) Status post lumbar spine surgery for decompression foraminal/lateral recess L2-S1, 6/23/17  (M48.06) Spinal stenosis of lumbar region without neurogenic claudication  Comment: Variable pain control no s/s of infection  Plan: P/T, brace when OOB, add gabapentin qhs, add lidoderm patch to left hip/low back, continue other chilango medications as above. Ice to left hip PRN    (K59.01) Slow transit constipation  Comment: Symptomatic, suspect related to narcotic use, decreased mobility, recent back surgery.   Plan: change miralax to daily and PRN, hold for loose stool, continue senna, add PRN suppository and give today if no BM, staff to offer prune juice daily, encourage activity as tolerated    (R09.02) Hypoxia  (J98.11) Atelectasis of both lungs  Comment: Suspect r/t narcotic use, decreased mobility, low suspicion for PE given no SOB or chest pain.   Plan: IS every hour while awake. Activity/oob as tolerated. PRN albuterol nebulizers. Wean oxygen as able.       Electronically signed by  DUGLAS Littlejohn CNP

## 2017-06-30 ENCOUNTER — TRANSFERRED RECORDS (OUTPATIENT)
Dept: HEALTH INFORMATION MANAGEMENT | Facility: CLINIC | Age: 67
End: 2017-06-30

## 2017-06-30 ENCOUNTER — TELEPHONE (OUTPATIENT)
Dept: GERIATRICS | Facility: CLINIC | Age: 67
End: 2017-06-30

## 2017-06-30 NOTE — TELEPHONE ENCOUNTER
Patient reported urinary urgency overnight and UA/UC ordered. She remains afebrile, VSS. Denies any further urinary symptoms today - no urgency, frequency, dysuria. UA showed mod leukocytes, few bacteria, 5-10 WBC, negative for nitrates and RBC/blood. Given no further urinary symptoms will await UC results to determine if antibiotics needed..

## 2017-07-03 ENCOUNTER — NURSING HOME VISIT (OUTPATIENT)
Dept: GERIATRICS | Facility: CLINIC | Age: 67
End: 2017-07-03
Payer: MEDICARE

## 2017-07-03 VITALS
BODY MASS INDEX: 28.12 KG/M2 | WEIGHT: 169 LBS | HEART RATE: 88 BPM | SYSTOLIC BLOOD PRESSURE: 121 MMHG | OXYGEN SATURATION: 93 % | RESPIRATION RATE: 18 BRPM | DIASTOLIC BLOOD PRESSURE: 76 MMHG

## 2017-07-03 DIAGNOSIS — R53.81 PHYSICAL DECONDITIONING: ICD-10-CM

## 2017-07-03 DIAGNOSIS — J44.9 CHRONIC OBSTRUCTIVE PULMONARY DISEASE, UNSPECIFIED COPD TYPE (H): ICD-10-CM

## 2017-07-03 DIAGNOSIS — I10 BENIGN ESSENTIAL HYPERTENSION: ICD-10-CM

## 2017-07-03 DIAGNOSIS — Z85.3 HISTORY OF BREAST CANCER: ICD-10-CM

## 2017-07-03 DIAGNOSIS — F32.A DEPRESSION, UNSPECIFIED DEPRESSION TYPE: ICD-10-CM

## 2017-07-03 DIAGNOSIS — Z98.890 STATUS POST LUMBAR SPINE SURGERY FOR DECOMPRESSION OF SPINAL CORD: Primary | ICD-10-CM

## 2017-07-03 DIAGNOSIS — E78.5 HYPERLIPIDEMIA, UNSPECIFIED HYPERLIPIDEMIA TYPE: ICD-10-CM

## 2017-07-03 PROCEDURE — 99306 1ST NF CARE HIGH MDM 50: CPT | Performed by: INTERNAL MEDICINE

## 2017-07-03 PROCEDURE — 99207 ZZC CDG-CORRECTLY CODED, REVIEWED AND AGREE: CPT | Performed by: INTERNAL MEDICINE

## 2017-07-03 RX ORDER — BISACODYL 10 MG
10 SUPPOSITORY, RECTAL RECTAL DAILY PRN
COMMUNITY
Start: 2017-06-29 | End: 2017-07-19

## 2017-07-03 RX ORDER — LIDOCAINE 50 MG/G
2 PATCH TOPICAL EVERY 24 HOURS
COMMUNITY
Start: 2017-06-29 | End: 2019-01-01

## 2017-07-03 NOTE — PROGRESS NOTES
PRIMARY CARE PROVIDER AND CLINIC RESPONSIBLE:  Marisa Hoover, [place]  E [facility] / [facility] *        ADMISSION HISTORY AND PHYSICAL EXAMINATION     Chief Complaint   Patient presents with     Hospital F/U         HISTORY OF PRESENT ILLNESS:  67 year old female, (1950), admitted to the [facility] TCU for continuation of medical care and rehab.    Pt admitted to [facility] 6/23 to 6/26 for L spine decompression and fusion.    Pt denies any fevers/chills/chest pain/SOB. Does have increased pain in LE's, and lidocaine patches are helping. + urgency in urination.     Patient is seen and examined by me with Rosina Muro CNP. Please see Rosina Muro 's admit noted dated 6/27  for details of admission, past medical history, family history, allergies, medication list, social history and other details pertinent with this admission. Hospital admission and dc summary reviewed.      Past Medical History:   Diagnosis Date     Back pain      CAD (coronary artery disease)      COPD (chronic obstructive pulmonary disease) (H) 02/06/2016     Depression      Falls      Hyperlipidemia      Hypertension      Smoker        Past Surgical History:   Procedure Laterality Date     ANGIOGRAM  10-    Moderate non-obstructive coronary disease involving all 3 vessels. LAD and RCA lesions are non-flow-limiting. Recommend aggressive risk factor management and possible pulmonary evaluation for dyspnea     ARTHROPLASTY TOE(S) Right 2/24/2016    Procedure: ARTHROPLASTY TOE(S);  Surgeon: Levar Matias DPM;  Location: [facility]     ARTHROTOMY SHOULDER, ROTATOR CUFF REPAIR, COMBINED Right 7/12/2016    Procedure: COMBINED ARTHROTOMY SHOULDER, ROTATOR CUFF REPAIR;  Surgeon: Reggie Cisse MD;  Location:  OR     HEAD & NECK SURGERY      Jaw surgery     LUMPECTOMY BREAST, SEED LOCALIZATION, SENTINEL NODE Right 12/2/2016    Procedure: LUMPECTOMY BREAST, SEED LOCALIZATION, SENTINEL NODE;  Surgeon:  Tiffanie Cabrera MD;  Location:  OR     ORTHOPEDIC SURGERY  1994    left foot surgery / bunion     OSTEOTOMY FOOT Right 2/24/2016    Procedure: OSTEOTOMY FOOT;  Surgeon: Levar Matias DPM;  Location: [facility]     RECONSTRUCT FOREFOOT WITH METATARSOPHALANGEAL (MTP) FUSION Right 2/24/2016    Procedure: RECONSTRUCT FOREFOOT WITH METATARSOPHALANGEAL (MTP) FUSION;  Surgeon: Levar Matias DPM;  Location:  SD     REMOVE HARDWARE FOOT Right 1/18/2017    Procedure: REMOVE HARDWARE FOOT;  Surgeon: Levar Matias DPM;  Location:  OR       Current Outpatient Prescriptions   Medication Sig     bisacodyl (DULCOLAX) 10 MG Suppository Place 10 mg rectally daily as needed for constipation     GABAPENTIN PO Take 300 mg by mouth At Bedtime Give at bedtiem 2000     Cephalexin (KEFLEX PO) Take 500 mg by mouth Give three times a day at 0800, 1200, 2000.     lidocaine (LIDODERM) 5 % Patch Place 1 patch onto the skin every 24 hours Apply to low back/left hip - on in AM, remove after 12 hours. 2118-3328, 7232-2724.     albuterol (2.5 MG/3ML) 0.083% neb solution Take 1 vial by nebulization every 2 hours as needed for shortness of breath / dyspnea or wheezing     CYCLOBENZAPRINE HCL PO Take 5 mg by mouth every 8 hours as needed for muscle spasms     senna-docusate (SENOKOT-S;PERICOLACE) 8.6-50 MG per tablet Take 2 tablets by mouth 2 times daily     OXYCODONE HCL PO Take 2.5 mg by mouth every 4 hours as needed And 2.5mg PO TID at 0800, 1200, and 2000     Acetaminophen (TYLENOL PO) Take 1,000 mg by mouth every 8 hours      polyethylene glycol (MIRALAX/GLYCOLAX) powder Take 17 g by mouth daily as needed for constipation     letrozole (FEMARA) 2.5 MG tablet Take 2.5 mg by mouth daily     amLODIPine (NORVASC) 10 MG tablet Take 1 tablet (10 mg) by mouth daily     olmesartan (BENICAR) 40 MG tablet Take 1 tablet (40 mg) by mouth daily     FLUoxetine (PROZAC) 20 MG capsule Take 3 capsules (60 mg) by mouth daily     buPROPion  (WELLBUTRIN SR) 150 MG 12 hr tablet Take 1 tablet (150 mg) by mouth 2 times daily     rosuvastatin (CRESTOR) 10 MG tablet Take 1 tablet (10 mg) by mouth daily     aspirin 81 MG tablet Take 81 mg by mouth daily     Multiple Vitamin CHEW Take 1 tablet by mouth daily      calcium 500 MG CHEW Take by mouth daily      No current facility-administered medications for this visit.        Allergies   Allergen Reactions     Betadine [Povidone Iodine] Hives     Iodine-131      No Clinical Screening - See Comments Swelling     Water softener salts     Soap      Perfumed soaps     Sodium Chloride        Social History     Social History     Marital status:      Spouse name: N/A     Number of children: N/A     Years of education: N/A     Occupational History     Not on file.     Social History Main Topics     Smoking status: Former Smoker     Packs/day: 0.50     Years: 30.00     Types: Cigarettes     Quit date: 7/15/2015     Smokeless tobacco: Never Used     Alcohol use 0.0 oz/week     0 Standard drinks or equivalent per week      Comment: 3/ week     Drug use: No     Sexual activity: Yes     Partners: Male     Other Topics Concern     Caffeine Concern No     1 diet coke, coffee occ     Sleep Concern No     Special Diet No     regular     Exercise No     none     Social History Narrative          Information reviewed:  Medications, vital signs, orders, nursing notes, problem list, hospital information.     ROS: All 10 point review of system completed, those pertinent positive, please see H&P, the remaining ROS is negative.    /76  Pulse 88  Resp 18  Wt 169 lb (76.7 kg)  SpO2 93%  BMI 28.12 kg/m2    PHYSICAL EXAMINATION:   GENERAL:  No acute distress. Sitting on WC, on NC.  SKIN:  Dry and warm.  There is no rash, lesions, ulcers or juandice at area of skin examined.  HEENT:  Head without trauma.  Pupils round, reactive. Exam of conjunctiva and lids are normal. Sclera without icterus. There is no oral  thrush.  NECK:  Supple.  There is no cervical adenopathy, no thyromegaly. No jugular venous distension.  CHEST: No reproducible chest tenderness.   LUNGS:  Normal respiratory effort. DIminished BS.  HEART:  Regular rate and rhythm.  No murmur, gallops or rubs auscultated.  ABDOMEN:  Has soft brace.  EXTREMITIES: No edema.   NEUROLOGIC:  Alert and oriented x3.      Lab/Diagnostic data:  Reviewed    Lab Results   Component Value Date    WBC 7.6 06/05/2017     Lab Results   Component Value Date    RBC 4.70 06/05/2017     Lab Results   Component Value Date    HGB 14.8 06/05/2017     Lab Results   Component Value Date    HCT 45.1 06/05/2017     Lab Results   Component Value Date    MCV 96 06/05/2017     Lab Results   Component Value Date    MCH 31.5 06/05/2017     Lab Results   Component Value Date    MCHC 32.8 06/05/2017     Lab Results   Component Value Date    RDW 15.0 06/05/2017     Lab Results   Component Value Date     06/05/2017       Last Basic Metabolic Panel:  Lab Results   Component Value Date     06/05/2017      Lab Results   Component Value Date    POTASSIUM 5.1 06/05/2017     Lab Results   Component Value Date    CHLORIDE 106 06/05/2017     Lab Results   Component Value Date    CARMINE 9.8 06/05/2017     Lab Results   Component Value Date    CO2 28 06/05/2017     Lab Results   Component Value Date    BUN 31 06/05/2017     Lab Results   Component Value Date    CR 1.06 06/05/2017     Lab Results   Component Value Date    GLC 84 06/05/2017         ASSESSMENT / PLAN:     Status post lumbar spine surgery for decompression of spinal cord  - s/p decompression and fusion L2-S1.  - Pain control, follow up with neurosurgery as scheduled.  - did have issues with increased somnolence with narcotics and requiring BiPAP, however now has increased pain and may have to escalate narcotics gradually and carefully.    Depression, unspecified depression type  - On wellbutrin and prozac.    Hyperlipidemia, unspecified  hyperlipidemia type  - On crestor.    Chronic obstructive pulmonary disease, unspecified COPD type (H)  - Moderate COPD per PFT's 2/2016.  - On oxygen, denies any SOB.  - Wean off oxygen.     Benign essential hypertension  - On norvasc and benicar.    History of breast cancer  - On femara.    Abnormal CT head.  - CT head 6/23 done for lethargy suggested NPH.  - f/u with PCP.    UTI with E.coli.  - On PO keflex.    Physical deconditioning  -Plan: PT/OT, fall precautions. Care conference with patient and family for the progress of rehab and disposition issues will be discussed as planned. Rehab evaluation and other evaluations including CPT are at rehab logs, to be reviewed separately.  Fall risk assessment as well as cognitive evaluation will be formed during rehab stay if indicated.      Other problems with same care. Primary care doctor and other specialists to address those chronic problems in next clinic appointment to be scheduled upon discharge from the TCU.    Total time spent with patient visit was 45  min including patient visit, review of past records, 1/2 time on patients counseling and coordinating care.

## 2017-07-06 ENCOUNTER — NURSING HOME VISIT (OUTPATIENT)
Dept: GERIATRICS | Facility: CLINIC | Age: 67
End: 2017-07-06
Payer: MEDICARE

## 2017-07-06 ENCOUNTER — TRANSFERRED RECORDS (OUTPATIENT)
Dept: HEALTH INFORMATION MANAGEMENT | Facility: CLINIC | Age: 67
End: 2017-07-06

## 2017-07-06 VITALS
WEIGHT: 161.8 LBS | HEIGHT: 65 IN | RESPIRATION RATE: 18 BRPM | OXYGEN SATURATION: 90 % | TEMPERATURE: 97.6 F | BODY MASS INDEX: 26.96 KG/M2 | SYSTOLIC BLOOD PRESSURE: 115 MMHG | HEART RATE: 79 BPM | DIASTOLIC BLOOD PRESSURE: 70 MMHG

## 2017-07-06 DIAGNOSIS — I10 BENIGN ESSENTIAL HYPERTENSION: ICD-10-CM

## 2017-07-06 DIAGNOSIS — J98.11 ATELECTASIS OF BOTH LUNGS: ICD-10-CM

## 2017-07-06 DIAGNOSIS — R09.02 HYPOXIA: ICD-10-CM

## 2017-07-06 DIAGNOSIS — Z98.890 STATUS POST LUMBAR SPINE SURGERY FOR DECOMPRESSION OF SPINAL CORD: Primary | ICD-10-CM

## 2017-07-06 DIAGNOSIS — Z98.1 S/P SPINAL FUSION: ICD-10-CM

## 2017-07-06 DIAGNOSIS — K59.01 SLOW TRANSIT CONSTIPATION: ICD-10-CM

## 2017-07-06 DIAGNOSIS — R53.81 PHYSICAL DECONDITIONING: ICD-10-CM

## 2017-07-06 DIAGNOSIS — N39.0 URINARY TRACT INFECTION WITHOUT HEMATURIA, SITE UNSPECIFIED: ICD-10-CM

## 2017-07-06 LAB
ANION GAP SERPL CALCULATED.3IONS-SCNC: 12 MMOL/L (ref 5–18)
BUN SERPL-MCNC: 18 MG/DL (ref 5–22)
CALCIUM SERPL-MCNC: 9.8 MG/DL (ref 8.5–10.5)
CHLORIDE SERPLBLD-SCNC: 101 MMOL/L (ref 98–107)
CO2 SERPL-SCNC: 25 MMOL/L (ref 22–31)
CREAT SERPL-MCNC: 0.96 MG/DL (ref 0.6–1.1)
ERYTHROCYTE [DISTWIDTH] IN BLOOD BY AUTOMATED COUNT: 15.5 % (ref 11–14.5)
GFR SERPL CREATININE-BSD FRML MDRD: 58 ML/MIN/1.73M2
GLUCOSE SERPL-MCNC: 78 MG/DL (ref 70–125)
HCT VFR BLD AUTO: 28 % (ref 35–47)
HEMOGLOBIN: 9.2 G/DL (ref 12–16)
MCH RBC QN AUTO: 32.2 PG (ref 27–34)
MCHC RBC AUTO-ENTMCNC: 32.9 G/DL (ref 32–38)
MCV RBC AUTO: 98 FL (ref 80–100)
PLATELET # BLD AUTO: 487 THOU/UL (ref 140–440)
POTASSIUM SERPL-SCNC: 3.9 MMOL/L (ref 3.5–5)
RBC # BLD AUTO: 2.86 MILL/UL (ref 3.8–5.4)
SODIUM SERPL-SCNC: 138 MMOL/L (ref 136–145)
WBC # BLD AUTO: 9.6 THOU/UL (ref 4–11)

## 2017-07-06 PROCEDURE — 99207 ZZC CDG-CORRECTLY CODED, REVIEWED AND AGREE: CPT | Performed by: NURSE PRACTITIONER

## 2017-07-06 PROCEDURE — 99310 SBSQ NF CARE HIGH MDM 45: CPT | Performed by: NURSE PRACTITIONER

## 2017-07-10 ENCOUNTER — NURSING HOME VISIT (OUTPATIENT)
Dept: GERIATRICS | Facility: CLINIC | Age: 67
End: 2017-07-10
Payer: MEDICARE

## 2017-07-10 VITALS
HEART RATE: 71 BPM | OXYGEN SATURATION: 91 % | SYSTOLIC BLOOD PRESSURE: 115 MMHG | DIASTOLIC BLOOD PRESSURE: 66 MMHG | HEIGHT: 65 IN | BODY MASS INDEX: 26.96 KG/M2 | RESPIRATION RATE: 16 BRPM | WEIGHT: 161.8 LBS | TEMPERATURE: 97.6 F

## 2017-07-10 DIAGNOSIS — R53.81 PHYSICAL DECONDITIONING: ICD-10-CM

## 2017-07-10 DIAGNOSIS — J98.11 ATELECTASIS OF BOTH LUNGS: ICD-10-CM

## 2017-07-10 DIAGNOSIS — Z98.1 S/P SPINAL FUSION: ICD-10-CM

## 2017-07-10 DIAGNOSIS — R09.02 HYPOXIA: ICD-10-CM

## 2017-07-10 DIAGNOSIS — Z98.890 STATUS POST LUMBAR SPINE SURGERY FOR DECOMPRESSION OF SPINAL CORD: ICD-10-CM

## 2017-07-10 DIAGNOSIS — N39.0 URINARY TRACT INFECTION WITHOUT HEMATURIA, SITE UNSPECIFIED: ICD-10-CM
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
## 2017-07-10 DIAGNOSIS — K59.01 SLOW TRANSIT CONSTIPATION: ICD-10-CM

## 2017-07-10 PROCEDURE — 99309 SBSQ NF CARE MODERATE MDM 30: CPT | Performed by: NURSE PRACTITIONER

## 2017-07-10 PROCEDURE — 99207 ZZC CDG-CORRECTLY CODED, REVIEWED AND AGREE: CPT | Performed by: NURSE PRACTITIONER

## 2017-07-10 NOTE — PROGRESS NOTES
[place] [facility]    Chief Complaint   Patient presents with     Nursing Home Acute       HPI:    Zoey Armstrong is a 67 year old  (1950), who is being seen today for an episodic care visit at [facility].  HPI information obtained from: facility chart records, facility staff, patient report and [facility] chart review.Today's concern is:  Fall, initial encounter  Patient stated that she slide off the side of the bed, she landed on her bottom. Denies any headache, lightheadedness, dizziness, LOC. Denies any injury or increased pain. Stated she has similar occurrences at home prior to the surgery     Status post lumbar spine surgery for decompression foraminal/lateral recess L2-S1, 6/23/17  S/P spinal fusion, posterior, L3-S1, 6/23/17  Physical deconditioning  Continues to have intermittent shooting pain down left hip into thigh/knee, occurring less often and less severe. Continues to have some numbness to LLE, but this is improving. Pain medication recently increased to gabapentin TID, oxycodone 5mg QID and 2.5mg PRN, lidocaine patches, APAP QID and PRN flexeril. She is alert and reports no increased drowsiness with increase in pain medication. She wears LSO brace when OOB. No further fevers noted. Incision is intact with steri-strips in place. She is on ASA for AC. Working with PT and OT, was able to do stairs today.     Hypoxia  Atelectasis of both lungs  Hx of COPD, no maintenance medications. IS sating 91% on RA today. CXR showed atelectasis, no PNA. Continues to use IS regularly. Has PRN nebs available. Denies any cough, wheezing or SOB.     Slow transit constipation  On miralax, senna, PRN suppository. Reports she is having 1-2 loose stools daily, no bloating, abdominal pain, or discomfort.     Urinary tract infection without hematuria, site unspecified  UC grew e.coli, sensitive to keflex. Completed 5 day course of keflex on 7/7. Denies any further  urgency or frequency. Has had some incontinence as she was unable to make it to the bathroom in time due to decreased mobility and having to wait for assistance. She remains afebrile.        Last 3 BPs:115/66, 123/73, 115/75  Admission Weight: 169 lbs  Current Weight: 161.8 lbs    ALLERGIES: Betadine [povidone iodine]; Iodine-131; No clinical screening - see comments; Soap; and Sodium chloride  Past Medical, Surgical, Family and Social History reviewed and updated in [facility].    Current Outpatient Prescriptions   Medication Sig Dispense Refill     bisacodyl (DULCOLAX) 10 MG Suppository Place 10 mg rectally daily as needed for constipation       GABAPENTIN PO Take 300 mg by mouth 3 times daily        lidocaine (LIDODERM) 5 % Patch Place 1 patch onto the skin every 24 hours Apply to low back/left hip - on in AM, remove after 12 hours. 7725-7676, 6723-0800.       albuterol (2.5 MG/3ML) 0.083% neb solution Take 1 vial by nebulization every 2 hours as needed for shortness of breath / dyspnea or wheezing       CYCLOBENZAPRINE HCL PO Take 5 mg by mouth every 8 hours as needed for muscle spasms       senna-docusate (SENOKOT-S;PERICOLACE) 8.6-50 MG per tablet Take 2 tablets by mouth 2 times daily       OXYCODONE HCL PO Take 2.5 mg by mouth every 4 hours as needed And 5 mg PO QID       Acetaminophen (TYLENOL PO) Take 1,000 mg by mouth 4 times daily        polyethylene glycol (MIRALAX/GLYCOLAX) powder Take 17 g by mouth daily AND daily PRN       letrozole (FEMARA) 2.5 MG tablet Take 2.5 mg by mouth daily       amLODIPine (NORVASC) 10 MG tablet Take 1 tablet (10 mg) by mouth daily 90 tablet 1     olmesartan (BENICAR) 40 MG tablet Take 1 tablet (40 mg) by mouth daily 90 tablet 1     FLUoxetine (PROZAC) 20 MG capsule Take 3 capsules (60 mg) by mouth daily 90 capsule 3     buPROPion (WELLBUTRIN SR) 150 MG 12 hr tablet Take 1 tablet (150 mg) by mouth 2 times daily 180 tablet 1     rosuvastatin (CRESTOR) 10 MG tablet Take 1 tablet (10  "mg) by mouth daily 90 tablet 3     aspirin 81 MG tablet Take 81 mg by mouth daily       Multiple Vitamin CHEW Take 1 tablet by mouth daily        calcium 500 MG CHEW Take by mouth daily        Medications reviewed:  Medications reconciled to facility chart and changes were made to reflect current medications as identified as above med list. Below are the changes that were made:   Medications stopped since last EPIC medication reconciliation:   Medications Discontinued During This Encounter   Medication Reason     Cephalexin (KEFLEX PO) Medication Reconciliation Clean Up       Medications started since last [facility] medication reconciliation:  No orders of the defined types were placed in this encounter.        REVIEW OF SYSTEMS:  10 point ROS of systems including Constitutional, Eyes, Respiratory, Cardiovascular, Gastroenterology, Genitourinary, Integumentary, Muscularskeletal, Psychiatric were all negative except for pertinent positives noted in my HPI.    Physical Exam:  /66  Pulse 71  Temp 97.6  F (36.4  C)  Resp 16  Ht 5' 5\" (1.651 m)  Wt 161 lb 12.8 oz (73.4 kg)  SpO2 91%  BMI 26.92 kg/m2  GENERAL APPEARANCE:  Alert, in no distress, oriented, cooperative  EYES:  EOM, conjunctivae, lids, pupils and irises normal, PERRL  RESP:  respiratory effort and palpation of chest normal, lungs clear to auscultation , no respiratory distress, cough absent  CV:  Palpation and auscultation of heart done , regular rate and rhythm, no murmur, rub, or gallop, no edema, +2 pedal pulses  ABDOMEN:  normal bowel sounds, soft, nontender, no hepatosplenomegaly or other masses, no guarding or rebound  M/S:   Gait and station abnormal generalized weakness, small steps, ambulates with walker. no joint tenderness  SKIN:  Inspection of skin and subcutaneous tissue baseline, Palpation of skin and subcutaneous tissue baseline, surigical incision C/D/I, small amount of scabbing, steri-strips in place  NEURO:   Cranial nerves 2-12 are " normal tested and grossly at patient's baseline, Examination of sensation by touch normal  PSYCH:  oriented X 3, normal insight, judgement and memory, insight and judgement impaired, affect and mood normal    Recent Labs:      CBC RESULTS:   Recent Labs   Lab Test 07/06/17 06/05/17   1351   WBC  9.6  7.6   RBC  2.86*  4.70   HGB  9.2*  14.8   HCT  28.0*  45.1   MCV  98  96   MCH  32.2  31.5   MCHC  32.9  32.8   RDW  15.5*  15.0   PLT  487*  290       Last Basic Metabolic Panel:  Recent Labs   Lab Test 07/06/17 06/05/17   1351   NA  138  141   POTASSIUM  3.9  5.1   CHLORIDE  101  106   CARMINE  9.8  9.8   CO2  25  28   BUN  18  31*   CR  0.96  1.06*   GLC  78  84       Assessment/Plan:  (W19.XXXA) Fall, initial encounter  (primary encounter diagnosis)  Comment: No injury, denies any LOC  Plan: Neuro's intact, staff to follow post-fall protocol, up with assistance. Monitor neuro's    (Z98.890) Status post lumbar spine surgery for decompression foraminal/lateral recess L2-S1, 6/23/17  (Z98.1) S/P spinal fusion, posterior, L3-S1, 6/23/17  (R53.81) Physical deconditioning  Comment: Pain control improved, but continues to have intermittent pain, mobility improving slowly, no s/s of infection  Plan: Increase gabapentin to QID, other medications as above. PT/OT eval and treat. Discharge planning per their recommendation, LSO brace when OOB, f/u with neurosurgery in 2-4 weeks    (R09.02) Hypoxia  (J98.11) Atelectasis of both lungs  Comment: On RA. Suspect r/t narcotic use, decreased mobility, low suspicion for PE given no SOB or chest pain  Plan: Continue IS, PRN nebs, Activity/OOB as tolerated, wean oxygen as able  for SPO2 >88%    (K59.01) Slow transit constipation  Comment: Having loose stool  Plan: Decrease senna to 1 tab BID and PRN, continue other medications as above, monitor and adjust as needed.    (N39.0) Urinary tract infection without hematuria, site unspecified  Comment: Resolved  Plan: Monitor VS, for urinary  symptoms       Electronically signed by  DUGLAS Littlejohn CNP

## 2017-07-12 VITALS
HEART RATE: 70 BPM | HEIGHT: 65 IN | DIASTOLIC BLOOD PRESSURE: 65 MMHG | TEMPERATURE: 97.4 F | RESPIRATION RATE: 18 BRPM | WEIGHT: 159.6 LBS | OXYGEN SATURATION: 90 % | BODY MASS INDEX: 26.59 KG/M2 | SYSTOLIC BLOOD PRESSURE: 105 MMHG

## 2017-07-13 ENCOUNTER — NURSING HOME VISIT (OUTPATIENT)
Dept: GERIATRICS | Facility: CLINIC | Age: 67
End: 2017-07-13
Payer: MEDICARE

## 2017-07-13 ENCOUNTER — TRANSFERRED RECORDS (OUTPATIENT)
Dept: HEALTH INFORMATION MANAGEMENT | Facility: CLINIC | Age: 67
End: 2017-07-13

## 2017-07-13 DIAGNOSIS — R53.81 PHYSICAL DECONDITIONING: ICD-10-CM

## 2017-07-13 DIAGNOSIS — R09.02 HYPOXIA: ICD-10-CM

## 2017-07-13 DIAGNOSIS — Z98.1 S/P SPINAL FUSION: ICD-10-CM

## 2017-07-13 DIAGNOSIS — K59.01 SLOW TRANSIT CONSTIPATION: ICD-10-CM

## 2017-07-13 DIAGNOSIS — Z98.890 STATUS POST LUMBAR SPINE SURGERY FOR DECOMPRESSION OF SPINAL CORD: Primary | ICD-10-CM

## 2017-07-13 DIAGNOSIS — J98.11 ATELECTASIS OF BOTH LUNGS: ICD-10-CM

## 2017-07-13 LAB
ERYTHROCYTE [DISTWIDTH] IN BLOOD BY AUTOMATED COUNT: 15 % (ref 11–14.5)
HCT VFR BLD AUTO: 31.2 % (ref 35–47)
HEMOGLOBIN: 10.2 G/DL (ref 11.7–15.7)
MCH RBC QN AUTO: 31.5 PG (ref 27–34)
MCHC RBC AUTO-ENTMCNC: 32.7 G/DL (ref 32–36)
MCV RBC AUTO: 96 FL (ref 80–100)
PLATELET # BLD AUTO: 572 THOU/UL (ref 140–440)
RBC # BLD AUTO: 3.24 MILL/UL (ref 3.8–5.4)
WBC # BLD AUTO: 7 THOU/UL (ref 4–11)

## 2017-07-13 PROCEDURE — 99309 SBSQ NF CARE MODERATE MDM 30: CPT | Performed by: NURSE PRACTITIONER

## 2017-07-13 NOTE — PROGRESS NOTES
[place] [facility]    Chief Complaint   Patient presents with     Nursing Home Acute       HPI:    Zoey Armstrong is a 67 year old  (1950), who is being seen today for an episodic care visit at [facility]  [facility].  HPI information obtained from: facility chart records, facility staff, patient report and [facility] chart review.Today's concern is:  Status post lumbar spine surgery for decompression foraminal/lateral recess L2-S1, 6/23/17  S/P spinal fusion, posterior, L3-S1, 6/23/17  Physical deconditioning  Continues to have intermittent shooting pain down left hip into thigh/knee. Pain lasts for a few seconds, and occurs more frequently with movement, but she feels that is it occurring left often.  Continues to have some numbness to LLE, has not changed much since Monday. Pain medication recently increased to gabapentin QID, oxycodone 5mg QID and 2.5mg PRN, lidocaine patches, APAP QID and PRN flexeril. She is alert and reports no increased drowsiness with increase in pain medication. She wears LSO brace when OOB, does requires some assistance with putting on. No further fevers noted. Incision is intact with steri-strips in place. She is on ASA for AC. Working with PT and OT. Spouse has undergone caregiver training.     Hypoxia  Atelectasis of both lungs  Has remained on RA for the past few days, sating 90-95%. Denies any cough, SOB, or wheezing.     Slow transit constipation  States she has not had a BM in two days, usually goes QOD at home. Senna-s was decreased for two pills BID to 1 tab BID earlier this week due to loose stools, she is wanting to try 2 tabs in the AM, and one in the PM. She is also on miralax q day and PRN and PRN suppository.  Denies any nausea, vomiting, or abdominal pain.        Last 3 BPs:105/65, 130/74, 125/72  Admission Weight: 169 lbs  Current Weight: 159.6 lbs      ALLERGIES: Betadine [povidone iodine]; Iodine-131; No clinical screening -  see comments; Soap; and Sodium chloride  Past Medical, Surgical, Family and Social History reviewed and updated in [facility].    Current Outpatient Prescriptions   Medication Sig Dispense Refill     bisacodyl (DULCOLAX) 10 MG Suppository Place 10 mg rectally daily as needed for constipation       GABAPENTIN PO Take 300 mg by mouth 4 times daily        lidocaine (LIDODERM) 5 % Patch Place 1 patch onto the skin every 24 hours Apply to low back/left hip - on in AM, remove after 12 hours. 4724-3600, 0432-7646.       albuterol (2.5 MG/3ML) 0.083% neb solution Take 1 vial by nebulization every 2 hours as needed for shortness of breath / dyspnea or wheezing       CYCLOBENZAPRINE HCL PO Take 5 mg by mouth every 8 hours as needed for muscle spasms       senna-docusate (SENOKOT-S;PERICOLACE) 8.6-50 MG per tablet Take 1 tablet by mouth 2 times daily AND BID PRN       OXYCODONE HCL PO Take 2.5 mg by mouth every 4 hours as needed And 5 mg PO QID       Acetaminophen (TYLENOL PO) Take 1,000 mg by mouth 4 times daily        polyethylene glycol (MIRALAX/GLYCOLAX) powder Take 17 g by mouth daily AND daily PRN       letrozole (FEMARA) 2.5 MG tablet Take 2.5 mg by mouth daily       amLODIPine (NORVASC) 10 MG tablet Take 1 tablet (10 mg) by mouth daily 90 tablet 1     olmesartan (BENICAR) 40 MG tablet Take 1 tablet (40 mg) by mouth daily 90 tablet 1     FLUoxetine (PROZAC) 20 MG capsule Take 3 capsules (60 mg) by mouth daily 90 capsule 3     buPROPion (WELLBUTRIN SR) 150 MG 12 hr tablet Take 1 tablet (150 mg) by mouth 2 times daily 180 tablet 1     rosuvastatin (CRESTOR) 10 MG tablet Take 1 tablet (10 mg) by mouth daily 90 tablet 3     aspirin 81 MG tablet Take 81 mg by mouth daily       Multiple Vitamin CHEW Take 1 tablet by mouth daily        calcium 500 MG CHEW Take by mouth daily        Medications reviewed:  Medications reconciled to facility chart and changes were made to reflect current medications as identified as above med list.  "Below are the changes that were made:   Medications stopped since last EPIC medication reconciliation:   There are no discontinued medications.    Medications started since last [facility] medication reconciliation:  No orders of the defined types were placed in this encounter.        REVIEW OF SYSTEMS:  10 point ROS of systems including Constitutional, Eyes, Respiratory, Cardiovascular, Gastroenterology, Genitourinary, Integumentary, Muscularskeletal, Psychiatric were all negative except for pertinent positives noted in my HPI.    Physical Exam:  /65  Pulse 70  Temp 97.4  F (36.3  C)  Resp 18  Ht 5' 5\" (1.651 m)  Wt 159 lb 9.6 oz (72.4 kg)  SpO2 90%  BMI 26.56 kg/m2  GENERAL APPEARANCE:  Alert, in no distress, oriented, cooperative  NECK:  No adenopathy,masses or thyromegaly  CV:  Palpation and auscultation of heart done , regular rate and rhythm, no murmur, rub, or gallop, no edema, +2 pedal pulses  ABDOMEN:  normal bowel sounds, soft, nontender, no hepatosplenomegaly or other masses, no guarding or rebound  M/S:   Gait and station abnormal generalized weakness, ambulates with walker, mild tenderness to low back, BLE strength 4/5  SKIN:  wound healing well, no signs of infection surgical incision C/D/I, few steri-strips in place. no erythema or drainage.   NEURO:   Cranial nerves 2-12 are normal tested and grossly at patient's baseline, Examination of sensation by touch normal  PSYCH:  oriented X 3, normal insight, judgement and memory, affect and mood normal    Recent Labs:      CBC RESULTS:   Recent Labs   Lab Test 07/06/17 06/05/17   1351   WBC  9.6  7.6   RBC  2.86*  4.70   HGB  9.2*  14.8   HCT  28.0*  45.1   MCV  98  96   MCH  32.2  31.5   MCHC  32.9  32.8   RDW  15.5*  15.0   PLT  487*  290       Last Basic Metabolic Panel:  Recent Labs   Lab Test 07/06/17 06/05/17   1351   NA  138  141   POTASSIUM  3.9  5.1   CHLORIDE  101  106   CARMINE  9.8  9.8   CO2  25  28   BUN  18  31*   CR  0.96  1.06*   GLC  " 78  84       Assessment/Plan:  (Z98.890) Status post lumbar spine surgery for decompression foraminal/lateral recess L2-S1, 6/23/17  (primary encounter diagnosis)  (Z98.1) S/P spinal fusion, posterior, L3-S1, 6/23/17  (R53.81) Physical deconditioning  Comment: Pain fairly well controlled, incision healing well, no s/s of infection  Plan: Continue medications as above, PT/OT eval and treat. Discharge planning per their recommendation, LSO brace when OOB, f/u with neurosurgery in 2-4 weeks    (R09.02) Hypoxia  (J98.11) Atelectasis of both lungs  Comment: On RA. Suspect r/t narcotic use, decreased mobility  Plan: Continue IS, PRN nebs, Activity/OOB as tolerated, wean oxygen as able for SPO2 >88%    (K59.01) Slow transit constipation  Comment: No BM in 2 days  Plan: Increase senna-s to 2 tabs q AM, 1 tab qPM, continue miralax qday and PRN, PRn suppository, monitor and adjust PRN      Electronically signed by  DUGLAS Littlejohn CNP

## 2017-07-17 ENCOUNTER — TRANSFERRED RECORDS (OUTPATIENT)
Dept: HEALTH INFORMATION MANAGEMENT | Facility: CLINIC | Age: 67
End: 2017-07-17

## 2017-07-17 ENCOUNTER — NURSING HOME VISIT (OUTPATIENT)
Dept: GERIATRICS | Facility: CLINIC | Age: 67
End: 2017-07-17
Payer: MEDICARE

## 2017-07-17 VITALS
DIASTOLIC BLOOD PRESSURE: 73 MMHG | HEIGHT: 65 IN | OXYGEN SATURATION: 96 % | WEIGHT: 159.6 LBS | TEMPERATURE: 97.5 F | RESPIRATION RATE: 18 BRPM | HEART RATE: 76 BPM | BODY MASS INDEX: 26.59 KG/M2 | SYSTOLIC BLOOD PRESSURE: 113 MMHG

## 2017-07-17 DIAGNOSIS — D62 ANEMIA DUE TO BLOOD LOSS, ACUTE: ICD-10-CM

## 2017-07-17 DIAGNOSIS — R32 URINARY INCONTINENCE, UNSPECIFIED TYPE: ICD-10-CM

## 2017-07-17 DIAGNOSIS — I10 BENIGN ESSENTIAL HYPERTENSION: ICD-10-CM

## 2017-07-17 DIAGNOSIS — J44.9 CHRONIC OBSTRUCTIVE PULMONARY DISEASE, UNSPECIFIED COPD TYPE (H): ICD-10-CM

## 2017-07-17 DIAGNOSIS — N39.0 URINARY TRACT INFECTION WITHOUT HEMATURIA, SITE UNSPECIFIED: ICD-10-CM

## 2017-07-17 DIAGNOSIS — K59.01 SLOW TRANSIT CONSTIPATION: ICD-10-CM

## 2017-07-17 DIAGNOSIS — C50.911 MALIGNANT NEOPLASM OF RIGHT FEMALE BREAST, UNSPECIFIED ESTROGEN RECEPTOR STATUS, UNSPECIFIED SITE OF BREAST (H): ICD-10-CM

## 2017-07-17 DIAGNOSIS — M48.061 SPINAL STENOSIS OF LUMBAR REGION WITHOUT NEUROGENIC CLAUDICATION: Primary | ICD-10-CM

## 2017-07-17 DIAGNOSIS — R53.81 PHYSICAL DECONDITIONING: ICD-10-CM

## 2017-07-17 DIAGNOSIS — R09.02 HYPOXIA: ICD-10-CM

## 2017-07-17 DIAGNOSIS — Z98.1 S/P SPINAL FUSION: ICD-10-CM

## 2017-07-17 DIAGNOSIS — Z98.890 STATUS POST LUMBAR SPINE SURGERY FOR DECOMPRESSION OF SPINAL CORD: ICD-10-CM

## 2017-07-17 DIAGNOSIS — J98.11 ATELECTASIS OF BOTH LUNGS: ICD-10-CM

## 2017-07-17 PROCEDURE — 99316 NF DSCHRG MGMT 30 MIN+: CPT | Performed by: NURSE PRACTITIONER

## 2017-07-17 PROCEDURE — 99207 ZZC CDG-CORRECTLY CODED, REVIEWED AND AGREE: CPT | Performed by: NURSE PRACTITIONER

## 2017-07-17 NOTE — PROGRESS NOTES
[place] [facility] DISCHARGE SUMMARY    PATIENT'S NAME: Zoey Armstrong  YOB: 1950  MEDICAL RECORD NUMBER:  [id number]    PRIMARY CARE PROVIDER AND CLINIC RESPONSIBLE AFTER TRANSFER: Marisa Hoover [facility] [address] / [place]     CODE STATUS/ADVANCE DIRECTIVES DISCUSSION:   CPR/Full code        Allergies   Allergen Reactions     Betadine [Povidone Iodine] Hives     Iodine-131      No Clinical Screening - See Comments Swelling     Water softener salts     Soap      Perfumed soaps     Sodium Chloride        TRANSFERRING PROVIDERS: DUGLAS Littlejohn CNP, Dr. Nanette Vargas MD  DATE OF SNF ADMISSION:    DATE OF SNF (anticipated) DISCHARGE:   DISCHARGE DISPOSITION: [facility] Provider   Nursing Facility: [facility]  stay 17 to 17.     Condition on Discharge:  Improving.  Function:  UE Drsg: sba LE Drsg: sba Bed Mobility: sba-min Transfers: min Walkinft cga Toileting: mod   Cognitive Scores: SLUMS 16/30    Equipment: walker and wheelchair    DISCHARGE DIAGNOSIS:   1. Spinal stenosis of lumbar region without neurogenic claudication    2. Status post lumbar spine surgery for decompression foraminal/lateral recess L2-S1, 17    3. S/P spinal fusion, posterior, L3-S1, 17    4. Physical deconditioning    5. Hypoxia    6. Atelectasis of both lungs    7. Chronic obstructive pulmonary disease, unspecified COPD type (H)    8. Slow transit constipation    9. Urinary tract infection without hematuria, site unspecified    10. Urinary incontinence, unspecified type    11. Benign essential hypertension    12. Anemia due to blood loss, acute    13. Malignant neoplasm of right female breast, unspecified estrogen receptor status, unspecified site of breast (H)        HPI Nursing Facility Course:  HPI information obtained from: facility chart  records, facility staff, patient report and [facility] chart review.    From Hospital discharge summary:Ms. Zoey Armstrong is a 67 y.o. with a history of Spinal stenosis, lumbar region, without neurogenic claudication, spondylolisthesis, postoperative hypotension, hyperlipidemia, hypertension, COPD, CAD, and depression, who underwent DECOMPRESSION FORAMINAL/LATERAL RECESS BILATERAL L2-S1, POSTERIOR SPINE FUSION L3-S1 on 6/23/2017 by Dr. López, Yoel Polanco MD; anesthesia General,  ml, OR time 4 Hr 1 Min 46 Sec. During surgery, the patient had 300 ml of blood loss and received 3400 IVF.     The patient complained of pain at site she received 25 mcg IV fentanyl. Shortly after, the patient was able to respond to verbal stimuli but quickly goes back to sleep. Her respiratory rate dropped to low normals, 9-14, and BP ranged from  systolic and 50-56 diastolic with o-sats in the low 90's. The patients sleepiness may be attributed to narcotic vs midazolam sensitivity (In total the patient was given 1 mg IV Dilaudid, 200 mcg Fentanyl IV intra-op, and the 25 mcg of IV fentanyl as above). Placed on BiPAP after respiratory consult. She then received flumazenil, Nubain, and two doses of .16 mg narcan. After the second narcan the patient became significantly more verbal and reported to have good pain control.    Due to this sensitivity to narcotics, the patient will be admitted to the ICU for further observation. BiPAP was stopped and put on NC. Given 20,000 mcg phenylephrine in normal saline. Patient stable and transferred to the floor on 06/24/17.     Her mentation improved and pain was controlled on low dose oxycodone and tylenol. She remained on oxygen. She was discharged to TCU on 6/27 for further rehab and medical management. Had slow progress in TCU. Developed hypoxia upon admission requiring up to 4L of oxygen. Was felt to be r/t atlectasis and she was slowly, successfully weaned to RA. Did have some  difficulty with pain control, and she continues to have some nerve pain in left him, but overall pain improved and she was able to participate in therapy. She will discharge home with walker and will rent a wheelchair. Spouse completed caregiver training in TCU. She will have home home PT, OT, RN, HHA and SW at discharge.       Spinal stenosis of lumbar region without neurogenic claudication  Status post lumbar spine surgery for decompression foraminal/lateral recess L2-S1, 6/23/17  S/P spinal fusion, posterior, L3-S1, 6/23/17  Physical deconditioning  Continues to have intermittent shooting pain down left hip into thigh/knee. Pain lasts for a few seconds, and occurs more frequently with movement. Frequency and severity of the pain continues to decrease.  Continues to have some numbness to LLE, which is also improving. For pain control she is on gabapentin QID, oxycodone 5mg QID and 2.5mg PRN, lidocaine patches, APAP QID and PRN flexeril. She is alert and there were no concerns of drowsiness or sedation in TCU. She wears LSO brace when OOB, does requires some assistance with putting on. No further fevers noted. Incision is intact with steri-strips in place. She is on ASA for AC. F/u with [facility] on 8/1.     ADDN: Spoke with patient that once shooting pains in low back/legs resolved speak with PCP/neurosurgery about weaning off gabapentin.    Hypoxia  Atelectasis of both lungs  Chronic obstructive pulmonary disease, unspecified COPD type (H)  Developed hypoxia requiring 4L of oxygen upon TCU admission. She was afebrile, no cough or SOB. CXR showed atelectasis, no PNA. Suspect r/t narcotic use, decreased activity. Aggressive IS and pulmonary toilet done and she was slowly but successfully weaned to RA.  Does have a history of COPD and is on on maintenance medications. Had PRN nebs available but rarely used.    Slow transit constipation  Reports was an intermittent issue at home prior to surgery. Has gone  back and forth between constipation and loose stools in TCU. She is currently on senna-s BID and miralax q day and PRN. Discussed use at home given narcotic use and likely need to continue use of laxatives to prevent constipation while using oxycodone, but to hold if having loose stools    Urinary tract infection without hematuria, site unspecified  Urinary incontinence, unspecified type  C/o urgency and dysuria. UC grew e.coli, sensitive to keflex, and she completed 5 day course of keflex. Symptoms improved. She did continue to have some incontinence at night as she reports that staff was unable to answer her call light fast enough to assist her to the bathroom. However she reports waking this AM and urinating in her bed without any warning. She denies any dysuria, frequency, or urgency.  Will send repeat UA/UC    ADDN: UA/UC grew >100,000 E.COLI, sensitive to bactrim, will start on bactrim DS BID x 3 days. No other urinary symptoms, but continues to have early AM incontinence.    Benign essential hypertension  Currently on PTA norvasc and olmesartan. No concerns or med changes made during TCU stay.     Anemia due to blood loss, acute  R/t blood from surgery. Hgb improved to 10.2 on 7/13.    Malignant neoplasm of right female breast, unspecified estrogen receptor status, unspecified site of breast (H)  S/p lumpectomy 12/2/16, currently on daily letrozole.       PAST MEDICAL HISTORY:  has a past medical history of Back pain; CAD (coronary artery disease); COPD (chronic obstructive pulmonary disease) (H) (02/06/2016); Depression; Falls; Hyperlipidemia; Hypertension; and Smoker. She also has no past medical history of History of blood transfusion or Malignant hyperthermia.    DISCHARGE MEDICATIONS:  Current Outpatient Prescriptions   Medication Sig Dispense Refill     Sulfamethoxazole-Trimethoprim (BACTRIM DS PO) Take 1 tablet by mouth 2 times daily X 6 doses       GABAPENTIN PO Take 300 mg by mouth 4 times daily     "    lidocaine (LIDODERM) 5 % Patch Place 2 patches onto the skin every 24 hours Apply to low back/left hip - on in AM, remove after 12 hours. [id number], [id number].        senna-docusate (SENOKOT-S;PERICOLACE) 8.6-50 MG per tablet Take 2 tablets by mouth daily AND 1 tab QHS       OXYCODONE HCL PO Take 2.5-5 mg by mouth every 4 hours as needed        Acetaminophen (TYLENOL PO) Take 1,000 mg by mouth 4 times daily        polyethylene glycol (MIRALAX/GLYCOLAX) powder Take 17 g by mouth daily AND daily PRN       letrozole (FEMARA) 2.5 MG tablet Take 2.5 mg by mouth daily       amLODIPine (NORVASC) 10 MG tablet Take 1 tablet (10 mg) by mouth daily 90 tablet 1     olmesartan (BENICAR) 40 MG tablet Take 1 tablet (40 mg) by mouth daily 90 tablet 1     FLUoxetine (PROZAC) 20 MG capsule Take 3 capsules (60 mg) by mouth daily 90 capsule 3     buPROPion (WELLBUTRIN SR) 150 MG 12 hr tablet Take 1 tablet (150 mg) by mouth 2 times daily 180 tablet 1     rosuvastatin (CRESTOR) 10 MG tablet Take 1 tablet (10 mg) by mouth daily 90 tablet 3     aspirin 81 MG tablet Take 81 mg by mouth daily       Multiple Vitamin CHEW Take 1 tablet by mouth daily        calcium 500 MG CHEW Take 1,250 mg by mouth daily          MEDICATION CHANGES/RATIONALE:   Flexeril stopped as not used  Bactrim DS x 3 days for UTI      Last 3 BPs:113/73, 98/67, 110/68  Admission Weight: 169 lbs  Current Weight: 159.6 lbs    Controlled medications sent with patient: Script for oxycodone medication for 50 tabs and 0 refills given to patient at dischage to have them fill at their out patient pharmacy     ROS:    10 point ROS of systems including Constitutional, Eyes, Respiratory, Cardiovascular, Gastroenterology, Genitourinary, Integumentary, Muscularskeletal, Psychiatric were all negative except for pertinent positives noted in my HPI.    Physical Exam:   Vitals: /73  Pulse 76  Temp 97.5  F (36.4  C)  Resp 18  Ht 5' 5\" (1.651 m)  Wt 159 lb 9.6 oz (72.4 kg)  " SpO2 96%  BMI 26.56 kg/m2  BMI= Body mass index is 26.56 kg/(m^2).    GENERAL APPEARANCE:  Alert, in no distress, cooperative  ENT:  Mouth and posterior oropharynx normal, moist mucous membranes, normal hearing acuity  EYES:  EOM, conjunctivae, lids, pupils and irises normal, PERRL  RESP:  respiratory effort and palpation of chest normal, lungs clear to auscultation , no respiratory distress, cough absent  CV:  Palpation and auscultation of heart done , regular rate and rhythm, no murmur, rub, or gallop, no edema, +2 pedal pulses  ABDOMEN:  normal bowel sounds, soft, nontender, no hepatosplenomegaly or other masses  M/S:   Gait and station abnormal generalized weakness, ambulates with walker, BLE strength 4/5  SKIN:  Inspection of skin and subcutaneous tissue baseline, Palpation of skin and subcutaneous tissue baseline, wound healing well, no signs of infection lumbar incision, few steri-strips in place, no erythema or drainage  NEURO:   Cranial nerves 2-12 are normal tested and grossly at patient's baseline, Examination of sensation by touch normal  PSYCH:  oriented X 3, normal insight, judgement and memory, affect and mood normal    DISCHARGE PLAN:  Occupational Therapy, Physical Therapy, Registered Nurse, Home Health Aide,  and From:  [facility]  Patient instructed to follow-up with:  PCP in 7 days      Current [facility] scheduled appointments:  Future Appointments  Date Time Provider Department Center   11/3/2017 10:30 AM [id number] referral needed and placed by this provider: No    Pending labs: None  SNF labs     CBC RESULTS:   Recent Labs   Lab Test 07/13/17 07/06/17   WBC  7.0  9.6   RBC  3.24*  2.86*   HGB  10.2*  9.2*   HCT  31.2*  28.0*   MCV  96  98   MCH  31.5  32.2   MCHC  32.7  32.9   RDW  15.0*  15.5*   PLT  572*  487*       Last Basic Metabolic Panel:  Recent Labs   Lab Test 07/06/17 06/05/17   1351   NA  138  141   POTASSIUM  3.9  5.1   CHLORIDE  101   106   CARMINE  9.8  9.8   CO2  25  28   BUN  18  31*   CR  0.96  1.06*   GLC  78  84       Discharge Treatments:  Follow up with PCP 7-10 days  F/u with [facility] on 8/1  No soaking in tub, may shower, and rinse incisions, no creams or lotions to incision  Complete abx  Home PT, OT, RN, HHA, and SW    TOTAL DISCHARGE TIME:   Greater than 30 minutes  Electronically signed by:  DUGLAS Littlejohn CNP

## 2017-07-21 ENCOUNTER — TELEPHONE (OUTPATIENT)
Dept: INTERNAL MEDICINE | Facility: CLINIC | Age: 67
End: 2017-07-21

## 2017-07-21 NOTE — TELEPHONE ENCOUNTER
IP F/U    Date: 7/20/17  Diagnosis: Spinal stenosis of lumbar region without neurogenic claudication  Is patient active in care coordination? No  Was patient in TCU? Yes - Route to Care Coordination ([phone])

## 2017-07-23 ENCOUNTER — DOCUMENTATION ONLY (OUTPATIENT)
Dept: CARE COORDINATION | Facility: CLINIC | Age: 67
End: 2017-07-23

## 2017-07-23 NOTE — PROGRESS NOTES
[facility] now requests orders and shares plan of care/discharge summaries for some patients through [employer].  Please REPLY TO THIS MESSAGE in order to give authorization for orders when needed.  This is considered a verbal order, you will still receive a faxed copy of orders for signature.  Thank you for your assistance in improving collaboration for our patients.    ORDER    Requesting [facility] home care orders.   Skilled Nursing 3 week x 1, 2 week x 2, 1 week x 2, 3 prn visits for medication mgmt, fall prevention, pain mgmt, CP assessment.   OT 1 day 1 to eval and treat for home safety, energy conservation.   HHA 2 week x 4 to assist with bathing and personal cares.     Note  Pt had a fall on 7/22 when she slipped to the floor when attempting to get up from bed. No injury noted, did not hit head, denies pain.    Pt scored a 4 on PHQ 2. She states she has felt down while  in TCU and she expected to be able to more when she returned home. She denies and thoughts of self harm.     Thank you,   Anna Omalley, RN  [phone]

## 2017-07-25 ENCOUNTER — CARE COORDINATION (OUTPATIENT)
Dept: CARE COORDINATION | Facility: CLINIC | Age: 67
End: 2017-07-25

## 2017-07-25 ENCOUNTER — TELEPHONE (OUTPATIENT)
Dept: INTERNAL MEDICINE | Facility: CLINIC | Age: 67
End: 2017-07-25

## 2017-07-25 NOTE — PROGRESS NOTES
Clinic Care Coordination Contact  Care Team Conversations    Received a referral and pt is receiving home care services through [facility].  Will continue to partner with home care top provide care coordination for pt.    Larry Rivas RN/CC  Care Coordinator [facility]  [phone]

## 2017-07-25 NOTE — TELEPHONE ENCOUNTER
Jadyn, PT calls to request orders.  PT visits 2w1, 3w1, 2w4    Verbal authorization given for home care orders per [facility] protocol.

## 2017-07-30 ENCOUNTER — DOCUMENTATION ONLY (OUTPATIENT)
Dept: CARE COORDINATION | Facility: CLINIC | Age: 67
End: 2017-07-30

## 2017-07-31 ENCOUNTER — TELEPHONE (OUTPATIENT)
Dept: INTERNAL MEDICINE | Facility: CLINIC | Age: 67
End: 2017-07-31

## 2017-07-31 NOTE — PROGRESS NOTES
[facility] now requests orders and shares plan of care/discharge summaries for some patients through [employer].  Please REPLY TO THIS MESSAGE in order to give authorization for orders when needed.  This is considered a verbal order, you will still receive a faxed copy of orders for signature.  Thank you for your assistance in improving collaboration for our patients.    ORDER....    SN RV cancelled for 8/1 due to Surgeon appt.  Will see pt as scheduled on 8/5.  She will call with any questions or concerns.     Tiffanie Moser RN   [phone]  [email or web address]

## 2017-08-01 ENCOUNTER — TELEPHONE (OUTPATIENT)
Dept: INTERNAL MEDICINE | Facility: CLINIC | Age: 67
End: 2017-08-01

## 2017-08-01 ENCOUNTER — TRANSFERRED RECORDS (OUTPATIENT)
Dept: HEALTH INFORMATION MANAGEMENT | Facility: CLINIC | Age: 67
End: 2017-08-01

## 2017-08-01 DIAGNOSIS — R30.0 DYSURIA: Primary | ICD-10-CM

## 2017-08-01 RX ORDER — CEPHALEXIN 500 MG/1
500 CAPSULE ORAL 3 TIMES DAILY
Qty: 30 CAPSULE | Refills: 0 | Status: SHIPPED | OUTPATIENT
Start: 2017-08-01 | End: 2017-08-30

## 2017-08-01 NOTE — TELEPHONE ENCOUNTER
Pt calls, she was discharged from TCU about a week ago following back surgery. She was given prescription for a 3 day course Bactrim for UTI at discharge. Pt completed 3 day course and was getting some relief of symptoms with antibiotic, but they did not completely resolve. Pt asks if she can get a longer course of antibiotic as it is difficult to get to the clinic at this time. Sent to provider to review.     Per 7/17/17 TCU Discharge summary:   Urinary tract infection without hematuria, site unspecified  Urinary incontinence, unspecified type  C/o urgency and dysuria. UC grew e.coli, sensitive to keflex, and she completed 5 day course of keflex. Symptoms improved. She did continue to have some incontinence at night as she reports that staff was unable to answer her call light fast enough to assist her to the bathroom. However she reports waking this AM and urinating in her bed without any warning. She denies any dysuria, frequency, or urgency.  Will send repeat UA/UC     ADDN: UA/UC grew >100,000 E.COLI, sensitive to bactrim, will start on bactrim DS BID x 3 days. No other urinary symptoms, but continues to have early AM incontinence.

## 2017-08-01 NOTE — TELEPHONE ENCOUNTER
Form received from: [facility]    Form requesting following info/need: OT and PT orders    JAZMIN needed?: No    Location of form: Dr. Hoover's in basket    When completed the route for return: Fax

## 2017-08-02 NOTE — TELEPHONE ENCOUNTER
Pt informed prescription sent to pharmacy for Keflex. Advised pt to schedule an appt if no improvement after taking antibiotic.

## 2017-08-08 ENCOUNTER — CARE COORDINATION (OUTPATIENT)
Dept: CARE COORDINATION | Facility: CLINIC | Age: 67
End: 2017-08-08

## 2017-08-08 NOTE — PROGRESS NOTES
Clinic Care Coordination Contact  Care Team Conversations    Reviewed chart and reached out to [facility] and they reported that pt continues to receive home care services.  Will continue to partner with home care to provide care coordination for pt.    Larry Rivas RN/CC  Care Coordinator [facility]  [phone]

## 2017-08-09 ENCOUNTER — TELEPHONE (OUTPATIENT)
Dept: INTERNAL MEDICINE | Facility: CLINIC | Age: 67
End: 2017-08-09

## 2017-08-09 NOTE — TELEPHONE ENCOUNTER
Ana ( homecare) called. Stated pt started Keflex for UTI on 8/1. On 8/4 pt started feeling nauseated. Pt is taking abx with food, but is taking pill right away after eating a little something. Recommended pt give it a little more time (30min) before taking abx after eating and to call call with update by Friday. Pt verbalized understanding

## 2017-08-14 ENCOUNTER — OFFICE VISIT (OUTPATIENT)
Dept: NEUROSURGERY | Facility: CLINIC | Age: 67
End: 2017-08-14

## 2017-08-14 VITALS — DIASTOLIC BLOOD PRESSURE: 70 MMHG | HEIGHT: 65 IN | SYSTOLIC BLOOD PRESSURE: 114 MMHG

## 2017-08-14 DIAGNOSIS — G93.89 CEREBRAL VENTRICULOMEGALY: Primary | ICD-10-CM

## 2017-08-14 ASSESSMENT — PAIN SCALES - GENERAL: PAINLEVEL: SEVERE PAIN (6)

## 2017-08-14 NOTE — MR AVS SNAPSHOT
After Visit Summary   8/14/2017    Zoey Armstrong    MRN: [id number]           Patient Information     Date Of Birth          1950        Visit Information        Provider Department      8/14/2017 1:00 PM Marquez Cruz MD [facility] Neurosurgery        Today's Diagnoses     Cerebral ventriculomegaly    -  1       Follow-ups after your visit        Your next 10 appointments already scheduled     Sep 22, 2017  9:15 AM CDT   Neuro Eval with Chloe Monroe OT   [facility] ([facility])    [address] [facility] [address]   [phone]            Sep 26, 2017  3:30 PM CDT   Neuro Treatment with Danielle Terrence, PT   [facility] ([facility])    [address] [facility] [address]   [phone]            Sep 29, 2017  9:00 AM CDT   Neuro Treatment with Danielle Terrence, PT   [facility] ([facility])    [address] [facility] [address]   [phone]            Oct 03, 2017  4:15 PM CDT   Neuro Treatment with Danielle Terrence, PT   [facility] ([facility])    [address] [facility] [address]   [phone]            Oct 04, 2017  1:30 PM CDT   Neuro Treatment with Danielle Terrence, PT   [facility] ([facility])    [address] [facility] [address]   [phone]            Oct 11, 2017  3:00 PM CDT   Neuro Treatment with Danielle Terrence, PT   [facility] ([facility])    [address] [facility] [address]   [phone]            Oct 13, 2017  8:15 AM CDT   Neuro Treatment with Danielle Terrence, PT   [facility] ([facility])    [address] [facility] [address]   [phone]            Oct 17, 2017  3:30 PM CDT   Neuro Treatment with Daniellegray Ocampo, PT    [facility] ([facility])    [address] [facility] [address] [address]   [phone]            Oct 18, 2017  3:00 PM CDT   Neuro Treatment with Danielle Ocampo, NIKKI   [facility] ([facility])    [address] [address]   [phone]            Oct 25, 2017  3:00 PM CDT   Neuro Treatment with Danielle Ocampo, PT   [facility] ([facility])    [address] [facility] [facility] [address]   [phone]              Who to contact     Please call your clinic at [phone] to:    Ask questions about your health    Make or cancel appointments    Discuss your medicines    Learn about your test results    Speak to your doctor   If you have compliments or concerns about an experience at your clinic, or if you wish to file a complaint, please contact [facility] Patient Relations at [phone] or email us at [email or web address]         Additional Information About Your Visit        [email or web address] Information     [employer] gives you secure access to your electronic health record. If you see a primary care provider, you can also send messages to your care team and make appointments. If you have questions, please call your primary care clinic.  If you do not have a primary care provider, please call [phone] and they will assist you.      [employer] is an electronic gateway that provides easy, online access to your medical records. With [employer], you can request a clinic appointment, read your test results, renew a prescription or communicate with your care team.     To access your existing account, please contact your [facility] or call [phone] for assistance.        Care EveryWhere ID     This is your Care EveryWhere ID. This could be used by other organizations to access your [facility] medical records  [id number]    "     Your Vitals Were     Height                   1.651 m (5' 5\")            Blood Pressure from Last 3 Encounters:   08/30/17 118/72   08/14/17 114/70   07/17/17 113/73    Weight from Last 3 Encounters:   08/30/17 67.7 kg (149 lb 3.2 oz)   07/17/17 72.4 kg (159 lb 9.6 oz)   07/12/17 72.4 kg (159 lb 9.6 oz)              Today, you had the following     No orders found for display       Primary Care Provider Office Phone # Fax #    Marisa Hoover [phone]       [address] [facility] [address]        Equal Access to Services     BINH BORDEN : Jackie Loomis, wasrinivas sylvester, qaybta kaalmada amy, ewa mcgovern . So [facility] [phone].    ATENCIÓN: Si habla español, tiene a sutton disposición servicios gratuitos de asistencia lingüística. Llame al [phone].    We comply with applicable federal civil rights laws and [place] laws. We do not discriminate on the basis of race, color, national origin, age, disability sex, sexual orientation or gender identity.            Thank you!     Thank you for choosing [facility]  for your care. Our goal is always to provide you with excellent care. Hearing back from our patients is one way we can continue to improve our services. Please take a few minutes to complete the written survey that you may receive in the mail after your visit with us. Thank you!             Your Updated Medication List - Protect others around you: Learn how to safely use, store and throw away your medicines at www.disposemymeds.org.          This list is accurate as of: 8/14/17 11:59 PM.  Always use your most recent med list.                   Brand Name Dispense Instructions for use Diagnosis    amLODIPine 10 MG tablet    NORVASC    90 tablet    Take 1 tablet (10 mg) by mouth daily    Essential hypertension       aspirin 81 MG tablet      Take 81 mg by mouth daily        buPROPion 150 MG 12 hr tablet    WELLBUTRIN " SR    180 tablet    Take 1 tablet (150 mg) by mouth 2 times daily    Major depressive disorder, recurrent episode, mild (H)       calcium 500 MG Chew      Take 1,250 mg by mouth daily        cephALEXin 500 MG capsule    KEFLEX    30 capsule    Take 1 capsule (500 mg) by mouth 3 times daily    Dysuria       GABAPENTIN PO      Take 300 mg by mouth 4 times daily        letrozole 2.5 MG tablet    FEMARA     Take 2.5 mg by mouth daily        lidocaine 5 % Patch    LIDODERM     Place 2 patches onto the skin every 24 hours Apply to low back/left hip - on in AM, remove after 12 hours. 2951-2670, 3052-6507.        Multiple Vitamin Chew      Take 1 tablet by mouth daily        olmesartan 40 MG tablet    BENICAR    90 tablet    Take 1 tablet (40 mg) by mouth daily    Essential hypertension       OXYCODONE HCL PO      Take 2.5-5 mg by mouth every 4 hours as needed        polyethylene glycol powder    MIRALAX/GLYCOLAX     Take 17 g by mouth daily AND daily PRN        rosuvastatin 10 MG tablet    CRESTOR    90 tablet    Take 1 tablet (10 mg) by mouth daily    Hyperlipidemia       senna-docusate 8.6-50 MG per tablet    SENOKOT-S;PERICOLACE     Take 2 tablets by mouth daily AND 1 tab QHS        TYLENOL PO      Take 1,000 mg by mouth 4 times daily

## 2017-08-14 NOTE — LETTER
8/14/2017       RE: Zoey Armstrong  [address]  [place] [address]     Dear Colleague,    Thank you for referring your patient, Zoey Armstrong, to the [facility]UROS[facility]ERY at [facility]. Please see a copy of my visit note below.       HISTORY AND PHYSICAL EXAM    Chief Complaint   Patient presents with     Consult     Possible  shunt; Normal Pressure Hydrocephalus       HISTORY OF PRESENT ILLNESS  We saw Ms. Zoey Armstrong today in Neurosurgery Clinic. She is a 67 year old female with episodes of dizziness over the past year. She feels lightheaded during these episodes, leading to several falls where she cannot remember the time between being upright and finding herself on the ground. These episodes are mostly progressive, but seem to be exacerbated by physical exertion and sharp turns of her head. Taking a break does improve these symptoms. Her symptoms prompted her to have a CT of the brain in 11/2016.  It demonstrated ventriculomegaly and normal pressure hydrocephalus was suspected. Reportedly, MRI brain was also performed which was suggestive of normal pressure hydrocephalus diagnosis but this was not available for review. She was seen by Dr. Arzola in 04/2017 where she did not appear to have the typical signs and symptoms of NPH. Dr. Arzola's note reads:    IMPRESSION AND RECOMMENDATIONS:  A 58-year-old woman with a history of  neurogenic claudication presenting with 1 year of dizziness and an abnormal MRI that indicated  possible concern for NPH.  Of note, the patient does have notably enlarged ventricles on the images which were reviewed together with the patient at this visit.  However, she is also describing a lack of some other symptoms that would be typical of NPH such as memory difficulties, personality changes, urinary incontinence and other gait difficulties.  She has a slow but steady gait on exam, but does not appear to be grossly ataxic.  There  is also no noted papilledema or other outward signs that would point towards a current NPH.  However, given the aforementioned MRI, it is thought prudent that this be explored a bit further.  We will repeat MRI to assess any changes that may have occurred since her last one, which occurred in 11/2016.  We also suggested a high volume lumbar puncture with physical therapy done both before and 1-2 days after to assess if that improved her functional status in any noticeable way.  We discussed this with the patient who registered her understanding and agreement with the plan.  She will make arrangements with the clinic here to have this done and we will see her in followup after.     She had a lumbar puncture test on 05/25/2017 and she had no changes to her gait. Currently, she is recovering well from her recent back surgery that has completely relieved her intermittent, sharp, right-sided back pain. She is using a walker, but her gait is improving and she has not had any recent falls. She has urinary retention, but denies nocturia or incontinence. Her  denies changes in her behavior.       Past Medical History:   Diagnosis Date     Back pain      CAD (coronary artery disease)      COPD (chronic obstructive pulmonary disease) (H) 02/06/2016     Depression      Falls      Hyperlipidemia      Hypertension      Smoker        Past Surgical History:   Procedure Laterality Date     ANGIOGRAM  10-    Moderate non-obstructive coronary disease involving all 3 vessels. LAD and RCA lesions are non-flow-limiting. Recommend aggressive risk factor management and possible pulmonary evaluation for dyspnea     ARTHROPLASTY TOE(S) Right 2/24/2016    Procedure: ARTHROPLASTY TOE(S);  Surgeon: Levar Matias DPM;  Location: [facility]     ARTHROTOMY SHOULDER, ROTATOR CUFF REPAIR, COMBINED Right 7/12/2016    Procedure: COMBINED ARTHROTOMY SHOULDER, ROTATOR CUFF REPAIR;  Surgeon: Reggie Cisse MD;  Location:  OR     HEAD  & NECK SURGERY      Jaw surgery     LUMPECTOMY BREAST, SEED LOCALIZATION, SENTINEL NODE Right 12/2/2016    Procedure: LUMPECTOMY BREAST, SEED LOCALIZATION, SENTINEL NODE;  Surgeon: Tiffanie Cabrera MD;  Location:  OR     ORTHOPEDIC SURGERY  1994    left foot surgery / bunion     OSTEOTOMY FOOT Right 2/24/2016    Procedure: OSTEOTOMY FOOT;  Surgeon: Levar Matias DPM;  Location:  [facility]     RECONSTRUCT FOREFOOT WITH METATARSOPHALANGEAL (MTP) FUSION Right 2/24/2016    Procedure: RECONSTRUCT FOREFOOT WITH METATARSOPHALANGEAL (MTP) FUSION;  Surgeon: Levar Matias DPM;  Location:  [facility]     REMOVE HARDWARE FOOT Right 1/18/2017    Procedure: REMOVE HARDWARE FOOT;  Surgeon: Levar Matias DPM;  Location:  OR       Family History   Problem Relation Age of Onset     Breast Cancer Mother      C.A.D. Father      MI     Lipids Father      Coronary Artery Disease Father      Hyperlipidemia Father      Breast Cancer Other      maternal aunt     CANCER Other      skin       Social History     Social History     Marital status:      Spouse name: N/A     Number of children: N/A     Years of education: N/A     Occupational History     Not on file.     Social History Main Topics     Smoking status: Former Smoker     Packs/day: 0.50     Years: 30.00     Types: Cigarettes     Quit date: 7/15/2015     Smokeless tobacco: Never Used     Alcohol use 0.0 oz/week     0 Standard drinks or equivalent per week      Comment: 3/ week     Drug use: No     Sexual activity: Yes     Partners: Male     Other Topics Concern     Caffeine Concern No     1 diet coke, coffee occ     Sleep Concern No     Special Diet No     regular     Exercise No     none     Social History Narrative          Allergies   Allergen Reactions     Betadine [Povidone Iodine] Hives     Iodine-131      No Clinical Screening - See Comments Swelling     Water softener salts     Soap      Perfumed soaps     Sodium Chloride        Current Outpatient  Prescriptions   Medication     cephALEXin (KEFLEX) 500 MG capsule     GABAPENTIN PO     lidocaine (LIDODERM) 5 % Patch     senna-docusate (SENOKOT-S;PERICOLACE) 8.6-50 MG per tablet     OXYCODONE HCL PO     Acetaminophen (TYLENOL PO)     polyethylene glycol (MIRALAX/GLYCOLAX) powder     letrozole (FEMARA) 2.5 MG tablet     amLODIPine (NORVASC) 10 MG tablet     olmesartan (BENICAR) 40 MG tablet     FLUoxetine (PROZAC) 20 MG capsule     buPROPion (WELLBUTRIN SR) 150 MG 12 hr tablet     rosuvastatin (CRESTOR) 10 MG tablet     aspirin 81 MG tablet     Multiple Vitamin CHEW     calcium 500 MG CHEW     No current facility-administered medications for this visit.        REVIEW OF SYSTEMS:  General: Negative for chills/sweats/fever. difficulty sleeping, headache, recent fatigue, or weight gain/loss.  Eyes: Negative for blurred vision, crossed eyes, double vision, recent eye infections, vision flashes, or vision halos.  Ears/Nose/Mouth/Throat: Negative for bleeding gums, difficulty swallowing, earache, ear discharge, hearing loss, hoarseness, nosebleeds, tinnitus, or sinus problems.  Respiratory:Negative for chronic cough, coughing blood, night sweats, shortness of breath, Tuberculosis, or wheezing.  Cardiovascular: Negative for chest pain, dyspnea at night, heart murmur, palpitations, pacemaker, pacemaker, poor circulation, swollen legs/feet, or varicose veins.  Gastrointestinal: Negative for melena, hematochezia, chronic diarrhea, heartburn, Hepatitis A/B/C, increasing constipation, Liver Disease, nausea, or vomiting.   Genitourinary: Negative for Urinary retention, genital discharge, urinary incontinence, urgency, or UTI.   Neurological: Negative for syncope, headaches, numbness of arms/legs, tingling in hands/arms/legs, memory problems, or seizures.  Psychological: Negative for anxiety, depression, panic attacks, or restlessness.  Skin: Negative for chronic skin itching, color changes in hand/feet when cold, poor  "scarring, non-healing ulcers, skin rashes/hives, unusual moles.  Musculoskeletal: Negative for arthritis, joint swelling in hands/wrists/hips/knees/joints, muscle tenderness in arms/legs, or osteoporosis.  Endocrine: Negative for excessive thirst/hunger, intolerance for warm rooms, loss of libido, multiple broken bones, rapid weight gain/loss, galactorrhea, or thyroid issues.  Hematologic/Lymphatic: Negative for easy skin bruising, significant fatigue, prolonged bleeding, tender glands/lymph nodes.  Allergies: Negative for asthma or hay fever.      PHYSICAL EXAM  /70  Ht 1.651 m (5' 5\")    General: Awake, alert, oriented. Well nourished, well developed, she is not in any acute distress.  HEENT: Head normocephalic, atraumatic. No carotid bruit. Neck supple. Good range of motion. No palpable thyroid mass.  Heart: Regular rhythm and rate. No murmurs.  Lungs: Clear to auscultation and percussion bilaterally. No ronchi, rales or wheeze.  Abdomen: Soft, non-tender, non-distended. No hepatosplenomegaly.  Extremity: Warm with no clubbing or cyanosis. No lower extremity edema.  Incision: Back incision is healing well.     Neurological  Awake, alert and oriented to date, time, place and person. Speech fluent.   Pupils equal, round, reactive to light.  Extraocular movement intact.  Visual Fields are full on confrontation.  Hearing is grossly normal to finger rub.   Facial sensation intact.  Face symmetric.  Tongue midline.  Uvula elevates equally.  No drift of the upper extremities.    Motor: full strength throughout.  Sensation: intact to light touch and pinpoint.  Deep tendon reflexes: 1+ throughout. Negative for clonus. Negative for Griffiths's sign. No dysmetria.      IMAGING  CT brain 11/2/2016  IMPRESSION:  1. Moderate ventriculomegaly out of proportion to the subarachnoid spaces. While findings have not significantly changed since the 4/16/2016 exam, it is difficult to exclude normal pressure hydrocephalus. " Findings also could be due to to just atrophy.  2. Patchy white matter changes in both hemispheres which are nonspecific but probably due to chronic small vessel ischemic disease.  3. No evidence for intracranial hemorrhage or any acute infarct.       ASSESSMENT  67 year old female with imaging findings that is suggestive of normal pressure hydrocephalus.    Patient was referred to our clinic for possible normal pressure hydrocephalus diagnosis and need for shunting.  Upon discussing the symptoms with the patient and her , patient does not have any symptoms that are typical of normal pressure hydrocephalus (NPH).  She does not have any gait difficulty which is typical of NPH but rather consistent with neurogenic claudication.  She recently had spinal surgery to address this.  She does not have urinary incontinence and she certainly does not have altered mental status.  For her workup, she even had a high volume lumbar puncture which did not affect or improve her gait.  She reports that her walking is better, now that she had the spine surgery.    We did discuss at length about the signs and symptoms of NPH and how it is typically diagnosed.  I also emphasized that it is not a radiographic diagnosis and that she may have enlarged ventricles secondary to atrophy, for example, and that she does not have NPH, since she does not have the symptoms of NPH.    We also discussed shunting briefly but I told the patient that since she has no symptoms, there is no benefit of shunting even if we do diagnose her with NPH.  However, I do not think that she has NPH.    All questions were answered from the patient and patient and her  expressed understanding.    I also spoke with Dr. Lele Stahl, neurologist at the [facility].  He also does not feel that patient has NPH and that most of her symptoms are related to her spine.      PLAN  1. No future appointments at this time - we will see her back  as needed.    I, Boaz Noelletim, am serving as a scribe to document services personally performed by Marquez Cruz MD, PhD, based upon my observations and the provider's statements to me. All documentation has been reviewed and edited by the aforementioned doctor prior to being entered into the official medical record.    I, Marquez Cruz, attest that above named individual is acting in scribe capacity, has observed my performance of the services and has documented them in accordance with my direction. The documentation recorded by the scribe accurately reflects the service I personally performed and the decisions made by me. The document was also partially recorded by me and the entire document was edited by me as well.    Again, thank you for allowing me to participate in the care of your patient.      Sincerely,    Marquez Cruz MD

## 2017-08-14 NOTE — PROGRESS NOTES
HISTORY AND PHYSICAL EXAM    Chief Complaint   Patient presents with     Consult     Possible  shunt; Normal Pressure Hydrocephalus       HISTORY OF PRESENT ILLNESS  We saw Ms. Zoey Armstrong today in [facility]surgery [facility]. She is a 67 year old female with episodes of dizziness over the past year. She feels lightheaded during these episodes, leading to several falls where she cannot remember the time between being upright and finding herself on the ground. These episodes are mostly progressive, but seem to be exacerbated by physical exertion and sharp turns of her head. Taking a break does improve these symptoms. Her symptoms prompted her to have a CT of the brain in 11/2016.  It demonstrated ventriculomegaly and normal pressure hydrocephalus was suspected. Reportedly, MRI brain was also performed which was suggestive of normal pressure hydrocephalus diagnosis but this was not available for review. She was seen by Dr. Arzola in 04/2017 where she did not appear to have the typical signs and symptoms of NPH. Dr. Arzola's note reads:    IMPRESSION AND RECOMMENDATIONS:  A 58-year-old woman with a history of  neurogenic claudication presenting with 1 year of dizziness and an abnormal MRI that indicated  possible concern for NPH.  Of note, the patient does have notably enlarged ventricles on the images which were reviewed together with the patient at this visit.  However, she is also describing a lack of some other symptoms that would be typical of NPH such as memory difficulties, personality changes, urinary incontinence and other gait difficulties.  She has a slow but steady gait on exam, but does not appear to be grossly ataxic.  There is also no noted papilledema or other outward signs that would point towards a current NPH.  However, given the aforementioned MRI, it is thought prudent that this be explored a bit further.  We will repeat MRI to assess any changes that may have occurred since her last one, which  occurred in 11/2016.  We also suggested a high volume lumbar puncture with physical therapy done both before and 1-2 days after to assess if that improved her functional status in any noticeable way.  We discussed this with the patient who registered her understanding and agreement with the plan.  She will make arrangements with the clinic here to have this done and we will see her in followup after.     She had a lumbar puncture test on 05/25/2017 and she had no changes to her gait. Currently, she is recovering well from her recent back surgery that has completely relieved her intermittent, sharp, right-sided back pain. She is using a walker, but her gait is improving and she has not had any recent falls. She has urinary retention, but denies nocturia or incontinence. Her  denies changes in her behavior.       Past Medical History:   Diagnosis Date     Back pain      CAD (coronary artery disease)      COPD (chronic obstructive pulmonary disease) (H) 02/06/2016     Depression      Falls      Hyperlipidemia      Hypertension      Smoker        Past Surgical History:   Procedure Laterality Date     ANGIOGRAM  10-    Moderate non-obstructive coronary disease involving all 3 vessels. LAD and RCA lesions are non-flow-limiting. Recommend aggressive risk factor management and possible pulmonary evaluation for dyspnea     ARTHROPLASTY TOE(S) Right 2/24/2016    Procedure: ARTHROPLASTY TOE(S);  Surgeon: Levar Matias DPM;  Location: [facility]     ARTHROTOMY SHOULDER, ROTATOR CUFF REPAIR, COMBINED Right 7/12/2016    Procedure: COMBINED ARTHROTOMY SHOULDER, ROTATOR CUFF REPAIR;  Surgeon: Reggie Cisse MD;  Location:  OR     HEAD & NECK SURGERY      Jaw surgery     LUMPECTOMY BREAST, SEED LOCALIZATION, SENTINEL NODE Right 12/2/2016    Procedure: LUMPECTOMY BREAST, SEED LOCALIZATION, SENTINEL NODE;  Surgeon: Tiffanie Cabrera MD;  Location:  OR     ORTHOPEDIC SURGERY  1994    left foot surgery / [facility]      OSTEOTOMY FOOT Right 2/24/2016    Procedure: OSTEOTOMY FOOT;  Surgeon: Levar Matias DPM;  Location:  [facility]     RECONSTRUCT FOREFOOT WITH METATARSOPHALANGEAL (MTP) FUSION Right 2/24/2016    Procedure: RECONSTRUCT FOREFOOT WITH METATARSOPHALANGEAL (MTP) FUSION;  Surgeon: Levar Matias DPM;  Location:  [facility]     REMOVE HARDWARE FOOT Right 1/18/2017    Procedure: REMOVE HARDWARE FOOT;  Surgeon: Levar Matias DPM;  Location: [facility] OR       Family History   Problem Relation Age of Onset     Breast Cancer Mother      C.A.D. Father      MI     Lipids Father      Coronary Artery Disease Father      Hyperlipidemia Father      Breast Cancer Other      maternal aunt     CANCER Other      skin       Social History     Social History     Marital status:      Spouse name: N/A     Number of children: N/A     Years of education: N/A     Occupational History     Not on file.     Social History Main Topics     Smoking status: Former Smoker     Packs/day: 0.50     Years: 30.00     Types: Cigarettes     Quit date: 7/15/2015     Smokeless tobacco: Never Used     Alcohol use 0.0 oz/week     0 Standard drinks or equivalent per week      Comment: 3/ week     Drug use: No     Sexual activity: Yes     Partners: Male     Other Topics Concern     Caffeine Concern No     1 diet coke, coffee occ     Sleep Concern No     Special Diet No     regular     Exercise No     none     Social History Narrative          Allergies   Allergen Reactions     Betadine [Povidone Iodine] Hives     Iodine-131      No Clinical Screening - See Comments Swelling     Water softener salts     Soap      Perfumed soaps     Sodium Chloride        Current Outpatient Prescriptions   Medication     cephALEXin (KEFLEX) 500 MG capsule     GABAPENTIN PO     lidocaine (LIDODERM) 5 % Patch     senna-docusate (SENOKOT-S;PERICOLACE) 8.6-50 MG per tablet     OXYCODONE HCL PO     Acetaminophen (TYLENOL PO)     polyethylene glycol (MIRALAX/GLYCOLAX) powder      letrozole (FEMARA) 2.5 MG tablet     amLODIPine (NORVASC) 10 MG tablet     olmesartan (BENICAR) 40 MG tablet     FLUoxetine (PROZAC) 20 MG capsule     buPROPion (WELLBUTRIN SR) 150 MG 12 hr tablet     rosuvastatin (CRESTOR) 10 MG tablet     aspirin 81 MG tablet     Multiple Vitamin CHEW     calcium 500 MG CHEW     No current facility-administered medications for this visit.        REVIEW OF SYSTEMS:  General: Negative for chills/sweats/fever. difficulty sleeping, headache, recent fatigue, or weight gain/loss.  Eyes: Negative for blurred vision, crossed eyes, double vision, recent eye infections, vision flashes, or vision halos.  Ears/Nose/Mouth/Throat: Negative for bleeding gums, difficulty swallowing, earache, ear discharge, hearing loss, hoarseness, nosebleeds, tinnitus, or sinus problems.  Respiratory:Negative for chronic cough, coughing blood, night sweats, shortness of breath, Tuberculosis, or wheezing.  Cardiovascular: Negative for chest pain, dyspnea at night, heart murmur, palpitations, pacemaker, pacemaker, poor circulation, swollen legs/feet, or varicose veins.  Gastrointestinal: Negative for melena, hematochezia, chronic diarrhea, heartburn, Hepatitis A/B/C, increasing constipation, Liver Disease, nausea, or vomiting.   Genitourinary: Negative for Urinary retention, genital discharge, urinary incontinence, urgency, or UTI.   Neurological: Negative for syncope, headaches, numbness of arms/legs, tingling in hands/arms/legs, memory problems, or seizures.  Psychological: Negative for anxiety, depression, panic attacks, or restlessness.  Skin: Negative for chronic skin itching, color changes in hand/feet when cold, poor scarring, non-healing ulcers, skin rashes/hives, unusual moles.  Musculoskeletal: Negative for arthritis, joint swelling in hands/wrists/hips/knees/joints, muscle tenderness in arms/legs, or osteoporosis.  Endocrine: Negative for excessive thirst/hunger, intolerance for warm rooms, loss of  "libido, multiple broken bones, rapid weight gain/loss, galactorrhea, or thyroid issues.  Hematologic/Lymphatic: Negative for easy skin bruising, significant fatigue, prolonged bleeding, tender glands/lymph nodes.  Allergies: Negative for asthma or hay fever.      PHYSICAL EXAM  /70  Ht 1.651 m (5' 5\")    General: Awake, alert, oriented. Well nourished, well developed, she is not in any acute distress.  HEENT: Head normocephalic, atraumatic. No carotid bruit. Neck supple. Good range of motion. No palpable thyroid mass.  Heart: Regular rhythm and rate. No murmurs.  Lungs: Clear to auscultation and percussion bilaterally. No ronchi, rales or wheeze.  Abdomen: Soft, non-tender, non-distended. No hepatosplenomegaly.  Extremity: Warm with no clubbing or cyanosis. No lower extremity edema.  Incision: Back incision is healing well.     Neurological  Awake, alert and oriented to date, time, place and person. Speech fluent.   Pupils equal, round, reactive to light.  Extraocular movement intact.  Visual Fields are full on confrontation.  Hearing is grossly normal to finger rub.   Facial sensation intact.  Face symmetric.  Tongue midline.  Uvula elevates equally.  No drift of the upper extremities.    Motor: full strength throughout.  Sensation: intact to light touch and pinpoint.  Deep tendon reflexes: 1+ throughout. Negative for clonus. Negative for Griffiths's sign. No dysmetria.      IMAGING  CT brain 11/2/2016  IMPRESSION:  1. Moderate ventriculomegaly out of proportion to the subarachnoid spaces. While findings have not significantly changed since the 4/16/2016 exam, it is difficult to exclude normal pressure hydrocephalus. Findings also could be due to to just atrophy.  2. Patchy white matter changes in both hemispheres which are nonspecific but probably due to chronic small vessel ischemic disease.  3. No evidence for intracranial hemorrhage or any acute infarct.       ASSESSMENT  67 year old female with imaging " findings that is suggestive of normal pressure hydrocephalus.    Patient was referred to our clinic for possible normal pressure hydrocephalus diagnosis and need for shunting.  Upon discussing the symptoms with the patient and her , patient does not have any symptoms that are typical of normal pressure hydrocephalus (NPH).  She does not have any gait difficulty which is typical of NPH but rather consistent with neurogenic claudication.  She recently had spinal surgery to address this.  She does not have urinary incontinence and she certainly does not have altered mental status.  For her workup, she even had a high volume lumbar puncture which did not affect or improve her gait.  She reports that her walking is better, now that she had the spine surgery.    We did discuss at length about the signs and symptoms of NPH and how it is typically diagnosed.  I also emphasized that it is not a radiographic diagnosis and that she may have enlarged ventricles secondary to atrophy, for example, and that she does not have NPH, since she does not have the symptoms of NPH.    We also discussed shunting briefly but I told the patient that since she has no symptoms, there is no benefit of shunting even if we do diagnose her with NPH.  However, I do not think that she has NPH.    All questions were answered from the patient and patient and her  expressed understanding.    I also spoke with Dr. Lele Stahl, neurologist at the [facility].  He also does not feel that patient has NPH and that most of her symptoms are related to her spine.      PLAN  1. No future appointments at this time - we will see her back as needed.    I, Boaz Dominguez, am serving as a scribe to document services personally performed by Marquez Cruz MD, PhD, based upon my observations and the provider's statements to me. All documentation has been reviewed and edited by the aforementioned doctor prior to being entered into  the official medical record.    I, Marquez Cruz, attest that above named individual is acting in scribe capacity, has observed my performance of the services and has documented them in accordance with my direction. The documentation recorded by the scribe accurately reflects the service I personally performed and the decisions made by me. The document was also partially recorded by me and the entire document was edited by me as well.

## 2017-08-15 ENCOUNTER — TELEPHONE (OUTPATIENT)
Dept: INTERNAL MEDICINE | Facility: CLINIC | Age: 67
End: 2017-08-15

## 2017-08-22 ENCOUNTER — TELEPHONE (OUTPATIENT)
Dept: INTERNAL MEDICINE | Facility: CLINIC | Age: 67
End: 2017-08-22

## 2017-08-22 ENCOUNTER — CARE COORDINATION (OUTPATIENT)
Dept: CARE COORDINATION | Facility: CLINIC | Age: 67
End: 2017-08-22

## 2017-08-22 DIAGNOSIS — Z53.9 DIAGNOSIS NOT YET DEFINED: Primary | ICD-10-CM

## 2017-08-22 PROCEDURE — G0180 MD CERTIFICATION HHA PATIENT: HCPCS | Performed by: INTERNAL MEDICINE

## 2017-08-22 NOTE — PROGRESS NOTES
Clinic Care Coordination Contact  Care Team Conversations    Reviewed chart and reached out to [facility] and they reported that pt continues to receive home care services.  Will continue to partner with home care to provide care coordination for pt.    Larry Rivas RN/CC  Care Coordinator [facility]  [phone]

## 2017-08-22 NOTE — TELEPHONE ENCOUNTER
Tanna OT  HC calling to request:    [facility] 1x1 to assist with bathing and personal care.      Verbal order given to approve visits until certification received for provider signature.        Patient had a fall this morning. Slipped out of bed while putting on pants and slowly slid to the floor, unwitnessed. Denies any noted injuries at this time.  helped patient up.  FYI to provider.

## 2017-08-24 ENCOUNTER — TELEPHONE (OUTPATIENT)
Dept: INTERNAL MEDICINE | Facility: CLINIC | Age: 67
End: 2017-08-24

## 2017-08-24 DIAGNOSIS — R53.81 PHYSICAL DECONDITIONING: Primary | ICD-10-CM

## 2017-08-24 DIAGNOSIS — R29.6 RECURRENT FALLS: ICD-10-CM

## 2017-08-24 NOTE — TELEPHONE ENCOUNTER
GWEN Cisse with  Home Care calls to report pt fell last night. Pt was attempting to slide her feet into her shoes while standing with her walker, forgot to put the brake on her walker and slid to the floor. No injury or bruising and her  was able to help her up.     Tanna also states that pt has hit a plateau with in-home PT and OT. Requesting orders for outpatient PT and OT through [facility] in [place] for continued work on balance and strengthening. Sent to provider to review.

## 2017-08-25 ENCOUNTER — DOCUMENTATION ONLY (OUTPATIENT)
Dept: CARE COORDINATION | Facility: CLINIC | Age: 67
End: 2017-08-25

## 2017-08-25 ENCOUNTER — TELEPHONE (OUTPATIENT)
Dept: INTERNAL MEDICINE | Facility: CLINIC | Age: 67
End: 2017-08-25

## 2017-08-25 NOTE — PROGRESS NOTES
[facility] now requests orders and shares plan of care/discharge summaries for some patients through [facility].  Please REPLY TO THIS MESSAGE in order to give authorization for orders when needed.  This is considered a verbal order, you will still receive a faxed copy of orders for signature.  Thank you for your assistance in improving collaboration for our patients.    Occupational Therapy DC summary    SUBJECTIVE, pt reports had another fall yesterday. Pt was standing in bedroom and going to slip shoes on and did not have the brakes of her walker locked. MD office notified.  Upcoming Appointments/8/30/17 Dr. Hoover 940 am.     Final Instructions and Education//Recommend new short reacher as pts reacher does not close all the way for a tight enough pinch to work well. Recommend have assist with all showers and spouse says will provide this ongoing after homecare discharge. Recommend that the 4WW brake on the L side be fixed as is too tight for pt to push down/on consistently. Continue with HEP. Follow spine precuations and refer to handout as needed. Recommend Outpatient OT to work on functional endurance, memory compensation, rehab of the R shoulder for improved pain free AROM and strength. Continue to wear LSO when up out of bed. Have spouse/caregivers give the SBA needed for ADLs like dressing, pericare after bowel movement.  OBJECTIVE Changes and progress since initial eval noted in following areas  BP   140/80        HR   84       O2 Sats 94  Respiratory//no SOB.   Mobility Tool Score// 1.   Functional mobility//SBA for toilet transfers, min A for safe tub/shower transfers and bathing, SBA for sit to stand toilet, bed, chair in bedroom and kitchen chair.   Skin Integrity/Edema//no deficits observed.   Continence//Improved  with bladder program of more walking to strengthen pelvic floor mm and with more frequent voiding during day.   UE function//R UE went from AROM of 90 degrees flexion to  130 degrees flexion pain free in sitting by doing the supine dowel exercises. Still needs ongoing strengthening and rehab of the R shoulder. Pt has had multiple medical complications since the rotator cuff repair last summer.    ADLs//Pt has improved to SBA for dressing, toileting and min A for bathing.   IADLs//Continues to need Assist from spouse. Pt too fatigued for cooking/cleaning. Needs supervison with meds.  DME//added elastic laces, added non slip surface for chair in bedroom to use for dressing. Asked spouse to have L 4WW brake fixed so pt can use more easily/consistently, changed to a shower chair with back for the deep tub and removed one arm for doing a sitting pivot and small lift forward to edge of tub and then a stand for getting out. recommended 20 inch pikstik reacher.   Cognition//Pt improved on the [facility] Status exam to a score of 26/30 which indicates mild deficit.   [facility] Plan of Care//continue with last visit 8/25/17. Spouse will take over shower assist after homecare episode completed.    . Explained to pt and spouse that pt may benefit from continued homecare due to 2 falls this week. Pt wants to pursue the outpatient therapy and thinks being off homebound status may help her mood to be able to go out with friends/family and that the trips to the clinic may also just help with her endurance. Pt states she feels there is a good plan in place with followup appt at clinic next week, referral in process for OP PT and OT and she has set up first visit for home cleaning service too. Spouse agrees with plan that pt desires.     ASSESSMENT/SUMMARY/Pt  seen for 11 OT visits during homecare episode. Pt had 2 falls this week with lack of judgment/forgetting techniques and spouse not present to give the cues when pt trying dressing tasks. Encouraged spouse to consider outside assist to give him time out/respite. Spouse declines. Pt feels ready for moving forward to OP OT/PT.     Initial Goals  readdressed//  1. Pt will demostrate safety with U/LE dressing, including brace, using modified techniques/adaptive equipment with min assist or less.  MET  2. Pt will  demonstrate safety with showering routine, including mobility, with min assist.    MET  3. Pt will demonstrate safe use of walker/AE while performing ADLs/IADLs, including light housekeeping. Not met, needs cues for safety and for consistent use of brakes.   4. Pt will demonstrate safety with toileting, clothing management, pericare using appropriate adaptive equipment Independently. Not met. Is SBA.  5. Pt will perform UE HEP to increase strength/functional endurance for ADLs/IADLs Independently.  MET  6. Pt will participate in cognitive evaluation to assist in determining pts current level of safety and function with ADLs/IADLs.  MET.  7. Pt/caregivers will be instructed in home safety/service recommendations based on cognitive testing and ADL/IADL performance.  MET  8. Pt to demo safe with min assist or less for bed transfers/mobility and repositioning using AE. MET per pt report  9. pt to demo carryover with SBA per spouse of fluid intake/bladder program for reduced nightime bladder accidents. MET  Pamela Boateng, OTR/L

## 2017-08-25 NOTE — TELEPHONE ENCOUNTER
Form received from: [facility]    Form requesting following info/need: HA orders    JAZMIN needed?: No    Location of form: Dr. Hoover's in basket    When completed the route for return: Fax

## 2017-08-30 ENCOUNTER — MEDICAL CORRESPONDENCE (OUTPATIENT)
Dept: HEALTH INFORMATION MANAGEMENT | Facility: CLINIC | Age: 67
End: 2017-08-30

## 2017-08-30 ENCOUNTER — OFFICE VISIT (OUTPATIENT)
Dept: INTERNAL MEDICINE | Facility: CLINIC | Age: 67
End: 2017-08-30
Payer: MEDICARE

## 2017-08-30 VITALS
HEART RATE: 108 BPM | OXYGEN SATURATION: 96 % | DIASTOLIC BLOOD PRESSURE: 72 MMHG | WEIGHT: 149.2 LBS | TEMPERATURE: 98.1 F | SYSTOLIC BLOOD PRESSURE: 118 MMHG | BODY MASS INDEX: 24.83 KG/M2

## 2017-08-30 DIAGNOSIS — R20.9 DISTURBANCE OF SKIN SENSATION: ICD-10-CM

## 2017-08-30 DIAGNOSIS — R30.0 DYSURIA: Primary | ICD-10-CM

## 2017-08-30 DIAGNOSIS — Z12.11 COLON CANCER SCREENING: ICD-10-CM

## 2017-08-30 DIAGNOSIS — R25.1 TREMOR: ICD-10-CM

## 2017-08-30 LAB
ALBUMIN UR-MCNC: NEGATIVE MG/DL
APPEARANCE UR: CLEAR
BACTERIA #/AREA URNS HPF: ABNORMAL /HPF
BILIRUB UR QL STRIP: NEGATIVE
COLOR UR AUTO: YELLOW
GLUCOSE UR STRIP-MCNC: NEGATIVE MG/DL
HGB UR QL STRIP: NEGATIVE
KETONES UR STRIP-MCNC: NEGATIVE MG/DL
LEUKOCYTE ESTERASE UR QL STRIP: ABNORMAL
MUCOUS THREADS #/AREA URNS LPF: PRESENT /LPF
NITRATE UR QL: NEGATIVE
NON-SQ EPI CELLS #/AREA URNS LPF: ABNORMAL /LPF
PH UR STRIP: 5.5 PH (ref 5–7)
RBC #/AREA URNS AUTO: ABNORMAL /HPF
SOURCE: ABNORMAL
SP GR UR STRIP: 1.02 (ref 1–1.03)
UROBILINOGEN UR STRIP-ACNC: 0.2 EU/DL (ref 0.2–1)
VIT B12 SERPL-MCNC: 208 PG/ML (ref 193–986)
WBC #/AREA URNS AUTO: ABNORMAL /HPF

## 2017-08-30 PROCEDURE — 81001 URINALYSIS AUTO W/SCOPE: CPT | Performed by: INTERNAL MEDICINE

## 2017-08-30 PROCEDURE — 87086 URINE CULTURE/COLONY COUNT: CPT | Performed by: INTERNAL MEDICINE

## 2017-08-30 PROCEDURE — 82607 VITAMIN B-12: CPT | Performed by: INTERNAL MEDICINE

## 2017-08-30 PROCEDURE — 99214 OFFICE O/P EST MOD 30 MIN: CPT | Performed by: INTERNAL MEDICINE

## 2017-08-30 PROCEDURE — 36415 COLL VENOUS BLD VENIPUNCTURE: CPT | Performed by: INTERNAL MEDICINE

## 2017-08-30 NOTE — MR AVS SNAPSHOT
After Visit Summary   8/30/2017    Zoey Armstrong    MRN: [id number]           Patient Information     Date Of Birth          1950        Visit Information        Provider Department      8/30/2017 9:40 AM Marisa Hoover MD [facility]        Today's Diagnoses     Dysuria    -  1    Disturbance of skin sensation        Tremor        Colon cancer screening           Follow-ups after your visit        Additional Services     NEUROLOGY ADULT REFERRAL       Your provider has referred you for the following:   Consult at [facility]: [facility] [phone]   [email or web address]    Please be aware that coverage of these services is subject to the terms and limitations of your health insurance plan.  Call member services at your health plan with any benefit or coverage questions.      Please bring the following with you to your appointment:    (1) Any X-Rays, CTs or MRIs which have been performed.  Contact the facility where they were done to arrange for  prior to your scheduled appointment.    (2) List of current medications  (3) This referral request   (4) Any documents/labs given to you for this referral                  Your next 10 appointments already scheduled     Sep 01, 2017  8:30 AM CDT   Neuro Eval with Danielle Ocampo, PT   [facility] ([facility])    [address] [facility] [address] [phone]            Oct 13, 2017  2:00 PM CDT   Pool Evaluation with Danielle Ocampo PT   [facility] ([facility])    [address] [facility] [id number] [phone]            Nov 03, 2017 10:30 AM CDT   MA SCREENING BILATERAL W/ SOFIA with RHBCMA2   [facility] ([facility])    [address]  [facility] [address] [phone]           Do not use any powder, lotion or  "deodorant under your arms or on your breast. If you do, we will ask you to remove it before your exam.  Wear comfortable, two-piece clothing.  If you have any allergies, tell your care team.  Bring any previous mammograms from other facilities or have them mailed to the breast center.  Three-dimensional (3D) mammograms are available at [place] locations in [place], [facility], and [place]. Health locations include [place] and [facility] in [place].   Benefits of 3D mammograms include: - Improved rate of cancer detection - Decreases your chance of having to go back for more tests, which means fewer: - \"False-positive\" results (This means that there is an abnormal area but it isn't cancer.) - Invasive testing procedures, such as a biopsy or surgery - Can provide clearer images of the breast if you have dense breast tissue. 3D mammography is an optional exam that anyone can have with a 2D mammogram. It doesn't replace or take the place of a 2D mammogram. 2D mammograms remain an effective screening test for all women.  Not all insurance companies cover the cost of a 3D mammogram. Check with your insurance.              Future tests that were ordered for you today     Open Future Orders        Priority Expected Expires Ordered    Fecal colorectal cancer screen FIT Routine 9/20/2017 11/22/2017 8/30/2017            Who to contact     If you have questions or need follow up information about today's clinic visit or your schedule please contact [facility] directly at [phone].  Normal or non-critical lab and imaging results will be communicated to you by MyChart, letter or phone within 4 business days after the clinic has received the results. If you do not hear from us within 7 days, please contact the clinic through MyChart or phone. If you have a critical or abnormal lab result, we will notify you by phone as soon as possible.  Submit refill " requests through [employer] or call your pharmacy and they will forward the refill request to us. Please allow 3 business days for your refill to be completed.          Additional Information About Your Visit        [employer] Information     [employer] gives you secure access to your electronic health record. If you see a primary care provider, you can also send messages to your care team and make appointments. If you have questions, please call your primary care clinic.  If you do not have a primary care provider, please call [phone] and they will assist you.        Care EveryWhere ID     This is your Care EveryWhere ID. This could be used by other organizations to access your [place] medical records  [id number]        Your Vitals Were     Pulse Temperature Pulse Oximetry BMI (Body Mass Index)          108 98.1  F (36.7  C) (Oral) 96% 24.83 kg/m2         Blood Pressure from Last 3 Encounters:   08/30/17 118/72   08/14/17 114/70   07/17/17 113/73    Weight from Last 3 Encounters:   08/30/17 149 lb 3.2 oz (67.7 kg)   07/17/17 159 lb 9.6 oz (72.4 kg)   07/12/17 159 lb 9.6 oz (72.4 kg)              We Performed the Following     NEUROLOGY ADULT REFERRAL     UA reflex to Microscopic and Culture     Urine Microscopic     Vitamin B12          Today's Medication Changes          These changes are accurate as of: 8/30/17 11:04 AM.  If you have any questions, ask your nurse or doctor.               Stop taking these medicines if you haven't already. Please contact your care team if you have questions.     cephALEXin 500 MG capsule   Commonly known as:  KEFLEX   Stopped by:  Marisa Hoover MD           GABAPENTIN PO   Stopped by:  Marisa Hoover MD                    Primary Care Provider Office Phone # Fax #    Marisa Hoover [phone]       [address] [facility] [address]        Equal Access to Services     [facility] SUHA : jill Andrews qaybta  ewa reyeselli singh. Desiree [facility] [phone].    ATENCIÓN: Si john umanzor, tiene a sutton disposición servicios gratuitos de asistencia lingüística. Shahrzad al [phone].    We comply with applicable federal civil rights laws and [place] laws. We do not discriminate on the basis of race, color, national origin, age, disability sex, sexual orientation or gender identity.            Thank you!     Thank you for choosing [facility]  for your care. Our goal is always to provide you with excellent care. Hearing back from our patients is one way we can continue to improve our services. Please take a few minutes to complete the written survey that you may receive in the mail after your visit with us. Thank you!             Your Updated Medication List - Protect others around you: Learn how to safely use, store and throw away your medicines at www.disposemymeds.org.          This list is accurate as of: 8/30/17 11:04 AM.  Always use your most recent med list.                   Brand Name Dispense Instructions for use Diagnosis    amLODIPine 10 MG tablet    NORVASC    90 tablet    Take 1 tablet (10 mg) by mouth daily    Essential hypertension       aspirin 81 MG tablet      Take 81 mg by mouth daily        buPROPion 150 MG 12 hr tablet    WELLBUTRIN SR    180 tablet    Take 1 tablet (150 mg) by mouth 2 times daily    Major depressive disorder, recurrent episode, mild (H)       calcium 500 MG Chew      Take 1,250 mg by mouth daily        FLUoxetine 20 MG capsule    PROzac    90 capsule    Take 3 capsules (60 mg) by mouth daily    Major depressive disorder, recurrent episode, mild (H)       letrozole 2.5 MG tablet    FEMARA     Take 2.5 mg by mouth daily        lidocaine 5 % Patch    LIDODERM     Place 2 patches onto the skin every 24 hours Apply to low back/left hip - on in AM, remove after 12 hours. 5142-1148, 0417-0698.        Multiple Vitamin Chew      Take 1 tablet  by mouth daily        olmesartan 40 MG tablet    BENICAR    90 tablet    Take 1 tablet (40 mg) by mouth daily    Essential hypertension       OXYCODONE HCL PO      Take 2.5-5 mg by mouth every 4 hours as needed        polyethylene glycol powder    MIRALAX/GLYCOLAX     Take 17 g by mouth daily AND daily PRN        rosuvastatin 10 MG tablet    CRESTOR    90 tablet    Take 1 tablet (10 mg) by mouth daily    Hyperlipidemia       senna-docusate 8.6-50 MG per tablet    SENOKOT-S;PERICOLACE     Take 2 tablets by mouth daily AND 1 tab QHS        TYLENOL PO      Take 1,000 mg by mouth 4 times daily

## 2017-08-30 NOTE — NURSING NOTE
"Chief Complaint   Patient presents with     UTI     Dysuria x's 2 weeks       Initial /72  Pulse 108  Temp 98.1  F (36.7  C) (Oral)  Wt 149 lb 3.2 oz (67.7 kg)  SpO2 96%  BMI 24.83 kg/m2 Estimated body mass index is 24.83 kg/(m^2) as calculated from the following:    Height as of 8/14/17: 5' 5\" (1.651 m).    Weight as of this encounter: 149 lb 3.2 oz (67.7 kg).  Medication Reconciliation: complete     Michelle Adame CMA      "

## 2017-08-30 NOTE — PROGRESS NOTES
SUBJECTIVE:   Zoey Armstrong is a 67 year old female who presents to clinic today for the following health issues:    Patient is present with her . She reports that she only goes to the bathroom 1-2 times per day.  Her urine is darker yellow.     URINARY TRACT SYMPTOMS      Duration: 2 weeks    Description  urgency    Intensity:  mild    Accompanying signs and symptoms:  Fever/chills: no   Flank pain no   Nausea and vomiting: no   Vaginal symptoms: none  Abdominal/Pelvic Pain: no     History  History of frequent UTI's: no   History of kidney stones: no   Sexually Active: no   Possibility of pregnancy: No    Precipitating or alleviating factors: None    Therapies tried and outcome: course of antibiotics -    Outcome:       H/o falls.  Neurosurgery did not think she has NPH.   She does have some numbness of her feet. No tingling.  She has improved with physical therapy.  She did have a boderline B12. Normal TSH. Normal glucose.   Has not seen neurology for the falls.     Problem list and histories reviewed & adjusted, as indicated.      Reviewed and updated as needed this visit by clinical staffTobacco  Allergies  Meds  Med Hx  Surg Hx  Fam Hx  Soc Hx      Reviewed and updated as needed this visit by Provider         ROS:  C: NEGATIVE for fever, chills, change in weight  R: NEGATIVE for significant cough or SOB  CV: NEGATIVE for chest pain, palpitations or peripheral edema  Neuro: decreased sensation of feet bilaterally    OBJECTIVE:     /72  Pulse 108  Temp 98.1  F (36.7  C) (Oral)  Wt 149 lb 3.2 oz (67.7 kg)  SpO2 96%  BMI 24.83 kg/m2  Body mass index is 24.83 kg/(m^2).  GENERAL: healthy, alert and no distress  RESP: lungs clear to auscultation - no rales, rhonchi or wheezes  CV: regular rate and rhythm, normal S1 S2, no S3 or S4, no murmur, click or rub  Back: no CVA tenderness appreciated  Neuro: decreased sensation to monofilament        ASSESSMENT/PLAN:       (R30.0) Dysuria  (primary  encounter diagnosis)  Comment: likely secondary to dehydration  Plan: UA reflex to Microscopic and Culture, Urine         Microscopic, Urine Culture Aerobic Bacterial        -increase fluid intake    (R20.9) Disturbance of skin sensation  Comment:   Plan: Vitamin B12, NEUROLOGY ADULT REFERRAL            (R25.1) Tremor  Comment:   Plan: NEUROLOGY ADULT REFERRAL            (Z12.11) Colon cancer screening  Comment: due for colon cancer screening  Plan: Fecal colorectal cancer screen FIT               Marisa Hoover MD  [facility]

## 2017-08-31 ENCOUNTER — TELEPHONE (OUTPATIENT)
Dept: INTERNAL MEDICINE | Facility: CLINIC | Age: 67
End: 2017-08-31

## 2017-08-31 DIAGNOSIS — E53.9 B-COMPLEX DEFICIENCY: ICD-10-CM

## 2017-08-31 DIAGNOSIS — R29.6 RECURRENT FALLS: ICD-10-CM

## 2017-08-31 DIAGNOSIS — M62.81 GENERALIZED MUSCLE WEAKNESS: Primary | ICD-10-CM

## 2017-08-31 LAB
BACTERIA SPEC CULT: NO GROWTH
SPECIMEN SOURCE: NORMAL

## 2017-09-01 ENCOUNTER — HOSPITAL ENCOUNTER (OUTPATIENT)
Dept: PHYSICAL THERAPY | Facility: CLINIC | Age: 67
Setting detail: THERAPIES SERIES
End: 2017-09-01
Attending: INTERNAL MEDICINE
Payer: MEDICARE

## 2017-09-01 DIAGNOSIS — F33.0 MAJOR DEPRESSIVE DISORDER, RECURRENT EPISODE, MILD (H): ICD-10-CM

## 2017-09-01 PROCEDURE — 40000719 ZZHC STATISTIC PT DEPARTMENT NEURO VISIT: Performed by: PHYSICAL THERAPIST

## 2017-09-01 PROCEDURE — G8979 MOBILITY GOAL STATUS: HCPCS | Mod: GP,CK | Performed by: PHYSICAL THERAPIST

## 2017-09-01 PROCEDURE — 97162 PT EVAL MOD COMPLEX 30 MIN: CPT | Mod: GP | Performed by: PHYSICAL THERAPIST

## 2017-09-01 PROCEDURE — G8978 MOBILITY CURRENT STATUS: HCPCS | Mod: GP,CL | Performed by: PHYSICAL THERAPIST

## 2017-09-01 PROCEDURE — 97116 GAIT TRAINING THERAPY: CPT | Mod: GP | Performed by: PHYSICAL THERAPIST

## 2017-09-01 RX ORDER — CYANOCOBALAMIN 1000 UG/ML
1 INJECTION, SOLUTION INTRAMUSCULAR; SUBCUTANEOUS
Qty: 1 ML
Start: 2017-09-01 | End: 2018-04-02 | Stop reason: ALTCHOICE

## 2017-09-01 NOTE — TELEPHONE ENCOUNTER
Pt calls back. Informed B12 is low and she should begin injections. Pt scheduled Nurse only appt for 9/7/17.     Pt also has 2 additional requests for Dr. Hoover.   1. Requesting to  order for Cologard when she comes in on 9/7/17 so she can give the information to insurance.   2. She is getting PT through [facility] and they also want her to have outpatient OT as well. Requesting orders for OT      Dr. Hoover:  1. Please advise on B12 dosing and frequency for pt.  2. Please complete Cologard order form and have at  for her to  on 9/7/17.   3. Review request for outpatient OT orders.

## 2017-09-01 NOTE — TELEPHONE ENCOUNTER
Please let the patient know that her B12 is low.    The B12 should be treated with monthly injections

## 2017-09-01 NOTE — TELEPHONE ENCOUNTER
PROZAC     Last Written Prescription Date: 12/15/16  Last Fill Quantity: 90, # refills: 3  Last Office Visit with G primary care provider:  08/30/17        Last PHQ-9 score on record=   PHQ-9 SCORE 6/5/2017   Total Score -   Total Score 8

## 2017-09-05 ENCOUNTER — TELEPHONE (OUTPATIENT)
Dept: INTERNAL MEDICINE | Facility: CLINIC | Age: 67
End: 2017-09-05

## 2017-09-06 ENCOUNTER — MEDICAL CORRESPONDENCE (OUTPATIENT)
Dept: HEALTH INFORMATION MANAGEMENT | Facility: CLINIC | Age: 67
End: 2017-09-06

## 2017-09-06 ENCOUNTER — CARE COORDINATION (OUTPATIENT)
Dept: CARE COORDINATION | Facility: CLINIC | Age: 67
End: 2017-09-06

## 2017-09-06 NOTE — PROGRESS NOTES
Clinic Care Coordination Contact  Care Team Conversations    Reviewed chart and reached out to [facility] and they reported that pt was discharged from services on 8/25.  Reached out to pt and she reported that she was doing well and would be starting as an outpt at [facility] for PT/OT.  Pt stated that she had no other concerns and we discussed care coordination and she declined at this time as she feels she is managing her care well on her own.    Larry Rivas RN/CC  Care Coordinator [facility]  [phone]

## 2017-09-07 ENCOUNTER — ALLIED HEALTH/NURSE VISIT (OUTPATIENT)
Dept: NURSING | Facility: CLINIC | Age: 67
End: 2017-09-07
Payer: MEDICARE

## 2017-09-07 DIAGNOSIS — E53.9 B-COMPLEX DEFICIENCY: ICD-10-CM

## 2017-09-07 PROCEDURE — 96372 THER/PROPH/DIAG INJ SC/IM: CPT

## 2017-09-07 NOTE — MR AVS SNAPSHOT
After Visit Summary   9/7/2017    Zoey Armstrong    MRN: [id number]           Patient Information     Date Of Birth          1950        Visit Information        Provider Department      9/7/2017 10:30 AM RI IM NURSE [facility]        Today's Diagnoses     B-complex deficiency           Follow-ups after your visit        Your next 10 appointments already scheduled     Sep 13, 2017  2:15 PM CDT   Neuro Treatment with Danielle Ocampo, PT   [facility] ([facility])    [address] [facility] [address]   [phone]            Sep 19, 2017  8:30 AM CDT   Neuro Treatment with Danielle Ocampo, PT   [facility] ([facility])    [address] [facility] [address]   [phone]            Sep 21, 2017  3:30 PM CDT   Neuro Eval with Hannah Ndiaye OT   [facility] ([facility])    [address] [address]   [phone]            Sep 26, 2017  3:30 PM CDT   Neuro Treatment with Danielle Ocampo, PT   [facility] ([facility])    [address] [address]   [phone]            Sep 29, 2017  9:00 AM CDT   Neuro Treatment with Danielle Ocampo, PT   [facility] ([facility])    [address] [address]   [phone]            Nov 03, 2017 10:30 AM CDT   MA SCREENING BILATERAL W/ SOFIA with RHBCMA2   [facility] ([facility])    [address]  [facility] [address]   [phone]           Do not use any powder, lotion or deodorant under your arms or on your breast. If you do, we will ask you to remove it before your exam.  Wear comfortable, two-piece clothing.  If you have any allergies, tell your care team.  Bring any previous mammograms from other facilities or have them  "mailed to the breast center.  Three-dimensional (3D) mammograms are available at [place] locations in [place], [place], [facility], and [place]. [facility] locations include [place] and [facility] in [place].   Benefits of 3D mammograms include: - Improved rate of cancer detection - Decreases your chance of having to go back for more tests, which means fewer: - \"False-positive\" results (This means that there is an abnormal area but it isn't cancer.) - Invasive testing procedures, such as a biopsy or surgery - Can provide clearer images of the breast if you have dense breast tissue. 3D mammography is an optional exam that anyone can have with a 2D mammogram. It doesn't replace or take the place of a 2D mammogram. 2D mammograms remain an effective screening test for all women.  Not all insurance companies cover the cost of a 3D mammogram. Check with your insurance.              Who to contact     If you have questions or need follow up information about today's clinic visit or your schedule please contact [facility] directly at [phone].  Normal or non-critical lab and imaging results will be communicated to you by [employer], letter or phone within 4 business days after the clinic has received the results. If you do not hear from us within 7 days, please contact the clinic through [employer] or phone. If you have a critical or abnormal lab result, we will notify you by phone as soon as possible.  Submit refill requests through [employer] or call your pharmacy and they will forward the refill request to us. Please allow 3 business days for your refill to be completed.          Additional Information About Your Visit        [employer] Information     [employer] gives you secure access to your electronic health record. If you see a primary care provider, you can also send messages to your care team and make appointments. If you have questions, please call your primary " care clinic.  If you do not have a primary care provider, please call [phone] and they will assist you.        Care EveryWhere ID     This is your Care EveryWhere ID. This could be used by other organizations to access your [place] medical records  [id number]         Blood Pressure from Last 3 Encounters:   08/30/17 118/72   08/14/17 114/70   07/17/17 113/73    Weight from Last 3 Encounters:   08/30/17 149 lb 3.2 oz (67.7 kg)   07/17/17 159 lb 9.6 oz (72.4 kg)   07/12/17 159 lb 9.6 oz (72.4 kg)              We Performed the Following     VITAMIN B12 1000 mcg (standing order count of 12)        Primary Care Provider Office Phone # Fax #    Marisa Hoover [phone]       [address] [facility] [address]        Equal Access to Services     [facility] : Hadii aad ku hadasho Soomaali, waaxda luqadaha, qaybta kaalmada adeegyada, waxay idiin hayaan adeelli mcgovern . So [facility] [phone].    ATENCIÓN: Si habla español, tiene a sutton disposición servicios gratuitos de asistencia lingüística. Candity al [phone].    We comply with applicable federal civil rights laws and [place] laws. We do not discriminate on the basis of race, color, national origin, age, disability sex, sexual orientation or gender identity.            Thank you!     Thank you for choosing [facility]  for your care. Our goal is always to provide you with excellent care. Hearing back from our patients is one way we can continue to improve our services. Please take a few minutes to complete the written survey that you may receive in the mail after your visit with us. Thank you!             Your Updated Medication List - Protect others around you: Learn how to safely use, store and throw away your medicines at www.disposemymeds.org.          This list is accurate as of: 9/7/17 11:01 AM.  Always use your most recent med list.                   Brand Name Dispense Instructions for use Diagnosis     amLODIPine 10 MG tablet    NORVASC    90 tablet    Take 1 tablet (10 mg) by mouth daily    Essential hypertension       aspirin 81 MG tablet      Take 81 mg by mouth daily        buPROPion 150 MG 12 hr tablet    WELLBUTRIN SR    180 tablet    Take 1 tablet (150 mg) by mouth 2 times daily    Major depressive disorder, recurrent episode, mild (H)       calcium 500 MG Chew      Take 1,250 mg by mouth daily        cyanocobalamin 1000 MCG/ML injection    VITAMIN B12    1 mL    Inject 1 mL (1,000 mcg) into the muscle every 30 days    B-complex deficiency       FLUoxetine 20 MG capsule    PROzac    90 capsule    Take 3 capsules (60 mg) by mouth daily    Major depressive disorder, recurrent episode, mild (H)       letrozole 2.5 MG tablet    FEMARA     Take 2.5 mg by mouth daily        lidocaine 5 % Patch    LIDODERM     Place 2 patches onto the skin every 24 hours Apply to low back/left hip - on in AM, remove after 12 hours. 8569-4509, 4540-1684.        Multiple Vitamin Chew      Take 1 tablet by mouth daily        olmesartan 40 MG tablet    BENICAR    90 tablet    Take 1 tablet (40 mg) by mouth daily    Essential hypertension       OXYCODONE HCL PO      Take 2.5-5 mg by mouth every 4 hours as needed        polyethylene glycol powder    MIRALAX/GLYCOLAX     Take 17 g by mouth daily AND daily PRN        rosuvastatin 10 MG tablet    CRESTOR    90 tablet    Take 1 tablet (10 mg) by mouth daily    Hyperlipidemia       senna-docusate 8.6-50 MG per tablet    SENOKOT-S;PERICOLACE     Take 2 tablets by mouth daily AND 1 tab QHS        TYLENOL PO      Take 1,000 mg by mouth 4 times daily

## 2017-09-07 NOTE — NURSING NOTE
"Chief Complaint   Patient presents with     Allied Health Visit     B12       Initial There were no vitals taken for this visit. Estimated body mass index is 24.83 kg/(m^2) as calculated from the following:    Height as of 8/14/17: 5' 5\" (1.651 m).    Weight as of 8/30/17: 149 lb 3.2 oz (67.7 kg).  Medication Reconciliation: DOM Aguillon LPN      "

## 2017-09-13 ENCOUNTER — HOSPITAL ENCOUNTER (OUTPATIENT)
Dept: PHYSICAL THERAPY | Facility: CLINIC | Age: 67
Setting detail: THERAPIES SERIES
End: 2017-09-13
Attending: INTERNAL MEDICINE
Payer: MEDICARE

## 2017-09-13 PROCEDURE — 97116 GAIT TRAINING THERAPY: CPT | Mod: GP | Performed by: PHYSICAL THERAPIST

## 2017-09-13 PROCEDURE — 40000719 ZZHC STATISTIC PT DEPARTMENT NEURO VISIT: Performed by: PHYSICAL THERAPIST

## 2017-09-13 PROCEDURE — 97110 THERAPEUTIC EXERCISES: CPT | Mod: GP | Performed by: PHYSICAL THERAPIST

## 2017-09-13 PROCEDURE — 97112 NEUROMUSCULAR REEDUCATION: CPT | Mod: GP | Performed by: PHYSICAL THERAPIST

## 2017-09-13 NOTE — PROGRESS NOTES
[facility]        OUTPATIENT PHYSICAL THERAPY FUNCTIONAL EVALUATION  PLAN OF TREATMENT FOR OUTPATIENT REHABILITATION  (COMPLETE FOR INITIAL CLAIMS ONLY)  Patient's Last Name, First Name, M.I.  YOB: 1950  Zoey Armstrong     Provider's Name   [facility]   Medical Record No.  [id number]     Start of Care Date:  09/01/17   Onset Date:  06/23/17 (date of surgery)   Type:     _X__PT   ____OT  ____SLP Medical Diagnosis:  Physical deconditioning R53.81  - Primary ;  Recurrent falls R29.6      PT Diagnosis:  gait and balance disorder Visits from SOC:  1                              __________________________________________________________________________________  Plan of Treatment/Functional Goals:  balance training, bed mobility training, gait training, neuromuscular re-education, motor coordination training, strengthening, stretching, transfer training, other (see comments)           GOALS  Report of falls (baseline: 5-6 falls in the past year - 1 with injury to R shoulder and laceration to her face requiring stitches. Since surgery: 2 falls, slid off the bed while sitting for dressing.)  Pt to report improved safety with consistent use of appropriate AD within home without report of falling throughout course of care.   11/24/17    Bed mobility (baseline: min assist to LEs scooting and getting into bed)  Pt to demonstrate improved independence with bed mobility without need for assistance to LEs getting into bed,  with bridge and shift and rolling R <> L SL.   11/24/17    TUG (baseline: 41.71 sec with 4ww; > 30 sec correlated with difficulty with ADLs)  To complete TUG in < 30 sec with least restrictive AD to demonstrate improved safety with indoor ambulation over short distances and dec fall risk.   11/24/17    Turning (360 degree turn: 28-30 steps, pauses with L LE  shaking, 40 sec total; assist to transfer from shower)  Patient will independently turn 180 degrees with larger steps in < 20 seconds in order to safely complete independent toilet and/or tub/shower transfer.  11/24/17    FERNANDES (baseline: 29/56)  Pt will score >45/56 on the fernandes balance assessment to indicate improved postural stability and low fall risk.   11/24/17    30 sec sit <> stand (baseline: 4 reps from 18 , light touch to armrest, maintains support from armrests in standing with limited time without UE support, denies any pain)  Pt to perform 8 reps sit to and from stand from 18 inch chair to demonstrate improved functional strength for all ADL's and less fall risk at home/ability to rise from chair without fall.   11/24/17    HEP  Pt to be independent in HEP with assist from spouse prn to continue to safely address her impairments following d/c from skilled PT services.   11/24/17    Community Ambulation   Pt to demonstrate safety with ambulation outdoors without instability or significant fatigue >/= 1312.4' with 4ww for progress towards community ambulation towards her goal to return to gym and social gatherings with friends/shopping.   11/24/17    Therapy Frequency:  2 times/Week   Predicted Duration of Therapy Intervention:  12 weeks    Danielle Ocampo, PT                                    I CERTIFY THE NEED FOR THESE SERVICES FURNISHED UNDER        THIS PLAN OF TREATMENT AND WHILE UNDER MY CARE     (Physician co-signature of this document indicates review and certification of the therapy plan).                  Certification Date From:  09/01/17   Certification Date To:  11/24/17    Referring Provider:  Marisa Hoover MD    Initial Assessment  See Epic Evaluation- Start of Care Date: 09/01/17

## 2017-09-13 NOTE — PROGRESS NOTES
09/01/17 0800   Quick Adds   Quick Adds Certification   Type of Visit Initial OP PT Evaluation   General Information   Start of Care Date 09/01/17   Referring Physician Marisa Hoover MD   Orders Evaluate and Treat as Indicated   Additional Orders Aquatic therapy - Dr López (pt scheduled eval in October, bringing order on future session)   Order Date 08/24/17   Medical Diagnosis Physical deconditioning R53.81  - Primary ;  Recurrent falls R29.6    Onset of illness/injury or Date of Surgery 06/23/17  (date of surgery)   Precautions/Limitations fall precautions;spinal precautions   Surgical/Medical history reviewed Yes   Pertinent history of current problem (include personal factors and/or comorbidities that impact the POC) 66 yo F s/p posterior spine fusion L3-S1 and bilateral decompression foraminal/lateral L2-S1 by Dr Yoel López on 6/23/17. Following surgery she participated in therapies at [facility] and then home health OT and PT before discharged last week on 8/25/17. She has been referred by her primary care for transition to land based physical therapy. She also has received orders from Dr López a month after surgery for participation in aquatic therapy and scheduled that evaluation in October at this OP clinic. Per chart review of d/c summary on 8/25 from homecare OT: She Completed 11 OT visits and 2 falls the week of d/c with lack of judgement/forgetting technique and spouse not present to give cues during dressing. Recommended OP OT for functional endurance, memory compensation, rehab of R shoulder for improved pain free AROM and strength. R shoulder - multiple medical complications since rotator cuff repair last summer. She does not currently have orders for OT. Other PMH: HTN, CAD, COPD, Hyperlipidemia, Falls, Depression, Smoker, Back pain. Limitations walking, balance, dizziness prior to surgery: She reports sudden decline in balance and onset of dizziness 5 years ago with progressive sxs over time.  Per chart review, in Aug/Sept 2016 she was evaluated at the [facility] with full audiologic and vestibular function testing, and received PT treatment for balance and vestibular rehab. At that time there were no indications of peripheral vestibular dysfunction but there were some central vestibular findings. Patient had a limited number of sessions due to a fall and shoulder injury requiring surgery. She received MRI in November, 2016 consistent with NPH, but did not receive shunt placement without changes to functional gait. She is familiar with this clinic, previously seen in April, 2017 for pre/post lumbar puncture assessments. She did not continue with OP therapy at that time d/t anticipated back surgery in June. Reports resolution of intermittent, sharp, right-sided back pain following surgery. Reports some pain in L hip when walking since surgery. Reports dx with L hip bursitis and recently received injection to L hip that helped, not as painful as it was a couple weeks ago. Followed-up with PCP earlier this week and has been referred back to neurologist d/t assessment of LE numbness. Pt reports  feet don't work  but denies awareness of any numbness/tingling in feet.    Pertinent Visual History  No visual changes reported   Prior level of function comment Prior function b/f surgery, April, 2017 chart review:   Patient is very fearful of falling due to multiple falls and injuries over the years. She uses a walker intermittently, mostly in the evening and at night. She feels best in the AM but by evening seems to decline with balance and gait. She does not walk outside on her own. She is unable to go shopping, out to coffee with friends alone as she is not comfortable walking even short distances out of the house by herself. She is driving short distances only. She feels most of her falls have occurred due to rapid head movements. She reports her handwriting seems to be getting worse, illegible by evening. She feels  she also has a harder time with speech and word finding towards the end of the day but spouse has not noticed. They both deny memory problems. Patient reports she is (I) in her self cares but is careful. She is unable to perform household chores due to a combination of imbalance and back/LE symptoms. In this past year, patient also has had rotator cuff surgery s/p fall, a shoulder fracture due to a fall and a lumpectomy with finding of breast CA.  CURRENT FUNCTION: Spouse give SBA needed for ADLs: dressing - spouse assists with shoes/socks and periocare after bowel movements. Valdo for safe tub/shower transfer and bathing, SBA for sit <> stand - reports later in the day needs UE support with greater difficulty getting out of latia, toilet, bed, chair in bedroom. Too fatigued for cooking/cleaning. R shoulder stiffness/painful reaching overhead into cupboards. Supervision with medications. Uses 4ww for ambulation. Walking continues to be worse later in the day. Sedentary for most of the day. HEP from home therapist: knee bends, marching and UE exercises with dowel. Stationary bike at home 1x/week 5 minutes until fatigues.   Previous/Current Treatment Physical Therapy;Occupational Therapy;Other   Other treatment surgery   Current Community Support Family/friend caregiver  (spouse)   Patient role/Employment history Retired  ()   Living environment [place]  (lives with spouse)   Home/Community Accessibility Comments Has flight stairs into garage with B handrails - no instability, spouse SBA navigating in/out of house. Stays on main level of house - 6 stairs in home she does not navigate. No longer driving   Current Assistive Devices Four Wheeled Walker   ADL Devices Shower/Tub Chair;Shower/Tub Grab Bar;Wall Grab Bar;Other  (bedrail)   Assistive Devices Comments Received during homecare: short reacher, elastic laces, non-slip surface for bedroom chair for dressing. grabbars: bathroom, tub and bed.  "Borrowed bariatric 4ww, Using walker almost all the time. Feels good in the morning and will walk to bathroom without walker with small steps. More fatigued later in the day and much more reliant on walker. Needs break on L side to be fixed. Continues to wear LSO when out of bed until f/u Dr López at end of September   Patient/Family Goals Statement \"walk normally\" Wants to walk without walker. Return to swimming, shopping, going out to coffee with friends.   General Information Comments Prior gym membership to [employer] 5-6 years ago, stopped d/t price, was participating in water aerobics.   Fall Risk Screen   Fall screen completed by PT   Per patient - Fall 2 or more times in past year? Yes   Per patient - Fall with injury in past year? Yes   Timed Up and Go score (seconds) 41.71 sec with 4ww   Is patient a fall risk? Yes;Department fall risk interventions implemented   Fall screen comments FALLS: reports quick movements to head, denies dizziness but feels disoriented. Multiple falls prior to surgery. 5-6 falls in the past year - 1 with injury to R shoulder and laceration to her face requiring sitches. Since surgery: 2 falls, slid off the bed while sitting for dressing. Spouse helped to return to standing.    Pain   Pain comments L hip pain with walking, improved since injection   Cognitive Status Examination   Orientation orientation to person, place and time   Level of Consciousness alert;other (see comments)  (flat affect)   Follows Commands and Answers Questions 100% of the time   Personal Safety and Judgment intact   Memory impaired   Cognitive Comment Reports improved cognition. Per chart review: mild deficit per [facility] Status exam.    Observation   Observation wearing brace   Posture   Posture Comments In standing slight R knee bend    Strength   Strength Comments functional strength assessement per 30 sec sit <> stand   Bed Mobility   Bed Mobility Comments min assist to LEs scooting and " getting into bed   Transfer Skills   Transfer Comments UE requires min UE support to rise and stability in standing   Gait   Gait Comments Gait: with 4ww over short distances indoors, slow, small step-through gait pattern, forward flexed at trunk, no instability with walker Turns: Small steps to turn, questionable freezing moment, limited coordination stepping - R LE heel raises. 360 degree turn: 28-30 steps, pauses with L LE shaking 40 sec total   Gait Special Tests   Gait Special Tests 25 FOOT TIMED WALK   Gait Special Tests 25 Foot Timed Walk   Comments TBA (May, 2017 for pre/post lumbar puncture assessments: 25  walk 11.4 sec with 20 steps)   Gait Special Tests Dynamic Gait Index   Comments N/A d/t reliance on 4ww for safety (May, 2017 for pre/post lumbar puncture assessments:DGI no AD scoring 8-13/24)   Balance   Balance Comments Poor coordination, weight shifting to L LE   Balance Special Tests   Balance Special Tests Timed up and go;Romberg;Bateman balance;Sit to stand reps   Balance Special Tests Timed Up and Go   Seconds 41.71 Seconds   Comments with 4ww, L    Balance Special Tests Bateman Balance   Score out of 56 29   Balance Special Tests Modified CTSIB Conditions   Condition 1, seconds 15 Seconds   Condition 2, seconds 10 Seconds  (*feet apart)   Modified CTSIB Comments (May, 2017 for pre/post lumbar puncture assessments: foam EC 2.6 sec)   Balance Special Tests Sit to Stand Reps in 30 Seconds   Reps in 30 seconds 4   Height 18   Comments light touch to armrest, maintains support from armrests in standing with limited time without UE support, denies any pain    Sensory Examination   Sensory Perception Comments not tested, reports recently decreased sensation per MD assessment   Coordination   Coordination Comments decreased Lobo supination/pronation, slower L side, finger <> nose, slow by not dysmetric; poor functional coordination: turning, weight shifting   Muscle Tone   Muscle Tone Comments Mild inc tone L  LE   Oculomotor Exam   Smooth Pursuit Comment occassional saccadic intrusions   Saccades Normal   Modality Interventions   Planned Modality Interventions Comments aquatic therapy   Planned Therapy Interventions   Planned Therapy Interventions balance training;bed mobility training;gait training;neuromuscular re-education;motor coordination training;strengthening;stretching;transfer training;other (see comments)   Clinical Impression   Criteria for Skilled Therapeutic Interventions Met yes, treatment indicated   PT Diagnosis gait and balance disorder   Influenced by the following impairments Significant falls history with injury with unknown etiology associated with dizziness/disorientation and ongoing medical work-up with neurology. Post surgical spine restrictions with continued bracing.  Cognitive impairments.  L hip pain/dx with bursitis. Reported recent sensation changes LEs. Impaired judgment/cognition. Impaired posture. Impaired postural stability. Impaired gait with ? observed freezing moments. Impaired functional strength with transitions. (+) central signs: impaired coordination. Increased tone L LE. (+) saccades with smooth pursuit.    Functional limitations due to impairments transfers, short distances and community ambulation, stair navigation, bed mobility, participation in social outings, participation in IADLs   Clinical Presentation Evolving/Changing   Clinical Presentation Rationale decline in gait and stability since seen 5 months ago prior to back surgery. Fluctuating stability, worse later in the day.    Clinical Decision Making (Complexity) Moderate complexity   Therapy Frequency 2 times/Week   Predicted Duration of Therapy Intervention (days/wks) 12 weeks   Risk & Benefits of therapy have been explained Yes   Patient, Family & other staff in agreement with plan of care Yes   Clinical Impression Comments Recommending skilled PT with combination of both land and aquatic based therapy to address  impairments and limitations stated above. Requesting orders from referring MD for OT assessment and treat prn to address memory compensation, rehab of R shoulder for improved pain free AROM and strength as previously recommended.    Education Assessment   Barriers to Learning Cognitive   GOALS   PT Eval Goals 1;2;3;4;5;6;7;8   Goal 1   Goal Identifier Report of falls (baseline: 5-6 falls in the past year - 1 with injury to R shoulder and laceration to her face requiring stitches. Since surgery: 2 falls, slid off the bed while sitting for dressing.)   Goal Description Pt to report improved safety with consistent use of appropriate AD within home without report of falling throughout course of care.    Target Date 11/24/17   Goal 2   Goal Identifier Bed mobility (baseline: min assist to LEs scooting and getting into bed)   Goal Description Pt to demonstrate improved independence with bed mobility without need for assistance to LEs getting into bed,  with bridge and shift and rolling R <> L SL.    Target Date 11/24/17   Goal 3   Goal Identifier TUG (baseline: 41.71 sec with 4ww; > 30 sec correlated with difficulty with ADLs)   Goal Description To complete TUG in < 30 sec with least restrictive AD to demonstrate improved safety with indoor ambulation over short distances and dec fall risk.    Target Date 11/24/17   Goal 4   Goal Identifier Turning (360 degree turn: 28-30 steps, pauses with L LE shaking, 40 sec total; assist to transfer from shower)   Goal Description Patient will independently turn 180 degrees with larger steps in < 20 seconds in order to safely complete independent toilet and/or tub/shower transfer.   Target Date 11/24/17   Goal 5   Goal Identifier FERNANDES (baseline: 29/56)   Goal Description Pt will score >45/56 on the fernandes balance assessment to indicate improved postural stability and low fall risk.    Target Date 11/24/17   Goal 6   Goal Identifier 30 sec sit <> stand (baseline: 4 reps from 18 , light  touch to armrest, maintains support from armrests in standing with limited time without UE support, denies any pain)   Goal Description Pt to perform 8 reps sit to and from stand from 18 inch chair to demonstrate improved functional strength for all ADL's and less fall risk at home/ability to rise from chair without fall.    Target Date 11/24/17   Goal 7   Goal Identifier HEP   Goal Description Pt to be independent in HEP with assist from spouse prn to continue to safely address her impairments following d/c from skilled PT services.    Target Date 11/24/17   Goal 8   Goal Identifier Community Ambulation    Goal Description Pt to demonstrate safety with ambulation outdoors without instability or significant fatigue >/= 1312.4' with 4ww for progress towards community ambulation towards her goal to return to gym and social gatherings with friends/shopping.    Target Date 11/24/17   Total Evaluation Time   Total Evaluation Time (Minutes) 60   Therapy Certification   Certification date from 09/01/17   Certification date to 11/24/17   Medical Diagnosis Physical deconditioning R53.81  - Primary ;  Recurrent falls R29.6

## 2017-09-13 NOTE — ADDENDUM NOTE
Encounter addended by: Danielle Ocampo PT on: 9/13/2017  1:57 PM<BR>     Actions taken: Flowsheet data copied forward, Flowsheet accepted, Document created, Sign clinical note

## 2017-09-13 NOTE — ADDENDUM NOTE
Encounter addended by: Danielle Ocampo PT on: 9/13/2017  7:58 AM<BR>     Actions taken: Flowsheet accepted

## 2017-09-18 NOTE — TELEPHONE ENCOUNTER
Filled out cologard form for our section, PCP signed. Have to mail back to pt as she didn't sign her section.

## 2017-09-19 ENCOUNTER — HOSPITAL ENCOUNTER (OUTPATIENT)
Dept: PHYSICAL THERAPY | Facility: CLINIC | Age: 67
Setting detail: THERAPIES SERIES
End: 2017-09-19
Attending: INTERNAL MEDICINE
Payer: MEDICARE

## 2017-09-19 PROCEDURE — 97110 THERAPEUTIC EXERCISES: CPT | Mod: GP | Performed by: PHYSICAL THERAPIST

## 2017-09-19 PROCEDURE — 97530 THERAPEUTIC ACTIVITIES: CPT | Mod: GP | Performed by: PHYSICAL THERAPIST

## 2017-09-19 PROCEDURE — 40000719 ZZHC STATISTIC PT DEPARTMENT NEURO VISIT: Performed by: PHYSICAL THERAPIST

## 2017-09-22 ENCOUNTER — HOSPITAL ENCOUNTER (OUTPATIENT)
Dept: OCCUPATIONAL THERAPY | Facility: CLINIC | Age: 67
Setting detail: THERAPIES SERIES
End: 2017-09-22
Attending: INTERNAL MEDICINE
Payer: MEDICARE

## 2017-09-22 PROCEDURE — 97110 THERAPEUTIC EXERCISES: CPT | Mod: GO | Performed by: OCCUPATIONAL THERAPIST

## 2017-09-22 PROCEDURE — 97535 SELF CARE MNGMENT TRAINING: CPT | Mod: GO | Performed by: OCCUPATIONAL THERAPIST

## 2017-09-22 PROCEDURE — G9169 MEMORY GOAL STATUS: HCPCS | Mod: GO,CI | Performed by: OCCUPATIONAL THERAPIST

## 2017-09-22 PROCEDURE — 97167 OT EVAL HIGH COMPLEX 60 MIN: CPT | Mod: GO | Performed by: OCCUPATIONAL THERAPIST

## 2017-09-22 PROCEDURE — G9168 MEMORY CURRENT STATUS: HCPCS | Mod: GO,CJ | Performed by: OCCUPATIONAL THERAPIST

## 2017-09-22 PROCEDURE — 40000125 ZZHC STATISTIC OT OUTPT VISIT: Performed by: OCCUPATIONAL THERAPIST

## 2017-09-23 NOTE — PROGRESS NOTES
[facility]          OUTPATIENT OCCUPATIONAL THERAPY  EVALUATION  PLAN OF TREATMENT FOR OUTPATIENT REHABILITATION  (COMPLETE FOR INITIAL CLAIMS ONLY)  Patient's Last Name, First Name, M.I.  YOB: 1950  Zoey Armstrong                        Provider's Name  [facility] Medical Record No.  [id number]                               Onset Date:     09/06/17 (order date)   Start of Care Date:     09/22/17   Type:     ___PT   _X_OT   ___SLP Medical Diagnosis:     mild cognitive deficits                          OT Diagnosis:     decreased ADL/IADL independence Visits from SOC:  1   _________________________________________________________________________________  Plan of Treatment/Functional Goals:  ADL training, IADL training, ROM, Self care/Home management, Strengthening, Stretching, Visual perception             Goals  Goal Identifier: R shoulder ROM and strength  Goal Description: Patient to increase R shoulder flexion AROM to 135 degrees and strength to 4+/5 for return to baseline for improved R UE function for ADL/IADLs (during dressing and grooming tasks, kitchen tasks).  Target Date: 12/15/17     Goal Identifier: memory skills  Goal Description: Patient will demonstrate improved memory skills by completing short-term memory tasks in occupational therapy with 85% accuracy using compensatory strategies for increased safety and independence with ADL/IADLS (remembering to take medications, turn off the stove when done, remember rules/laws for community mobility, recall therapy techniques/exercises).  Target Date: 12/15/17     Goal Identifier: problem solving skills and attention to detail  Goal Description: Patient will demonstrate the ability to complete simple to moderately complex tasks requiring numerical reasoning, attention to detail and problem solving skills with 90% accuracy  to complete basic home and community tasks (medication setup and management, financial tasks) accurately, for maximum independence to function at or near baseline at home and in community.  Target Date: 12/15/17     Goal Identifier: UE motor and visual reaction time  Goal Description: Patient will demonstrate WFLs  UE and visual reaction time for safe independent community mobility (crossing the street, driving) by scoring WNLs on all modes of the [employer].  Target Date: 12/15/17     Goal Identifier: R FM coordination skills  Goal Description: Patient will improve R FM coordination skills for increased handwriting legibility and improved performance with FM ADLS by increasing letter size by 25% and completing 9HPT in 25 seconds.  Target Date: 12/15/17            Therapy Frequency: 2x/week, decreasing in frequency prn     Predicted Duration of Therapy Intervention (days/wks): 12 weeks  Chloe Monroe, OTR/L, OT          I CERTIFY THE NEED FOR THESE SERVICES FURNISHED UNDER        THIS PLAN OF TREATMENT AND WHILE UNDER MY CARE     (Physician co-signature of this document indicates review and certification of the therapy plan).                   ,    Certification date from: 09/22/17, Certification date to: 12/15/17               Referring Physician: Marisa Hoover     Initial Assessment        See Epic Evaluation      Start Of Care Date: 09/22/17

## 2017-09-25 ENCOUNTER — HOSPITAL ENCOUNTER (OUTPATIENT)
Dept: OCCUPATIONAL THERAPY | Facility: CLINIC | Age: 67
Setting detail: THERAPIES SERIES
End: 2017-09-25
Attending: INTERNAL MEDICINE
Payer: MEDICARE

## 2017-09-25 PROCEDURE — 40000125 ZZHC STATISTIC OT OUTPT VISIT: Performed by: OCCUPATIONAL THERAPIST

## 2017-09-25 PROCEDURE — 97535 SELF CARE MNGMENT TRAINING: CPT | Mod: GO | Performed by: OCCUPATIONAL THERAPIST

## 2017-09-26 ENCOUNTER — HOSPITAL ENCOUNTER (OUTPATIENT)
Dept: PHYSICAL THERAPY | Facility: CLINIC | Age: 67
Setting detail: THERAPIES SERIES
End: 2017-09-26
Attending: INTERNAL MEDICINE
Payer: MEDICARE

## 2017-09-26 PROCEDURE — 40000719 ZZHC STATISTIC PT DEPARTMENT NEURO VISIT: Performed by: PHYSICAL THERAPIST

## 2017-09-26 PROCEDURE — 97530 THERAPEUTIC ACTIVITIES: CPT | Mod: GP | Performed by: PHYSICAL THERAPIST

## 2017-09-29 ENCOUNTER — HOSPITAL ENCOUNTER (OUTPATIENT)
Dept: PHYSICAL THERAPY | Facility: CLINIC | Age: 67
Setting detail: THERAPIES SERIES
End: 2017-09-29
Attending: INTERNAL MEDICINE
Payer: MEDICARE

## 2017-09-29 PROCEDURE — 97116 GAIT TRAINING THERAPY: CPT | Mod: GP | Performed by: PHYSICAL THERAPIST

## 2017-09-29 PROCEDURE — 40000719 ZZHC STATISTIC PT DEPARTMENT NEURO VISIT: Performed by: PHYSICAL THERAPIST

## 2017-09-29 PROCEDURE — 97110 THERAPEUTIC EXERCISES: CPT | Mod: GP | Performed by: PHYSICAL THERAPIST

## 2017-10-03 ENCOUNTER — HOSPITAL ENCOUNTER (OUTPATIENT)
Dept: OCCUPATIONAL THERAPY | Facility: CLINIC | Age: 67
Setting detail: THERAPIES SERIES
End: 2017-10-03
Attending: INTERNAL MEDICINE
Payer: MEDICARE

## 2017-10-03 ENCOUNTER — HOSPITAL ENCOUNTER (OUTPATIENT)
Dept: PHYSICAL THERAPY | Facility: CLINIC | Age: 67
Setting detail: THERAPIES SERIES
End: 2017-10-03
Attending: INTERNAL MEDICINE
Payer: MEDICARE

## 2017-10-03 ENCOUNTER — TRANSFERRED RECORDS (OUTPATIENT)
Dept: HEALTH INFORMATION MANAGEMENT | Facility: CLINIC | Age: 67
End: 2017-10-03

## 2017-10-03 PROCEDURE — 97116 GAIT TRAINING THERAPY: CPT | Mod: GP | Performed by: PHYSICAL THERAPIST

## 2017-10-03 PROCEDURE — 97110 THERAPEUTIC EXERCISES: CPT | Mod: GO

## 2017-10-03 PROCEDURE — 40000719 ZZHC STATISTIC PT DEPARTMENT NEURO VISIT: Performed by: PHYSICAL THERAPIST

## 2017-10-03 PROCEDURE — 97110 THERAPEUTIC EXERCISES: CPT | Mod: GP | Performed by: PHYSICAL THERAPIST

## 2017-10-03 PROCEDURE — 97535 SELF CARE MNGMENT TRAINING: CPT | Mod: GO

## 2017-10-03 PROCEDURE — 40000125 ZZHC STATISTIC OT OUTPT VISIT

## 2017-10-04 ENCOUNTER — HOSPITAL ENCOUNTER (OUTPATIENT)
Dept: PHYSICAL THERAPY | Facility: CLINIC | Age: 67
Setting detail: THERAPIES SERIES
End: 2017-10-04
Attending: INTERNAL MEDICINE
Payer: MEDICARE

## 2017-10-04 PROCEDURE — 97530 THERAPEUTIC ACTIVITIES: CPT | Mod: GP | Performed by: PHYSICAL THERAPIST

## 2017-10-04 PROCEDURE — 97110 THERAPEUTIC EXERCISES: CPT | Mod: GP | Performed by: PHYSICAL THERAPIST

## 2017-10-04 PROCEDURE — 40000719 ZZHC STATISTIC PT DEPARTMENT NEURO VISIT: Performed by: PHYSICAL THERAPIST

## 2017-10-06 ENCOUNTER — HOSPITAL ENCOUNTER (OUTPATIENT)
Dept: OCCUPATIONAL THERAPY | Facility: CLINIC | Age: 67
Setting detail: THERAPIES SERIES
End: 2017-10-06
Attending: INTERNAL MEDICINE
Payer: MEDICARE

## 2017-10-06 PROCEDURE — 97535 SELF CARE MNGMENT TRAINING: CPT | Mod: GO | Performed by: OCCUPATIONAL THERAPIST

## 2017-10-06 PROCEDURE — 40000125 ZZHC STATISTIC OT OUTPT VISIT: Performed by: OCCUPATIONAL THERAPIST

## 2017-10-07 NOTE — ADDENDUM NOTE
Encounter addended by: Danielle Ocampo PT on: 10/7/2017 10:29 AM<BR>     Actions taken: Flowsheet accepted

## 2017-10-07 NOTE — ADDENDUM NOTE
Encounter addended by: Danielle Ocampo PT on: 10/7/2017  6:23 PM<BR>     Actions taken: Flowsheet data copied forward, Flowsheet accepted

## 2017-10-08 NOTE — ADDENDUM NOTE
Encounter addended by: Danielle Ocampo, PT on: 10/8/2017 11:37 AM<BR>     Actions taken: Flowsheet data copied forward, Flowsheet accepted

## 2017-10-08 NOTE — ADDENDUM NOTE
Encounter addended by: Danielle Ocampo, PT on: 10/8/2017 12:05 PM<BR>     Actions taken: Flowsheet data copied forward, Flowsheet accepted

## 2017-10-08 NOTE — ADDENDUM NOTE
Encounter addended by: Danielle Ocampo PT on: 10/8/2017 11:43 AM<BR>     Actions taken: Flowsheet accepted

## 2017-10-08 NOTE — ADDENDUM NOTE
Encounter addended by: Danielle Ocampo, PT on: 10/8/2017 11:01 AM<BR>     Actions taken: Flowsheet data copied forward, Flowsheet accepted

## 2017-10-08 NOTE — ADDENDUM NOTE
Encounter addended by: Danielle Ocampo, PT on: 10/8/2017 12:25 PM<BR>     Actions taken: Flowsheet data copied forward, Flowsheet accepted

## 2017-10-10 ENCOUNTER — HOSPITAL ENCOUNTER (OUTPATIENT)
Dept: OCCUPATIONAL THERAPY | Facility: CLINIC | Age: 67
Setting detail: THERAPIES SERIES
End: 2017-10-10
Attending: INTERNAL MEDICINE
Payer: MEDICARE

## 2017-10-10 PROCEDURE — 40000125 ZZHC STATISTIC OT OUTPT VISIT: Performed by: OCCUPATIONAL THERAPIST

## 2017-10-10 PROCEDURE — 97110 THERAPEUTIC EXERCISES: CPT | Mod: GO | Performed by: OCCUPATIONAL THERAPIST

## 2017-10-10 PROCEDURE — 97535 SELF CARE MNGMENT TRAINING: CPT | Mod: GO | Performed by: OCCUPATIONAL THERAPIST

## 2017-10-11 ENCOUNTER — HOSPITAL ENCOUNTER (OUTPATIENT)
Dept: PHYSICAL THERAPY | Facility: CLINIC | Age: 67
Setting detail: THERAPIES SERIES
End: 2017-10-11
Attending: INTERNAL MEDICINE
Payer: MEDICARE

## 2017-10-11 PROCEDURE — 40000719 ZZHC STATISTIC PT DEPARTMENT NEURO VISIT: Performed by: PHYSICAL THERAPIST

## 2017-10-11 PROCEDURE — 97530 THERAPEUTIC ACTIVITIES: CPT | Mod: GP | Performed by: PHYSICAL THERAPIST

## 2017-10-13 ENCOUNTER — HOSPITAL ENCOUNTER (OUTPATIENT)
Dept: OCCUPATIONAL THERAPY | Facility: CLINIC | Age: 67
Setting detail: THERAPIES SERIES
End: 2017-10-13
Attending: INTERNAL MEDICINE
Payer: MEDICARE

## 2017-10-13 ENCOUNTER — HOSPITAL ENCOUNTER (OUTPATIENT)
Dept: PHYSICAL THERAPY | Facility: CLINIC | Age: 67
Setting detail: THERAPIES SERIES
End: 2017-10-13
Attending: INTERNAL MEDICINE
Payer: MEDICARE

## 2017-10-13 PROCEDURE — 97535 SELF CARE MNGMENT TRAINING: CPT | Mod: GO,59 | Performed by: OCCUPATIONAL THERAPIST

## 2017-10-13 PROCEDURE — 97530 THERAPEUTIC ACTIVITIES: CPT | Mod: GP | Performed by: PHYSICAL THERAPIST

## 2017-10-13 PROCEDURE — 40000719 ZZHC STATISTIC PT DEPARTMENT NEURO VISIT: Performed by: PHYSICAL THERAPIST

## 2017-10-13 PROCEDURE — 40000125 ZZHC STATISTIC OT OUTPT VISIT: Performed by: OCCUPATIONAL THERAPIST

## 2017-10-16 ENCOUNTER — HOSPITAL ENCOUNTER (OUTPATIENT)
Dept: OCCUPATIONAL THERAPY | Facility: CLINIC | Age: 67
Setting detail: THERAPIES SERIES
End: 2017-10-16
Attending: INTERNAL MEDICINE
Payer: MEDICARE

## 2017-10-16 PROCEDURE — 97535 SELF CARE MNGMENT TRAINING: CPT | Mod: GO | Performed by: OCCUPATIONAL THERAPIST

## 2017-10-16 PROCEDURE — 40000125 ZZHC STATISTIC OT OUTPT VISIT: Performed by: OCCUPATIONAL THERAPIST

## 2017-10-16 NOTE — PROGRESS NOTES
10/16/17 1600   Signing Clinician's Name / Credentials   Signing clinician's name / credentials Desirae Oedll OTR/adry   Session Number   Session Number 7/10 (Medicare, [facility] - 75 visits combined for OT, PT and SLP)   Adult OT Eval Goals   OT Eval Goals (Adult) 1;2;3;4;5   OT Goal 1   Goal Identifier R shoulder ROM and strength   Goal Description Patient to increase R shoulder flexion AROM to 135 degrees and strength to 4+/5 for return to baseline for improved R UE function for ADL/IADLs (during dressing and grooming tasks, kitchen tasks).   Target Date 12/15/17   OT Goal 2   Goal Identifier memory skills   Goal Description Patient will demonstrate improved memory skills by completing short-term memory tasks in occupational therapy with 85% accuracy using compensatory strategies for increased safety and independence with ADL/IADLS (remembering to take medications, turn off the stove when done, remember rules/laws for community mobility, recall therapy techniques/exercises).   Target Date 12/15/17   OT Goal 3   Goal Identifier problem solving skills and attention to detail   Goal Description Patient will demonstrate the ability to complete simple to moderately complex tasks requiring numerical reasoning, attention to detail and problem solving skills with 90% accuracy to complete basic home and community tasks (medication setup and management, financial tasks) accurately, for maximum independence to function at or near baseline at home and in community.   Target Date 12/15/17   OT Goal 4   Goal Identifier UE motor and visual reaction time   Goal Description Patient will demonstrate WFLs  UE and visual reaction time for safe independent community mobility (crossing the street, driving) by scoring WNLs on all modes of the [employer].   Target Date 12/15/17   OT Goal 5   Goal Identifier R FM coordination skills   Goal Description Patient will improve R FM coordination skills for increased handwriting legibility and  "improved performance with FM ADLS by increasing letter size by 25% and completing 9HPT in 25 seconds.   Target Date 12/15/17   Subjective Report   Subjective Report \"I had another fall-I didn't lock the brakes on my brakes and I fell backward onto my R hip in the kitchen, reaching off to the side, especially if I forget to turn using many smaller steps. My body just forgets that I can't get up quickly anymore.\" Similar to 2 other falls 3 weeks ago.  Pt enters clinic today without using her walker, \"I just forget to bring it.\"  Needs to be consistent with it's use in all settings, OTR reinforces.  Sees neurology tomorrow, \"Need to get ot the bottom of this I can't live this way, without knowing what is wrong.\" ( Dr. Lele Stahl).  Pt still having freezing symptoms at her feet ( as while turning, then she falls) and also demonstrates while writing on the R side of the paper.  Has difficulty advancing  pen to the Right, does well with \\ but not // lines, substitutes tall lines.   Hard to write letters B, K, F, A, V to due this.  Pt also reporting some occasional double vision in the  afternoons.  ( OTR able to elicit with having pt focalize her eyes  12 inches with over head gaze at target, then blurs x 25 seconds before recovering.  Pt has ptosis of B eye lids.  Pt has done her putty HEP for 3 days and shows improved  strength by 5 # in each hand.   Objective Measures   Objective Measures Objective Measure 3   Objective Measure 1   Objective Measure Handwriting Sample   Details Gets 45 characters per minute, varied letter height, spacing.  Can get 3-4 letters to form at a time if she rushes, if she moves slow it gets worse. Letter strokes with far reach to R take extra time to motor plan (b, K, U). Occasional jerks with letter formations, pt feels her hands are stammering; freezing at times.     Objective Measure 3   Objective Measure , Pinch   Details  taken 5 trials each to check for fatiguing pattern " on 10/16/17:  R 35/33/35/40/35#; L 35/35/35/40/36 # ( BNL by 10# in B hands). Making large alternating amplitude movements with big finger flicks are really hard for her, L more so than the R side.   (From 10/13/17--  30# R, 30# L.  Delgado pinch 12 # R and 11 # L.  3pt pinch  (modified with  forearm in neutral (increased visual input) and dycem pad in place-8# R, 8# L.  )   Self Care/Home Management   Minutes 44 Minutes   Skilled Intervention written communication/adaptations, home program ed, problem solving skills, attention to detail, numerical reasoning and memory skills for increased ADL/IADL independence   Patient Response Pt tends to rely very heavily in watching her hands and feet during all motor planning tasks.     Treatment Detail Review of recent falls, extra objective data taken to support next steps with neurology.  Pt has cognitive components, difficulty organizing information with simple financial problems ( subtracting sub totals, dividing totals, multiplying; does well with adding), finished 4 of 10 problems on #5.  Pt guided to use pencil with handwriting pages to increase fiction on the page ( see stroke formation issues per above), has better success.  Diagonal s and forward curves  k,p,b,d,v,a  are still  very hard for her using her right hand.    Pt working hard on watching her spacing, but letter size tends to shrink/lose amplitude as she writes across the page. OTR introduces SH ER stretch using dowel in sitting and she is able to tolerate it well, ( picture was too confusing, in supine).   Equipment   Equipment yellow putty   Plan   Homework set and functional math wkst   Home program Seated dowel ER stretch, Putty HEP pg 1-2. Continue dowel exercises (from HH therapist),; R shoulder flexion stretch at tabletop   Plan for next session Instruct pt in labeling sub steps, watching carry-overs, use calculator to recheck final answers.  Recheck /pinch/FMC ~ 11/1/17 (if she is doing HEP  daily); show gripper/issue ordering information,  f/u with home program for SH, putty;  ed. in towel stretch for R sh. and trial ER R shoulder stretch supine with cane; ed. in Tband vs. weights HEP (R shoulder rotator cuff repair); IADL eval; practice STM skills in sessions with use of strategies; MEDBOX and have her bring in meds to set up; upgrade handwriting practice strategies (PBS, hand flicks, completed) and ed. in R FMC HEP; f/u with neurology visit after she has on 10/17/17.   Comments   Comments Does patient have sx of Parkinson's?  Myasthenia gravis?   Total Session Time   Timed Code Treatment Minutes 44   Total Treatment Time (sum of timed and untimed services) 44     Pt having newer ocular, fine motor and cognitive issues in addition to her frequent falls.    Thank you for this thoughtful referral! If you have any questions, please call me [phone].  Nice to share in this patient's care with you.    Sincerely,   Desirae Odell, OTR/l

## 2017-10-17 ENCOUNTER — TRANSFERRED RECORDS (OUTPATIENT)
Dept: HEALTH INFORMATION MANAGEMENT | Facility: CLINIC | Age: 67
End: 2017-10-17

## 2017-10-17 ENCOUNTER — HOSPITAL ENCOUNTER (OUTPATIENT)
Dept: PHYSICAL THERAPY | Facility: CLINIC | Age: 67
Setting detail: THERAPIES SERIES
End: 2017-10-17
Attending: INTERNAL MEDICINE
Payer: MEDICARE

## 2017-10-17 PROCEDURE — 97750 PHYSICAL PERFORMANCE TEST: CPT | Mod: GP | Performed by: PHYSICAL THERAPIST

## 2017-10-17 PROCEDURE — 97110 THERAPEUTIC EXERCISES: CPT | Mod: GP | Performed by: PHYSICAL THERAPIST

## 2017-10-17 PROCEDURE — 97530 THERAPEUTIC ACTIVITIES: CPT | Mod: GP | Performed by: PHYSICAL THERAPIST

## 2017-10-17 PROCEDURE — 40000719 ZZHC STATISTIC PT DEPARTMENT NEURO VISIT: Performed by: PHYSICAL THERAPIST

## 2017-10-17 PROCEDURE — G8978 MOBILITY CURRENT STATUS: HCPCS | Mod: GP,CJ | Performed by: PHYSICAL THERAPIST

## 2017-10-17 PROCEDURE — G8979 MOBILITY GOAL STATUS: HCPCS | Mod: GP,CI | Performed by: PHYSICAL THERAPIST

## 2017-10-18 ENCOUNTER — HOSPITAL ENCOUNTER (OUTPATIENT)
Dept: PHYSICAL THERAPY | Facility: CLINIC | Age: 67
Setting detail: THERAPIES SERIES
End: 2017-10-18
Attending: INTERNAL MEDICINE
Payer: MEDICARE

## 2017-10-18 PROCEDURE — 40000719 ZZHC STATISTIC PT DEPARTMENT NEURO VISIT: Performed by: PHYSICAL THERAPIST

## 2017-10-18 PROCEDURE — 97110 THERAPEUTIC EXERCISES: CPT | Mod: GP | Performed by: PHYSICAL THERAPIST

## 2017-10-18 PROCEDURE — 97530 THERAPEUTIC ACTIVITIES: CPT | Mod: GP | Performed by: PHYSICAL THERAPIST

## 2017-10-19 ENCOUNTER — HOSPITAL ENCOUNTER (OUTPATIENT)
Dept: OCCUPATIONAL THERAPY | Facility: CLINIC | Age: 67
Setting detail: THERAPIES SERIES
End: 2017-10-19
Attending: INTERNAL MEDICINE
Payer: MEDICARE

## 2017-10-19 PROCEDURE — 40000125 ZZHC STATISTIC OT OUTPT VISIT: Performed by: OCCUPATIONAL THERAPIST

## 2017-10-19 PROCEDURE — 97110 THERAPEUTIC EXERCISES: CPT | Mod: GO | Performed by: OCCUPATIONAL THERAPIST

## 2017-10-19 PROCEDURE — 97535 SELF CARE MNGMENT TRAINING: CPT | Mod: GO | Performed by: OCCUPATIONAL THERAPIST

## 2017-10-23 ENCOUNTER — HOSPITAL ENCOUNTER (OUTPATIENT)
Dept: OCCUPATIONAL THERAPY | Facility: CLINIC | Age: 67
Setting detail: THERAPIES SERIES
End: 2017-10-23
Attending: INTERNAL MEDICINE
Payer: MEDICARE

## 2017-10-23 ENCOUNTER — TRANSFERRED RECORDS (OUTPATIENT)
Dept: HEALTH INFORMATION MANAGEMENT | Facility: CLINIC | Age: 67
End: 2017-10-23

## 2017-10-23 PROCEDURE — 40000125 ZZHC STATISTIC OT OUTPT VISIT: Performed by: OCCUPATIONAL THERAPIST

## 2017-10-23 PROCEDURE — 97535 SELF CARE MNGMENT TRAINING: CPT | Mod: GO | Performed by: OCCUPATIONAL THERAPIST

## 2017-10-23 PROCEDURE — 97112 NEUROMUSCULAR REEDUCATION: CPT | Mod: GO | Performed by: OCCUPATIONAL THERAPIST

## 2017-10-25 ENCOUNTER — HOSPITAL ENCOUNTER (OUTPATIENT)
Dept: PHYSICAL THERAPY | Facility: CLINIC | Age: 67
Setting detail: THERAPIES SERIES
End: 2017-10-25
Attending: INTERNAL MEDICINE
Payer: MEDICARE

## 2017-10-25 PROCEDURE — 40000719 ZZHC STATISTIC PT DEPARTMENT NEURO VISIT: Performed by: PHYSICAL THERAPIST

## 2017-10-25 PROCEDURE — 97530 THERAPEUTIC ACTIVITIES: CPT | Mod: GP | Performed by: PHYSICAL THERAPIST

## 2017-10-25 PROCEDURE — 97112 NEUROMUSCULAR REEDUCATION: CPT | Mod: GP | Performed by: PHYSICAL THERAPIST

## 2017-10-25 NOTE — ADDENDUM NOTE
Encounter addended by: Danielle Ocampo PT on: 10/25/2017 11:12 AM<BR>     Actions taken: Flowsheet accepted

## 2017-10-25 NOTE — ADDENDUM NOTE
Encounter addended by: Danielle Ocampo PT on: 10/25/2017 10:27 AM<BR>     Actions taken: Flowsheet accepted

## 2017-10-25 NOTE — ADDENDUM NOTE
Encounter addended by: Danielle Ocampo PT on: 10/25/2017 10:43 AM<BR>     Actions taken: Flowsheet data copied forward, Flowsheet accepted

## 2017-10-26 ENCOUNTER — HOSPITAL ENCOUNTER (OUTPATIENT)
Dept: OCCUPATIONAL THERAPY | Facility: CLINIC | Age: 67
Setting detail: THERAPIES SERIES
End: 2017-10-26
Attending: INTERNAL MEDICINE
Payer: MEDICARE

## 2017-10-26 PROCEDURE — 97110 THERAPEUTIC EXERCISES: CPT | Mod: GO | Performed by: OCCUPATIONAL THERAPIST

## 2017-10-26 PROCEDURE — 40000125 ZZHC STATISTIC OT OUTPT VISIT: Performed by: OCCUPATIONAL THERAPIST

## 2017-10-26 PROCEDURE — 97535 SELF CARE MNGMENT TRAINING: CPT | Mod: GO | Performed by: OCCUPATIONAL THERAPIST

## 2017-10-26 PROCEDURE — 97112 NEUROMUSCULAR REEDUCATION: CPT | Mod: GO | Performed by: OCCUPATIONAL THERAPIST

## 2017-10-26 PROCEDURE — G9169 MEMORY GOAL STATUS: HCPCS | Mod: GO,CI | Performed by: OCCUPATIONAL THERAPIST

## 2017-10-26 PROCEDURE — G9168 MEMORY CURRENT STATUS: HCPCS | Mod: GO,CJ | Performed by: OCCUPATIONAL THERAPIST

## 2017-10-26 NOTE — ADDENDUM NOTE
Encounter addended by: Danielle Ocampo PT on: 10/26/2017  8:48 AM<BR>     Actions taken: Flowsheet accepted

## 2017-10-27 ENCOUNTER — HOSPITAL ENCOUNTER (OUTPATIENT)
Dept: PHYSICAL THERAPY | Facility: CLINIC | Age: 67
Setting detail: THERAPIES SERIES
End: 2017-10-27
Attending: INTERNAL MEDICINE
Payer: MEDICARE

## 2017-10-27 PROCEDURE — 97110 THERAPEUTIC EXERCISES: CPT | Mod: GP | Performed by: PHYSICAL THERAPIST

## 2017-10-27 PROCEDURE — 40000719 ZZHC STATISTIC PT DEPARTMENT NEURO VISIT: Performed by: PHYSICAL THERAPIST

## 2017-10-27 PROCEDURE — 97530 THERAPEUTIC ACTIVITIES: CPT | Mod: GP | Performed by: PHYSICAL THERAPIST

## 2017-10-28 NOTE — PROGRESS NOTES
"Outpatient Occupational Therapy Progress Note     Patient: Rosy Cannon  : 1950  Insurance:   Payor/Plan Subscriber Name Rel Member # Group #   MEDICARE - MEDICARE ROSY CANNON  [id number]       ATTN CLAIMS, [address]   BCBS - [employer]* ROSY CANNON  [id number]      [address] [address]       Beginning/End Dates of Reporting Period:  17 to 10/28/2017    Referring Provider: Marisa Hoover    Therapy Diagnosis: mild cognitive deficits    Client Self Report: I had an MME to look at the nerves in my back on Monday, but there were no significant findings other than I have severe nerve damage in my back.  I have a new prescription for pre parkinson's but the side effects are severe tiredness and nausea.  I was very tired yesterday and then felt the nausea this morning. I have left hip bursitis which is sometimes kind of painful.  I have a prescription for that but it hasn't helped much. What does help are these patches I can get at the drugstore, but I ran out of them. I have been working with my therapy putty and rubber band hand exercises and I plan to buy the hand exerciser that uses rubberbands, I just haven't ordered it yet. I wrote with both the black felt tip pen and the built up rubber  pen this week and I definitely like the black felt tip better because of the thick dark point I can see what I've done on the paper. The ballpoint ink is so light.\"     Objective Measurements:      On 10/26:     Objective Measure: [employer]   Details: Mode A 60 sec.: 42, 51 - BNL where WNL is 52 hits or more.  Mode B 60 sec.: 8 - BNL where WNL is 42 hits or above.      Objective Measure: 9HPT   Details: R 26 seconds L 23 seconds. R has improved 2 seconds since evaluation date but still BNL based on norms for age and gender.  L UE score is WNL but below average for age and gender.      On 10/16:   Handwriting Sample    Gets 45 characters per minute, varied letter height, spacing.  Can get 3-4 letters " "to form at a time if she rushes, if she moves slow it gets worse. Letter strokes with far reach to R take extra time to motor plan (b, K, U). Occasional jerks with letter formations, pt feels her hands are stammering; freezing at times. (on 9/22: small letters: 1 to 2/16\" and large caps 3 to 4/16\" with fair letter spacing (cursive) and fair to good legibility, however, last few letters jerky/not smooth)     , Pinch   taken 5 trials each to check for fatiguing pattern on 10/16/17:  R 35/33/35/40/35#; L 35/35/35/40/36 # ( BNL by 10# in B hands). Making large alternating amplitude movements with big finger flicks are really hard for her, L more so than the R side.   (From 10/13/17--  30# R, 30# L.  Delgado pinch 12 # R and 11 # L.  3pt pinch  (modified with  forearm in neutral (increased visual input) and dycem pad in place-8# R, 8# L.  )    On 9/25:  Cognitive Assessment of [place] (CAM)  WFLs single digit math skills and moderate deficits for multiple digit math skills    Outcome Measures (most recent score): N/A    Goals:     Goal Identifier R shoulder ROM and strength   Goal Description Patient to increase R shoulder flexion AROM to 135 degrees and strength to 4+/5 for return to baseline for improved R UE function for ADL/IADLs (during dressing and grooming tasks, kitchen tasks).   Target Date 12/15/17   Date Met      Progress:  Goal ongoing.     Goal Identifier memory skills   Goal Description Patient will demonstrate improved memory skills by completing short-term memory tasks in occupational therapy with 85% accuracy using compensatory strategies for increased safety and independence with ADL/IADLS (remembering to take medications, turn off the stove when done, remember rules/laws for community mobility, recall therapy techniques/exercises).   Target Date 12/15/17   Date Met      Progress:  Patient demonstrated improvement to WFL for auditory recall (from moderate deficits), however, stayed at mild deficits " for backward visual memory and sequencing and moderate deficits for forward visual memory and sequencing.  Goal ongoing.     Goal Identifier problem solving skills and attention to detail   Goal Description Patient will demonstrate the ability to complete simple to moderately complex tasks requiring numerical reasoning, attention to detail and problem solving skills with 90% accuracy to complete basic home and community tasks (medication setup and management, financial tasks) accurately, for maximum independence to function at or near baseline at home and in community.   Target Date 12/15/17   Date Met      Progress:  Goal ongoing.  Patient ranging from 65-80% accurate currently.     Goal Identifier UE motor and visual reaction time   Goal Description Patient will demonstrate WFLs  UE and visual reaction time for safe independent community mobility (crossing the street, driving) by scoring WNLs on all modes of the [employer].   Target Date 12/15/17   Date Met      Progress:m  Goal not met - see above     Goal Identifier R FM coordination skills   Goal Description Patient will improve R FM coordination skills for increased handwriting legibility and improved performance with FM ADLS by increasing letter size by 25% and completing 9HPT in 25 seconds.   Target Date 12/15/17   Date Met      Progress:  Goal not fully met, however, improvement demonstrated - see above.     Progress Toward Goals:   Progress this reporting period: Patient has been seen for 10 visits to address the following deficit areas:  decreased cognition, decreased functional mobilty, decreased R shoulder ROM and strength, decreased R FM coordination.  She is making progress in all areas.  Patient is appropriate for continued skilled occupational therapy at a frequency of 2x/week x 8 weeks, decreasing frequency prn.    Plan:  Continue therapy per current plan of care.    Discharge:  No

## 2017-10-30 ENCOUNTER — HOSPITAL ENCOUNTER (OUTPATIENT)
Dept: PHYSICAL THERAPY | Facility: CLINIC | Age: 67
Setting detail: THERAPIES SERIES
End: 2017-10-30
Attending: INTERNAL MEDICINE
Payer: MEDICARE

## 2017-10-30 ENCOUNTER — HOSPITAL ENCOUNTER (OUTPATIENT)
Dept: OCCUPATIONAL THERAPY | Facility: CLINIC | Age: 67
Setting detail: THERAPIES SERIES
End: 2017-10-30
Attending: INTERNAL MEDICINE
Payer: MEDICARE

## 2017-10-30 PROCEDURE — 97110 THERAPEUTIC EXERCISES: CPT | Mod: GO | Performed by: OCCUPATIONAL THERAPIST

## 2017-10-30 PROCEDURE — 97113 AQUATIC THERAPY/EXERCISES: CPT | Mod: GP | Performed by: PHYSICAL THERAPIST

## 2017-10-30 PROCEDURE — 40000125 ZZHC STATISTIC OT OUTPT VISIT: Performed by: OCCUPATIONAL THERAPIST

## 2017-10-30 PROCEDURE — 40000189 ZZH STATISTIC PT POOL VISIT: Performed by: PHYSICAL THERAPIST

## 2017-10-30 PROCEDURE — 97535 SELF CARE MNGMENT TRAINING: CPT | Mod: GO | Performed by: OCCUPATIONAL THERAPIST

## 2017-10-30 NOTE — PROGRESS NOTES
AQUATIC PHYSICAL THERAPY EXERCISE LOG (TRUNK)  Date  Danielle Ocampo PT, DPT  10/30/17   Warm Up Ambulation (Forward/Side/Back/March/All) x4 laps forward, x2 side and x2 backwards with hands on noodle for support, facil at hips for posture/limit posterior lean and cues for larger step length     UE support from railing and therapist to enter/exit pool        Stretching/ROM Chin Tucks     CROM (Flex/Ext/SB/Rot/All)     Shoulder (Shrugs/Rolls)     Scapular (Retraction/Depression)     Upper Trunk (Levator/Scalene/Upper Trapezius)     Pec Stretch (Unilateral/Bilateral)     Posterior Shoulder     Gluteal     Hamstring (On Step/Cuff)     Calf (In Hole/At Wall)     Quad     Hip Flexor (Kneel)     Piriformis (Seated/Stand)     Trunk ROM (Flex/Ext/SB/Rot/All)     BAD RAGAZ              Strengthening Abdominal Sets/ Pelvic Tilt     Shoulder (Flex/Ext, Abd/Add, IR/ER, Circles, Alternation flex/ext for core) x10-15 ea UE only then progressed with open paddles for greater resistance, completed in wall sit for core activation    Horizon (Abd/Add, Diagonals) (as above)    Rowing Arms     UE PNF (D1/D2)     Abdominal Sets (Push/Pull, side to side, push down with board)     Squat     Lunge Squat     Hip (Flex/Ext, Abd/Add, single leg bike, circles, figure 8, All) x10 ea 1-2 UE support from wall for balance/posture (no figure 8)    Heel/Toe Raises     Step Ups (Forward, Lateral)     Noodle Push Downs with UE(B UE/1 UE) for core strengthening     Noodle Push Downs with LE     Stir the Pot/Punches with Dumbells     Sit to Stand at Bench h8-10x total, 3x sucessfull without retropulsion, needs support from therapist for controled desent    Prone Plank on step     Side Plank on step              Aerobic Fast Walking     Bike/Ski/Juan/All              Balance Narrow Base of Support x10-30 sec feet hips width, progressed to romberg x30 sec without LOB    Tandem Stand     Single Leg Stance     Heel/Toe Walking     3 Step and Stop     Braiding                    Cool Down Ambulation     Stella Dangle     Whirlpool

## 2017-11-02 ENCOUNTER — HOSPITAL ENCOUNTER (OUTPATIENT)
Dept: PHYSICAL THERAPY | Facility: CLINIC | Age: 67
Setting detail: THERAPIES SERIES
End: 2017-11-02
Attending: INTERNAL MEDICINE
Payer: MEDICARE

## 2017-11-02 ENCOUNTER — HOSPITAL ENCOUNTER (OUTPATIENT)
Dept: OCCUPATIONAL THERAPY | Facility: CLINIC | Age: 67
Setting detail: THERAPIES SERIES
End: 2017-11-02
Attending: INTERNAL MEDICINE
Payer: MEDICARE

## 2017-11-02 PROCEDURE — 40000125 ZZHC STATISTIC OT OUTPT VISIT: Performed by: OCCUPATIONAL THERAPIST

## 2017-11-02 PROCEDURE — 97535 SELF CARE MNGMENT TRAINING: CPT | Mod: GO | Performed by: OCCUPATIONAL THERAPIST

## 2017-11-02 PROCEDURE — 40000189 ZZH STATISTIC PT POOL VISIT: Performed by: PHYSICAL THERAPIST

## 2017-11-02 PROCEDURE — 97113 AQUATIC THERAPY/EXERCISES: CPT | Mod: GP | Performed by: PHYSICAL THERAPIST

## 2017-11-02 NOTE — PROGRESS NOTES
AQUATIC PHYSICAL THERAPY EXERCISE LOG (TRUNK)  Date   Danielle Ocampo PT, DPT  10/30/17 Danielle Ocampo PT,  DPT  11/2/17   Warm Up Ambulation (Forward/Side/Back/March/All) x4 laps forward, x2 side and x2 backwards with hands on noodle for support, facil at hips for posture/limit posterior lean and cues for larger step length x2 laps forward, x2 side and x2 backwards with hands on noodle for support, facil at hips for posture and cues for larger step length       UE support from railing and therapist to enter/exit pool UE support from railing and therapist to enter/exit pool - improved step length and speed leaving session            Stretching/ROM Chin Tucks        CROM (Flex/Ext/SB/Rot/All)        Shoulder (Shrugs/Rolls)        Scapular (Retraction/Depression)        Upper Trunk (Levator/Scalene/Upper Trapezius)        Pec Stretch (Unilateral/Bilateral)        Posterior Shoulder        Gluteal   x30 sec ea LE     Hamstring (On Step/Cuff)   x30 sec ea LE therapist assist horx abd/add and noodle support     Calf (In Hole/At Wall)   x60 sec ea LE, mod-max cues for technique      Quad        Hip Flexor (Kneel)        Piriformis (Seated/Stand)        Trunk ROM (Flex/Ext/SB/Rot/All)   R/L trunk SB x30 sec ea     BAD RAGAZ                        Strengthening Abdominal Sets/ Pelvic Tilt        Shoulder (Flex/Ext, Abd/Add, IR/ER, Circles, Alternation flex/ext for core) x10-15 ea UE only then progressed with open paddles for greater resistance, completed in wall sit for core activation x10 reps ea UE only in standing for balance challenge     Horizon (Abd/Add, Diagonals) (as above)  (as above)     Rowing Arms        UE PNF (D1/D2)        Abdominal Sets (Push/Pull, side to side, push down with board)        Squat   B UE support from pool wall x10 reps     Lunge Squat        Hip (Flex/Ext, Abd/Add, single leg bike, circles, figure 8, All) x10 ea 1-2 UE support from wall for balance/posture (no figure 8) x10 ea with B UE support  from wall in corner (no figure 8)     Heel/Toe Raises        Step Ups (Forward, Lateral)        Noodle Push Downs with UE(B UE/1 UE) for core strengthening        Noodle Push Downs with LE   x10  ea LE back support against wall, lightweight noodle, assist with set-up     Stir the Pot/Punches with Dumbells        Sit to Stand at Bench h8-10x total, 3x sucessfull without retropulsion, needs support from therapist for controled desent x8 reps 1x retropulsion, significantly improved control, continued cues for technique      Prone Plank on step        Side Plank on step                        Aerobic Fast Walking        Bike/Ski/Juan/All                        Balance Narrow Base of Support x10-30 sec feet hips width, progressed to romberg x30 sec without LOB      Tandem Stand        Single Leg Stance        Heel/Toe Walking        3 Step and Stop        Braiding                                 Cool Down Ambulation        [place] Dangle        Whirlpool

## 2017-11-03 ENCOUNTER — HOSPITAL ENCOUNTER (OUTPATIENT)
Dept: MAMMOGRAPHY | Facility: CLINIC | Age: 67
Discharge: HOME | End: 2017-11-03
Attending: INTERNAL MEDICINE | Admitting: INTERNAL MEDICINE
Payer: MEDICARE

## 2017-11-03 DIAGNOSIS — Z12.31 VISIT FOR SCREENING MAMMOGRAM: ICD-10-CM

## 2017-11-03 PROCEDURE — 77063 BREAST TOMOSYNTHESIS BI: CPT

## 2017-11-03 PROCEDURE — G0202 SCR MAMMO BI INCL CAD: HCPCS

## 2017-11-06 ENCOUNTER — HOSPITAL ENCOUNTER (OUTPATIENT)
Dept: OCCUPATIONAL THERAPY | Facility: CLINIC | Age: 67
Setting detail: THERAPIES SERIES
End: 2017-11-06
Attending: INTERNAL MEDICINE
Payer: MEDICARE

## 2017-11-06 ENCOUNTER — HOSPITAL ENCOUNTER (OUTPATIENT)
Dept: PHYSICAL THERAPY | Facility: CLINIC | Age: 67
Setting detail: THERAPIES SERIES
End: 2017-11-06
Attending: INTERNAL MEDICINE
Payer: MEDICARE

## 2017-11-06 ENCOUNTER — TRANSFERRED RECORDS (OUTPATIENT)
Dept: HEALTH INFORMATION MANAGEMENT | Facility: CLINIC | Age: 67
End: 2017-11-06

## 2017-11-06 PROCEDURE — 40000125 ZZHC STATISTIC OT OUTPT VISIT: Performed by: OCCUPATIONAL THERAPIST

## 2017-11-06 PROCEDURE — 40000189 ZZH STATISTIC PT POOL VISIT: Performed by: PHYSICAL THERAPIST

## 2017-11-06 PROCEDURE — 97113 AQUATIC THERAPY/EXERCISES: CPT | Mod: GP,KX | Performed by: PHYSICAL THERAPIST

## 2017-11-06 PROCEDURE — 97535 SELF CARE MNGMENT TRAINING: CPT | Mod: GO | Performed by: OCCUPATIONAL THERAPIST

## 2017-11-06 NOTE — ADDENDUM NOTE
Encounter addended by: Danielle Ocampo PT on: 11/6/2017  7:41 AM<BR>     Actions taken: Charge Capture section accepted

## 2017-11-06 NOTE — PROGRESS NOTES
AQUATIC PHYSICAL THERAPY EXERCISE LOG (TRUNK)  Date    Danielle Ocampo PT, DPT  10/30/17 Danielle Ocampo PT,  DPT  11/2/17 Danielle Ocampo PT,  DPT  11/6/17   Warm Up Ambulation (Forward/Side/Back/March/All) x4 laps forward, x2 side and x2 backwards with hands on noodle for support, facil at hips for posture/limit posterior lean and cues for larger step length x2 laps forward, x2 side and x2 backwards with hands on noodle for support, facil at hips for posture and cues for larger step length x2 laps forward, x2 side and x2 backwards with hands on noodle for support, facil at hips for posture and cues for larger step length         UE support from railing and therapist to enter/exit pool UE support from railing and therapist to enter/exit pool - improved step length and speed leaving session UE support from railing to enter/exit, therapist close-distant supervision                Stretching/ROM Chin Tucks            CROM (Flex/Ext/SB/Rot/All)            Shoulder (Shrugs/Rolls)            Scapular (Retraction/Depression)            Upper Trunk (Levator/Scalene/Upper Trapezius)            Pec Stretch (Unilateral/Bilateral)            Posterior Shoulder            Gluteal    x30 sec ea LE x30 sec ea LE      Hamstring (On Step/Cuff)    x30 sec ea LE therapist assist horx abd/add and noodle support x30 sec ea LE therapist assist horx abd/add and noodle support      Calf (In Hole/At Wall)    x60 sec ea LE, mod-max cues for technique  x60 sec ea LE, mod-max cues for technique      Quad            Hip Flexor (Kneel)            Piriformis (Seated/Stand)            Trunk ROM (Flex/Ext/SB/Rot/All)    R/L trunk SB x30 sec ea      BAD RAGAZ                                     Strengthening Abdominal Sets/ Pelvic Tilt            Shoulder (Flex/Ext, Abd/Add, IR/ER, Circles, Alternation flex/ext for core) x10-15 ea UE only then progressed with open paddles for greater resistance, completed in wall sit for core activation x10 reps ea UE  only in standing for balance challenge      Horizon (Abd/Add, Diagonals) (as above)  (as above)      Rowing Arms            UE PNF (D1/D2)            Abdominal Sets (Push/Pull, side to side, push down with board)            Squat    B UE support from pool wall x10 reps B UE support from pool wall x10 reps      Lunge Squat            Hip (Flex/Ext, Abd/Add, single leg bike, circles, figure 8, All) x10 ea 1-2 UE support from wall for balance/posture (no figure 8) x10 ea with B UE support from wall in corner (no figure 8) x10 ea with B UE support from wall in corner (no figure 8)     Heel/Toe Raises            Step Ups (Forward, Lateral)            Noodle Push Downs with UE(B UE/1 UE) for core strengthening            Noodle Push Downs with LE    x10  ea LE back support against wall, lightweight noodle, assist with set-up x10  ea LE back support against wall, progressed noodle resistance, assist with set-up     Stir the Pot/Punches with Dumbells            Sit to Stand at Bench h8-10x total, 3x sucessfull without retropulsion, needs support from therapist for controled desent x8 reps 1x retropulsion, significantly improved control, continued cues for technique  x8 reps without LOB, continued cues for technique      Prone Plank on step            Side Plank on step                                     Aerobic Fast Walking            Bike/Ski/Juan/All                                     Balance Narrow Base of Support x10-30 sec feet hips width, progressed to romberg x30 sec without LOB         Tandem Stand            Single Leg Stance            Heel/Toe Walking            3 Step and Stop            Braiding                                                   Cool Down Ambulation            [place] Dangle            Whirlpool

## 2017-11-08 ENCOUNTER — TRANSFERRED RECORDS (OUTPATIENT)
Dept: SURGERY | Facility: CLINIC | Age: 67
End: 2017-11-08

## 2017-11-08 ENCOUNTER — TRANSFERRED RECORDS (OUTPATIENT)
Dept: HEALTH INFORMATION MANAGEMENT | Facility: CLINIC | Age: 67
End: 2017-11-08

## 2017-11-09 ENCOUNTER — HOSPITAL ENCOUNTER (OUTPATIENT)
Dept: OCCUPATIONAL THERAPY | Facility: CLINIC | Age: 67
Setting detail: THERAPIES SERIES
End: 2017-11-09
Attending: INTERNAL MEDICINE
Payer: MEDICARE

## 2017-11-09 PROCEDURE — 97535 SELF CARE MNGMENT TRAINING: CPT | Mod: GO | Performed by: OCCUPATIONAL THERAPIST

## 2017-11-09 PROCEDURE — 40000125 ZZHC STATISTIC OT OUTPT VISIT: Performed by: OCCUPATIONAL THERAPIST

## 2017-11-13 ENCOUNTER — HOSPITAL ENCOUNTER (OUTPATIENT)
Dept: OCCUPATIONAL THERAPY | Facility: CLINIC | Age: 67
Setting detail: THERAPIES SERIES
End: 2017-11-13
Attending: INTERNAL MEDICINE
Payer: MEDICARE

## 2017-11-13 ENCOUNTER — HOSPITAL ENCOUNTER (OUTPATIENT)
Dept: PHYSICAL THERAPY | Facility: CLINIC | Age: 67
Setting detail: THERAPIES SERIES
End: 2017-11-13
Attending: INTERNAL MEDICINE
Payer: MEDICARE

## 2017-11-13 ENCOUNTER — HOSPITAL ENCOUNTER (OUTPATIENT)
Dept: ULTRASOUND IMAGING | Facility: CLINIC | Age: 67
Discharge: HOME | End: 2017-11-13
Attending: INTERNAL MEDICINE | Admitting: INTERNAL MEDICINE
Payer: MEDICARE

## 2017-11-13 DIAGNOSIS — C50.511 MALIGNANT NEOPLASM OF LOWER-OUTER QUADRANT OF RIGHT BREAST OF FEMALE, ESTROGEN RECEPTOR POSITIVE (H): ICD-10-CM

## 2017-11-13 DIAGNOSIS — Z17.0 MALIGNANT NEOPLASM OF LOWER-OUTER QUADRANT OF RIGHT BREAST OF FEMALE, ESTROGEN RECEPTOR POSITIVE (H): ICD-10-CM

## 2017-11-13 DIAGNOSIS — N64.89 BREAST ASYMMETRY: ICD-10-CM

## 2017-11-13 PROCEDURE — 40000189 ZZH STATISTIC PT POOL VISIT: Performed by: PHYSICAL THERAPIST

## 2017-11-13 PROCEDURE — 97535 SELF CARE MNGMENT TRAINING: CPT | Mod: GO | Performed by: OCCUPATIONAL THERAPIST

## 2017-11-13 PROCEDURE — 97113 AQUATIC THERAPY/EXERCISES: CPT | Mod: GP | Performed by: PHYSICAL THERAPIST

## 2017-11-13 PROCEDURE — 40000125 ZZHC STATISTIC OT OUTPT VISIT: Performed by: OCCUPATIONAL THERAPIST

## 2017-11-13 PROCEDURE — 76642 ULTRASOUND BREAST LIMITED: CPT | Mod: RT

## 2017-11-16 ENCOUNTER — HOSPITAL ENCOUNTER (OUTPATIENT)
Dept: PHYSICAL THERAPY | Facility: CLINIC | Age: 67
Setting detail: THERAPIES SERIES
End: 2017-11-16
Attending: INTERNAL MEDICINE
Payer: MEDICARE

## 2017-11-16 ENCOUNTER — HOSPITAL ENCOUNTER (OUTPATIENT)
Dept: OCCUPATIONAL THERAPY | Facility: CLINIC | Age: 67
Setting detail: THERAPIES SERIES
End: 2017-11-16
Attending: INTERNAL MEDICINE
Payer: MEDICARE

## 2017-11-16 PROCEDURE — 97110 THERAPEUTIC EXERCISES: CPT | Mod: GO | Performed by: OCCUPATIONAL THERAPIST

## 2017-11-16 PROCEDURE — 40000125 ZZHC STATISTIC OT OUTPT VISIT: Performed by: OCCUPATIONAL THERAPIST

## 2017-11-16 PROCEDURE — 97535 SELF CARE MNGMENT TRAINING: CPT | Mod: GO | Performed by: OCCUPATIONAL THERAPIST

## 2017-11-16 PROCEDURE — 40000189 ZZH STATISTIC PT POOL VISIT: Performed by: PHYSICAL THERAPIST

## 2017-11-16 PROCEDURE — 97113 AQUATIC THERAPY/EXERCISES: CPT | Mod: GP,KX | Performed by: PHYSICAL THERAPIST

## 2017-11-17 ENCOUNTER — TELEPHONE (OUTPATIENT)
Dept: INTERNAL MEDICINE | Facility: CLINIC | Age: 67
End: 2017-11-17

## 2017-11-17 NOTE — TELEPHONE ENCOUNTER
Panel Management Review      Patient has the following on her problem list:     Depression / Dysthymia review    Measure:  Needs PHQ-9 score of 4 or less during index window.  Administer PHQ-9 and if score is 5 or more, send encounter to provider for next steps.    5 - 7 month window range: yes    PHQ-9 SCORE 2/16/2016 10/25/2016 6/5/2017   Total Score - - -   Total Score 8 4 8       If PHQ-9 recheck is 5 or more, route to provider for next steps.    Patient is due for:  PHQ9      Last LDL:    Lab Results   Component Value Date    CHOL 195 09/28/2015     Lab Results   Component Value Date    HDL 74 09/28/2015     Lab Results   Component Value Date    LDL 87 09/28/2015     Lab Results   Component Value Date    TRIG 170 09/28/2015     Lab Results   Component Value Date    CHOLHDLRATIO 2.6 09/28/2015     No results found for: NHDL    Is the patient on a Statin? YES             Is the patient on Aspirin? YES    Medications     HMG CoA Reductase Inhibitors    rosuvastatin (CRESTOR) 10 MG tablet    Salicylates    aspirin 81 MG tablet          Last three blood pressure readings:  BP Readings from Last 3 Encounters:   08/30/17 118/72   08/14/17 114/70   07/17/17 113/73          Tobacco History:     History   Smoking Status     Former Smoker     Packs/day: 0.50     Years: 30.00     Types: Cigarettes     Quit date: 7/15/2015   Smokeless Tobacco     Never Used         Hypertension   Last three blood pressure readings:  BP Readings from Last 3 Encounters:   08/30/17 118/72   08/14/17 114/70   07/17/17 113/73     Blood pressure: Passed    HTN Guidelines:  Age 18-59 BP range:  Less than 140/90  Age 60-85 with Diabetes:  Less than 140/90  Age 60-85 without Diabetes:  less than 150/90     COPD  Diagnosis date: unknown, hx    Is this diagnosis new within the last year?  Unknown, 2016 possibly   Was spirometry completed?  YES         Composite cancer screening  Chart review shows that this patient is due/due soon for the following  Fecal Colorectal (FIT)  Summary:    Patient is due/failing the following:   FOLLOW UP, FIT and PHQ9    Action needed:   Patient needs office visit for follow up around 1/2018, and Patient needs to do PHQ9.    Type of outreach:    Sent [employer] message.    Questions for provider review:    None                                                                                                                                    Reny Rosado CMA       Chart routed to no one  .

## 2017-11-20 ENCOUNTER — HOSPITAL ENCOUNTER (OUTPATIENT)
Dept: PHYSICAL THERAPY | Facility: CLINIC | Age: 67
Setting detail: THERAPIES SERIES
End: 2017-11-20
Attending: INTERNAL MEDICINE
Payer: MEDICARE

## 2017-11-20 PROCEDURE — 97530 THERAPEUTIC ACTIVITIES: CPT | Mod: GP | Performed by: PHYSICAL THERAPIST

## 2017-11-20 PROCEDURE — 40000719 ZZHC STATISTIC PT DEPARTMENT NEURO VISIT: Performed by: PHYSICAL THERAPIST

## 2017-11-20 PROCEDURE — 97112 NEUROMUSCULAR REEDUCATION: CPT | Mod: GP | Performed by: PHYSICAL THERAPIST

## 2017-11-21 ENCOUNTER — HOSPITAL ENCOUNTER (OUTPATIENT)
Dept: OCCUPATIONAL THERAPY | Facility: CLINIC | Age: 67
Setting detail: THERAPIES SERIES
End: 2017-11-21
Attending: INTERNAL MEDICINE
Payer: MEDICARE

## 2017-11-21 ENCOUNTER — HOSPITAL ENCOUNTER (OUTPATIENT)
Dept: PHYSICAL THERAPY | Facility: CLINIC | Age: 67
Setting detail: THERAPIES SERIES
End: 2017-11-21
Attending: INTERNAL MEDICINE
Payer: MEDICARE

## 2017-11-21 PROCEDURE — G8978 MOBILITY CURRENT STATUS: HCPCS | Mod: GP,CK | Performed by: PHYSICAL THERAPIST

## 2017-11-21 PROCEDURE — 40000125 ZZHC STATISTIC OT OUTPT VISIT: Performed by: OCCUPATIONAL THERAPIST

## 2017-11-21 PROCEDURE — 97110 THERAPEUTIC EXERCISES: CPT | Mod: GO | Performed by: OCCUPATIONAL THERAPIST

## 2017-11-21 PROCEDURE — G8979 MOBILITY GOAL STATUS: HCPCS | Mod: GP,CI | Performed by: PHYSICAL THERAPIST

## 2017-11-21 PROCEDURE — 97535 SELF CARE MNGMENT TRAINING: CPT | Mod: GO,59 | Performed by: OCCUPATIONAL THERAPIST

## 2017-11-21 PROCEDURE — 97112 NEUROMUSCULAR REEDUCATION: CPT | Mod: GP,KX | Performed by: PHYSICAL THERAPIST

## 2017-11-21 PROCEDURE — 40000719 ZZHC STATISTIC PT DEPARTMENT NEURO VISIT: Performed by: PHYSICAL THERAPIST

## 2017-11-21 PROCEDURE — 97530 THERAPEUTIC ACTIVITIES: CPT | Mod: GP,CI | Performed by: PHYSICAL THERAPIST

## 2017-11-22 ENCOUNTER — TELEPHONE (OUTPATIENT)
Dept: SPIRITUAL SERVICES | Facility: CLINIC | Age: 67
End: 2017-11-22

## 2017-11-22 NOTE — TELEPHONE ENCOUNTER
SPIRITUAL HEALTH SERVICES Progress Note  [facility]     Reason for call: Attempted to schedule appt. Pt, Zoey, notes that she would like to receive  support.  Intervention: Pt Zoey, initially attempted to set up appointment, but because of the coming holiday, wanted to wait to talk next week after she gets through Thanksgiving.   Plan: Will f/u with pt next week to schedule appointment.     [facility].  Staff    Pager #[phone]

## 2017-11-27 ENCOUNTER — TELEPHONE (OUTPATIENT)
Dept: SPIRITUAL SERVICES | Facility: CLINIC | Age: 67
End: 2017-11-27

## 2017-11-27 NOTE — TELEPHONE ENCOUNTER
"SPIRITUAL HEALTH SERVICES Progress Note  [facility] Spiritual Health    Reason: Per previous conversation with pt, contacted today to set up SH support.   Intervention: Oriented to SH support and pt shares that she would like to \"postpone\" at this time. Offered phone consultation as well, if this was more convenient for pt, but she declined offer of support at this time.   Plan: Per pt request, no further f/u planned. Pt does have SH contact info if she would like to set up appt in the future.     [facility].  Staff    Pager #[phone]     "

## 2017-12-05 ENCOUNTER — HOSPITAL ENCOUNTER (OUTPATIENT)
Dept: PHYSICAL THERAPY | Facility: CLINIC | Age: 67
Setting detail: THERAPIES SERIES
End: 2017-12-05
Attending: INTERNAL MEDICINE
Payer: MEDICARE

## 2017-12-05 ENCOUNTER — HOSPITAL ENCOUNTER (OUTPATIENT)
Dept: OCCUPATIONAL THERAPY | Facility: CLINIC | Age: 67
Setting detail: THERAPIES SERIES
End: 2017-12-05
Attending: INTERNAL MEDICINE
Payer: MEDICARE

## 2017-12-05 PROCEDURE — 40000719 ZZHC STATISTIC PT DEPARTMENT NEURO VISIT: Performed by: PHYSICAL THERAPIST

## 2017-12-05 PROCEDURE — G8978 MOBILITY CURRENT STATUS: HCPCS | Mod: GP,CJ | Performed by: PHYSICAL THERAPIST

## 2017-12-05 PROCEDURE — G8979 MOBILITY GOAL STATUS: HCPCS | Mod: GP,CI | Performed by: PHYSICAL THERAPIST

## 2017-12-05 PROCEDURE — 97750 PHYSICAL PERFORMANCE TEST: CPT | Mod: GP,XU | Performed by: PHYSICAL THERAPIST

## 2017-12-05 PROCEDURE — 97112 NEUROMUSCULAR REEDUCATION: CPT | Mod: GP | Performed by: PHYSICAL THERAPIST

## 2017-12-05 PROCEDURE — 97530 THERAPEUTIC ACTIVITIES: CPT | Mod: GP | Performed by: PHYSICAL THERAPIST

## 2017-12-05 PROCEDURE — 40000125 ZZHC STATISTIC OT OUTPT VISIT: Performed by: OCCUPATIONAL THERAPIST

## 2017-12-05 PROCEDURE — 97110 THERAPEUTIC EXERCISES: CPT | Mod: GO | Performed by: OCCUPATIONAL THERAPIST

## 2017-12-05 PROCEDURE — 97535 SELF CARE MNGMENT TRAINING: CPT | Mod: GO | Performed by: OCCUPATIONAL THERAPIST

## 2017-12-07 ENCOUNTER — HOSPITAL ENCOUNTER (OUTPATIENT)
Dept: PHYSICAL THERAPY | Facility: CLINIC | Age: 67
Setting detail: THERAPIES SERIES
End: 2017-12-07
Attending: INTERNAL MEDICINE
Payer: MEDICARE

## 2017-12-07 PROCEDURE — 97113 AQUATIC THERAPY/EXERCISES: CPT | Mod: GP | Performed by: PHYSICAL THERAPIST

## 2017-12-07 PROCEDURE — 40000189 ZZH STATISTIC PT POOL VISIT: Performed by: PHYSICAL THERAPIST

## 2017-12-08 ENCOUNTER — HOSPITAL ENCOUNTER (OUTPATIENT)
Dept: OCCUPATIONAL THERAPY | Facility: CLINIC | Age: 67
Setting detail: THERAPIES SERIES
End: 2017-12-08
Attending: INTERNAL MEDICINE
Payer: MEDICARE

## 2017-12-08 PROCEDURE — 97110 THERAPEUTIC EXERCISES: CPT | Mod: GO | Performed by: OCCUPATIONAL THERAPIST

## 2017-12-08 PROCEDURE — 40000125 ZZHC STATISTIC OT OUTPT VISIT: Performed by: OCCUPATIONAL THERAPIST

## 2017-12-08 NOTE — PROGRESS NOTES
10/17/17 1500   Signing Clinician's Name / Credentials   Signing clinician's name / credentials Danielle Ocampo PT, DPT   Bateman Balance Scale (DENA MAYNARD, TYSON S, DICK MORAN, BRET HOLBROOK: MEASURING BALANCE IN THE ELDERLY: VALIDATION OF AN INSTRUMENT. CAN. J. PUB. HEALTH, JULY/AUGUST SUPPLEMENT 2:S7-11, 1992.)   Sit To Stand 3  (decreased L LE WBing)   Standing Unsupported 4  (walker in front)   Sitting Unsupported 4   Stand to Sit 4   Transfers 4   Standing with Eyes Closed 4  (30 sec hips width apart, )   Standing Unsupported, Feet Together 1  (UE support to assume romberg, x30 sec)   Reach Forward With Outstretched Arm 2   Retrieve Object From Floor 1  (reports back strain, unable to reach)   Turning to Look Behind 2   Turn 360 Degrees 2   Placing Alternate Foot on Stool (4-6 inches) 1  (L foot catches, UE support with LOB then continues )   Unsupported Tandem Stand (Demonstrate to Subject) 0   One Leg Stand 0   Total Score (A score of 45 or less has been correlated with an increased risk of falls)   Total Score (out of 56) 32     Bateman Balance Scale (BBS) Cutoff Scores: A score of ? 45/56 indicates an increased risk for falls.   Patient Score: 32/56    The BBS is a measure of static and dynamic standing balance that has been validated in community dwelling elderly individuals and individuals who have Parkinson's Disease, MS, and those who are s/p CVA and TBI. The test is administered without an assistive device. Scores from the Bateman are used to determine the probability of falling based on the patient's previous history of falls and their test performance.     Minimal Detectable Change = 6.5   & Minimal Detectable Change (Parkinson's Disease) = 5 according to Eduardo & Seney 2008    Assessment (rationale for performing, application to patient s function & care plan): Fall risk, functional static and dynamic postural stability.     Minutes billed as physical performance test: 14

## 2017-12-12 ENCOUNTER — HOSPITAL ENCOUNTER (OUTPATIENT)
Dept: PHYSICAL THERAPY | Facility: CLINIC | Age: 67
Setting detail: THERAPIES SERIES
End: 2017-12-12
Attending: INTERNAL MEDICINE
Payer: MEDICARE

## 2017-12-12 ENCOUNTER — HOSPITAL ENCOUNTER (OUTPATIENT)
Dept: OCCUPATIONAL THERAPY | Facility: CLINIC | Age: 67
Setting detail: THERAPIES SERIES
End: 2017-12-12
Attending: INTERNAL MEDICINE
Payer: MEDICARE

## 2017-12-12 PROCEDURE — 40000125 ZZHC STATISTIC OT OUTPT VISIT: Performed by: OCCUPATIONAL THERAPIST

## 2017-12-12 PROCEDURE — 40000719 ZZHC STATISTIC PT DEPARTMENT NEURO VISIT: Performed by: PHYSICAL THERAPIST

## 2017-12-12 PROCEDURE — G9168 MEMORY CURRENT STATUS: HCPCS | Mod: GO,CJ | Performed by: OCCUPATIONAL THERAPIST

## 2017-12-12 PROCEDURE — 97530 THERAPEUTIC ACTIVITIES: CPT | Mod: GP | Performed by: PHYSICAL THERAPIST

## 2017-12-12 PROCEDURE — 97110 THERAPEUTIC EXERCISES: CPT | Mod: GO | Performed by: OCCUPATIONAL THERAPIST

## 2017-12-12 PROCEDURE — G9169 MEMORY GOAL STATUS: HCPCS | Mod: GO,CI | Performed by: OCCUPATIONAL THERAPIST

## 2017-12-12 PROCEDURE — 97112 NEUROMUSCULAR REEDUCATION: CPT | Mod: GO | Performed by: OCCUPATIONAL THERAPIST

## 2017-12-12 PROCEDURE — 97535 SELF CARE MNGMENT TRAINING: CPT | Mod: GO,XU | Performed by: OCCUPATIONAL THERAPIST

## 2017-12-14 ENCOUNTER — HOSPITAL ENCOUNTER (OUTPATIENT)
Dept: PHYSICAL THERAPY | Facility: CLINIC | Age: 67
Setting detail: THERAPIES SERIES
End: 2017-12-14
Attending: INTERNAL MEDICINE
Payer: MEDICARE

## 2017-12-14 PROCEDURE — 97113 AQUATIC THERAPY/EXERCISES: CPT | Mod: GP,KX | Performed by: PHYSICAL THERAPIST

## 2017-12-14 PROCEDURE — 40000189 ZZH STATISTIC PT POOL VISIT: Performed by: PHYSICAL THERAPIST

## 2017-12-14 NOTE — PROGRESS NOTES
Outpatient Occupational Therapy Progress Note     Patient: Rosy Cannon  : 1950  Insurance:   Payor/Plan Subscriber Name Rel Member # Group #   MEDICARE - MEDICARE ROSY CANNON  [id number]       ATTN CLAIMS, [address]   BCBS - [employer] EMPLOY* ROSY CANNON  [id number]      [address] [address]       Beginning/End Dates of Reporting Period:  10/29/17 to 2017    Referring Provider:  Marisa Hoover    Therapy Diagnosis: mild cognitive deficits    Client Self Report: Patient reports she'd really like to be able to drive soon.  Spouse is having some health issues.     Objective Measurements:     Objective Measure: Assessment of Handwriting Fluency and Legibility   Details: Patient wrote in cursive 145 characters in 2 min sample, for ~73 characters per minute - much improved from 45 on 10/16.  Legibility Rubric: fair for letter formation and letter size and fair to average for capitals and end marks; average for even spaces between letters - all similar to performance on 10/16.     Objective Measure:  Strength   Details: R: 42# (was 39# on , was 30# on 10/13); L: 42# (was 42# on  and was 30# on 10/13)     Objective Measure: Pinch Strength   Details: Lateral pinch: R: 13# (was 13# on , was 12# on 10/13), L: 11# (was 9, 10# on  and was 11#);  3pt pinch: R: 12# (was 11# on , was 8# prior), L: 11# (was 12# on , was 8# prior)     Objective Measure: Cognitive Assessment of MN (CAM)   Details: patient scored MODERATE deficits with complex concrete problem solving skills (attended to 8 of 9 details and 1 out of sequence) - improved from SEVERE deficits in Sept. where she attended to 5/9 details and had numerous items out of sequence     Objective Measure: 9HPT   Details: R 25 sec. (was 26 on 10/26 and 28 sec. at eval); L : 23 sec. (same as 10/26).  No freezing noted.     Objective Measure: B shoulder AROM   Details: R shoulder flexion 120 degrees (was 125 degrees at  "evaluation) and L shoulder flexion 142 degrees (was 135 degrees), AROM B shoulder abduction WFLs however R UE more in scaption range as compared to L shoulder (same); pain \"0\" in end range (improved from \"1\")    On 11/6/17:  [employer]:  Mode A 60 sec.: 45 hits - BNLs, Mode B 60 sec.: 13 hits - significantly BNLs but slightly improved from last trail on 10/26.  Board set not to include 5th ring as walker limiting her somewhat - Using 4ww in front which is slightly bulky - will trial fww next time.  SBA for balance and using R UE only as L UE was on walker for support    Goals:     Goal Identifier R shoulder ROM and strength   Goal Description Patient to increase R shoulder flexion AROM to 135 degrees and strength to 4+/5 for return to baseline for improved R UE function for ADL/IADLs (during dressing and grooming tasks, kitchen tasks).   Target Date 12/15/17   Date Met      Progress:  Goal not met for R shoulder ROM, however, patient reports less limited in ADLs because of R shoulder ROM.  Strength NT.  Improved L shoulder ROM and less pain in R shoulder.     Goal Identifier memory skills   Goal Description Patient will demonstrate improved memory skills by completing short-term memory tasks in occupational therapy with 85% accuracy using compensatory strategies for increased safety and independence with ADL/IADLS (remembering to take medications, turn off the stove when done, remember rules/laws for community mobility, recall therapy techniques/exercises).   Target Date 12/15/17   Date Met      Progress:  Not yet fully met.     Goal Identifier problem solving skills and attention to detail   Goal Description Patient will demonstrate the ability to complete simple to moderately complex tasks requiring numerical reasoning, attention to detail and problem solving skills with 90% accuracy to complete basic home and community tasks (medication setup and management, financial tasks) accurately, for maximum independence " to function at or near baseline at home and in community.   Target Date 12/15/17   Date Met      Progress:  Not fully met, however, notable improvement demonstrated - see CAM above.     Goal Identifier UE motor and visual reaction time   Goal Description Patient will demonstrate WFLs  UE and visual reaction time for safe independent community mobility (crossing the street, driving) by scoring WNLs on all modes of the [employer].   Target Date 12/15/17   Date Met      Progress:  Goal not met - see above.     Goal Identifier R FM coordination skills   Goal Description Patient will improve R FM coordination skills for increased handwriting legibility and improved performance with FM ADLS by increasing letter size by 25% and completing 9HPT in 25 seconds.   Target Date 12/15/17   Date Met      Progress:  Goal met for 9HPT, however, letter size didn't improve by 25%.  Patient has shown significant improvement in speed of writing (see above).  Will discharge goal.  Patient aware of strategies to continue to improve handwriting.     Progress Toward Goals:   Progress this reporting period: Patient has been seen for 10 additional visits since last progress note to address the following deficit areas:  decreased cognition, decreased functional mobilty, decreased R shoulder ROM and strength, decreased R FM coordination.  She is making progress in all areas except for R shoulder ROM.  Patient is appropriate for continued skilled occupational therapy at a frequency of 2x/week x 4 weeks, then 1x/week x4 weeks, decreasing frequency prn.     Plan:  Continue therapy per current plan of care.     Discharge:  No

## 2017-12-14 NOTE — PROGRESS NOTES
[facility]      OUTPATIENT OCCUPATIONAL THERAPY  PLAN OF TREATMENT FOR OUTPATIENT REHABILITATION    Patient's Last Name, First Name, M.I.                YOB: 1950  Zoey Armstrong                        Provider's Name  [facility] Medical Record No.  [id number]                               Onset Date: 09/06/17 (order date)   Start of Care Date: 09/22/17   Type:     ___PT   _X_OT   ___SLP Medical Diagnosis: mild cognitive deficits                        OT Diagnosis: decreased ADL/IADL independence      _________________________________________________________________________________  Plan of Treatment/Frequency/Duration/Goals:  Please see detailed progress note dated 12/14/17 (from encounter 12/12/17) for all.    Certification date from 12/13/17 to 2/6/18.    Chloe Monroe, OTR/L, OT          I CERTIFY THE NEED FOR THESE SERVICES FURNISHED UNDER        THIS PLAN OF TREATMENT AND WHILE UNDER MY CARE     (Physician co-signature of this document indicates review and certification of the therapy plan).                  Referring Provider: Marisa Hoover

## 2017-12-15 DIAGNOSIS — I10 ESSENTIAL HYPERTENSION: ICD-10-CM

## 2017-12-18 RX ORDER — OLMESARTAN MEDOXOMIL 40 MG/1
TABLET ORAL
Qty: 90 TABLET | Refills: 1 | Status: SHIPPED | OUTPATIENT
Start: 2017-12-18 | End: 2018-09-13

## 2017-12-18 RX ORDER — AMLODIPINE BESYLATE 10 MG/1
TABLET ORAL
Qty: 90 TABLET | Refills: 1 | Status: SHIPPED | OUTPATIENT
Start: 2017-12-18 | End: 2018-06-08

## 2017-12-18 NOTE — TELEPHONE ENCOUNTER
Requested Prescriptions   Pending Prescriptions Disp Refills     olmesartan (BENICAR) 40 MG tablet [Pharmacy Med Name: OLMESARTAN MEDOXOMIL 40MG TABLETS] 90 tablet 0     Sig: TAKE 1 TABLET(40 MG) BY MOUTH DAILY    Angiotensin-II Receptors Passed    12/15/2017  9:42 AM       Passed - Blood pressure under 140/90 in past 12 months.    BP Readings from Last 3 Encounters:   08/30/17 118/72   08/14/17 114/70   07/17/17 113/73                Passed - Recent or future visit with authorizing provider's specialty    Patient had office visit in the last year or has a visit in the next 30 days with authorizing provider.  See chart review.              Passed - Patient is age 18 or older       Passed - No active pregnancy on record       Passed - Normal serum creatinine on file in past 12 months    Recent Labs   Lab Test 07/06/17   CR  0.96            Passed - Normal serum potassium on file in past 12 months    Recent Labs   Lab Test 07/06/17   POTASSIUM  3.9                   Passed - No positive pregnancy test in past 12 months        amLODIPine (NORVASC) 10 MG tablet [Pharmacy Med Name: AMLODIPINE BESYLATE 10MG TABLETS] 90 tablet 0     Sig: TAKE 1 TABLET(10 MG) BY MOUTH DAILY    Calcium Channel Blockers Protocol  Passed    12/15/2017  9:42 AM       Passed - Blood pressure under 140/90    BP Readings from Last 3 Encounters:   08/30/17 118/72   08/14/17 114/70   07/17/17 113/73                Passed - Recent or future visit with authorizing provider    Patient had office visit in the last year or has a visit in the next 30 days with authorizing provider.  See chart review.              Passed - Patient is age 18 or older       Passed - No active pregnancy on record       Passed - Normal serum creatinine on file in past 12 months    Recent Labs   Lab Test 07/06/17   CR  0.96            Passed - No positive pregnancy test in past 12 months        Prescription approved per [facility] Refill Protocol.

## 2017-12-19 ENCOUNTER — HOSPITAL ENCOUNTER (OUTPATIENT)
Dept: OCCUPATIONAL THERAPY | Facility: CLINIC | Age: 67
Setting detail: THERAPIES SERIES
End: 2017-12-19
Attending: INTERNAL MEDICINE
Payer: MEDICARE

## 2017-12-19 ENCOUNTER — HOSPITAL ENCOUNTER (OUTPATIENT)
Dept: PHYSICAL THERAPY | Facility: CLINIC | Age: 67
Setting detail: THERAPIES SERIES
End: 2017-12-19
Attending: INTERNAL MEDICINE
Payer: MEDICARE

## 2017-12-19 DIAGNOSIS — F33.0 MAJOR DEPRESSIVE DISORDER, RECURRENT EPISODE, MILD (H): ICD-10-CM

## 2017-12-19 PROCEDURE — 97110 THERAPEUTIC EXERCISES: CPT | Mod: GP,KX | Performed by: PHYSICAL THERAPIST

## 2017-12-19 PROCEDURE — 97530 THERAPEUTIC ACTIVITIES: CPT | Mod: GP,KX | Performed by: PHYSICAL THERAPIST

## 2017-12-19 PROCEDURE — 40000719 ZZHC STATISTIC PT DEPARTMENT NEURO VISIT: Performed by: PHYSICAL THERAPIST

## 2017-12-19 PROCEDURE — 97535 SELF CARE MNGMENT TRAINING: CPT | Mod: GO,KX | Performed by: OCCUPATIONAL THERAPIST

## 2017-12-19 PROCEDURE — 40000125 ZZHC STATISTIC OT OUTPT VISIT: Performed by: OCCUPATIONAL THERAPIST

## 2017-12-21 ENCOUNTER — HOSPITAL ENCOUNTER (OUTPATIENT)
Dept: PHYSICAL THERAPY | Facility: CLINIC | Age: 67
Setting detail: THERAPIES SERIES
End: 2017-12-21
Attending: INTERNAL MEDICINE
Payer: MEDICARE

## 2017-12-21 ENCOUNTER — HOSPITAL ENCOUNTER (OUTPATIENT)
Dept: OCCUPATIONAL THERAPY | Facility: CLINIC | Age: 67
Setting detail: THERAPIES SERIES
End: 2017-12-21
Attending: INTERNAL MEDICINE
Payer: MEDICARE

## 2017-12-21 PROCEDURE — 97535 SELF CARE MNGMENT TRAINING: CPT | Mod: GO,KX | Performed by: OCCUPATIONAL THERAPIST

## 2017-12-21 PROCEDURE — 97113 AQUATIC THERAPY/EXERCISES: CPT | Mod: GP,KX | Performed by: PHYSICAL THERAPIST

## 2017-12-21 PROCEDURE — 40000125 ZZHC STATISTIC OT OUTPT VISIT: Performed by: OCCUPATIONAL THERAPIST

## 2017-12-21 PROCEDURE — 40000189 ZZH STATISTIC PT POOL VISIT: Performed by: PHYSICAL THERAPIST

## 2017-12-28 ENCOUNTER — HOSPITAL ENCOUNTER (OUTPATIENT)
Dept: OCCUPATIONAL THERAPY | Facility: CLINIC | Age: 67
Setting detail: THERAPIES SERIES
End: 2017-12-28
Attending: INTERNAL MEDICINE
Payer: MEDICARE

## 2017-12-28 PROCEDURE — 97110 THERAPEUTIC EXERCISES: CPT | Mod: GO,KX | Performed by: OCCUPATIONAL THERAPIST

## 2017-12-28 PROCEDURE — 40000125 ZZHC STATISTIC OT OUTPT VISIT: Performed by: OCCUPATIONAL THERAPIST

## 2017-12-28 PROCEDURE — G9169 MEMORY GOAL STATUS: HCPCS | Mod: GO,CI | Performed by: OCCUPATIONAL THERAPIST

## 2017-12-28 PROCEDURE — G9170 MEMORY D/C STATUS: HCPCS | Mod: GO,CI | Performed by: OCCUPATIONAL THERAPIST

## 2017-12-28 PROCEDURE — 97535 SELF CARE MNGMENT TRAINING: CPT | Mod: GO,KX | Performed by: OCCUPATIONAL THERAPIST

## 2017-12-28 NOTE — PROGRESS NOTES
"Outpatient Occupational Therapy Discharge Note     Patient: Zoey Armstrong  : 1950    Beginning/End Dates of Reporting Period:  17 to 2017    Referring Provider: Marisa Hoover    Therapy Diagnosis: mild cognitive deficits    Client Self Report: Patient concerned about her spouse who is having a pre-op today for removal of a mass.  Reports her son has used [employer] in case her spouse can't drive him.  Patient feels she could drive if she practiced a little first.     Objective Measurements:       Objective Measure: [employer]   Details: Mode B 60 sec.: 19 hits - BNLs and slight decline from last session (though no warm-up with Mode A today); Mode B divided attention: 14 hits 8/10 - BNLs and similar to score last time completed.  Board set not to include 5th ring as limited shoulder ROM slightly - Used fww today vs. 4ww for more room for patient and required SBA for balance and using B UEs at times (not always needing support) .Slightly decreased attention to the R on 1st trial.     Objective Measure: Scancourse   Details: 1st trial: 5/10 target notes on L and 8/9 on R 1st trial in 1'02\"; she hugged the R wall.  Educated patient in results and instructed patient to increase attention to the L.  2nd trial: 6/10 on L and 7/9 on R in 1 min. 5 sec.  Both trials BNLS for accuracy and speed (WNLS is 90% accuracy and <35 seconds).     Objective Measure: Cognitive Assessment of MN (CAM)   Details: Re-tested the following areas:  she scored WFLs with temporal awareness (improved from moderate deficts), auditory recall (same as 10/26 and improved from ); MILD deficits with backward visual memory and sequencing skills (same) and mental manipulation with simple money skills (same), MODERATE deficits with forward visual memory and sequencing skills (same) and multiple digit math (slightly improved in moderate deficit category).     Objective Measure: Distance visual acuity screen via Snellen (no glasses as " she doesn't wear glasses for distance)   Details: 20/40 for R eye, L eye and B eyes     Objective Measure: B shoulder strength   Details: R shoulder 4 to 4+ (was 4/5 at evaluation), L shoulder 4+ to 5/5 (was 4+ at evaluation)    Outcome Measures (most recent score): N/A    Goals:     Goal Identifier R shoulder ROM and strength   Goal Description Patient to increase R shoulder flexion AROM to 135 degrees and strength to 4+/5 for return to baseline for improved R UE function for ADL/IADLs (during dressing and grooming tasks, kitchen tasks).   Target Date 02/06/18   Date Met      Progress: Patient close to meeting the goal for strength, but not for ROM.     Goal Identifier memory skills   Goal Description Patient will demonstrate improved memory skills by completing short-term memory tasks in occupational therapy with 85% accuracy using compensatory strategies for increased safety and independence with ADL/IADLS (remembering to take medications, turn off the stove when done, remember rules/laws for community mobility, recall therapy techniques/exercises).   Target Date 02/06/18   Date Met   12/28/17   Progress:  Goal met for auditory recall.  Continued mild to moderate deficits beyond 4 in sequence for visual memory and sequencing.     Goal Identifier problem solving skills and attention to detail   Goal Description Patient will demonstrate the ability to complete simple to moderately complex tasks requiring numerical reasoning, attention to detail and problem solving skills with 90% accuracy to complete basic home and community tasks (medication setup and management, financial tasks) accurately, for maximum independence to function at or near baseline at home and in community.   Target Date 02/06/18   Date Met      Progress:  Goal met for simple to moderately complex tasks.  She was 89% accurate on complex problem solving task on 12/12/17 per CAM.     Goal Identifier UE motor and visual reaction time   Goal  Description Patient will demonstrate WFLs  UE and visual reaction time for safe independent community mobility (crossing the street, driving) by scoring WNLs on all modes of the [employer].   Target Date 02/06/18   Date Met      Progress:  Goal not fully met.  She improved to WNLs on 2 of 4 modes, but continued difficulty with 1.0 light speed and divided attention (Modes B).     Progress Toward Goals:  Progress this reporting period: Patient has been seen for just 3 additional visits since last progress note dated 12/14/17 to address the following deficit areas:  decreased cognition, decreased functional mobilty, decreased R shoulder ROM and strength, decreased R FM coordination.  Please see 12/14/17 progress note for details of earlier progress.  Overall, patient demonstrated progress in all areas noted above, in addition to visual scanning and reaction time except for R shoulder ROM.  Patient continues to demonstrate deficits in most areas, however, and has the potential to continue to improve on her own if she is compliant with her HEP.  Patient appears to have depressed mood and patient admits to this.  Feel this has affected patient's progress, especially more recently.  Patient has refused meeting with a  in our department.  Patient has not met with her MD re: this issue as recommended.    Recommend supervision and assist prn with medications, finances, multi-tasking.  Strongly recommend patient complete a formal behind the wheel driving assessment should she wish to return to driving due to concerns with continued deficits in divided attention, visual scanning and reaction time, visual acuity and alternating attention.  Resources were issued to patient.    Plan:  Discharge from therapy.    Discharge:    Reason for Discharge: Progress has been more limited.  Patient has home program to continue to work at home.  Skilled occupational therapy no longer indicated.    Equipment Issued: [employer] and  Jose A.    Discharge Plan: Patient to continue home program.

## 2018-01-01 ENCOUNTER — TELEPHONE (OUTPATIENT)
Dept: INTERNAL MEDICINE | Facility: CLINIC | Age: 68
End: 2018-01-01

## 2018-01-02 ENCOUNTER — TRANSFERRED RECORDS (OUTPATIENT)
Dept: HEALTH INFORMATION MANAGEMENT | Facility: CLINIC | Age: 68
End: 2018-01-02

## 2018-01-15 ENCOUNTER — HOSPITAL ENCOUNTER (OUTPATIENT)
Dept: PHYSICAL THERAPY | Facility: CLINIC | Age: 68
Setting detail: THERAPIES SERIES
End: 2018-01-15
Attending: INTERNAL MEDICINE
Payer: MEDICARE

## 2018-01-15 PROCEDURE — 97110 THERAPEUTIC EXERCISES: CPT | Mod: GP | Performed by: PHYSICAL THERAPIST

## 2018-01-15 PROCEDURE — 97750 PHYSICAL PERFORMANCE TEST: CPT | Mod: GP | Performed by: PHYSICAL THERAPIST

## 2018-01-15 PROCEDURE — 97530 THERAPEUTIC ACTIVITIES: CPT | Mod: GP | Performed by: PHYSICAL THERAPIST

## 2018-01-15 PROCEDURE — 40000719 ZZHC STATISTIC PT DEPARTMENT NEURO VISIT: Performed by: PHYSICAL THERAPIST

## 2018-01-15 PROCEDURE — G8979 MOBILITY GOAL STATUS: HCPCS | Mod: GP,CI | Performed by: PHYSICAL THERAPIST

## 2018-01-15 PROCEDURE — G8980 MOBILITY D/C STATUS: HCPCS | Mod: GP,CJ | Performed by: PHYSICAL THERAPIST

## 2018-01-18 DIAGNOSIS — F33.0 MAJOR DEPRESSIVE DISORDER, RECURRENT EPISODE, MILD (H): ICD-10-CM

## 2018-01-18 DIAGNOSIS — E78.5 HYPERLIPIDEMIA LDL GOAL <100: Primary | ICD-10-CM

## 2018-01-26 RX ORDER — ROSUVASTATIN CALCIUM 10 MG/1
TABLET, COATED ORAL
Qty: 90 TABLET | Refills: 0 | Status: SHIPPED | OUTPATIENT
Start: 2018-01-26 | End: 2018-05-30

## 2018-01-26 NOTE — TELEPHONE ENCOUNTER
"Requested Prescriptions   Pending Prescriptions Disp Refills     FLUoxetine (PROZAC) 20 MG capsule [Pharmacy Med Name: FLUOXETINE 20MG CAPSULES] 90 capsule 0     Sig: TAKE 3 CAPSULES(60 MG) BY MOUTH DAILY    SSRIs Protocol Failed    1/18/2018  6:06 PM       Failed - PHQ-9 score less than 5 in past 6 months    The PHQ-9 criteria is meant to fail. It requires a PHQ-9 score review         Passed - Patient is age 18 or older       Passed - No active pregnancy on record       Passed - No positive pregnancy test in last 12 months       Passed - Recent (6 mo) or future visit with authorizing provider's specialty    Patient had office visit in the last 6 months or has a visit in the next 30 days with authorizing provider.  See \"Patient Info\" tab in inbasket, or \"Choose Columns\" in Meds & Orders section of the refill encounter.            rosuvastatin (CRESTOR) 10 MG tablet [Pharmacy Med Name: ROSUVASTATIN CALCIUM 10MG TABLETS] 90 tablet 0     Sig: TAKE 1 TABLET(10 MG) BY MOUTH DAILY    Statins Protocol Failed    1/18/2018  6:06 PM       Failed - LDL on file in past 12 months    Recent Labs   Lab Test  09/28/15   0800   LDL  87            Passed - No abnormal creatine kinase in past 12 months    No lab results found.         Passed - Recent or future visit with authorizing provider    Patient had office visit in the last year or has a visit in the next 30 days with authorizing provider.  See \"Patient Info\" tab in inbasket, or \"Choose Columns\" in Meds & Orders section of the refill encounter.            Passed - Patient is age 18 or older       Passed - No active pregnancy on record       Passed - No positive pregnancy test in past 12 months        PHQ-9 score:    PHQ-9 SCORE 6/5/2017   Total Score -   Total Score 8     Routing refill request to provider for review/approval because:  Failed protocol                "

## 2018-03-19 NOTE — ADDENDUM NOTE
Encounter addended by: Danielle Ocampo PT on: 3/18/2018  9:04 PM<BR>     Actions taken: Flowsheet data copied forward, Flowsheet accepted

## 2018-03-19 NOTE — ADDENDUM NOTE
Encounter addended by: Danielle Ocampo, PT on: 3/18/2018  9:18 PM<BR>     Actions taken: Flowsheet data copied forward, Flowsheet accepted

## 2018-03-19 NOTE — ADDENDUM NOTE
Encounter addended by: Danielle Ocampo, PT on: 3/19/2018  7:46 AM<BR>     Actions taken: Flowsheet data copied forward, Flowsheet accepted

## 2018-03-19 NOTE — ADDENDUM NOTE
Encounter addended by: Danielle Ocampo PT on: 3/18/2018  9:16 PM<BR>     Actions taken: Flowsheet data copied forward, Flowsheet accepted

## 2018-03-19 NOTE — PROGRESS NOTES
11/21/17 0800   Signing Clinician's Name / Credentials   Signing clinician's name / credentials Danielle Ocampo PT, DPT   Session Number   Session Number PROGRESS NOTE: Episode of care 9/1/17-11/21/17. Most recent progress note 10/17/17. Pt has participated in 20 visits of physical therapy including land-based and aquatic-based therapy to address gait and balance disorder.   Progress Note/Recertification   Recertification Due Date 12/22/17   Mobility: Walking & Moving Around   Mobility Current Status,  (eval/re-eval & every progress note) CK: 40-59% impairment   Current Mobility Modifier Rationale TUG, clinical judgement   Mobility Goal,  (eval/re-eval, every progress note, & discharge) CI: 1-19% impairment   Mobility Comments regression from progress made during last reassessment   Goal 1   Goal Identifier Report of falls (baseline: 5-6 falls in the past year - 1 with injury to R shoulder and laceration to her face requiring stitches. Since surgery: 2 falls, slid off the bed while sitting for dressing.)  (10/17: 3 falls reported w/o AD, 0 since 9/26/17, modify goal)   Goal Description Pt to report improved safety with consistent use of appropriate AD in and outside of home, with fewer falls reported throughout course of care.   Target Date 12/22/17   Goal 2   Goal Identifier Bed mobility (baseline: min assist to LEs scooting and getting into bed)  (10/17: progress made, (I) but poor effeciency, goal modified)   Goal Description Pt to demonstrate improved efficiency with bed mobility demonstrating and reporting ease of bridge and shift and rolling R <> L SL for repositioning in </= 12 sec.    Target Date 12/22/17   Goal 3   Goal Identifier TUG (baseline: 41.71 sec with 4ww; > 30 sec correlated with difficulty with ADLs)  (10/17: GOAL MET, 28.31 sec with 4ww, new goal )   Goal Description To complete TUG in < 12 sec with SPC to demonstrate improved safety with indoor ambulation over short distances and dec  fall risk.    Target Date 12/22/17   Date Met (1/21/17: regression to near-baseline, 35.99 sec)   Goal 4   Goal Identifier Turning (360 degree turn: 28-30 steps, pauses with L LE shaking, 40 sec total; assist to transfer from shower)  (10/17:GOAL MET 11.48 sec, 12 steps to R side, new goal)   Goal Description Patient will independently turn 180 degrees with larger/fewer steps </= 8 steps in < 10 seconds in order to safely complete independent toilet and/or tub/shower transfer.   Target Date 12/22/17   Goal 5   Goal Identifier FERNANDES (baseline: 29/56)   Goal Description Pt will score >45/56 on the fernandes balance assessment to indicate improved postural stability and low fall risk.    Target Date 12/22/17   Date Met (10/17: progress made, 32/56, continued fall risk)   Goal 6   Goal Identifier 30 sec sit <> stand (baseline: 4 reps from 18 , light touch to armrest, maintains support from armrests in standing with limited time without UE support, denies any pain)   Goal Description Pt to perform 8 reps sit to and from stand from 18 inch chair to demonstrate improved functional strength for all ADL's and less fall risk at home/ability to rise from chair without fall.    Target Date 12/22/17   Date Met (11/21: decline, 1.5 reps; 10/17: no change, 3 reps)   Goal 7   Goal Identifier HEP   Goal Description Pt to be independent in HEP with assist from spouse prn to continue to safely address her impairments following d/c from skilled PT services.    Target Date 12/22/17   Date Met (10/17: in progress, pt reports self-guided HEP w/o spouse)   Goal 8   Goal Identifier Community Ambulation    Goal Description Pt to demonstrate safety with ambulation outdoors without instability or significant fatigue >/= 1312.4' with 4ww for progress towards community ambulation towards her goal to return to gym and social gatherings with friends/shopping.    Target Date 12/22/17   Date Met (10/17: limited community ambulation except for  "appointments)   Subjective Report   Subjective Report Arrives to land-based therapy session accompanied by spouse. She has discussed with [facility] staff gym membership, which she thinks is costly without discounts available but continues to be interested in pool access for continued execise. Spouse has yet to join for aquatic therap sessions, but pt and spouse report he is willing to attend to guide her/supervision for safety prn. Reports her next apt with neurologist is in March to f/u on Carbidopa Levodopa.  This morning  feeling slow/ \"spinny\".  Sxs worse when walking into appointment with walker.    Objective Measure 1   Objective Measure 30 sec sit < > stand   Details 1.5 reps with poor initation, fearful, retropulsive   Objective Measure 2   Objective Measure TUG   Details with 4ww 35.99 sec, \"freezing\" moments/poor weight shifting, R LE laging with turns    Therapeutic Activity   Minutes 20   Skilled Intervention cues and feedback on reassessments relative to baseline, prior progress and goals   Patient Response retrupousive to stand, poor initation, maintined weight posteriorly   Treatment Detail 30 sec sit <> stand reassessment. sit <> stand: repeated cues for forward weight shifting with reaching, walker positioned in front for improved confidence/safety, therapist ed to spouse to help pt at home with set-up for chair with elevated height, foot positioning and walker position for safety to continue for HEP. TUG reassessment. gait over short distances indoors with 4ww:  small step length, turning with freezing moments, continues to limit full turn to sit d/t difficulty weight shifting/ decreased B stance time   Progress transition to stand: decline compaired to prior reassessment, only able to complete 1.5 reps with modified set-up and cues for increased confidence; TUG with 4ww: regression to near-baseline, 35.99 sec, \"freezing\" moments/poor weight shifting, R LE laging with turns. Pt has been slow to progress " throughout POC, unclear reason for pt's regression today.    Neuromuscular Re-education   Minutes 28   Skilled Intervention re-ed on importance of addressing emotional distress, recommending f/u with previously provided resources to see clinic's [place], emphasized emotional distress limiting pt's physical gains. reviewed with pt and spouse written/picture instruction for exercises, reviewed safety with set-up, purpose of  each exercise towards pt's limitations, recommending spouse involvement in set-up/guidance prn   Patient Response addressing pt/spouse questions, both agree with therapists recommendations for continued HEP    Progress Limitations in therapy have been: poor motor planning, reports falls with decreased safety awareness, also very fearful of falls limiting progress, flucuating consistancy with HEP d/t apparent cognitive and motivational limitations/mood disorder. Spouse has remained relatively uninvolved in pt's therapy at home.   Education   Learner Patient   Readiness Acceptance   Method Booklet/handout;Explanation;Demonstration   Response Verbalizes Understanding;Demonstrates Understanding;Needs Reinforcement   Education Comments (above)   Plan   Home program continue land HEP: sit <> L SL in bed repeated; scooting R/L in bed; stationary biking with inc resistance, laying LE and core strengthening exs - pelvic tilts, hip abduction, sitting LE exercises with emphasis on large amplitude movements and theraband resistance prn, standing exercises - squats; hip extension, sit <> stand, forward and side stepping along counter for support    Updates to plan of care Continue POC as previously recommended.    Plan for next session land sessions: continue gait training with 4 pt cane, focus on turns. dynamic stepping. transfers. Postural stability ; in pool: continue postural and gait stability challenges, functional strengthening   Comments   Comments KX modifier needed- patient continues to have deficits  in LE coordination, strength, balance, and gait and will benefit from continued skilled therapy services to increase safety with all functional mobility in the home and community environments.   Total Session Time   Timed Code Treatment Minutes 48   Total Treatment Time (sum of timed and untimed services) 48

## 2018-03-19 NOTE — PROGRESS NOTES
AQUATIC PHYSICAL THERAPY EXERCISE LOG (TRUNK)  Date    Daniellegray Ocampo PT, DPT  10/30/17 Daniellegray Ocampo PT,  DPT  11/2/17 Daniellegray Ocampo PT,  DPT  11/6/17 Daniellegray Ocampo PT,  DPT  11/13/17 Danielle Ocampo PT,  DPT  11/16/17   Warm Up Ambulation (Forward/Side/Back/March/All) x4 laps forward, x2 side and x2 backwards with hands on noodle for support, facil at hips for posture/limit posterior lean and cues for larger step length x2 laps forward, x2 side and x2 backwards with hands on noodle for support, facil at hips for posture and cues for larger step length x2 laps forward, x2 side and x2 backwards with hands on noodle for support, facil at hips for posture and cues for larger step length x2 laps forward, x2 side and x2 backwards with hands on noodle for support, facil at hips for posture and cues for larger step length x8 laps forward, x2 side and x2 backwards with hands on noodle for support, facil at hips for posture and cues for larger step length         UE support from railing and therapist to enter/exit pool UE support from railing and therapist to enter/exit pool - improved step length and speed leaving session UE support from railing to enter/exit, therapist close-distant supervision UE support from railing to enter/exit, therapist close-distant supervision UE support from railing to enter/exit, therapist close-distant supervision                       Stretching/ROM Chin Tucks                  CROM (Flex/Ext/SB/Rot/All)                  Shoulder (Shrugs/Rolls)                  Scapular (Retraction/Depression)                  Upper Trunk (Levator/Scalene/Upper Trapezius)                  Pec Stretch (Unilateral/Bilateral)                  Posterior Shoulder                  Gluteal    x30 sec ea LE x30 sec ea LE x30 sec ea LE       Hamstring (On Step/Cuff)    x30 sec ea LE therapist assist horx abd/add and noodle support x30 sec ea LE therapist assist horx abd/add and noodle support x30 sec ea LE therapist assist  horx abd/add and noodle support       Calf (In Hole/At Wall)    x60 sec ea LE, mod-max cues for technique  x60 sec ea LE, mod-max cues for technique  x60 sec ea LE, mod-max cues for technique        Quad                  Hip Flexor (Kneel)                  Piriformis (Seated/Stand)                  Trunk ROM (Flex/Ext/SB/Rot/All)    R/L trunk SB x30 sec ea    R/L trunk SB x30 sec ea R/L trunk SB x30 sec ea      BAD RAGAZ                                                       Strengthening Abdominal Sets/ Pelvic Tilt                  Shoulder (Flex/Ext, Abd/Add, IR/ER, Circles, Alternation flex/ext for core) x10-15 ea UE only then progressed with open paddles for greater resistance, completed in wall sit for core activation x10 reps ea UE only in standing for balance challenge    x10 reps ea UE only in standing for balance challenge x10 reps ea UE only in standing for balance challenge      Horizon (Abd/Add, Diagonals) (as above)  (as above)     (as above) (as above)      Rowing Arms                  UE PNF (D1/D2)                  Abdominal Sets (Push/Pull, side to side, push down with board)                  Squat    B UE support from pool wall x10 reps B UE support from pool wall x10 reps B UE support from pool wall x10 reps B UE support from pool wall x10 reps      Lunge Squat                  Hip (Flex/Ext, Abd/Add, single leg bike, circles, figure 8, All) x10 ea 1-2 UE support from wall for balance/posture (no figure 8) x10 ea with B UE support from wall in corner (no figure 8) x10 ea with B UE support from wall in corner (no figure 8) x10 ea with B UE support from wall in corner (no figure 8)       Heel/Toe Raises                  Step Ups (Forward, Lateral)                  Noodle Push Downs with UE(B UE/1 UE) for core strengthening                  Noodle Push Downs with LE    x10  ea LE back support against wall, lightweight noodle, assist with set-up x10  ea LE back support against wall, progressed noodle  resistance, assist with set-up x10  ea LE back support against wall, progressed noodle resistance, assist with set-up       Stir the Pot/Punches with Dumbells                  Sit to Stand at Bench h8-10x total, 3x sucessfull without retropulsion, needs support from therapist for controled desent x8 reps 1x retropulsion, significantly improved control, continued cues for technique  x8 reps without LOB, continued cues for technique x8 reps without LOB, continued cues for technique, use of paddleboard R UE support to limit tendency for L sided trunk lean x8 reps continued cues for technique, use of paddleboard R UE support to limit tendency for L sided trunk lean      Prone Plank on step                  Side Plank on step                                                       Aerobic Fast Walking                  Bike/Ski/Juan/All                                                       Balance Narrow Base of Support x10-30 sec feet hips width, progressed to romberg x30 sec without LOB         x10-30 sec feet hips width, progressed to romberg x30 sec without LOB      Tandem Stand                  Single Leg Stance                  Heel/Toe Walking                  3 Step and Stop                  Braiding                                                                           Cool Down Ambulation                  Deep Water Dangle                  Whirlpool

## 2018-03-19 NOTE — ADDENDUM NOTE
Encounter addended by: Danielle Ocampo, PT on: 3/18/2018  9:19 PM<BR>     Actions taken: Flowsheet data copied forward, Flowsheet accepted

## 2018-03-19 NOTE — ADDENDUM NOTE
Encounter addended by: Danielle Ocampo PT on: 3/19/2018  7:47 AM<BR>     Actions taken: Flowsheet accepted

## 2018-03-19 NOTE — ADDENDUM NOTE
Encounter addended by: Danielle Ocampo PT on: 3/18/2018 10:10 PM<BR>     Actions taken: Flowsheet data copied forward, Sign clinical note, Flowsheet accepted

## 2018-03-19 NOTE — PROGRESS NOTES
10/17/17 1500   Signing Clinician's Name / Credentials   Signing clinician's name / credentials Danielle Ocampo PT, DPT   Session Number   Session Number PROGRESS NOTE: Episode of care 9/1/17-10/17/17. Pt has participated in 10 visits of physical therapy including evaluation and today's reassessment. Pt has participated land-based therapy so far, with plans to incoorporate aquatic based therapy into POC on future visits to continue to address gait and balance disorder.   Progress Note/Recertification   Progress Note Completed Date 10/17/17   Recertification Due Date 11/24/17   Recertification Completed Date  10/17/17   PT Medicare Only G-code   G-code Mobility: Walking & Moving Around   Mobility: Walking & Moving Around   Mobility Current Status,  (eval/re-eval & every progress note) CJ: 20-39% impairment   Current Mobility Modifier Rationale TUG, clinical judgement   Mobility Goal,  (eval/re-eval, every progress note, & discharge) CI: 1-19% impairment   Mobility Comments lesser mobility impairment goal   Adult Goals   PT Eval Goals 1;2;3;4;5;6;7;8   Goal 1   Goal Identifier Report of falls (baseline: 5-6 falls in the past year - 1 with injury to R shoulder and laceration to her face requiring stitches. Since surgery: 2 falls, slid off the bed while sitting for dressing.)   Goal Description Pt to report improved safety with consistent use of appropriate AD within home without report of falling throughout course of care.    Target Date 11/24/17   Date Met (10/17: 3 falls reported w/o AD, 0 since 9/26/17, modify goal)   Goal 2   Goal Identifier Bed mobility (baseline: min assist to LEs scooting and getting into bed)   Goal Description Pt to demonstrate improved independence with bed mobility without need for assistance to LEs getting into bed,  with bridge and shift and rolling R <> L SL.    Target Date 11/24/17   Date Met (10/17: progress made, (I) but poor effeciency, goal modified)   Goal 3   Goal  Identifier TUG (baseline: 41.71 sec with 4ww; > 30 sec correlated with difficulty with ADLs)   Goal Description To complete TUG in < 30 sec with least restrictive AD to demonstrate improved safety with indoor ambulation over short distances and dec fall risk.    Target Date 11/24/17   Date Met 10/17/17  (10/17: GOAL MET, 28.31 sec with 4ww, new goal )   Goal 4   Goal Identifier Turning (360 degree turn: 28-30 steps, pauses with L LE shaking, 40 sec total; assist to transfer from shower)   Goal Description Patient will independently turn 180 degrees with larger steps in < 20 seconds in order to safely complete independent toilet and/or tub/shower transfer.   Target Date 11/24/17   Date Met 10/17/17  (10/17:GOAL MET 11.48 sec, 12 steps to R side, new goal)   Goal 5   Goal Identifier FERNANDES (baseline: 29/56)   Goal Description Pt will score >45/56 on the fernandes balance assessment to indicate improved postural stability and low fall risk.    Target Date 11/24/17   Date Met (10/17: progress made, 32/56, continued fall risk)   Goal 6   Goal Identifier 30 sec sit <> stand (baseline: 4 reps from 18 , light touch to armrest, maintains support from armrests in standing with limited time without UE support, denies any pain)   Goal Description Pt to perform 8 reps sit to and from stand from 18 inch chair to demonstrate improved functional strength for all ADL's and less fall risk at home/ability to rise from chair without fall.    Target Date 11/24/17   Date Met (10/17: no significant change, 3 reps, continue goal)   Goal 7   Goal Identifier HEP   Goal Description Pt to be independent in HEP with assist from spouse prn to continue to safely address her impairments following d/c from skilled PT services.    Target Date 11/24/17   Date Met (10/17: in progress, pt reports self-guided HEP w/o spouse)   Goal 8   Goal Identifier Community Ambulation    Goal Description Pt to demonstrate safety with ambulation outdoors without instability  "or significant fatigue >/= 1312.4' with 4ww for progress towards community ambulation towards her goal to return to gym and social gatherings with friends/shopping.    Target Date 11/24/17   Date Met (10/17: limited community ambulation except for appointments)   Subjective Report   Subjective Report Saw neurologist at [facility],  Dr Lele Stahl.  Starting new medicaton for PD, picking up today, starting a low dose. Also going to to have EMG testing. Fell since last seen, cannot remember her fall.  No falls in the past couple fo days.  Spouse continues to help occasionally getting/ adjusting LEs in bed but reports she is scooting better in bed.   Objective Measures   Objective Measures Objective Measure 1;Objective Measure 2;Objective Measure 3;Objective Measure 4   Objective Measure 1   Objective Measure Bed mobility: sit <> L SL <> supine   Details 19.25 seconds, (I) without assist to LEs, poor carryover of technique emphasized over the past 2 visits, continued limitations in coordinated/fluid movements: pt reaches arm overhead to lay from sitting, straightens LEs moving \"en bloc\" without bridge and scoot or turn to R SL, and returns to sitting from supine   Objective Measure 2   Objective Measure 30 sec sit  <> stand   Details 3 reps, with walker in front, 18\" height 1x without UE support, conitnued 2x light touch to armrests   Objective Measure 3   Objective Measure TUG   Details 28.31 sec with 4ww   Objective Measure 4   Objective Measure 360 turns   Details 11.48 sec, 12 small steps turning to R side, no instability    Treatment Interventions   Interventions Physical Performance Test/Measures;Therapeutic Activity   Therapeutic Procedure/exercise   Minutes 5   Skilled Intervention cues and feedback on reassessment relative to baseline and goals.   Patient Response (see objectives)   Treatment Detail 30 sec sit <> stand reassessment   Progress 30 sec sit <> stand: no significant change "   Therapeutic Activity   Minutes 19   Skilled Intervention cues and feedback on reassessment relative to baseline and goals.   Patient Response (see objectives for response to reassessments)   Treatment Detail Bed mobility, TUG and 360 degree turn reassessments. bed mobility: fear of rolling to L side on narrow bed with therapist CTG, cues from therapist for R UE reaching for momenturm and pushing through UEs from SL to sit from L side. filmed and reviewed gait with feedback on turning completely before sitting for safety with continued tendency to turing L without stepping, no LOB observed   Progress Bed mobility: goal in progress; improved independance wihout needed assist to LEs repositioning in bed, however continues to demonstrates limitations with bridge + shift and rollling in bed. TUG: significant progress made, goal met with progress towards decreased fall risk. Continued fall risk. New goal with less restrictive AD and decreased time for continued progress. Turns: significant progress made with goal met. Continues to demonstrate significant impairment with slow/small/uncoordinated steps to turn especially to R side. New goal for continued progress towards improved safety turning to sit.   Neuromuscular Re-education   Minutes 5   Skilled Intervention cues, facil   Treatment Detail alt toe taps at stairs: requires inc manual facil for greater ROM, fear and uncoordinated stepping L LE, no LOB maintains foot clearance with L hand support for stability from railing   Physical Performance Test/measures   Minutes 14   Skilled Intervention instruciton for reassessment, feedback on results relative to baseline and goals   Patient Response 32/56   Treatment Detail FERNANDES   Progress 10/17: progress made, 32/56, continued fall risk   Education   Learner Patient   Readiness Acceptance   Method Booklet/handout;Explanation;Demonstration   Response Verbalizes Understanding;Demonstrates Understanding;Needs Reinforcement    Education Comments (above)   Plan   Home program continue: sit <> L SL in bed; scooting R/L in bed; stationary biking with inc resistance, laying LE and core strengthening exs - pelvic tilts, hip abduction, sitting LE exercises with emphasis on large amplitude movements and theraband resistance prn, standing exercises - squats; hip extension, sit <> stand, forward and side stepping along counter for support    Updates to plan of care Continue to progress towards all goals as stated 2x/week x10 weeks through 12/22/2017.   Plan for next session continue gait training with appropraite AD, focus on turns. transfers. Postural stability limitations per FERNANDES   Total Session Time   Timed Code Treatment Minutes 43   Total Treatment Time (sum of timed and untimed services) 43

## 2018-03-19 NOTE — PROGRESS NOTES
[facility]      OUTPATIENT PHYSICAL THERAPY  PLAN OF TREATMENT FOR OUTPATIENT REHABILITATION    Patient's Last Name, First Name, M.I.                YOB: 1950  Zoey Armstrong                        Provider's Name  [facility] Medical Record No.  [id number]                               Onset Date: 06/23/17 (date of surgery)   Start of Care Date: 09/01/17   Type:     _X_PT   ___OT   ___SLP Medical Diagnosis: Physical deconditioning R53.81  - Primary ;  Recurrent falls R29.6                        PT Diagnosis: gait and balance disorder      _________________________________________________________________________________  Plan of Treatment:    Frequency/Duration: 2x/week x10 weeks from date of reassessment 10/17/17.     Goals:  Goal Identifier Report of falls (baseline: 5-6 falls in the past year - 1 with injury to R shoulder and laceration to her face requiring stitches. Since surgery: 2 falls, slid off the bed while sitting for dressing.) (10/17: 3 falls reported w/o AD, 0 since 9/26/17, modify goal)   Goal Description Pt to report improved safety with consistent use of appropriate AD in and outside of home, with fewer falls reported throughout course of care.   Target Date 12/22/17   Date Met   (10/17: 3 falls reported w/o AD, 0 since 9/26/17, modify goal)   Progress:     Goal Identifier Bed mobility (baseline: min assist to LEs scooting and getting into bed) (10/17: progress made, (I) but poor effeciency, goal modified)   Goal Description Pt to demonstrate improved efficiency with bed mobility demonstrating and reporting ease of bridge and shift and rolling R <> L SL for repositioning in </= 12 sec.    Target Date 12/22/17   Date Met   (10/17: progress made, (I) but poor effeciency, goal modified)   Progress:     Goal Identifier TUG (baseline: 41.71 sec with 4ww; > 30 sec  correlated with difficulty with ADLs) (10/17: GOAL MET, 28.31 sec with 4ww, new goal )   Goal Description To complete TUG in < 12 sec with SPC to demonstrate improved safety with indoor ambulation over short distances and dec fall risk.    Target Date 12/22/17   Date Met  10/17/17 (10/17: GOAL MET, 28.31 sec with 4ww, new goal )   Progress:     Goal Identifier Turning (360 degree turn: 28-30 steps, pauses with L LE shaking, 40 sec total; assist to transfer from shower) (10/17:GOAL MET 11.48 sec, 12 steps to R side, new goal)   Goal Description Patient will independently turn 180 degrees with larger/fewer steps </= 8 steps in < 10 seconds in order to safely complete independent toilet and/or tub/shower transfer.   Target Date 12/22/17   Date Met  10/17/17 (10/17:GOAL MET 11.48 sec, 12 steps to R side, new goal)   Progress:     Goal Identifier FERNANDES (baseline: 29/56)   Goal Description Pt will score >45/56 on the fernandes balance assessment to indicate improved postural stability and low fall risk.    Target Date 12/22/17   Date Met   (10/17: progress made, 32/56, continued fall risk)   Progress:     Goal Identifier 30 sec sit <> stand (baseline: 4 reps from 18 , light touch to armrest, maintains support from armrests in standing with limited time without UE support, denies any pain)   Goal Description Pt to perform 8 reps sit to and from stand from 18 inch chair to demonstrate improved functional strength for all ADL's and less fall risk at home/ability to rise from chair without fall.    Target Date 12/22/17   Date Met   (10/17: no significant change, 3 reps, continue goal)   Progress:     Goal Identifier HEP   Goal Description Pt to be independent in HEP with assist from spouse prn to continue to safely address her impairments following d/c from skilled PT services.    Target Date 12/22/17   Date Met   (10/17: in progress, pt reports self-guided HEP w/o spouse)   Progress:     Goal Identifier Community Ambulation     Goal Description Pt to demonstrate safety with ambulation outdoors without instability or significant fatigue >/= 1312.4' with 4ww for progress towards community ambulation towards her goal to return to gym and social gatherings with friends/shopping.    Target Date 12/22/17   Date Met   (10/17: limited community ambulation except for appointments)   Progress:     Progress Toward Goals:   See progress note dated 10/17/17.    Certification date from 11/25/17-12/22/17    Danielle Ocampo, PT          I CERTIFY THE NEED FOR THESE SERVICES FURNISHED UNDER        THIS PLAN OF TREATMENT AND WHILE UNDER MY CARE     (Physician co-signature of this document indicates review and certification of the therapy plan).                Referring Provider: Marisa Hoover MD

## 2018-03-19 NOTE — ADDENDUM NOTE
Encounter addended by: Danielle Ocampo PT on: 3/19/2018  8:26 AM<BR>     Actions taken: Flowsheet data copied forward, Flowsheet accepted

## 2018-03-19 NOTE — ADDENDUM NOTE
Encounter addended by: Danielle Ocampo PT on: 3/19/2018  8:18 AM<BR>     Actions taken: Flowsheet data copied forward, Sign clinical note, Flowsheet accepted

## 2018-03-19 NOTE — ADDENDUM NOTE
Encounter addended by: Danielle Ocampo PT on: 3/18/2018  9:21 PM<BR>     Actions taken: Flowsheet data copied forward, Flowsheet accepted

## 2018-03-19 NOTE — ADDENDUM NOTE
Encounter addended by: Danielle Ocampo PT on: 3/18/2018  8:53 PM<BR>     Actions taken: Flowsheet data copied forward, Flowsheet accepted

## 2018-03-19 NOTE — ADDENDUM NOTE
Encounter addended by: Danielle Ocampo PT on: 3/18/2018  9:58 PM<BR>     Actions taken: Flowsheet data copied forward, Sign clinical note, Flowsheet accepted

## 2018-03-19 NOTE — ADDENDUM NOTE
Encounter addended by: Danielle Ocampo PT on: 3/18/2018  8:37 PM<BR>     Actions taken: Flowsheet data copied forward, Flowsheet accepted, Document created, Sign clinical note

## 2018-03-20 NOTE — ADDENDUM NOTE
Encounter addended by: Danielle Ocampo PT on: 3/19/2018 11:45 PM<BR>     Actions taken: Flowsheet accepted

## 2018-03-20 NOTE — ADDENDUM NOTE
Encounter addended by: Danielle Ocampo PT on: 3/20/2018  7:58 AM<BR>     Actions taken: Flowsheet data copied forward, Sign clinical note, Flowsheet accepted

## 2018-03-20 NOTE — PROGRESS NOTES
AQUATIC PHYSICAL THERAPY EXERCISE LOG (TRUNK)  Date    Danielle Katzmark PT, DPT  10/30/17 Danielle Katzmark PT,  DPT  11/2/17 Danielle Katzmark PT,  DPT  11/6/17 Danielle Katzmark PT,  DPT  11/13/17 Danielle Katzmark PT,  DPT  11/16/17 Danielle Katzmark PT,  DPT  12/7/17 Danielle Katzmark PT,  DPT  12/14/17 Danielle Katzmark PT,  DPT  12/21/17   Warm Up Ambulation (Forward/Side/Back/March/All) x4 laps forward, x2 side and x2 backwards with hands on noodle for support, facil at hips for posture/limit posterior lean and cues for larger step length x2 laps forward, x2 side and x2 backwards with hands on noodle for support, facil at hips for posture and cues for larger step length x2 laps forward, x2 side and x2 backwards with hands on noodle for support, facil at hips for posture and cues for larger step length x2 laps forward, x2 side and x2 backwards with hands on noodle for support, facil at hips for posture and cues for larger step length x8 laps forward, x2 side and x2 backwards with hands on noodle for support, facil at hips for posture and cues for larger step length x4 laps forward, x2 side and x2 backwards with hands on noodle for support, facil at hips for posture and cues for larger step length x4 laps forward, x2 side and x2 backwards progressed without UE support, facil at hips for posture and cues for larger step length x2 laps forward, x2 side and x2 backwards  without UE support, facil at hips for posture and cues for larger step length/armswing         UE support from railing and therapist to enter/exit pool UE support from railing and therapist to enter/exit pool - improved step length and speed leaving session UE support from railing to enter/exit, therapist close-distant supervision UE support from railing to enter/exit, therapist close-distant supervision UE support from railing to enter/exit, therapist close-distant supervision UE support from railing to enter/exit, therapist close-distant supervision UE support from railing to  enter/exit, therapist distant supervision UE support from railing to enter/exit, therapist distant supervision                                Stretching/ROM Chin Tucks                           CROM (Flex/Ext/SB/Rot/All)                           Shoulder (Shrugs/Rolls)                           Scapular (Retraction/Depression)                           Upper Trunk (Levator/Scalene/Upper Trapezius)                           Pec Stretch (Unilateral/Bilateral)                           Posterior Shoulder                           Gluteal    x30 sec ea LE x30 sec ea LE x30 sec ea LE       x30 sec ea LE x30 sec ea LE      Hamstring (On Step/Cuff)    x30 sec ea LE therapist assist horx abd/add and noodle support x30 sec ea LE therapist assist horx abd/add and noodle support x30 sec ea LE therapist assist horx abd/add and noodle support       x30 sec ea LE therapist assist horx abd/add and noodle support x30 sec ea (no noodle)      Calf (In Hole/At Wall)    x60 sec ea LE, mod-max cues for technique  x60 sec ea LE, mod-max cues for technique  x60 sec ea LE, mod-max cues for technique        x60 sec ea LE, mod cues for technique  x60 sec ea LE, improved carryover      Quad                           Hip Flexor (Kneel)                           Piriformis (Seated/Stand)                           Trunk ROM (Flex/Ext/SB/Rot/All)    R/L trunk SB x30 sec ea    R/L trunk SB x30 sec ea R/L trunk SB x30 sec ea R/L trunk SB x30 sec ea R/L trunk SB x30 sec ea R/L trunk SB x30 sec ea      BAD RAGAZ                                                                                  Strengthening Abdominal Sets/ Pelvic Tilt                           Shoulder (Flex/Ext, Abd/Add, IR/ER, Circles, Alternation flex/ext for core) x10-15 ea UE only then progressed with open paddles for greater resistance, completed in wall sit for core activation x10 reps ea UE only in standing for balance challenge    x10 reps ea UE only in standing for balance  challenge x10 reps ea UE only in standing for balance challenge x10 reps ea UE only in standing for balance challenge - varied FRANK for balance challenge x10 reps ea UE only in standing on slope of pool for balance challenge - varied FRANK for appropriate balance challenge x10 reps ea UE only in standing on slope of pool for balance challenge - varied FRANK for appropriate balance challenge      Horizon (Abd/Add, Diagonals) (as above)  (as above)     (as above) (as above) (as above)         Rowing Arms                           UE PNF (D1/D2)                           Abdominal Sets (Push/Pull, side to side, push down with board)                   In wall sit with small board x10 ea       Squat    B UE support from pool wall x10 reps B UE support from pool wall x10 reps B UE support from pool wall x10 reps B UE support from pool wall x10 reps B UE support from pool wall x10 reps glute taps to pool wall x10 reps B UE support from pool wall x10 reps      Lunge Squat                           Hip (Flex/Ext, Abd/Add, single leg bike, circles, figure 8, All) x10 ea 1-2 UE support from wall for balance/posture (no figure 8) x10 ea with B UE support from wall in corner (no figure 8) x10 ea with B UE support from wall in corner (no figure 8) x10 ea with B UE support from wall in corner (no figure 8)    x10 ea with B UE support from wall in corner (no figure 8) x10 ea with 1-2 UE support from wall in corner (no figure 8) x10 ea with 1UE support from wall in corner (no figure 8)      Heel/Toe Raises                           Step Ups (Forward, Lateral)                           Noodle Push Downs with UE(B UE/1 UE) for core strengthening                           Noodle Push Downs with LE    x10  ea LE back support against wall, lightweight noodle, assist with set-up x10  ea LE back support against wall, progressed noodle resistance, assist with set-up x10  ea LE back support against wall, progressed noodle resistance, assist with  set-up    x10  ea LE back support against wall, progressed noodle resistance, assist with set-up   x10  ea LE back support against wall, noodle resistance, assist with set-up      Stir the Pot/Punches with Dumbells                           Sit to Stand at Bench h8-10x total, 3x sucessfull without retropulsion, needs support from therapist for controled desent x8 reps 1x retropulsion, significantly improved control, continued cues for technique  x8 reps without LOB, continued cues for technique x8 reps without LOB, continued cues for technique, use of paddleboard R UE support to limit tendency for L sided trunk lean x8 reps continued cues for technique, use of paddleboard R UE support to limit tendency for L sided trunk lean x8 reps, Increased ed for weight shift - hips over toes and posture for improved stability with sit < >stand following cues posterior LOB onto chair with arms back controling descent.      x5 reps, CTG/modeling  with improved stability with sit < >stand following, 1st time wihtout LOB x5 reps, improved stability wihtout LOB      Prone Plank on step                           Side Plank on step                                                                                  Aerobic Fast Walking                           Bike/Ski/Juan/All                                                                                  Balance Narrow Base of Support x10-30 sec feet hips width, progressed to romberg x30 sec without LOB          x10-30 sec feet hips width, progressed to romberg x30 sec without LOB      x10-30 sec feet hips width, progressed to romberg x30 sec without LOB, cues to maintain UEs at sides - step with mild LOB      Tandem Stand                           Single Leg Stance                           Heel/Toe Walking                           3 Step and Stop                           Braiding                                                                                                                Cool Down Ambulation                           Deep Water Dangle                           Whirlpool

## 2018-03-20 NOTE — ADDENDUM NOTE
Encounter addended by: Danielle Ocampo, PT on: 3/20/2018 12:06 AM<BR>     Actions taken: Flowsheet data copied forward, Flowsheet accepted

## 2018-03-20 NOTE — ADDENDUM NOTE
Encounter addended by: Danielle Ocampo, PT on: 3/20/2018  8:14 AM<BR>     Actions taken: Flowsheet data copied forward, Flowsheet accepted

## 2018-03-20 NOTE — ADDENDUM NOTE
Encounter addended by: Danielle Ocampo PT on: 3/20/2018  8:25 AM<BR>     Actions taken: Flowsheet data copied forward, Sign clinical note, Flowsheet accepted

## 2018-03-20 NOTE — PROGRESS NOTES
AQUATIC PHYSICAL THERAPY EXERCISE LOG (TRUNK)  Date    Daniellegray Ocampo PT, DPT  10/30/17 Danielle Katzmark PT,  DPT  11/2/17 Danielle Katzmark PT,  DPT  11/6/17 Danielle Katzmark PT,  DPT  11/13/17 Danielle Katzmark PT,  DPT  11/16/17 Danielle Katzmark PT,  DPT  12/7/17   Warm Up Ambulation (Forward/Side/Back/March/All) x4 laps forward, x2 side and x2 backwards with hands on noodle for support, facil at hips for posture/limit posterior lean and cues for larger step length x2 laps forward, x2 side and x2 backwards with hands on noodle for support, facil at hips for posture and cues for larger step length x2 laps forward, x2 side and x2 backwards with hands on noodle for support, facil at hips for posture and cues for larger step length x2 laps forward, x2 side and x2 backwards with hands on noodle for support, facil at hips for posture and cues for larger step length x8 laps forward, x2 side and x2 backwards with hands on noodle for support, facil at hips for posture and cues for larger step length x4 laps forward, x2 side and x2 backwards with hands on noodle for support, facil at hips for posture and cues for larger step length         UE support from railing and therapist to enter/exit pool UE support from railing and therapist to enter/exit pool - improved step length and speed leaving session UE support from railing to enter/exit, therapist close-distant supervision UE support from railing to enter/exit, therapist close-distant supervision UE support from railing to enter/exit, therapist close-distant supervision UE support from railing to enter/exit, therapist close-distant supervision                          Stretching/ROM Chin Tucks                     CROM (Flex/Ext/SB/Rot/All)                     Shoulder (Shrugs/Rolls)                     Scapular (Retraction/Depression)                     Upper Trunk (Levator/Scalene/Upper Trapezius)                     Pec Stretch (Unilateral/Bilateral)                     Posterior  Shoulder                     Gluteal    x30 sec ea LE x30 sec ea LE x30 sec ea LE         Hamstring (On Step/Cuff)    x30 sec ea LE therapist assist horx abd/add and noodle support x30 sec ea LE therapist assist horx abd/add and noodle support x30 sec ea LE therapist assist horx abd/add and noodle support         Calf (In Hole/At Wall)    x60 sec ea LE, mod-max cues for technique  x60 sec ea LE, mod-max cues for technique  x60 sec ea LE, mod-max cues for technique          Quad                     Hip Flexor (Kneel)                     Piriformis (Seated/Stand)                     Trunk ROM (Flex/Ext/SB/Rot/All)    R/L trunk SB x30 sec ea    R/L trunk SB x30 sec ea R/L trunk SB x30 sec ea R/L trunk SB x30 sec ea      BAD RAGAZ                                                                Strengthening Abdominal Sets/ Pelvic Tilt                     Shoulder (Flex/Ext, Abd/Add, IR/ER, Circles, Alternation flex/ext for core) x10-15 ea UE only then progressed with open paddles for greater resistance, completed in wall sit for core activation x10 reps ea UE only in standing for balance challenge    x10 reps ea UE only in standing for balance challenge x10 reps ea UE only in standing for balance challenge x10 reps ea UE only in standing for balance challenge - varied FRANK for balance challenge      Horizon (Abd/Add, Diagonals) (as above)  (as above)     (as above) (as above) (as above)      Rowing Arms                     UE PNF (D1/D2)                     Abdominal Sets (Push/Pull, side to side, push down with board)                     Squat    B UE support from pool wall x10 reps B UE support from pool wall x10 reps B UE support from pool wall x10 reps B UE support from pool wall x10 reps B UE support from pool wall x10 reps      Lunge Squat                     Hip (Flex/Ext, Abd/Add, single leg bike, circles, figure 8, All) x10 ea 1-2 UE support from wall for balance/posture (no figure 8) x10 ea with B UE support  from wall in corner (no figure 8) x10 ea with B UE support from wall in corner (no figure 8) x10 ea with B UE support from wall in corner (no figure 8)   x10 ea with B UE support from wall in corner (no figure 8)      Heel/Toe Raises                     Step Ups (Forward, Lateral)                     Noodle Push Downs with UE(B UE/1 UE) for core strengthening                     Noodle Push Downs with LE    x10  ea LE back support against wall, lightweight noodle, assist with set-up x10  ea LE back support against wall, progressed noodle resistance, assist with set-up x10  ea LE back support against wall, progressed noodle resistance, assist with set-up   x10  ea LE back support against wall, progressed noodle resistance, assist with set-up      Stir the Pot/Punches with Dumbells                     Sit to Stand at Bench h8-10x total, 3x sucessfull without retropulsion, needs support from therapist for controled desent x8 reps 1x retropulsion, significantly improved control, continued cues for technique  x8 reps without LOB, continued cues for technique x8 reps without LOB, continued cues for technique, use of paddleboard R UE support to limit tendency for L sided trunk lean x8 reps continued cues for technique, use of paddleboard R UE support to limit tendency for L sided trunk lean x8 reps, Increased ed for weight shift - hips over toes and posture for improved stability with sit < >stand following cues posterior LOB onto chair with arms back controling descent.         Prone Plank on step                     Side Plank on step                                                                Aerobic Fast Walking                     Bike/Ski/Juan/All                                                                Balance Narrow Base of Support x10-30 sec feet hips width, progressed to romberg x30 sec without LOB          x10-30 sec feet hips width, progressed to romberg x30 sec without LOB       Tandem Stand                      Single Leg Stance                     Heel/Toe Walking                     3 Step and Stop                     Braiding                                                                                       Cool Down Ambulation                     Deep Water Dangle                     Whirlpool

## 2018-03-20 NOTE — PROGRESS NOTES
AQUATIC PHYSICAL THERAPY EXERCISE LOG (TRUNK)  Date    Danielle Katzmark PT, DPT  10/30/17 Danielle Katzmark PT,  DPT  11/2/17 Danielle Katzmark PT,  DPT  11/6/17 Danielle Katzmark PT,  DPT  11/13/17 Danielle Katzmark PT,  DPT  11/16/17 Danielle Katzmark PT,  DPT  12/7/17 Danielle Katzmark PT,  DPT  12/14/17   Warm Up Ambulation (Forward/Side/Back/March/All) x4 laps forward, x2 side and x2 backwards with hands on noodle for support, facil at hips for posture/limit posterior lean and cues for larger step length x2 laps forward, x2 side and x2 backwards with hands on noodle for support, facil at hips for posture and cues for larger step length x2 laps forward, x2 side and x2 backwards with hands on noodle for support, facil at hips for posture and cues for larger step length x2 laps forward, x2 side and x2 backwards with hands on noodle for support, facil at hips for posture and cues for larger step length x8 laps forward, x2 side and x2 backwards with hands on noodle for support, facil at hips for posture and cues for larger step length x4 laps forward, x2 side and x2 backwards with hands on noodle for support, facil at hips for posture and cues for larger step length x4 laps forward, x2 side and x2 backwards progressed without UE support, facil at hips for posture and cues for larger step length         UE support from railing and therapist to enter/exit pool UE support from railing and therapist to enter/exit pool - improved step length and speed leaving session UE support from railing to enter/exit, therapist close-distant supervision UE support from railing to enter/exit, therapist close-distant supervision UE support from railing to enter/exit, therapist close-distant supervision UE support from railing to enter/exit, therapist close-distant supervision UE support from railing to enter/exit, therapist distant supervision                             Stretching/ROM Chin Tucks                        CROM (Flex/Ext/SB/Rot/All)                         Shoulder (Shrugs/Rolls)                        Scapular (Retraction/Depression)                        Upper Trunk (Levator/Scalene/Upper Trapezius)                        Pec Stretch (Unilateral/Bilateral)                        Posterior Shoulder                        Gluteal    x30 sec ea LE x30 sec ea LE x30 sec ea LE      x30 sec ea LE      Hamstring (On Step/Cuff)    x30 sec ea LE therapist assist horx abd/add and noodle support x30 sec ea LE therapist assist horx abd/add and noodle support x30 sec ea LE therapist assist horx abd/add and noodle support      x30 sec ea LE therapist assist horx abd/add and noodle support      Calf (In Hole/At Wall)    x60 sec ea LE, mod-max cues for technique  x60 sec ea LE, mod-max cues for technique  x60 sec ea LE, mod-max cues for technique       x60 sec ea LE, mod cues for technique       Quad                        Hip Flexor (Kneel)                        Piriformis (Seated/Stand)                        Trunk ROM (Flex/Ext/SB/Rot/All)    R/L trunk SB x30 sec ea    R/L trunk SB x30 sec ea R/L trunk SB x30 sec ea R/L trunk SB x30 sec ea R/L trunk SB x30 sec ea      BAD RAGAZ                                                                         Strengthening Abdominal Sets/ Pelvic Tilt                        Shoulder (Flex/Ext, Abd/Add, IR/ER, Circles, Alternation flex/ext for core) x10-15 ea UE only then progressed with open paddles for greater resistance, completed in wall sit for core activation x10 reps ea UE only in standing for balance challenge    x10 reps ea UE only in standing for balance challenge x10 reps ea UE only in standing for balance challenge x10 reps ea UE only in standing for balance challenge - varied FRANK for balance challenge x10 reps ea UE only in standing on slope of pool for balance challenge - varied FRANK for appropriate balance challenge      Horizon (Abd/Add, Diagonals) (as above)  (as above)     (as above) (as above) (as above)        Rowing Arms                        UE PNF (D1/D2)                        Abdominal Sets (Push/Pull, side to side, push down with board)                  In wall sit with small board x10 ea      Squat    B UE support from pool wall x10 reps B UE support from pool wall x10 reps B UE support from pool wall x10 reps B UE support from pool wall x10 reps B UE support from pool wall x10 reps glute taps to pool wall x10 reps      Lunge Squat                        Hip (Flex/Ext, Abd/Add, single leg bike, circles, figure 8, All) x10 ea 1-2 UE support from wall for balance/posture (no figure 8) x10 ea with B UE support from wall in corner (no figure 8) x10 ea with B UE support from wall in corner (no figure 8) x10 ea with B UE support from wall in corner (no figure 8)    x10 ea with B UE support from wall in corner (no figure 8) x10 ea with 1-2 UE support from wall in corner (no figure 8)      Heel/Toe Raises                        Step Ups (Forward, Lateral)                        Noodle Push Downs with UE(B UE/1 UE) for core strengthening                        Noodle Push Downs with LE    x10  ea LE back support against wall, lightweight noodle, assist with set-up x10  ea LE back support against wall, progressed noodle resistance, assist with set-up x10  ea LE back support against wall, progressed noodle resistance, assist with set-up    x10  ea LE back support against wall, progressed noodle resistance, assist with set-up       Stir the Pot/Punches with Dumbells                        Sit to Stand at Bench h8-10x total, 3x sucessfull without retropulsion, needs support from therapist for controled desent x8 reps 1x retropulsion, significantly improved control, continued cues for technique  x8 reps without LOB, continued cues for technique x8 reps without LOB, continued cues for technique, use of paddleboard R UE support to limit tendency for L sided trunk lean x8 reps continued cues for technique, use of paddleboard R UE  support to limit tendency for L sided trunk lean x8 reps, Increased ed for weight shift - hips over toes and posture for improved stability with sit < >stand following cues posterior LOB onto chair with arms back controling descent.     x5 reps, CTG/modeling  with improved stability with sit < >stand following, 1st time wihtout LOB      Prone Plank on step                        Side Plank on step                                                                         Aerobic Fast Walking                        Bike/Ski/Juan/All                                                                         Balance Narrow Base of Support x10-30 sec feet hips width, progressed to romberg x30 sec without LOB          x10-30 sec feet hips width, progressed to romberg x30 sec without LOB         Tandem Stand                        Single Leg Stance                        Heel/Toe Walking                        3 Step and Stop                        Braiding                                                                                                   Cool Down Ambulation                        Deep Water Dangle                        [place]

## 2018-03-20 NOTE — ADDENDUM NOTE
Encounter addended by: Danielle Ocampo PT on: 3/19/2018 11:55 PM<BR>     Actions taken: Sign clinical note, Flowsheet data copied forward, Flowsheet accepted

## 2018-03-27 ENCOUNTER — TRANSFERRED RECORDS (OUTPATIENT)
Dept: SURGERY | Facility: CLINIC | Age: 68
End: 2018-03-27

## 2018-03-27 ENCOUNTER — TRANSFERRED RECORDS (OUTPATIENT)
Dept: HEALTH INFORMATION MANAGEMENT | Facility: CLINIC | Age: 68
End: 2018-03-27

## 2018-04-02 ENCOUNTER — OFFICE VISIT (OUTPATIENT)
Dept: PHARMACY | Facility: CLINIC | Age: 68
End: 2018-04-02
Payer: COMMERCIAL

## 2018-04-02 VITALS
BODY MASS INDEX: 23.91 KG/M2 | SYSTOLIC BLOOD PRESSURE: 128 MMHG | DIASTOLIC BLOOD PRESSURE: 78 MMHG | WEIGHT: 143.7 LBS | HEART RATE: 83 BPM | OXYGEN SATURATION: 96 %

## 2018-04-02 DIAGNOSIS — E63.9 NUTRITIONAL DEFICIENCY: ICD-10-CM

## 2018-04-02 DIAGNOSIS — J44.9 CHRONIC OBSTRUCTIVE PULMONARY DISEASE, UNSPECIFIED COPD TYPE (H): ICD-10-CM

## 2018-04-02 DIAGNOSIS — R26.89 BALANCE PROBLEMS: ICD-10-CM

## 2018-04-02 DIAGNOSIS — K59.00 CONSTIPATION, UNSPECIFIED CONSTIPATION TYPE: ICD-10-CM

## 2018-04-02 DIAGNOSIS — F32.A DEPRESSION, UNSPECIFIED DEPRESSION TYPE: ICD-10-CM

## 2018-04-02 DIAGNOSIS — I10 ESSENTIAL HYPERTENSION: ICD-10-CM

## 2018-04-02 DIAGNOSIS — I25.10 CORONARY ARTERY DISEASE INVOLVING NATIVE CORONARY ARTERY OF NATIVE HEART WITHOUT ANGINA PECTORIS: ICD-10-CM

## 2018-04-02 DIAGNOSIS — R29.6 FALLING EPISODES: ICD-10-CM

## 2018-04-02 DIAGNOSIS — E78.5 HYPERLIPIDEMIA, UNSPECIFIED HYPERLIPIDEMIA TYPE: ICD-10-CM

## 2018-04-02 DIAGNOSIS — M85.80 OSTEOPENIA, UNSPECIFIED LOCATION: ICD-10-CM

## 2018-04-02 DIAGNOSIS — M48.061 SPINAL STENOSIS OF LUMBAR REGION WITHOUT NEUROGENIC CLAUDICATION: Primary | ICD-10-CM

## 2018-04-02 DIAGNOSIS — Z85.3 HISTORY OF BREAST CANCER: ICD-10-CM

## 2018-04-02 PROCEDURE — 99607 MTMS BY PHARM ADDL 15 MIN: CPT | Performed by: PHARMACIST

## 2018-04-02 PROCEDURE — 99605 MTMS BY PHARM NP 15 MIN: CPT | Performed by: PHARMACIST

## 2018-04-02 RX ORDER — CARBIDOPA AND LEVODOPA 25; 100 MG/1; MG/1
1 TABLET ORAL 3 TIMES DAILY
Refills: 1 | COMMUNITY
Start: 2018-03-08 | End: 2018-06-21

## 2018-04-02 NOTE — MR AVS SNAPSHOT
After Visit Summary   4/2/2018    Zoey Armstrong    MRN: [id number]           Patient Information     Date Of Birth          1950        Visit Information        Provider Department      4/2/2018 3:00 PM Anat Ellis, [facility] MTM        Today's Diagnoses     Spinal stenosis of lumbar region without neurogenic claudication    -  1    Balance problems        Falling episodes        Depression, unspecified depression type        Constipation, unspecified constipation type        Osteopenia, unspecified location        Essential hypertension        Hyperlipidemia, unspecified hyperlipidemia type        History of breast cancer        Chronic obstructive pulmonary disease, unspecified COPD type (H)        Nutritional deficiency        Coronary artery disease involving native coronary artery of native heart without angina pectoris          Care Instructions    Recommendations from today's MTM visit:                                                    MTM (medication therapy management) is a service provided by a clinical pharmacist designed to help you get the most of out of your medicines.   Today we reviewed what your medicines are for, how to know if they are working, that your medicines are safe and how to make your medicine regimen as easy as possible.     1. Calcium 600/vitamin 400 IU tablets: 1 tablet twice daily.     2. Try taking Tylenol (acetaminophen) 500-1000 mg every 6-8 hours as needed for pain (max 3000 mg/day). Can use lidoderm patches more frequently (at least 12 hours on, then 12 hours off) for pain management.     3. Follow-up with Neurology - Can ask about DaTscan (scan that looks for dopamine deficiencies in the brain)    4. Restart multivitamin daily.     Next MTM visit: 4 months - following up with Dr. Hoover in ~1 month.     To schedule another MTM appointment, please call the clinic directly or you may call the MTM scheduling line at [phone] or  toll-free at [phone].     My Clinical Pharmacist's contact information:                                                      It was a pleasure seeing you today!  Please feel free to contact me with any questions or concerns you have.      Anat Ellis, PharmD  Pharmaceutical Care Resident   Pager: [phone]      You may receive a survey about the [facility] services you received.  I would appreciate your feedback to help me serve you better in the future. Please fill it out and return it when you can. Your comments will be anonymous.                Follow-ups after your visit        Who to contact     If you have questions or need follow up information about today's clinic visit or your schedule please contact [facility] directly at [phone].  Normal or non-critical lab and imaging results will be communicated to you by [employer], letter or phone within 4 business days after the clinic has received the results. If you do not hear from us within 7 days, please contact the clinic through [employer] or phone. If you have a critical or abnormal lab result, we will notify you by phone as soon as possible.  Submit refill requests through [employer] or call your pharmacy and they will forward the refill request to us. Please allow 3 business days for your refill to be completed.          Additional Information About Your Visit        MyChart Information     [employer] gives you secure access to your electronic health record. If you see a primary care provider, you can also send messages to your care team and make appointments. If you have questions, please call your primary care clinic.  If you do not have a primary care provider, please call [phone] and they will assist you.        Care EveryWhere ID     This is your Care EveryWhere ID. This could be used by other organizations to access your [place] medical records  [id number]        Your Vitals Were     Pulse Pulse Oximetry BMI (Body Mass Index)              83 96% 23.91 kg/m2          Blood Pressure from Last 3 Encounters:   04/02/18 128/78   08/30/17 118/72   08/14/17 114/70    Weight from Last 3 Encounters:   04/02/18 143 lb 11.2 oz (65.2 kg)   08/30/17 149 lb 3.2 oz (67.7 kg)   07/17/17 159 lb 9.6 oz (72.4 kg)              Today, you had the following     No orders found for display         Today's Medication Changes          These changes are accurate as of 4/2/18 11:59 PM.  If you have any questions, ask your nurse or doctor.               Stop taking these medicines if you haven't already. Please contact your care team if you have questions.     cyanocobalamin 1000 MCG/ML injection   Commonly known as:  VITAMIN B12                    Primary Care Provider Office Phone # Fax #    Marisa Meek Hoover [phone]       [address] [facility] [address]        Equal Access to Services     [facility]: Hadii geneva bakero Sotoby, waaxda luqadaha, qaybta kaalmada adeelliyadg, ewa mcgovern . So [facility] [phone].    ATENCIÓN: Si habla español, tiene a sutton disposición servicios gratuitos de asistencia lingüística. Llame al [phone].    We comply with applicable federal civil rights laws and [place] laws. We do not discriminate on the basis of race, color, national origin, age, disability, sex, sexual orientation, or gender identity.            Thank you!     Thank you for choosing [facility]  for your care. Our goal is always to provide you with excellent care. Hearing back from our patients is one way we can continue to improve our services. Please take a few minutes to complete the written survey that you may receive in the mail after your visit with us. Thank you!             Your Updated Medication List - Protect others around you: Learn how to safely use, store and throw away your medicines at www.disposemymeds.org.          This list is accurate as of 4/2/18 11:59 PM.  Always use your most  recent med list.                   Brand Name Dispense Instructions for use Diagnosis    amLODIPine 10 MG tablet    NORVASC    90 tablet    TAKE 1 TABLET(10 MG) BY MOUTH DAILY    Essential hypertension       aspirin 81 MG tablet      Take 81 mg by mouth daily        buPROPion 150 MG 12 hr tablet    WELLBUTRIN SR    180 tablet    Take 1 tablet (150 mg) by mouth 2 times daily    Major depressive disorder, recurrent episode, mild (H)       calcium-vitamin D 600-400 MG-UNIT per tablet    CALTRATE     Take 1 tablet by mouth 2 times daily        carbidopa-levodopa  MG per tablet    SINEMET     Take 1 tablet by mouth 3 times daily        FLUoxetine 20 MG capsule    PROzac    90 capsule    TAKE 3 CAPSULES(60 MG) BY MOUTH DAILY    Major depressive disorder, recurrent episode, mild (H)       letrozole 2.5 MG tablet    FEMARA     Take 2.5 mg by mouth daily        lidocaine 5 % Patch    LIDODERM     Place 2 patches onto the skin every 24 hours Apply to low back/left hip - on in AM, remove after 12 hours. [id number], [id number].        MULTIVITAMIN ADULT PO      Take 1 tablet by mouth daily        olmesartan 40 MG tablet    BENICAR    90 tablet    TAKE 1 TABLET(40 MG) BY MOUTH DAILY    Essential hypertension       polyethylene glycol powder    MIRALAX/GLYCOLAX     Take 17 g by mouth daily AND daily PRN        rosuvastatin 10 MG tablet    CRESTOR    90 tablet    TAKE 1 TABLET(10 MG) BY MOUTH DAILY    Hyperlipidemia LDL goal <100       senna-docusate 8.6-50 MG per tablet    SENOKOT-S;PERICOLACE     Take 2 tablets by mouth daily AND 1 tab QHS        TYLENOL PO      Take 1,000 mg by mouth 4 times daily

## 2018-04-02 NOTE — LETTER
April 3, 2018      Zoey Armstrong  [address] [place] [address]        Dear Zoey,       It was so nice to speak with you on 4/3/18.  I hope I was able to give you some useful information during our Medication Therapy Management (MTM) visit. The purpose of this visit with a clinical pharmacist was to review the medicines you received when discharged from the hospital. We want to make sure that you know which medicines to take and what they are for. We also want to make sure all your medicines are working, safe, and as easy to take as possible.    Enclosed is a summary of the suggestions we talked about and any other thoughts I had. There is also a list of your medicines included. This information has also been shared with your primary care provider.    Feel free to call if you have any questions or concerns. By working together with you and your doctor, I hope to help you feel confident managing your medicines and improving your quality of life.         Best wishes,          Anat Ellis [facility]

## 2018-04-02 NOTE — Clinical Note
Arvin Hoover, please see note as FYI. Patient wanting to focus on current issues of balance/falls. Think in the future it would be beneficial to discuss alternative antidepressant options and potentially additional therapy for osteopenia in the presence of fracture. Patient said she is following up with you after Neurology f/u in 2-3 weeks.   Anat Ellis, PharmD Pharmaceutical Care Resident  Pager: [phone]

## 2018-04-02 NOTE — PATIENT INSTRUCTIONS
Recommendations from today's MTM visit:                                                    MTM (medication therapy management) is a service provided by a clinical pharmacist designed to help you get the most of out of your medicines.   Today we reviewed what your medicines are for, how to know if they are working, that your medicines are safe and how to make your medicine regimen as easy as possible.     1. Calcium 600/vitamin 400 IU tablets: 1 tablet twice daily.     2. Try taking Tylenol (acetaminophen) 500-1000 mg every 6-8 hours as needed for pain (max 3000 mg/day). Can use lidoderm patches more frequently (at least 12 hours on, then 12 hours off) for pain management.     3. Follow-up with Neurology - Can ask about DaTscan (scan that looks for dopamine deficiencies in the brain)    4. Restart multivitamin daily.     Next MTM visit: 4 months - following up with Dr. Hoover in ~1 month.     To schedule another MTM appointment, please call the clinic directly or you may call the MTM scheduling line at [phone] or toll-free at [phone].     My Clinical Pharmacist's contact information:                                                      It was a pleasure seeing you today!  Please feel free to contact me with any questions or concerns you have.      Anat Ellis, PharmD  Pharmaceutical Care Resident   Pager: [phone]      You may receive a survey about the MTM services you received.  I would appreciate your feedback to help me serve you better in the future. Please fill it out and return it when you can. Your comments will be anonymous.

## 2018-04-02 NOTE — PROGRESS NOTES
"SUBJECTIVE/OBJECTIVE:                           Zoey Armstrong is a 68 year old female coming in for an initial visit for Medication Therapy Management.  She was referred to me from Dr. Hoover. Joined by patient's .     Chief Complaint: Gait/walking issues     Allergies/ADRs: Reviewed in Epic  Tobacco: No tobacco use  Alcohol: none  Caffeine: 1 cups/day of coffee in morning or 1 diet coke/day  Activity: Just recently got new cane.   PMH: Reviewed in Epic    Medication Adherence/Access:  no issues reported,  assisting with filling pill box weekly.     Balance Difficulties/Falls: Currently taking Carbidopa/Levodopa 25/100 mg TID on empty stomach for the past 4 months. Previously thought that gait issues/falls were having to do with spinal stenosis in lower back- had fusion 7/2017- surgery was successful, but balance difficulties/walking issues/falls persisted. Sometimes legs \"just give out\". Currently going to balance/dizziness center. Was referred to Neurologist - was started on Sinemet due to theory that symptoms may be related with Parkinson's Disease.  Patient unsure if Sinemet has helped since starting. New 4-legged cane has been beneficial for balance- hasn't fallen since getting new cane. Last fall was 3 weeks ago, landed on couch. Finds that food/alcohol doesn't taste/smell good anymore. Thinks that she is losing weight because she finds that foods don't taste good, so doesn't eat often.  Patient and  are frustrated because they don't know what patient's diagnosis is and would like to have an answer to the reason why these symptoms are occurring, they are wondering if medications are contributing to falls/balance issues.  Followed by Dr. Lele Stahl Neurologist, [facility] at [facility]. Follow-up appointment in 2-3 weeks.     Pain: Patient currently taking Aspirin Back and Body (aspirin 500 mg, caffeine 32.5 mg) tablet: 2 tablets every 2-3 days PRN back " pain - helpful for back pain. Hx spinal stenosis. Has Tylenol at home, but doesn't use it. Doesn't know if Tylenol works for back pain. During surgery was given oxycodone and tramadol previously, but no longer uses. Has Lidoderm 5% patches and uses every 2-3 days for back pain - finds helpful. No medication side effects. No bleeding/bruising, no abdominal pain, no black tarry stools.     Depression:  Current medications includes bupropion  mg BID, fluoxetine 60 mg once daily.  Pt reports that depression symptoms are unchanged, even after doses of both medications have been increased previously. Finds that not getting around as much as she was used to, gets more frustrated. Patient is going to be following up with Dr. Hoover regarding this after appointment with Neurologist.   PHQ-9 SCORE 10/25/2016 6/5/2017 4/2/2018   Total Score - - -   Total Score 4 8 11     Wt Readings from Last 10 Encounters:   04/02/18 143 lb 11.2 oz (65.2 kg)   08/30/17 149 lb 3.2 oz (67.7 kg)   07/17/17 159 lb 9.6 oz (72.4 kg)   07/12/17 159 lb 9.6 oz (72.4 kg)   07/10/17 161 lb 12.8 oz (73.4 kg)   07/06/17 161 lb 12.8 oz (73.4 kg)   07/03/17 169 lb (76.7 kg)   06/29/17 169 lb (76.7 kg)   06/27/17 166 lb (75.3 kg)   06/05/17 156 lb 3.2 oz (70.9 kg)     Constipation: Currently taking Miralax 1 capful PRN (a couple times per week), senokot-S 1 tablet PRN. Will only take senokot-S if Miralax isn't working. Finds that she has a bowel movement every day to every other day. No medication side effects. No concerns.     Osteopenia: Current therapy includes: nothing. Was recommended by doctor to start taking calcium/vitamin D but hasn't started yet.   Pt is getting the following sources of dietary calcium: cheese 1-2 times weekly, doesn't like a lot of calcium containing foods/drinks.   Last vitamin D level: 28 on 2/2/16  DEXA History: 1/27/17  Lumbar Spine (L1-L2)      T-score:  0.4, marked degenerative changes present at L3,4, so only L1,2  are evaluated.   Left Femoral Neck            T-score:  -1.6  Right Femoral Neck         T-score:  -1.2  Risk factors: post-menopausal, history of fracture - ulna     CAD/Hyperlipidemia: Current therapy includes rosuvastatin 10mg once daily.  Pt reports no significant myalgias or other side effects. Also taking aspirin 81 mg once daily. No bleeding or bruising, denies side effects.     Hypertension: Current medications include amlodipine 10 mg once daily, olmesartan 40 mg once daily.  Patient does not self-monitor BP.  Patient reports no current medication side effects. No lightheadedness, no dizziness.     Breast Cancer: Currently taking letrozole 2.5 mg once daily. No medication side effects. No concerns at this time. Followed by Oncology. History of right breat lumpectomy.      COPD: Current medications: none. No PRN medications. Patient doesn't recollect where this diagnosis came from, thinks it might have been from hospitalization where she had lung infection. Has not had issues since with breathing.   Pt reports the following symptoms: none. Denies cough, wheezing, SOB (at rest or upon exertion).   Pt does not have an COPD Action Plan on file.   Has spirometry been completed: Yes in 2/16/16 - showed moderate airway obstruction.     Supplements: Currently taking Vitamin B12 1000 mcg tablet daily. Has multivitamin at home, but doesn't take. Had history of lower B12 levels, so was started on vitamin B12 injections, but then switched to oral supplementation. Last Vitamin B12 level 8/30/17 was 208.     Current labs include:  Today's Vitals: /78 (BP Location: Right arm)  Pulse 83  Wt 143 lb 11.2 oz (65.2 kg)  BMI 23.91 kg/m2  BP Readings from Last 3 Encounters:   04/02/18 128/78   08/30/17 118/72   08/14/17 114/70       Lab Results   Component Value Date    CHOL 195 09/28/2015     Lab Results   Component Value Date    TRIG 170 09/28/2015     Lab Results   Component Value Date    HDL 74 09/28/2015     Lab  Results   Component Value Date    LDL 87 09/28/2015       Liver Function Studies -   Recent Labs   Lab Test  09/28/15   0800   PROTTOTAL  7.6   ALBUMIN  3.9   BILITOTAL  0.6   ALKPHOS  92   AST  26   ALT  29       Lab Results   Component Value Date    UCRR 91 03/29/2013    MICROL 151 03/29/2013    UMALCR 165.93 (H) 03/29/2013       Last Basic Metabolic Panel:  Lab Results   Component Value Date     07/06/2017      Lab Results   Component Value Date    POTASSIUM 3.9 07/06/2017     Lab Results   Component Value Date    CHLORIDE 101 07/06/2017     Lab Results   Component Value Date    BUN 18 07/06/2017     Lab Results   Component Value Date    CR 0.96 07/06/2017     GFR Estimate   Date Value Ref Range Status   07/06/2017 58 (A) >60 ml/min/1.73m2 Final   06/05/2017 51 (L) >60 mL/min/1.7m2 Final     Comment:     Non  GFR Calc   01/04/2017 51 (L) >60 mL/min/1.7m2 Final     Comment:     Non  GFR Calc     TSH   Date Value Ref Range Status   10/25/2016 1.65 0.40 - 4.00 mU/L Final     Most Recent Immunizations   Administered Date(s) Administered     Pneumo Conj 13-V (2010&after) 06/24/2015     Pneumococcal 23 valent 03/29/2013     TDAP Vaccine (Adacel) 03/29/2013     Zoster vaccine, live 09/15/2011       ASSESSMENT:                             Current medications were reviewed today.     Medication Adherence: good, no issues identified    Balance Difficulties/Falls: Unimproved. Patient to follow-up with Neurology. After researching medication regimen, it doesn't seem that medications are contributing to balance difficulties/falls. Discussed with patient that she could inquire about DaTscan, scan that can be utilized to identify dopamine deficiencies in the brain and diagnosis.       Pain: Needs improvement. Patient would benefit from using Tylenol over Aspirin Back and Body for pain management.  Patient can use lidoderm patches more frequently to assist with back pain and also minimize  NSAID use. Worry of long-term effects of high dose NSAID use on GI and kidneys.     Depression:  Unimproved. PHQ9 completed. Bupropion could be contributing to weight loss. Due to this and unimproved depressive symptoms, patient may benefit from alternative therapy than bupropion and fluoxetine for depression management, such as trialing different SSRI like sertraline. However, patient states that with other concerns, would like to hold off and discuss this at later visit. To reassess this at future visit and reach out to Dr. Hoover regarding this as future consideration for follow-up.     Constipation: Stable.     Osteopenia: Needs Improvement. Pt is not meeting RDI of calcium 1200mg/day. Pt is not meeting RDI of Vitamin D 1000 IU/day. Pt would benefit from:additional supplementation with Calcium 600 mg/Vitamin D 400 mg BID. Due to previous fracture in presence of osteopenia, patient may benefit from additional therapy, such as bisphosphonate therapy - can reach out to Dr. Hoover regarding this as future consideration for follow-up.     CAD/Hyperlipidemia: Stable.     Hypertension: Stable.    Breast Cancer: Stable.      COPD: Stable.     Supplements: Stable. Due to limited food intake, may be beneficial for patient to restart multivitamin to ensure adequate nutritional supplementation.     PLAN:                            1. Patient to start Calcium carbonate 600/vitamin 400 IU tablets: 1 tablet twice daily.     2. Patient to take Tylenol (acetaminophen) 500-1000 mg every 6-8 hours as needed for pain (max 3000 mg/day). Can use lidoderm patches more frequently (at least 12 hours on, then 12 hours off) for pain management. Limit Aspirin Back and Body use.     3. Patient to restart multivitamin daily.     4. Patient can inquire about DaTscan when at next Neurology appointment.     Future considerations: Alternative medications for depression, additional therapy for osteopenia    I spent 60 minutes with this  patient today. I offer these suggestions for consideration by the PCP. A copy of the visit note was provided to the patient's primary care provider.    Will follow up in 4 months, following up with Dr. Hoover in ~1 month.     I concur with the note as dictated above which reflects our joint assessment and plan.   Angela Rodriguez PharmD    The patient was given a summary of these recommendations as an after visit summary.     Anat Ellis, Amadou  Pharmaceutical Care Resident   Pager: [phone]

## 2018-04-03 ASSESSMENT — PATIENT HEALTH QUESTIONNAIRE - PHQ9: SUM OF ALL RESPONSES TO PHQ QUESTIONS 1-9: 11

## 2018-04-06 DIAGNOSIS — F33.0 MAJOR DEPRESSIVE DISORDER, RECURRENT EPISODE, MILD (H): ICD-10-CM

## 2018-04-09 ENCOUNTER — TRANSFERRED RECORDS (OUTPATIENT)
Dept: HEALTH INFORMATION MANAGEMENT | Facility: CLINIC | Age: 68
End: 2018-04-09

## 2018-04-09 NOTE — TELEPHONE ENCOUNTER
"Requested Prescriptions   Pending Prescriptions Disp Refills     FLUoxetine (PROZAC) 20 MG capsule [Pharmacy Med Name: FLUOXETINE 20MG CAPSULES] 90 capsule 0     Sig: TAKE 3 CAPSULES(60 MG) BY MOUTH DAILY    SSRIs Protocol Failed    4/6/2018  9:40 AM       Failed - PHQ-9 score less than 5 in past 6 months    Please review last PHQ-9 score.   PHQ-9 score:    PHQ-9 SCORE 4/2/2018   Total Score -   Total Score 11          Failed - Recent (6 mo) or future (30 days) visit within the authorizing provider's specialty    Patient had office visit in the last 6 months or has a visit in the next 30 days with authorizing provider or within the authorizing provider's specialty.  See \"Patient Info\" tab in inbasket, or \"Choose Columns\" in Meds & Orders section of the refill encounter.    Last OV: 08/30/17       Passed - Patient is age 18 or older       Passed - No active pregnancy on record       Passed - No positive pregnancy test in last 12 months        Routing refill request to provider for review/approval because:  PHQ-9 >5  Last OV > 6 months ago    Please advise, thanks.  "

## 2018-04-17 NOTE — ADDENDUM NOTE
Encounter addended by: Danielle Ocampo PT on: 4/16/2018 10:08 PM<BR>     Actions taken: Flowsheet accepted

## 2018-05-09 DIAGNOSIS — F33.0 MAJOR DEPRESSIVE DISORDER, RECURRENT EPISODE, MILD (H): ICD-10-CM

## 2018-05-09 NOTE — TELEPHONE ENCOUNTER
FUTURE VISIT INFORMATION      FUTURE VISIT INFORMATION:    Date: 05/10/2018    Time:     Location:   REFERRAL INFORMATION:    Referring provider:  Dr. Gallo Royal    Referring providers clinic:      Reason for visit/diagnosis      NOTES (FOR ALL VISITS) STATUS DETAILS   OFFICE NOTE from referring provider Internal    OFFICE NOTE from other specialist Internal    DISCHARGE SUMMARY from hospital N/A    DISCHARGE REPORT from the ER N/A    OPERATIVE REPORT N/A    MEDICATION LIST Internal    IMAGING  (FOR ALL VISITS)     EMG N/A    EEG N/A    ECT N/A    MRI (HEAD, NECK, SPINE) Internal 11/02/2016   CT (HEAD, NECK, SPINE) Internal 11/02/16 04/16/2016   OTHER     OTHER: X-ray Lumbar Spinal Tap  Internal 05/25/2017

## 2018-05-10 ENCOUNTER — OFFICE VISIT (OUTPATIENT)
Dept: NEUROLOGY | Facility: CLINIC | Age: 68
End: 2018-05-10
Payer: MEDICARE

## 2018-05-10 ENCOUNTER — PRE VISIT (OUTPATIENT)
Dept: NEUROLOGY | Facility: CLINIC | Age: 68
End: 2018-05-10

## 2018-05-10 VITALS
SYSTOLIC BLOOD PRESSURE: 133 MMHG | WEIGHT: 138.4 LBS | HEART RATE: 79 BPM | BODY MASS INDEX: 23.06 KG/M2 | DIASTOLIC BLOOD PRESSURE: 79 MMHG | HEIGHT: 65 IN

## 2018-05-10 DIAGNOSIS — R26.89 PRIMARY FREEZING OF GAIT: Primary | ICD-10-CM

## 2018-05-10 RX ORDER — BUPROPION HYDROCHLORIDE 150 MG/1
TABLET, EXTENDED RELEASE ORAL
Qty: 180 TABLET | Refills: 0 | Status: SHIPPED | OUTPATIENT
Start: 2018-05-10 | End: 2018-10-23

## 2018-05-10 ASSESSMENT — ENCOUNTER SYMPTOMS
TREMORS: 1
DIZZINESS: 1
NUMBNESS: 0
TINGLING: 0
PARALYSIS: 0
HEADACHES: 0
LOSS OF CONSCIOUSNESS: 0
DISTURBANCES IN COORDINATION: 1
WEAKNESS: 1
MEMORY LOSS: 1
SEIZURES: 0
SPEECH CHANGE: 0

## 2018-05-10 ASSESSMENT — PAIN SCALES - GENERAL: PAINLEVEL: NO PAIN (0)

## 2018-05-10 NOTE — PROGRESS NOTES
[facility]    HPI:  Ms. Zoey Armstrong is a 68 year old woman with gait changes and possible parkinsonism referred for further evaluation.  She initially began to notice changes in her gait about 2-3 years ago.  Prior to that she was very active including walking and waterskiing.  She reports about 2-3 years ago she began to notice gait instability that has been progressive over the last several years.  With this, she will particular difficulty with turns and she has had associated low back pain that does not radiate into her extremities.  The low back pain is not present earlier in the day but is present later in the afternoons and evenings.  The pain comes on with minimal activity and resolves with rest.  These symptoms have not improved since her lumbar spine surgery.  She denies any preceding back injury or trauma.  At worst, she was following up to twice a week.  They have been much more cautious as a result..  She currently uses a walker for assistance with ambulation.  She has had issues with freezing including when turning.    She reports mild word finding issues and some difficulty remembering the day priors events.  She endorses some mild situational mood changes.  She denies incontinence.  She denies headaches or vision changes.  She denies numbness or tingling in her extremities.  She will feel tremulous at times, this can occur in both her arms and legs, not worse on one side.    She denies voice changes or dysphagia.     She has noticed mild generalized weakness in her arms and legs, with difficulty buttoning buttons.     Per report, she had an MRI of the cervical spine and head within the last month, that to her knowledge were unremarkable.    She is previously followed at the [facility], [facility] by Dr. Lele Stahl.  She underwent lumbar spine decompression L2- S1 in August 2017 for treatment of neurogenic claudication.  EMG from October 2017 was significant for  potential L5 radiculopathy, no evidence of peripheral neuropathy.  There is also been concern for ventriculomegaly on imaging and per report, she had large volume LP (38 cc removed) without significant improvement.  She was seen in clinic by Dr. Cruz in neurosurgery who did not feel her presentation was consistent with normal pressure hydrocephalus.      She has been trialed on Sinemet, up to 1 tablet 3 times a day without improvement.    PD Medications                   8am 1pm 10pm      Sinemet 25/100                                                 1 tab  1 tab  1 tab                                                                                   .    Past Surgical History:   Procedure Laterality Date     ANGIOGRAM  10-    Moderate non-obstructive coronary disease involving all 3 vessels. LAD and RCA lesions are non-flow-limiting. Recommend aggressive risk factor management and possible pulmonary evaluation for dyspnea     ARTHROPLASTY TOE(S) Right 2/24/2016    Procedure: ARTHROPLASTY TOE(S);  Surgeon: Levar Matias DPM;  Location: [facility]     ARTHROTOMY SHOULDER, ROTATOR CUFF REPAIR, COMBINED Right 7/12/2016    Procedure: COMBINED ARTHROTOMY SHOULDER, ROTATOR CUFF REPAIR;  Surgeon: Reggie Cisse MD;  Location:  OR     AS LUMBAR SPINE FUSION,ANTER APPRCH      L2-S1     HEAD & NECK SURGERY      Jaw surgery     LUMPECTOMY BREAST, SEED LOCALIZATION, SENTINEL NODE Right 12/2/2016    Procedure: LUMPECTOMY BREAST, SEED LOCALIZATION, SENTINEL NODE;  Surgeon: Tiffanie Cabrear MD;  Location:  OR     ORTHOPEDIC SURGERY  1994    left foot surgery / bunion     OSTEOTOMY FOOT Right 2/24/2016    Procedure: OSTEOTOMY FOOT;  Surgeon: Levar Matias DPM;  Location: [facility]     RECONSTRUCT FOREFOOT WITH METATARSOPHALANGEAL (MTP) FUSION Right 2/24/2016    Procedure: RECONSTRUCT FOREFOOT WITH METATARSOPHALANGEAL (MTP) FUSION;  Surgeon: Levar Matias DPM;  Location: [facility]     REMOVE HARDWARE FOOT  Right 1/18/2017    Procedure: REMOVE HARDWARE FOOT;  Surgeon: Levar Matias DPM;  Location: [facility] OR     Past Medical History:   Diagnosis Date     Back pain      CAD (coronary artery disease)      COPD (chronic obstructive pulmonary disease) (H) 02/06/2016     Depression      Falls      Hyperlipidemia      Hypertension      Smoker      Current Outpatient Prescriptions   Medication Sig Dispense Refill     Acetaminophen (TYLENOL PO) Take 1,000 mg by mouth 4 times daily        amLODIPine (NORVASC) 10 MG tablet TAKE 1 TABLET(10 MG) BY MOUTH DAILY 90 tablet 1     aspirin 81 MG tablet Take 81 mg by mouth daily       calcium-vitamin D (CALTRATE) 600-400 MG-UNIT per tablet Take 1 tablet by mouth 2 times daily       carbidopa-levodopa (SINEMET)  MG per tablet Take 1 tablet by mouth 3 times daily  1     FLUoxetine (PROZAC) 20 MG capsule TAKE 3 CAPSULES(60 MG) BY MOUTH DAILY 90 capsule 1     letrozole (FEMARA) 2.5 MG tablet Take 2.5 mg by mouth daily       lidocaine (LIDODERM) 5 % Patch Place 2 patches onto the skin every 24 hours Apply to low back/left hip - on in AM, remove after 12 hours. [id number], [id number].        Multiple Vitamins-Minerals (MULTIVITAMIN ADULT PO) Take 1 tablet by mouth daily       olmesartan (BENICAR) 40 MG tablet TAKE 1 TABLET(40 MG) BY MOUTH DAILY 90 tablet 1     polyethylene glycol (MIRALAX/GLYCOLAX) powder Take 17 g by mouth daily AND daily PRN       rosuvastatin (CRESTOR) 10 MG tablet TAKE 1 TABLET(10 MG) BY MOUTH DAILY 90 tablet 0     senna-docusate (SENOKOT-S;PERICOLACE) 8.6-50 MG per tablet Take 2 tablets by mouth daily AND 1 tab QHS       buPROPion (WELLBUTRIN SR) 150 MG 12 hr tablet TAKE 1 TABLET(150 MG) BY MOUTH TWICE DAILY 180 tablet 0        Allergies   Allergen Reactions     Betadine [Povidone Iodine] Hives     Iodine-131      No Clinical Screening - See Comments Swelling     Water softener salts     Soap      Perfumed soaps     Sodium Chloride      Social History     Social  "History     Marital status:      Spouse name: N/A     Number of children: N/A     Years of education: N/A     Occupational History     Not on file.     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 She is currently related. She lives with her SO and son. She has another son who is local.     Family History   Problem Relation Age of Onset     Breast Cancer Mother      C.A.D. Father      MI     Lipids Father      Coronary Artery Disease Father      Hyperlipidemia Father      Breast Cancer Other      maternal aunt     CANCER Other      skin     Migraines Cousin      Coronary Artery Disease Cousin      Multiple Sclerosis Paternal Uncle      ROS:  A complete ROS was obtained and is negative except as per HPI.    Patient Active Problem List   Diagnosis     HTN (hypertension)     Chronic rhinitis     Advance Care Planning     Mild major depression (H)     Hyperlipidemia     CAD (coronary artery disease)     Falling episodes     COPD (chronic obstructive pulmonary disease) (H)     Status post lumbar spine surgery for decompression foraminal/lateral recess L2-S1, 6/23/17     S/P spinal fusion, posterior, L3-S1, 6/23/17     Benign essential hypertension     Spondylolisthesis of lumbar region     Spinal stenosis of lumbar region without neurogenic claudication     Depression, unspecified depression type       Examination   138 lbs 6.4 oz  Blood pressure 133/79, pulse 79, height 1.651 m (5' 5\"), weight " 62.8 kg (138 lb 6.4 oz), not currently breastfeeding., Body mass index is 23.03 kg/(m^2).    General examination: no apparent distress     Neuro exam:   Mental status:  Alert, oriented x3.  Verbally fluent, no apraxia   Cranial nerves:  Pupils are equal, round, reactive to light. Extraocular movements intact. Facial sensation intact, facial strength intact.  Hearing intact to finger rub bi. Palate moves symmetrically.  Tongue midline.  Sternocleidomastoid and trapezius strength intact.    Motor: Tone: slightly increased in the neck and right arm. Strength was 5/5 throughout except 5-/5 with left arm flexion     Sensation: intact to light touch and pinprick in all extremities   Coordination: FTN intact without dysmetria   Gait: needs arms to help stand, narrow stance, gait freezing present with turning, en bloc turn, requires assistance to walk  Reflexes: normoreflexic and symmetric throughout.    Plantars: downgoing bi      MDS-UPDRS Part III   On meds   Speech:  1   Facial Expression:  1   Rigidity-Neck   1   Rigidity-RUE  0   Rigidity-LUE  1   Rigidity-RLE   0   Rigidity-LLE  0   Finger tap-R Hand  1   Finger tap-L Hand  2   Hand Movements-Right  0   Hand Movements-Left  0   Pronation-R Hand  0   Pronation-L Hand  0   Toe Tap-R Foot   2   Toe Tap-L Foot  3   Leg Agility-R Leg   1   Leg Agility-L Leg   1   Arise from chair  1   Gait  4   Freezing of Gait  2   Postural Stability   not done    Posture   1   Global spontaneity of Movement  1   Postural Tremor-R Hand  0   Postural Tremor-L Hand   0   Kinetic Tremor-R Hand  1   Kinetic Tremor-L Hand   1   Rest Tremor Amp-RUE  0   Rest Tremor Amp-LUE  0   Rest Tremor Amp-RLE   0   Rest Tremor Amp-LLE   0   Rest Tremor Amp-Lip/Jaw   0   Constancy of Rest Tremor  0   Total:   26       Imaging:  Noncontrast CT head from November 2016 was reviewed and significant for ventriculomegaly that does appear out of proportion to sulcal atrophy.    Assessment/plan:   Ms. Armstrong is a  68-year-old woman presenting with progressive gait instability and gait freezing over the last several years.  Exam today notable for her gait freezing. she is also endorsing mild cognitive symptoms, no incontinence.  Her noncontrast head CT from 2016  could be consistent with normal pressure hydrocephalus.  She had a negative tap test, however lumbar drain is more sensitive.  She does not have incontinence however, you do not always have the full triad.  Alternatively, with the early falls and gait instability, she could have an atypical parkinsonism.      -We recommend they send us her outside MRI of the brain and cervical spine to review  -We will obtain DARRICK scan  -neuropsych testing given cognitive concerns, question of NPH versus parkinsonism  -referral to Neurosurgery clinic for lumbar drain trial    RTC in 3 months    The case and recommendations were discussed with Dr. Mart, who has spoken with and examined the patient and helped to formulate the impression and recommendations.    Kelsey Garcia MD  Movement disorders fellow       Answers for HPI/ROS submitted by the patient on 5/10/2018   General Symptoms: No  Skin Symptoms: No  HENT Symptoms: No  EYE SYMPTOMS: No  HEART SYMPTOMS: No  LUNG SYMPTOMS: No  INTESTINAL SYMPTOMS: No  URINARY SYMPTOMS: No  GYNECOLOGIC SYMPTOMS: No  BREAST SYMPTOMS: No  SKELETAL SYMPTOMS: No  BLOOD SYMPTOMS: No  NERVOUS SYSTEM SYMPTOMS: Yes  MENTAL HEALTH SYMPTOMS: No  Trouble with coordination: Yes  Dizziness or trouble with balance: Yes  Fainting or black-out spells: No  Memory loss: Yes  Headache: No  Seizures: No  Speech problems: No  Tingling: No  Tremor: Yes  Weakness: Yes  Difficulty walking: Yes  Paralysis: No  Numbness: No

## 2018-05-10 NOTE — PATIENT INSTRUCTIONS
-Please send the images and reports from your MRI brain from [facility] so we can review these results.    -We will order a test called a DARRICK scan which will help us tell if your walking problems are related to Parkinson's disease/Parkinsonism    -We will refer you to Dr. Cruz in Neurosurgery for re-evaluation of NPH, lumbar drain trial     -We will order neurocognitive testing

## 2018-05-10 NOTE — LETTER
5/10/2018       RE: Zoey Armstrong  [address]  [place] [address]     Dear Colleague,    Thank you for referring your patient, Zoey Armstrong, to the [facility]UROLOG[facility] at [facility]. Please see a copy of my visit note below.    Movement Disorders Clinic    HPI:  Ms. Zoey Armstrong is a 68 year old woman with gait changes and possible parkinsonism referred for further evaluation.  She initially began to notice changes in her gait about 2-3 years ago.  Prior to that she was very active including walking and waterskiing.  She reports about 2-3 years ago she began to notice gait instability that has been progressive over the last several years.  With this, she will particular difficulty with turns and she has had associated low back pain that does not radiate into her extremities.  The low back pain is not present earlier in the day but is present later in the afternoons and evenings.  The pain comes on with minimal activity and resolves with rest.  These symptoms have not improved since her lumbar spine surgery.  She denies any preceding back injury or trauma.  At worst, she was following up to twice a week.  They have been much more cautious as a result..  She currently uses a walker for assistance with ambulation.  She has had issues with freezing including when turning.    She reports mild word finding issues and some difficulty remembering the day priors events.  She endorses some mild situational mood changes.  She denies incontinence.  She denies headaches or vision changes.  She denies numbness or tingling in her extremities.  She will feel tremulous at times, this can occur in both her arms and legs, not worse on one side.    She denies voice changes or dysphagia.     She has noticed mild generalized weakness in her arms and legs, with difficulty buttoning buttons.     Per report, she had an MRI of the cervical spine and head within the last month, that to  her knowledge were unremarkable.    She is previously followed at the [facility] by Dr. Lele Stahl.  She underwent lumbar spine decompression L2- S1 in August 2017 for treatment of neurogenic claudication.  EMG from October 2017 was significant for potential L5 radiculopathy, no evidence of peripheral neuropathy.  There is also been concern for ventriculomegaly on imaging and per report, she had large volume LP (38 cc removed) without significant improvement.  She was seen in clinic by Dr. Cruz in neurosurgery who did not feel her presentation was consistent with normal pressure hydrocephalus.      She has been trialed on Sinemet, up to 1 tablet 3 times a day without improvement.    PD Medications                   8am 1pm 10pm      Sinemet 25/100                                                 1 tab  1 tab  1 tab                                                                                   .    Past Surgical History:   Procedure Laterality Date     ANGIOGRAM  10-    Moderate non-obstructive coronary disease involving all 3 vessels. LAD and RCA lesions are non-flow-limiting. Recommend aggressive risk factor management and possible pulmonary evaluation for dyspnea     ARTHROPLASTY TOE(S) Right 2/24/2016    Procedure: ARTHROPLASTY TOE(S);  Surgeon: Levar Matias DPM;  Location: [facility]     ARTHROTOMY SHOULDER, ROTATOR CUFF REPAIR, COMBINED Right 7/12/2016    Procedure: COMBINED ARTHROTOMY SHOULDER, ROTATOR CUFF REPAIR;  Surgeon: Reggie Cisse MD;  Location: [facility] OR     AS LUMBAR SPINE FUSION,ANTER APPRCH      L2-S1     HEAD & NECK SURGERY      Jaw surgery     LUMPECTOMY BREAST, SEED LOCALIZATION, SENTINEL NODE Right 12/2/2016    Procedure: LUMPECTOMY BREAST, SEED LOCALIZATION, SENTINEL NODE;  Surgeon: Tiffanie Cabrera MD;  Location:  OR     ORTHOPEDIC SURGERY  1994    left foot surgery / bunion     OSTEOTOMY FOOT Right 2/24/2016    Procedure: OSTEOTOMY FOOT;  Surgeon:  Levar Matias DPM;  Location:  [facility]     RECONSTRUCT FOREFOOT WITH METATARSOPHALANGEAL (MTP) FUSION Right 2/24/2016    Procedure: RECONSTRUCT FOREFOOT WITH METATARSOPHALANGEAL (MTP) FUSION;  Surgeon: Levar Matias DPM;  Location:  [facility]     REMOVE HARDWARE FOOT Right 1/18/2017    Procedure: REMOVE HARDWARE FOOT;  Surgeon: Levar Matias DPM;  Location: [facility] OR     Past Medical History:   Diagnosis Date     Back pain      CAD (coronary artery disease)      COPD (chronic obstructive pulmonary disease) (H) 02/06/2016     Depression      Falls      Hyperlipidemia      Hypertension      Smoker      Current Outpatient Prescriptions   Medication Sig Dispense Refill     Acetaminophen (TYLENOL PO) Take 1,000 mg by mouth 4 times daily        amLODIPine (NORVASC) 10 MG tablet TAKE 1 TABLET(10 MG) BY MOUTH DAILY 90 tablet 1     aspirin 81 MG tablet Take 81 mg by mouth daily       calcium-vitamin D (CALTRATE) 600-400 MG-UNIT per tablet Take 1 tablet by mouth 2 times daily       carbidopa-levodopa (SINEMET)  MG per tablet Take 1 tablet by mouth 3 times daily  1     FLUoxetine (PROZAC) 20 MG capsule TAKE 3 CAPSULES(60 MG) BY MOUTH DAILY 90 capsule 1     letrozole (FEMARA) 2.5 MG tablet Take 2.5 mg by mouth daily       lidocaine (LIDODERM) 5 % Patch Place 2 patches onto the skin every 24 hours Apply to low back/left hip - on in AM, remove after 12 hours. 6611-2328, [id number].        Multiple Vitamins-Minerals (MULTIVITAMIN ADULT PO) Take 1 tablet by mouth daily       olmesartan (BENICAR) 40 MG tablet TAKE 1 TABLET(40 MG) BY MOUTH DAILY 90 tablet 1     polyethylene glycol (MIRALAX/GLYCOLAX) powder Take 17 g by mouth daily AND daily PRN       rosuvastatin (CRESTOR) 10 MG tablet TAKE 1 TABLET(10 MG) BY MOUTH DAILY 90 tablet 0     senna-docusate (SENOKOT-S;PERICOLACE) 8.6-50 MG per tablet Take 2 tablets by mouth daily AND 1 tab QHS       buPROPion (WELLBUTRIN SR) 150 MG 12 hr tablet TAKE 1 TABLET(150 MG) BY MOUTH  TWICE DAILY 180 tablet 0        Allergies   Allergen Reactions     Betadine [Povidone Iodine] Hives     Iodine-131      No Clinical Screening - See Comments Swelling     Water softener salts     Soap      Perfumed soaps     Sodium Chloride      Social History     Social History     Marital status:      Spouse name: N/A     Number of children: N/A     Years of education: N/A     Occupational History     Not on file.     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 She is currently related. She lives with her SO and son. She has another son who is local.     Family History   Problem Relation Age of Onset     Breast Cancer Mother      C.A.D. Father      MI     Lipids Father      Coronary Artery Disease Father      Hyperlipidemia Father      Breast Cancer Other      maternal aunt     CANCER Other      skin     Migraines Cousin      Coronary Artery Disease Cousin      Multiple Sclerosis Paternal Uncle      ROS:  A complete ROS was obtained and is negative except as per HPI.    Patient Active Problem List   Diagnosis     HTN (hypertension)     Chronic rhinitis     Advance Care Planning     Mild major depression (H)     Hyperlipidemia     CAD (coronary artery disease)     Falling episodes     COPD (chronic obstructive pulmonary disease) (H)     Status post lumbar spine surgery for decompression foraminal/lateral recess L2-S1, 6/23/17     S/P spinal fusion, posterior, L3-S1, 6/23/17     Benign  "essential hypertension     Spondylolisthesis of lumbar region     Spinal stenosis of lumbar region without neurogenic claudication     Depression, unspecified depression type     Examination   138 lbs 6.4 oz  Blood pressure 133/79, pulse 79, height 1.651 m (5' 5\"), weight 62.8 kg (138 lb 6.4 oz), not currently breastfeeding., Body mass index is 23.03 kg/(m^2).    General examination: no apparent distress     Neuro exam:   Mental status:  Alert, oriented x3.  Verbally fluent, no apraxia   Cranial nerves:  Pupils are equal, round, reactive to light. Extraocular movements intact. Facial sensation intact, facial strength intact.  Hearing intact to finger rub bi. Palate moves symmetrically.  Tongue midline.  Sternocleidomastoid and trapezius strength intact.    Motor: Tone: slightly increased in the neck and right arm. Strength was 5/5 throughout except 5-/5 with left arm flexion     Sensation: intact to light touch and pinprick in all extremities   Coordination: FTN intact without dysmetria   Gait: needs arms to help stand, narrow stance, gait freezing present with turning, en bloc turn, requires assistance to walk  Reflexes: normoreflexic and symmetric throughout.    Plantars: downgoing bi    MDS-UPDRS Part III   On meds   Speech:  1   Facial Expression:  1   Rigidity-Neck   1   Rigidity-RUE  0   Rigidity-LUE  1   Rigidity-RLE   0   Rigidity-LLE  0   Finger tap-R Hand  1   Finger tap-L Hand  2   Hand Movements-Right  0   Hand Movements-Left  0   Pronation-R Hand  0   Pronation-L Hand  0   Toe Tap-R Foot   2   Toe Tap-L Foot  3   Leg Agility-R Leg   1   Leg Agility-L Leg   1   Arise from chair  1   Gait  4   Freezing of Gait  2   Postural Stability   not done    Posture   1   Global spontaneity of Movement  1   Postural Tremor-R Hand  0   Postural Tremor-L Hand   0   Kinetic Tremor-R Hand  1   Kinetic Tremor-L Hand   1   Rest Tremor Amp-RUE  0   Rest Tremor Amp-LUE  0   Rest Tremor Amp-RLE   0   Rest Tremor Amp-LLE   0 "   Rest Tremor Amp-Lip/Jaw   0   Constancy of Rest Tremor  0   Total:   26     Imaging:  Noncontrast CT head from November 2016 was reviewed and significant for ventriculomegaly that does appear out of proportion to sulcal atrophy.    Assessment/plan:   Ms. Armstrong is a 68-year-old woman presenting with progressive gait instability and gait freezing over the last several years.  Exam today notable for her gait freezing. she is also endorsing mild cognitive symptoms, no incontinence.  Her noncontrast head CT from 2016  could be consistent with normal pressure hydrocephalus.  She had a negative tap test, however lumbar drain is more sensitive.  She does not have incontinence however, you do not always have the full triad.  Alternatively, with the early falls and gait instability, she could have an atypical parkinsonism.      -We recommend they send us her outside MRI of the brain and cervical spine to review  -We will obtain DARRICK scan  -neuropsych testing given cognitive concerns, question of NPH versus parkinsonism  -referral to Neurosurgery clinic for lumbar drain trial    RTC in 3 months    The case and recommendations were discussed with Dr. Mart, who has spoken with and examined the patient and helped to formulate the impression and recommendations.    Kelsey Garcia MD  Movement disorders fellow     Again, thank you for allowing me to participate in the care of your patient.      Sincerely,    Jace Mart MD

## 2018-05-10 NOTE — TELEPHONE ENCOUNTER
"Requested Prescriptions   Pending Prescriptions Disp Refills     buPROPion (WELLBUTRIN SR) 150 MG 12 hr tablet [Pharmacy Med Name: BUPROPION SR 150MG TABLETS (12 H)] 180 tablet 0     Sig: TAKE 1 TABLET(150 MG) BY MOUTH TWICE DAILY    SSRIs Protocol Failed    5/9/2018  3:10 PM       Failed - PHQ-9 score less than 5 in past 6 months    Please review last PHQ-9 score.          Failed - Recent (6 mo) or future (30 days) visit within the authorizing provider's specialty    Patient had office visit in the last 6 months or has a visit in the next 30 days with authorizing provider or within the authorizing provider's specialty.  See \"Patient Info\" tab in inbasket, or \"Choose Columns\" in Meds & Orders section of the refill encounter.           Passed - Medication is Bupropion    If the medication is Bupropion (Wellbutrin), and the patient is taking for smoking cessation; OK to refill.         Passed - Patient is age 18 or older       Passed - No active pregnancy on record       Passed - No positive pregnancy test in last 12 months          Routing refill request to provider for review/approval because:  Protocol failed.    Please advise, thanks.        "

## 2018-05-10 NOTE — MR AVS SNAPSHOT
After Visit Summary   5/10/2018    Zoey Armstrong    MRN: [id number]           Patient Information     Date Of Birth          1950        Visit Information        Provider Department      5/10/2018 9:00 AM Jace Mart MD [facility]        Today's Diagnoses     Primary freezing of gait    -  1      Care Instructions    -Please send the images and reports from your MRI brain from [facility] so we can review these results.    -We will order a test called a DARRICK scan which will help us tell if your walking problems are related to Parkinson's disease/Parkinsonism    -We will refer you to Dr. Cruz in Neurosurgery for re-evaluation of NPH, lumbar drain trial     -We will order neurocognitive testing           Follow-ups after your visit        Additional Services     NEUROPSYCHOLOGY REFERRAL       Your provider has referred you to:    [facility]: Adult Neuropsychology Clinic - [place] [phone] Preferred Provider: Danielle Waddell Ph.D., LP, [facility]   [email or web address]    Eval for mild cognitive changes, question of parkinsonism versus NPH    All scheduling is subject to the client's specific insurance plan & benefits, provider/location availability, and provider clinical specialities.  Please arrive 15 minutes early for your first appointment and bring your completed paperwork.    Please be aware that coverage of these services is subject to the terms and limitations of your health insurance plan.  Call member services at your health plan with any benefit or coverage questions.    Please bring the following to your appointment:  >>   Any x-rays, CTs or MRIs which have been performed.  Contact the facility where they were done to arrange for  prior to your scheduled appointment.  Any new CT, MRI or other procedures ordered by your specialist must be performed at a [place] facility or coordinated by your clinic's referral office.     >>   List of current medications   >>   This referral request   >>   Any documents/labs given to you for this referral            NEUROSURGERY REFERRAL       Your provider has referred you to: [facility]: Neurosurgery Clinic - [place] [phone]   [email or web address]    Please schedule with Dr. Cruz if able for re-evaluation of NPH, need for lumbar drain trial     Please be aware that coverage of these services is subject to the terms and limitations of your health insurance plan.  Call member services at your health plan with any benefit or coverage questions.      Please bring the following with you to your appointment:    (1) Any X-Rays, CTs or MRIs which have been performed.  Contact the facility where they were done to arrange for  prior to your scheduled appointment.   (2) List of current medications  (3) This referral request   (4) Any documents/labs given to you for this referral                  Follow-up notes from your care team     Return in about 3 months (around 8/10/2018).      Your next 10 appointments already scheduled     May 17, 2018  8:00 AM CDT   (Arrive by 7:45 AM)   Neuropsych Eval with Danielle Waddell, PhD [facility] ([facility])    [address] [place] [address] [phone]            Aug 23, 2018 10:00 AM CDT   (Arrive by 9:45 AM)   Return Movement Disorder with Jace Mart MD   [facility] ([facility])    [address] [place] [address] [phone]              Future tests that were ordered for you today     Open Future Orders        Priority Expected Expires Ordered    NM Brain Image Tomographic(Spect) Datscan Routine  5/10/2019 5/10/2018            Who to contact     Please call your clinic at [phone] to:    Ask questions about your health    Make or cancel appointments    Discuss your  "medicines    Learn about your test results    Speak to your doctor            Additional Information About Your Visit        edjinghart Information     [employer] gives you secure access to your electronic health record. If you see a primary care provider, you can also send messages to your care team and make appointments. If you have questions, please call your primary care clinic.  If you do not have a primary care provider, please call [phone] and they will assist you.      [employer] is an electronic gateway that provides easy, online access to your medical records. With [employer], you can request a clinic appointment, read your test results, renew a prescription or communicate with your care team.     To access your existing account, please contact your [facility] or call [phone] for assistance.        Care EveryWhere ID     This is your Care EveryWhere ID. This could be used by other organizations to access your [facility] medical records  [id number]        Your Vitals Were     Pulse Height BMI (Body Mass Index)             79 1.651 m (5' 5\") 23.03 kg/m2          Blood Pressure from Last 3 Encounters:   05/10/18 133/79   04/02/18 128/78   08/30/17 118/72    Weight from Last 3 Encounters:   05/10/18 62.8 kg (138 lb 6.4 oz)   04/02/18 65.2 kg (143 lb 11.2 oz)   08/30/17 67.7 kg (149 lb 3.2 oz)              We Performed the Following     NEUROPSYCHOLOGY REFERRAL     NEUROSURGERY REFERRAL        Primary Care Provider Office Phone # Fax #    Marisaedie Hoover [phone]       [facility] [address] [facility] [address]        Equal Access to Services     [facility]: Hadii aad ku hadasho Soomaali, waaxda luqadaha, qaybta kaalmada adeegyadg, ewa mcgovern . So [facility] [phone].    ATENCIÓN: Si habla español, tiene a sutton disposición servicios gratuitos de asistencia lingüística. Llame al [phone].    We comply with applicable federal " civil rights laws and [place] laws. We do not discriminate on the basis of race, color, national origin, age, disability, sex, sexual orientation, or gender identity.            Thank you!     Thank you for choosing [facility] NEUROLOGY  for your care. Our goal is always to provide you with excellent care. Hearing back from our patients is one way we can continue to improve our services. Please take a few minutes to complete the written survey that you may receive in the mail after your visit with us. Thank you!             Your Updated Medication List - Protect others around you: Learn how to safely use, store and throw away your medicines at www.disposemymeds.org.          This list is accurate as of 5/10/18 10:31 AM.  Always use your most recent med list.                   Brand Name Dispense Instructions for use Diagnosis    amLODIPine 10 MG tablet    NORVASC    90 tablet    TAKE 1 TABLET(10 MG) BY MOUTH DAILY    Essential hypertension       aspirin 81 MG tablet      Take 81 mg by mouth daily        buPROPion 150 MG 12 hr tablet    WELLBUTRIN SR    180 tablet    TAKE 1 TABLET(150 MG) BY MOUTH TWICE DAILY    Major depressive disorder, recurrent episode, mild (H)       calcium-vitamin D 600-400 MG-UNIT per tablet    CALTRATE     Take 1 tablet by mouth 2 times daily        carbidopa-levodopa  MG per tablet    SINEMET     Take 1 tablet by mouth 3 times daily        FLUoxetine 20 MG capsule    PROzac    90 capsule    TAKE 3 CAPSULES(60 MG) BY MOUTH DAILY    Major depressive disorder, recurrent episode, mild (H)       letrozole 2.5 MG tablet    FEMARA     Take 2.5 mg by mouth daily        lidocaine 5 % Patch    LIDODERM     Place 2 patches onto the skin every 24 hours Apply to low back/left hip - on in AM, remove after 12 hours. 4346-2938, 4051-4205.        MULTIVITAMIN ADULT PO      Take 1 tablet by mouth daily        olmesartan 40 MG tablet    BENICAR    90 tablet    TAKE 1 TABLET(40 MG) BY MOUTH DAILY     Essential hypertension       polyethylene glycol powder    MIRALAX/GLYCOLAX     Take 17 g by mouth daily AND daily PRN        rosuvastatin 10 MG tablet    CRESTOR    90 tablet    TAKE 1 TABLET(10 MG) BY MOUTH DAILY    Hyperlipidemia LDL goal <100       senna-docusate 8.6-50 MG per tablet    SENOKOT-S;PERICOLACE     Take 2 tablets by mouth daily AND 1 tab QHS        TYLENOL PO      Take 1,000 mg by mouth 4 times daily

## 2018-05-11 ENCOUNTER — TELEPHONE (OUTPATIENT)
Dept: BONE DENSITY | Facility: CLINIC | Age: 68
End: 2018-05-11

## 2018-05-11 ENCOUNTER — TELEPHONE (OUTPATIENT)
Dept: NEUROLOGY | Facility: CLINIC | Age: 68
End: 2018-05-11

## 2018-05-11 NOTE — TELEPHONE ENCOUNTER
NM Brain Image Tomographic(Spect) Datscan   CPT code: 45048,  with ICD10 code: Primary freezing of gait [R26.89]    Patient has Medicare insurance and per [employer] Checking this passes Medicare guidelines 05/11/18 1:16pm

## 2018-05-11 NOTE — TELEPHONE ENCOUNTER
I called MsZachary Patti to let her know that after speaking with Dr. Cruz and Dr. Mart, we will hold on the neurosurgery appointment until we have the results of her DARRICK scan and neuropsych testing.

## 2018-05-16 ENCOUNTER — TELEPHONE (OUTPATIENT)
Dept: GENERAL RADIOLOGY | Facility: CLINIC | Age: 68
End: 2018-05-16

## 2018-05-16 NOTE — TELEPHONE ENCOUNTER
This RN called and spoke with Zoey regarding her allergies. She states she broke out in hives after having skin cleaned with povidine iodine. She states she knows nothing about an allergy to Iodine-131 or sodium chloride. Explained DaTscan test and timeline to patient. All questions answered, patient verbalized understanding. Note to PCP regarding allergies.

## 2018-05-17 ENCOUNTER — OFFICE VISIT (OUTPATIENT)
Dept: NEUROPSYCHOLOGY | Facility: CLINIC | Age: 68
End: 2018-05-17
Payer: MEDICARE

## 2018-05-17 DIAGNOSIS — R41.3 MEMORY LOSS: Primary | ICD-10-CM

## 2018-05-17 NOTE — MR AVS SNAPSHOT
After Visit Summary   5/17/2018    Zoey Armstrong    MRN: [id number]           Patient Information     Date Of Birth          1950        Visit Information        Provider Department      5/17/2018 8:00 AM Danielle Waddell, PhD [facility] Neuropsychology        Today's Diagnoses     Memory loss    -  1       Follow-ups after your visit        Your next 10 appointments already scheduled     Jun 20, 2018  9:00 AM CDT   NM LUGOLS with MG NM TECH MG IMAGING NURSE   [facility])    [address] [place] [id number] [phone]            Jun 20, 2018 10:00 AM CDT   NM INJECTION with MGNMINJ1   [facility])    [address] [place] [id number] [phone]            Jun 20, 2018  2:00 PM CDT   NM BRAIN IMAGING TOMOGRAPHIC (SPECT) DATSCAN with MGNM1   [facility])    [address] [place] [address] [phone]           Please bring a list of your medicines to the exam. (Include vitamins, minerals and over-the-counter drugs.) You should wear comfortable clothes. Leave your valuables at home. Please bring related prior results and films.  Tell your doctor:   If you are breastfeeding or may be pregnant.   If you have had a barium test within the past 48 hours. Barium may change the results of certain exams.   If you think you may need sedation (medicine to help you relax).  You may eat and drink as normal.  Please call your Imaging Department at your exam site with any questions.            Aug 23, 2018 10:00 AM CDT   (Arrive by 9:45 AM)   Return Movement Disorder with Jace Mrat MD   [facility] Neurology ([facility] [place])    [address]  [address] [facility] [place] [facility] [address] [phone]              Who to contact     Please call your clinic at [phone] to:    Ask  questions about your health    Make or cancel appointments    Discuss your medicines    Learn about your test results    Speak to your doctor            Additional Information About Your Visit        [employer] Information     [facility] gives you secure access to your electronic health record. If you see a primary care provider, you can also send messages to your care team and make appointments. If you have questions, please call your primary care clinic.  If you do not have a primary care provider, please call [phone] and they will assist you.      [facility] is an electronic gateway that provides easy, online access to your medical records. With [facility], you can request a clinic appointment, read your test results, renew a prescription or communicate with your care team.     To access your existing account, please contact your [facility] or call [phone] for assistance.        Care EveryWhere ID     This is your Care EveryWhere ID. This could be used by other organizations to access your [place] medical records  [id number]         Blood Pressure from Last 3 Encounters:   05/10/18 133/79   04/02/18 128/78   08/30/17 118/72    Weight from Last 3 Encounters:   05/10/18 62.8 kg (138 lb 6.4 oz)   04/02/18 65.2 kg (143 lb 11.2 oz)   08/30/17 67.7 kg (149 lb 3.2 oz)              We Performed the Following     [id number] TESTING, PER HR/PSYCHOLOGIST     NEUROPSYCH TESTING BY [facility]        Primary Care Provider Office Phone # Fax #    Marisa Hoover [phone]       [address]  [facility] [address]        Equal Access to Services     [facility] AH: Hadii aad ku hadasho Soomaali, waaxda luqadaha, qaybta kaalmada adeegyada, waxay jalen mcgovern . So [facility] [phone].    ATENCIÓN: Si habla español, tiene a sutton disposición servicios gratuitos de asistencia lingüística. Llame al [phone].    We comply with applicable federal civil  rights laws and [place] laws. We do not discriminate on the basis of race, color, national origin, age, disability, sex, sexual orientation, or gender identity.            Thank you!     Thank you for choosing [facility] NEUROPSYCHOLOGY  for your care. Our goal is always to provide you with excellent care. Hearing back from our patients is one way we can continue to improve our services. Please take a few minutes to complete the written survey that you may receive in the mail after your visit with us. Thank you!             Your Updated Medication List - Protect others around you: Learn how to safely use, store and throw away your medicines at www.disposemymeds.org.          This list is accurate as of 5/17/18 11:59 PM.  Always use your most recent med list.                   Brand Name Dispense Instructions for use Diagnosis    amLODIPine 10 MG tablet    NORVASC    90 tablet    TAKE 1 TABLET(10 MG) BY MOUTH DAILY    Essential hypertension       aspirin 81 MG tablet      Take 81 mg by mouth daily        buPROPion 150 MG 12 hr tablet    WELLBUTRIN SR    180 tablet    TAKE 1 TABLET(150 MG) BY MOUTH TWICE DAILY    Major depressive disorder, recurrent episode, mild (H)       calcium-vitamin D 600-400 MG-UNIT per tablet    CALTRATE     Take 1 tablet by mouth 2 times daily        carbidopa-levodopa  MG per tablet    SINEMET     Take 1 tablet by mouth 3 times daily        FLUoxetine 20 MG capsule    PROzac    90 capsule    TAKE 3 CAPSULES(60 MG) BY MOUTH DAILY    Major depressive disorder, recurrent episode, mild (H)       letrozole 2.5 MG tablet    FEMARA     Take 2.5 mg by mouth daily        lidocaine 5 % Patch    LIDODERM     Place 2 patches onto the skin every 24 hours Apply to low back/left hip - on in AM, remove after 12 hours. [id number], [id number].        MULTIVITAMIN ADULT PO      Take 1 tablet by mouth daily        olmesartan 40 MG tablet    BENICAR    90 tablet    TAKE 1 TABLET(40 MG) BY MOUTH DAILY     Essential hypertension       polyethylene glycol powder    MIRALAX/GLYCOLAX     Take 17 g by mouth daily AND daily PRN        rosuvastatin 10 MG tablet    CRESTOR    90 tablet    TAKE 1 TABLET(10 MG) BY MOUTH DAILY    Hyperlipidemia LDL goal <100       senna-docusate 8.6-50 MG per tablet    SENOKOT-S;PERICOLACE     Take 2 tablets by mouth daily AND 1 tab QHS        TYLENOL PO      Take 1,000 mg by mouth 4 times daily

## 2018-05-20 ENCOUNTER — TELEPHONE (OUTPATIENT)
Dept: INTERNAL MEDICINE | Facility: CLINIC | Age: 68
End: 2018-05-20

## 2018-05-21 NOTE — TELEPHONE ENCOUNTER
I received a message from radiology department to check to see if patient is allergic to sodium chloride, as her chart states.  And if so, what the reaction is

## 2018-05-21 NOTE — TELEPHONE ENCOUNTER
"Spoke with pt.  She states she had an extreme allergic reaction (hives and itching) from water softener salt (the \"cheap stuff\").  States she is using [employer] brand and is not having any issues/problems.    Please advise, thanks.  "

## 2018-05-24 ENCOUNTER — TELEPHONE (OUTPATIENT)
Dept: GENERAL RADIOLOGY | Facility: CLINIC | Age: 68
End: 2018-05-24

## 2018-05-24 NOTE — TELEPHONE ENCOUNTER
Late entry from 5/22: This RN spoke on the phone with Zoey again to clarify the specifics of her allergy to sodium chloride. Patient stated it was to softener salt, not the sodium chloride (normal saline) used in medicine. Allergies updated in chart.

## 2018-05-30 DIAGNOSIS — E78.5 HYPERLIPIDEMIA LDL GOAL <100: ICD-10-CM

## 2018-06-04 NOTE — PROGRESS NOTES
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 Information was obtained via interview with the patient and her , and review of her medical records. Records indicate a history of gait changes and parkinsonism. At a recent neurology appointment, she indicated that she began to notice progressive changes in her gait two or three years ago, and prior to that she had been very active, including walking and waterskiing. She has had issues with freezing when turning. She reported mild word finding difficulties and some difficulty remembering the day prior s events. She endorsed some situational mood changes. She reported feeling tremulous at times in her arms and legs, not worse on one side. There has been concern for ventriculomegaly on imaging and she had large-volume LP without significant improvement. She was seen in clinic by Dr. Cruz in neurosurgery who did not feel that her presentation was consistent with normal pressure hydrocephalus. She does not endorse incontinence. She was referred for neuropsychological evaluation by Kelsey Garcia M.D., and Jace Mart M.D., for further categorization of any cognitive difficulties and to evaluate mood, with a question of normal pressure hydrocephalus versus parkinsonism.    CLINICAL INTERVIEW FINDINGS    Upon interview, Ms. Armstrong stated that she has had problems with walking, balance, and falling beginning three or four years ago. At first, these problems were sporadic, but they are gradually worsening. They tend to be worse later in the day, and there are times if she is standing and turns her head that she has almost  blacked out, so she does not turn her head.    Four years ago, Ms. Armstrong noticed problems with memory, and stated that her nurse practitioner thought that she may have had a small stroke. The memory problems have been progressive since then, however. She has trouble remembering what she was going to do, and she forgets what others have said. She has not been misplacing items nor has she become lost in familiar places, but she also stated that she does not do very much at all and is essentially housebound. Her  stated that she has not been repeating questions or stories. She notices difficulty with word finding, and attention and concentration. She finds that she must reread information more than she used to. When asked about decision-making, she stated that she just does not do anything. She stated that her  has sheltered her so much and she has had no responsibilities for at least three years, because she has had health decline, and a series of accidents and operations. Her handwriting has changed. She stated that it starts out larger and then gets very small to the point that it is illegible. She lives with her  and their youngest son. Her  manages their finances, but she remains involved in the finances and knows where things stand. Her  manages her pillbox, and she reminds herself when to take the medications, apparently without difficulty. She stopped driving a couple of years ago because she was having trouble getting from the parking lot into the store. Her  does the cooking. She requires some assistance with her personal cares.    Ms. Armstrong has a 30 year history of depression. She continues to be prescribed antidepressants. She has never been hospitalized for psychiatric care. She described her mood as  flat,  and during the testing itself she indicated that she wanted to emphasize how difficult her depression was. She stated that in the last few years, she has  broken her arm and undergone a lumpectomy, and that her  whole life is down the toilet.  She is no more irritable than normal. She has been sleeping nine hours a night. She does not act out her dreams. She naps every afternoon for two hours. Her appetite is poor but this is not a change for her. She stated that her energy level is nonexistent because she cannot do anything. Her interest level and her motivation are also poor. As noted, she spends all of her time at home. She denied suicidal ideation or any history of attempts to commit suicide. She denied visual or auditory hallucinations. She drinks about a glass of wine a week, and noted that she does not drink more than that because it does not taste as good as it used to. She denied illicit drug use or tobacco use. She does not hill.    In school, Ms. Armstrong was never in any special education classes, nor was she held back any grades. She attended college for a year. She worked as a second level supervisor for data processing for 30 years, retiring 15 years ago when she had an early out opportunity. She has been  three times, and  her current  in 2000. She has two children ages 35 and 49.    Ms. Armstrong denied any history of seizure, stroke, or head injury resulting loss of consciousness. As noted, her balance has been off. When she falls, it tends to be to the right. Physical therapy is  somewhat helpful.  She denied unilateral weakness or numbness. She has tremor in both legs and hands. Later in the day, she tends to develop tremor and then her legs get  stuck.  She has not had any incontinence, stating that in fact it is harder for her to urinate, and she feels like she should but she cannot. She denied headaches or other aches or pains. She described her sense of smell as acute, but her sense of taste has changed.    PAST MEDICAL HISTORY: Medical records indicate a history of depression, status post lumbar spine surgery for  decompression, status post spinal fusion, hypertension, spondylolisthesis, coronary artery disease, chronic obstructive pulmonary disease, hyperlipidemia.    CURRENT MEDICATIONS:  Include acetaminophen, amlodipine, aspirin 81 mg, bupropion, calcium-vitamin D, carbidopa-levodopa, fluoxetine, letrozole, lidocaine, multiple vitamins-minerals, olmesartan, polyethylene glycol, rosuvastatin, Senna-docusate.    FAMILY MEDICAL HISTORY:  Significant for a paternal uncle with cerebrovascular disease. She has no known family history of Parkinson s disease or tremor.    BEHAVIORAL OBSERVATIONS    During the evaluation, Ms. Armstrong was pleasant, cooperative, and seemed to understand the instructions. She was alert and oriented to person, place, month, and year, but not to date. Mood was depressed and affect was flat. Slight tremors were observed clinically in her hands. She was missing the tip of her right index finger. Speech was fluent, with normal articulation, volume, and rate. Spontaneous conversation was present and appropriate. The results are believed to accurately reflect her current level of functioning.    MEASURES ADMINISTERED    The following measures were administered by a trained psychometrist, under the direct supervision of a licensed psychologist:    Wechsler Abbreviated Scale of Intelligence - 2 (WASI-2); Subtests of the Wechsler Adult Intelligence Scale - 4; Reading subtest of the Wide Range Achievement Test - 4; subtests of the Wechsler Memory Scale - Revised; Keon Auditory Verbal Learning Test; Keon-Osterrieth Complex Figure; Vevay Naming Test; Controlled Oral Word Association Test; Semantic Fluency; Clock Drawing; Trail Making Test; Stroop; Wisconsin Card Sorting Test; Menjivar Judgment of Line Orientation; Dementia Rating Scale - 2 (DRS-2); Geriatric Depression Scale (GDS).    RESULTS AND INTERPRETATION    Overall intellectual functioning was estimated to fall in the low average range, below premorbid  estimates of high average based on single word reading abilities. Performance on a screening measure dementia was mildly impaired (DRS-2 Total Score = 129/144). She had particular difficulty on tasks of conceptualization on this measure.    Confrontation naming was high average for her age. Expressive vocabulary was average. Verbal abstract reasoning was borderline impaired. Letter fluency was mildly slowed. Generative naming to category was low average.    Attention span was moderately impaired for her age. Divided attention was severely impaired. She was unable to complete a visual motor sequencing task, with slow performance and difficulty shifting cognitive set. Sustained attention was impaired. She had multiple errors of omission and commission on a letter cancellation task. She was moderately distractible. Psychomotor processing speed was moderately slowed.     Basic visual perception, including matching lines and angles, was severely impaired. She was unable to complete the trial tasks. Construction of a clock fell within normal limits. Construction of a complex design was moderately impaired and was notable for significant distortions as well as perseverative placement of details. Assembly of visual material was mildly impaired. Nonverbal deductive reasoning was average.    Novel problem-solving, including the ability to generate strategies and solutions, was mildly impaired for her age. On this task, she demonstrated a mildly perseverative problem-solving style and mild difficulty with conceptualization, as well as mild difficulty maintaining set.    Immediate recall of verbal narrative material was average, with average recall following a 30 minute delay. On a multiple trial list learning task, learning was moderately inefficient, with severely impaired retention (0%) following a 30 minute delay. Recognition memory on this task was average. Immediate recall of visual material was moderately impaired, with  severely impaired recall following a 30 minute delay. Recognition memory on this task was not better than free recall.    On the GDS, a self-report questionnaire, Ms. Armstrong endorsed a severe level of depressive symptomatology.    IMPRESSIONS AND RECOMMENDATIONS    Current results indicate prominent impairments in attention, including basic and complex attentional processing. Memory is markedly variable, ranging from severely impaired to average. She demonstrates mild executive dysfunction, including difficulty with problem solving, perseverations, conceptualization, and ability to establish and maintain cognitive set. Information and psychomotor processing speed are slowed. Visual processing is impaired. Basic language reflects a strength, but is also somewhat variable. She endorsed a severe level of depressive symptomatology.    This pattern of performance is suggestive of diffuse cerebral involvement, possibly with greater involvement of frontal systems. The marked variability in memory and variability in other cognitive domains suggests a psychiatric contributor, and she is endorsing severe depressive symptomatology. She does demonstrate significant attentional difficulties and executive dysfunction, and both NPH and parkinsonism cannot be ruled out, although in this context they are more difficult to identify given her significant symptoms of depression. If not already considered, she will likely benefit from more aggressive treatment of her depressed mood. She may benefit from review of her antidepressant medications. She expressed an interest in referral to health psychology. Psychotherapy could focus specifically on strategies to manage her depressed mood.    In terms of daily functioning, Ms. Armstrong may have difficulty managing large, complex tasks. Others may assist by breaking down such tasks into smaller, more manageable parts. She will likely benefit from structure and routine. She spends very little  time outside of her home, which may contribute to her depressed mood. This is in part because of concerns regarding her declining gait. Perhaps she would benefit from consultation with her physicians and physical therapy, in order to determine whether assistive devices such as a wheelchair or walker might in fact help her to be more independent. In view of her difficulty with attention, she may find it helpful when working on a task to work in an environment that is relatively free from distractions, such as noises or other interruptions. She may also find it helpful to complete one task before beginning another. She may benefit from the use of written reminders or checklists, and a use of a smart phone may help as well. It is recommended that she be followed over time. Repeated neuropsychological evaluation in one year, or sooner if her depressed mood is addressed, may help to determine whether her cognitive difficulties progress, remit, or remain stable. These results have not been discussed with Ms. Armstrong or her family, although they were given directions to contact me for feedback if they would like.    Danielle Waddell, Ph.D., ABPP  Licensed Psychologist, LP [id number]  Board Certified in Clinical Neuropsychology    Time spent:  Four hours professional time, including interview, record review, data integration, and report writing (CPT 23247); an additional three hours, including testing administered by a psychometrist and interpreted by a neuropsychologist (CPT 28276). ICD-10 diagnosis: R41.3.

## 2018-06-05 DIAGNOSIS — E78.5 HYPERLIPIDEMIA LDL GOAL <100: ICD-10-CM

## 2018-06-05 RX ORDER — ROSUVASTATIN CALCIUM 10 MG/1
TABLET, COATED ORAL
Qty: 30 TABLET | Refills: 0 | Status: SHIPPED | OUTPATIENT
Start: 2018-06-05 | End: 2018-06-14

## 2018-06-05 NOTE — TELEPHONE ENCOUNTER
"  Past due for appt/labs     Requested Prescriptions   Pending Prescriptions Disp Refills     rosuvastatin (CRESTOR) 10 MG tablet [Pharmacy Med Name: ROSUVASTATIN CALCIUM 10MG TABLETS] 90 tablet 0     Sig: TAKE 1 TABLET(10 MG) BY MOUTH DAILY    Statins Protocol Failed    5/30/2018  4:28 PM       Failed - LDL on file in past 12 months    Recent Labs   Lab Test  09/28/15   0800   LDL  87            Passed - No abnormal creatine kinase in past 12 months    No lab results found.            Passed - Recent (12 mo) or future (30 days) visit within the authorizing provider's specialty    Patient had office visit in the last 12 months or has a visit in the next 30 days with authorizing provider or within the authorizing provider's specialty.  See \"Patient Info\" tab in inbasket, or \"Choose Columns\" in Meds & Orders section of the refill encounter.           Passed - Patient is age 18 or older       Passed - No active pregnancy on record       Passed - No positive pregnancy test in past 12 months          "

## 2018-06-08 DIAGNOSIS — I10 ESSENTIAL HYPERTENSION: ICD-10-CM

## 2018-06-08 DIAGNOSIS — F33.0 MAJOR DEPRESSIVE DISORDER, RECURRENT EPISODE, MILD (H): ICD-10-CM

## 2018-06-08 RX ORDER — ROSUVASTATIN CALCIUM 10 MG/1
TABLET, COATED ORAL
Qty: 90 TABLET | Refills: 0 | OUTPATIENT
Start: 2018-06-08

## 2018-06-08 NOTE — TELEPHONE ENCOUNTER
"  Duplicate     Requested Prescriptions   Pending Prescriptions Disp Refills     rosuvastatin (CRESTOR) 10 MG tablet [Pharmacy Med Name: ROSUVASTATIN CALCIUM 10MG TABLETS] 90 tablet 0     Sig: TAKE 1 TABLET BY MOUTH DAILY    Statins Protocol Failed    6/5/2018 10:12 AM       Failed - LDL on file in past 12 months    Recent Labs   Lab Test  09/28/15   0800   LDL  87            Passed - No abnormal creatine kinase in past 12 months    No lab results found.            Passed - Recent (12 mo) or future (30 days) visit within the authorizing provider's specialty    Patient had office visit in the last 12 months or has a visit in the next 30 days with authorizing provider or within the authorizing provider's specialty.  See \"Patient Info\" tab in inbasket, or \"Choose Columns\" in Meds & Orders section of the refill encounter.           Passed - Patient is age 18 or older       Passed - No active pregnancy on record       Passed - No positive pregnancy test in past 12 months          "

## 2018-06-12 RX ORDER — AMLODIPINE BESYLATE 10 MG/1
TABLET ORAL
Qty: 90 TABLET | Refills: 0 | Status: SHIPPED | OUTPATIENT
Start: 2018-06-12 | End: 2019-01-01

## 2018-06-12 NOTE — TELEPHONE ENCOUNTER
"Requested Prescriptions   Pending Prescriptions Disp Refills     amLODIPine (NORVASC) 10 MG tablet [Pharmacy Med Name: AMLODIPINE BESYLATE 10MG TABLETS] 90 tablet 0     Sig: TAKE 1 TABLET(10 MG) BY MOUTH DAILY    Calcium Channel Blockers Protocol  Passed    6/8/2018 11:34 AM       Passed - Blood pressure under 140/90 in past 12 months    BP Readings from Last 3 Encounters:   05/10/18 133/79   04/02/18 128/78   08/30/17 118/72                Passed - Recent (12 mo) or future (30 days) visit within the authorizing provider's specialty    Patient had office visit in the last 12 months or has a visit in the next 30 days with authorizing provider or within the authorizing provider's specialty.  See \"Patient Info\" tab in inbasket, or \"Choose Columns\" in Meds & Orders section of the refill encounter.           Passed - Patient is age 18 or older       Passed - No active pregnancy on record       Passed - Normal serum creatinine on file in past 12 months    Recent Labs   Lab Test 07/06/17   CR  0.96            Passed - No positive pregnancy test in past 12 months        Last office visit 8/30/17.  Prescription approved per [facility] Refill Protocol.  Lorraine Russell RN      "

## 2018-06-12 NOTE — TELEPHONE ENCOUNTER
"Requested Prescriptions   Pending Prescriptions Disp Refills     FLUoxetine (PROZAC) 20 MG capsule [Pharmacy Med Name: FLUOXETINE 20MG CAPSULES] 90 capsule 0     Sig: TAKE 3 CAPSULES(60 MG) BY MOUTH DAILY    SSRIs Protocol Failed    6/8/2018 11:34 AM       Failed - PHQ-9 score less than 5 in past 6 months    Please review last PHQ-9 score.          Failed - Recent (6 mo) or future (30 days) visit within the authorizing provider's specialty    Patient had office visit in the last 6 months or has a visit in the next 30 days with authorizing provider or within the authorizing provider's specialty.  See \"Patient Info\" tab in inbasket, or \"Choose Columns\" in Meds & Orders section of the refill encounter.           Passed - Patient is age 18 or older       Passed - No active pregnancy on record       Passed - No positive pregnancy test in last 12 months        PHQ-9 SCORE 10/25/2016 6/5/2017 4/2/2018   Total Score - - -   Total Score 4 8 11     Last office visit 8/30/17.  Routing refill request to provider for review/approval because:  PHQ-9 out of range.  Lorraine Russell RN          "

## 2018-06-14 DIAGNOSIS — E78.5 HYPERLIPIDEMIA LDL GOAL <100: ICD-10-CM

## 2018-06-14 RX ORDER — ROSUVASTATIN CALCIUM 10 MG/1
TABLET, COATED ORAL
Qty: 30 TABLET | Refills: 0 | Status: SHIPPED | OUTPATIENT
Start: 2018-06-14 | End: 2018-08-21

## 2018-06-14 NOTE — TELEPHONE ENCOUNTER
"Requested Prescriptions   Pending Prescriptions Disp Refills     rosuvastatin (CRESTOR) 10 MG tablet [Pharmacy Med Name: ROSUVASTATIN 10MG TABLETS] 30 tablet 0     Sig: TAKE 1 TABLET BY MOUTH DAILY    Statins Protocol Failed    6/14/2018 11:22 AM       Failed - LDL on file in past 12 months    Recent Labs   Lab Test  09/28/15   0800   LDL  87            Passed - No abnormal creatine kinase in past 12 months    No lab results found.            Passed - Recent (12 mo) or future (30 days) visit within the authorizing provider's specialty    Patient had office visit in the last 12 months or has a visit in the next 30 days with authorizing provider or within the authorizing provider's specialty.  See \"Patient Info\" tab in inbasket, or \"Choose Columns\" in Meds & Orders section of the refill encounter.           Passed - Patient is age 18 or older       Passed - No active pregnancy on record       Passed - No positive pregnancy test in past 12 months        Routing refill request to provider for review/approval because:  Labs not current:  Last LDL 2015  "

## 2018-06-19 ENCOUNTER — TELEPHONE (OUTPATIENT)
Dept: GENERAL RADIOLOGY | Facility: CLINIC | Age: 68
End: 2018-06-19

## 2018-06-20 ENCOUNTER — RADIANT APPOINTMENT (OUTPATIENT)
Dept: NUCLEAR MEDICINE | Facility: CLINIC | Age: 68
End: 2018-06-20
Attending: PSYCHIATRY & NEUROLOGY
Payer: MEDICARE

## 2018-06-20 DIAGNOSIS — R26.89 PRIMARY FREEZING OF GAIT: ICD-10-CM

## 2018-06-20 PROCEDURE — A9584 IODINE I-123 IOFLUPANE: HCPCS | Performed by: PSYCHIATRY & NEUROLOGY

## 2018-06-20 PROCEDURE — 78607 NM BRAIN IMAGING TOMOGRAPHIC (SPECT) DATSCAN: CPT | Performed by: RADIOLOGY

## 2018-06-21 ENCOUNTER — TELEPHONE (OUTPATIENT)
Dept: NEUROLOGY | Facility: CLINIC | Age: 68
End: 2018-06-21

## 2018-06-21 NOTE — TELEPHONE ENCOUNTER
I called Ms. 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  I discussed that this is concerning for an atypical parkinsonism as a cause of her symptoms.  She has not noticed much change in her symptoms with Sinemet 25/100 1 tablet 3 times a day, she is tolerating this well without lightheadedness or nausea.  I discussed that a more aggressive Sinemet trial is reasonable to see if it helps her gait freezing, although unfortunately this symptom can be poorly responsive to levodopa.  As such, I will send her a my chart message with how to uptitrate her Sinemet, with a target dose of 2-1/2 tablets 4 times a day or the highest tolerated dose.    As discussed with them that her neuropsych testing was concerning for severe depressive symptomatology.  They are aware of this finding and have been provided the name of several mental health providers.  I encouraged her to follow-up with 1 of these mental health providers as well as discuss with her primary care provider, who is prescribing her antidepressants if any dose up titration may be needed.

## 2018-07-03 NOTE — PROGRESS NOTES
WASI-2    Raw                     T   Vocabulary  35   48     Similarities       19   39     Block Design    8     34     Matrix Reasoning  13   46       VIQ  90  Full 4 IQ  84   PIQ  83        WAIS-IV    Raw              Age Scaled   Coding   22      4   Digit Span   13     3     WIDE RANGE ACHIEVEMENT TEST - 4    Standard  %tile              Grade      Score  Rank              Equiv.  Reading   110     75        >12.9      WECHSLER MEMORY SCALE - REVISED    Raw Score        MAS          %ile  Information & Orientation        13   Logical Memory  Immed.   23    10   50   Logical Memory  30 min.   18    10   50   Visual Reprod Imm.   11      2   <1   Visual Reprod 30 min.     2      2   <1   30 Minute Recognition     0     SEAMUS AUDITORY VERBAL LEARNING TEST  (30 item recognition)    I   II III IV V VI  VII    6    7       5       6       5       5       3   30 Minute Recall   0  MAS   3   30 Minute Recognition        12  MAS   9           Intrusions   2       Learning Efficiency         67  % Ret.   0                                                                     MAS   3     SEAMUS-OSTERRIETH COMPLEX FIGURE   Raw Score   %tile  Copy  19.5   ?1     BOSTON NAMING TEST  Score     57      MAS   12   75 %ile  [     57 w/o cues    3 w/phonemic cues]    CONTROLLED ORAL WORD ASSOC TEST  Score     22              MAS  6   9 %ile    SEMANTIC FLUENCY  Score     38              MAS  8   25 %ile    CLOCK DRAWING  Command  2/3    Copy   2/3       TRAIL MAKING TEST         Seconds         Errors           MAS                %ile  A    72    0     5  .     5   B     300    -     2  .     -     STROOP                    Raw   +  Tevin =     Total      MAS      %ile  Word  70  +  - =     70     6   9   Color  44  +  - =     44     5   5   C-W  16  +  - =     16     3   1     WISCONSIN CARD SORTING TEST - 64  # Categories  2            6-10   %ile  # persev err.        30          T       35          6 %ile    PSYCHOMOTOR TESTS                                                 RH                    LH  Grooved Pegboard    D/C 166              D/C 202                                       Drops ( 1 )                  Drops (  )    LETTER CANCELLATION TEST   Time        215   Errors    10     DEMENTIA RATING SCALE - 2 - Standard    Raw MAS Raw     MAS   Attention  34     8  Concept   31     6   Init/Psv  36   10   Memory   22     7   Construct    6   10  Total   129/144    6     GERIATRIC DEPRESSION SCALE: 22    [facility] = [place] Older Adult Normative Study Age Adj. Scaled Score

## 2018-07-17 ENCOUNTER — TRANSFERRED RECORDS (OUTPATIENT)
Dept: HEALTH INFORMATION MANAGEMENT | Facility: CLINIC | Age: 68
End: 2018-07-17

## 2018-07-19 ENCOUNTER — TELEPHONE (OUTPATIENT)
Dept: NEUROLOGY | Facility: CLINIC | Age: 68
End: 2018-07-19

## 2018-07-19 DIAGNOSIS — G20.A1 PARALYSIS AGITANS (H): ICD-10-CM

## 2018-07-19 NOTE — TELEPHONE ENCOUNTER
[facility] Call Center    Phone Message    May a detailed message be left on voicemail: no    Reason for Call: Medication Refill Request    Has the patient contacted the pharmacy for the refill? Yes   Name of medication being requested: carbidopa-levodopa (SINEMET)  MG per tablet  Provider who prescribed the medication: Dr. Garcia, but Pt's  requests that Dr. Mart prescribe due to Dr. Garcia leaving  Pharmacy: [facility], [place]  Date medication is needed: Asap, Pt will be out soon        Action Taken: Message routed to:  [facility] ([facility]

## 2018-07-20 DIAGNOSIS — F33.0 MAJOR DEPRESSIVE DISORDER, RECURRENT EPISODE, MILD (H): ICD-10-CM

## 2018-07-20 DIAGNOSIS — G20.A1 PARALYSIS AGITANS (H): ICD-10-CM

## 2018-07-20 RX ORDER — CARBIDOPA AND LEVODOPA 25; 100 MG/1; MG/1
TABLET ORAL
Qty: 900 TABLET | Refills: 0 | Status: SHIPPED | OUTPATIENT
Start: 2018-07-20 | End: 2018-08-23

## 2018-07-20 RX ORDER — CARBIDOPA AND LEVODOPA 25; 100 MG/1; MG/1
TABLET ORAL
Qty: 300 TABLET | Refills: 0 | Status: SHIPPED | OUTPATIENT
Start: 2018-07-20 | End: 2018-07-20

## 2018-07-20 NOTE — TELEPHONE ENCOUNTER
"Requested Prescriptions   Pending Prescriptions Disp Refills     FLUoxetine (PROZAC) 20 MG capsule [Pharmacy Med Name: FLUOXETINE 20MG CAPSULES] 90 capsule 0    Last Written Prescription Date:  06/12/2018  Last Fill Quantity: 90,  # refills: 0   Last office visit: 8/30/2017 with prescribing provider:     Future Office Visit:   Sig: TAKE 3 CAPSULES(60 MG) BY MOUTH DAILY    SSRIs Protocol Failed    7/20/2018  1:15 PM       Failed - PHQ-9 score less than 5 in past 6 months    Please review last PHQ-9 score.          Failed - Recent (6 mo) or future (30 days) visit within the authorizing provider's specialty    Patient had office visit in the last 6 months or has a visit in the next 30 days with authorizing provider or within the authorizing provider's specialty.  See \"Patient Info\" tab in inbasket, or \"Choose Columns\" in Meds & Orders section of the refill encounter.           Passed - Patient is age 18 or older       Passed - No active pregnancy on record       Passed - No positive pregnancy test in last 12 months        "

## 2018-07-20 NOTE — TELEPHONE ENCOUNTER
Pharmacy requested via fax that Carbidopa-Levodopa have a quantity of 900 rather than 300.  Resending rx to pharmacy.

## 2018-07-23 NOTE — TELEPHONE ENCOUNTER
PHQ-9 score:    PHQ-9 SCORE 4/2/2018   Total Score -   Total Score 11       Routing refill request to provider for review/approval because:  Protocol failed--most recent phq9 outside standing order parameters.    Please advise, thanks.

## 2018-08-21 DIAGNOSIS — E78.5 HYPERLIPIDEMIA LDL GOAL <100: ICD-10-CM

## 2018-08-21 DIAGNOSIS — F33.0 MAJOR DEPRESSIVE DISORDER, RECURRENT EPISODE, MILD (H): ICD-10-CM

## 2018-08-22 NOTE — TELEPHONE ENCOUNTER
"Requested Prescriptions   Pending Prescriptions Disp Refills     rosuvastatin (CRESTOR) 10 MG tablet [Pharmacy Med Name: ROSUVASTATIN 10MG TABLETS] 90 tablet 0    Last Written Prescription Date:  06/14/2018  Last Fill Quantity: 30,  # refills: 0   Last office visit: 8/30/2017 with prescribing provider:     Future Office Visit:   Sig: TAKE 1 TABLET BY MOUTH DAILY    Statins Protocol Failed    8/21/2018  1:22 PM       Failed - LDL on file in past 12 months    Recent Labs   Lab Test  09/28/15   0800   LDL  87            Passed - No abnormal creatine kinase in past 12 months    No lab results found.            Passed - Recent (12 mo) or future (30 days) visit within the authorizing provider's specialty    Patient had office visit in the last 12 months or has a visit in the next 30 days with authorizing provider or within the authorizing provider's specialty.  See \"Patient Info\" tab in ininterspireSubmitet, or \"Choose Columns\" in Meds & Orders section of the refill encounter.           Passed - Patient is age 18 or older       Passed - No active pregnancy on record       Passed - No positive pregnancy test in past 12 months        FLUoxetine (PROZAC) 20 MG capsule [Pharmacy Med Name: FLUOXETINE 20MG CAPSULES] 90 capsule 0    Last Written Prescription Date:  07/23/2018  Last Fill Quantity: 90,  # refills: 0   Last office visit: 8/30/2017 with prescribing provider:     Future Office Visit:   Sig: TAKE 3 CAPSULES(60 MG) BY MOUTH DAILY    SSRIs Protocol Failed    8/21/2018  1:22 PM       Failed - PHQ-9 score less than 5 in past 6 months    Please review last PHQ-9 score.          Failed - Recent (6 mo) or future (30 days) visit within the authorizing provider's specialty    Patient had office visit in the last 6 months or has a visit in the next 30 days with authorizing provider or within the authorizing provider's specialty.  See \"Patient Info\" tab in inbasket, or \"Choose Columns\" in Meds & Orders section of the refill encounter.     "       Passed - Patient is age 18 or older       Passed - No active pregnancy on record       Passed - No positive pregnancy test in last 12 months

## 2018-08-23 ENCOUNTER — MEDICAL CORRESPONDENCE (OUTPATIENT)
Dept: HEALTH INFORMATION MANAGEMENT | Facility: CLINIC | Age: 68
End: 2018-08-23

## 2018-08-23 ENCOUNTER — OFFICE VISIT (OUTPATIENT)
Dept: NEUROLOGY | Facility: CLINIC | Age: 68
End: 2018-08-23
Payer: MEDICARE

## 2018-08-23 VITALS
BODY MASS INDEX: 21.99 KG/M2 | HEIGHT: 65 IN | WEIGHT: 132 LBS | DIASTOLIC BLOOD PRESSURE: 64 MMHG | OXYGEN SATURATION: 97 % | HEART RATE: 91 BPM | SYSTOLIC BLOOD PRESSURE: 100 MMHG

## 2018-08-23 DIAGNOSIS — G20.C PARKINSONISM, UNSPECIFIED PARKINSONISM TYPE (H): Primary | ICD-10-CM

## 2018-08-23 DIAGNOSIS — R26.89 PRIMARY FREEZING OF GAIT: ICD-10-CM

## 2018-08-23 DIAGNOSIS — G20.A1 PARALYSIS AGITANS (H): ICD-10-CM

## 2018-08-23 DIAGNOSIS — R26.81 GAIT INSTABILITY: ICD-10-CM

## 2018-08-23 RX ORDER — CARBIDOPA 25 MG/1
25 TABLET ORAL 4 TIMES DAILY
Qty: 150 TABLET | Refills: 3 | Status: SHIPPED | OUTPATIENT
Start: 2018-08-23 | End: 2018-08-28

## 2018-08-23 RX ORDER — CARBIDOPA 25 MG/1
25 TABLET ORAL 4 TIMES DAILY
Qty: 150 TABLET | Refills: 3 | Status: SHIPPED | OUTPATIENT
Start: 2018-08-23 | End: 2018-08-23

## 2018-08-23 RX ORDER — CARBIDOPA AND LEVODOPA 25; 100 MG/1; MG/1
TABLET ORAL
Qty: 900 TABLET | Refills: 0
Start: 2018-08-23 | End: 2019-01-01

## 2018-08-23 ASSESSMENT — UNIFIED PARKINSONS DISEASE RATING SCALE (UPDRS)
POSTURE: 2 MILD.  DEFINITE FLEXION, SCOLIOSIS OR LEANING OT ONE SIDE, BUT CAN CORRECT POSTURE TO NORMAL WHEN ASKED.
SPONTANEITY_OF_MOVEMENT: 2: MILD: MILD GLOBAL SLOWNESS AND POVERTY OF SPONTANEOUS MOVEMENTS.
AMPLITUDE_LLE: NORMAL: NO TREMOR.
RIGIDITY_LLE: NORMAL
AXIAL_SCORE: 20
ARISING_CHAIR: SLIGHT: ARISING IS SLOWER THAN NORMAL, OR MAY NEED MORE THAN ONE ATTEMPT, OR MAY NEED TO MOVE FORWARD IN THE CHAIR TO ARISE.  NO NEED TO USE THE ARMS OF THE CHAIR.
PRONATION_SUPINATION_RIGHT: NORMAL
HANDMOVEMENTS_LEFT: NORMAL
TOTAL_SCORE_LEFT: 7
AMPLITUDE_LUE: NORMAL: NO TREMOR.
FINGER_TAPPING_LEFT: SLIGHT: ANY OF THE FOLLOWING: A) THE REGULAR RHYTHM IS BROKEN WITH ONE WITH ONE OR TWO INTERRUPTIONS OR HESITATIONS OF THE MOVEMENT B) SLIGHT SLOWING C) THE AMPLITUDE DECREMENTS NEAR THE END OF THE 10 MOVEMENTS.
LEG_AGILITY_LEFT: SLIGHT: ANY OF THE FOLLOWING: A) THE REGULAR RHYTHM IS BROKEN WITH ONE WITH ONE OR TWO INTERRUPTIONS OR HESITATIONS OF THE MOVEMENT B) SLIGHT SLOWING C) THE AMPLITUDE DECREMENTS NEAR THE END OF THE 10 MOVEMENTS.
AMPLITUDE_RLE: NORMAL: NO TREMOR.
HANDMOVEMENTS_RIGHT: NORMAL
CONSTANCY_TREMOR_ATREST: NORMAL: NO TREMOR.
TOETAPPING_RIGHT: MODERATE: ANY OF THE FOLLOWING:  A) MORE THAN 5 INTERRUPTIONS  OR AT LEAST ONE LONGER ARREST (FREEZE) IN ONGOING MOVEMENT  B) MODERATE SLOWING C) THE AMPLITUDE DECREMENTS STARTING AFTER THE FIRST MOVEMENT.
TOTAL_SCORE: 8
AMPLITUDE_RUE: NORMAL: NO TREMOR.
RIGIDITY_NECK: SLIGHT: RIGIDITY ONLY DETECTED WITH ACTIVATION MANEUVER.
FACIAL_EXPRESSION: SLIGHT: MINIMAL MASKED FACIES MANIFESTED ONLY BY DECREASED FREQUENCY OF BLINKING.
AMPLITUDE_LIP_JAW: NORMAL: NO TREMOR.
TOTAL_SCORE: 35
RIGIDITY_LUE: SLIGHT: RIGIDITY ONLY DETECTED WITH ACTIVATION MANEUVER.
RIGIDITY_RLE: NORMAL
FINGER_TAPPING_RIGHT: MILD: ANY OF THE FOLLOWING: A) 3 TO 5 INTERRUPTIONS DURING TAPPING B) MILD SLOWING C) THE AMPLITUDE DECREMENTS MIDWAY IN THE 10-MOVEMENT SEQUENCE
TOETAPPING_LEFT: MODERATE: ANY OF THE FOLLOWING:  A) MORE THAN 5 INTERRUPTIONS OR AT LEAST ONE LONGER ARREST (FREEZE) IN ONGOING MOVEMENT  B) MODERATE SLOWING C) THE AMPLITUDE DECREMENTS STARTING AFTER THE FIRST MOVEMENT.
RIGIDITY_RUE: SLIGHT: RIGIDITY ONLY DETECTED WITH ACTIVATION MANEUVER.
POSTURAL_STABILITY: SEVERE: VERY UNSTABLE, TENDS TO LOSE BALANCE SPONTANEOUSLY OR WITH JUST A GENTLE PULL ON THE SHOULDERS.
PARKINSONS_MEDS: ON
PRONATION_SUPINATION_LEFT: NORMAL
GAIT: SEVERE: CANNOT WALK AT ALL OR ONLY WITH ANOTHER PERSON'S ASSISTANCE.
SPEECH: SLIGHT: LOSS OF MODULATION, DICTION OR VOLUME, BUT STILL ALL WORDS EASY TO UNDERSTAND.
FREEZING_GAIT: SEVERE: FREEZES MULTIPLE TIMES DURING STRAIGHT WALKING.
LEG_AGILITY_RIGHT: SLIGHT: ANY OF THE FOLLOWING: A) THE REGULAR RHYTHM IS BROKEN WITH ONE WITH ONE OR TWO INTERRUPTIONS OR HESITATIONS OF THE MOVEMENT B) SLIGHT SLOWING C) THE AMPLITUDE DECREMENTS NEAR THE END OF THE 10 MOVEMENTS.

## 2018-08-23 ASSESSMENT — ENCOUNTER SYMPTOMS
DIZZINESS: 1
BRUISES/BLEEDS EASILY: 1
HEMATURIA: 0
SWOLLEN GLANDS: 0
TREMORS: 1
PARALYSIS: 0
WEAKNESS: 1
FLANK PAIN: 0
HEADACHES: 0
SEIZURES: 0
NUMBNESS: 1
DIFFICULTY URINATING: 0
DISTURBANCES IN COORDINATION: 1
TINGLING: 1
MEMORY LOSS: 0
DYSURIA: 0
LOSS OF CONSCIOUSNESS: 0
SPEECH CHANGE: 0

## 2018-08-23 ASSESSMENT — PAIN SCALES - GENERAL: PAINLEVEL: NO PAIN (0)

## 2018-08-23 NOTE — MR AVS SNAPSHOT
After Visit Summary   8/23/2018    Zoey Armstrong    MRN: [id number]           Patient Information     Date Of Birth          1950        Visit Information        Provider Department      8/23/2018 10:00 AM Jace Mart MD [facility]        Today's Diagnoses     Parkinsonism, unspecified Parkinsonism type (H)    -  1    Paralysis agitans (H)          Care Instructions    Plan:  - We will order a U-Step walker to improve your gait.  - Once you get your U-Step walker, we want you to do gait training with Physical Therapy using your U-Step walker.  - Increase dose of Sinemet (carbidopa/levodopa) 25/100mg to goal dose of 2 tabs five times a day   - Increase Sinemet to take five times a day at 9am, 1130, 3pm, 7pm, 10pm. For the first week, take one tab only at 9am. Then after the second week, take 2 tabs at 9am.    - After a month, if you do not find benefit, you may start to titrate off this medication by decreasing by 1 tab every 5 days until you are off of the medication. Call the clinic if you do this.   - We will prescribe an extra dose of carbidopa to take with each dose of the Sinemet to offset the effects of nausea and GI symptoms. You may also take ginger ale or ginger root capsule for nausea.   - To get maximum effects of Sinemet, take an 1 hour before a protein rich meal or 2 hours after a protein rich meal.  - For freezing, try rocking back and forth or placing tape on the floor as a cue to step on a line to walk.  - We encourage you to exercise daily on your recumbent bike and to stay social.     We will see you back in 4 months.             Follow-ups after your visit        Additional Services     PHYSICAL THERAPY REFERRAL       Please provide gait training therapy for patient with Parkinsonism associated with falls. PT near [place], [place].                  Follow-up notes from your care team     Return in about 4 months (around 12/23/2018).      Your next 10 appointments  "already scheduled     Larry 10, 2019 10:30 AM CST   (Arrive by 10:15 AM)   Return Movement Disorder with Jace Mart MD   [facility] ([facility])    [address] [address] [phone]              Who to contact     Please call your clinic at [phone] to:    Ask questions about your health    Make or cancel appointments    Discuss your medicines    Learn about your test results    Speak to your doctor            Additional Information About Your Visit        [employer] Information     studentSN gives you secure access to your electronic health record. If you see a primary care provider, you can also send messages to your care team and make appointments. If you have questions, please call your primary care clinic.  If you do not have a primary care provider, please call [phone] and they will assist you.      studentSN is an electronic gateway that provides easy, online access to your medical records. With studentSN, you can request a clinic appointment, read your test results, renew a prescription or communicate with your care team.     To access your existing account, please contact your [facility] or call [phone] for assistance.        Care EveryWhere ID     This is your Care EveryWhere ID. This could be used by other organizations to access your [place] medical records  [id number]        Your Vitals Were     Pulse Height Pulse Oximetry BMI (Body Mass Index)          91 1.651 m (5' 5\") 97% 21.97 kg/m2         Blood Pressure from Last 3 Encounters:   08/23/18 100/64   05/10/18 133/79   04/02/18 128/78    Weight from Last 3 Encounters:   08/23/18 59.9 kg (132 lb)   05/10/18 62.8 kg (138 lb 6.4 oz)   04/02/18 65.2 kg (143 lb 11.2 oz)              We Performed the Following     PHYSICAL THERAPY REFERRAL          Today's Medication Changes          These changes are accurate as of 8/23/18 11:30 AM.  If you " have any questions, ask your nurse or doctor.               Start taking these medicines.        Dose/Directions    carbidopa 25 MG Tabs tablet   Commonly known as:  LODOSYN   Used for:  Parkinsonism, unspecified Parkinsonism type (H)   Started by:  Jace Mart MD        Dose:  25 mg   Take 1 tablet (25 mg) by mouth 4 times daily   Quantity:  150 tablet   Refills:  3         These medicines have changed or have updated prescriptions.        Dose/Directions    carbidopa-levodopa  MG per tablet   Commonly known as:  SINEMET   This may have changed:  additional instructions   Used for:  Paralysis agitans (H)   Changed by:  Jace Mart MD        Please increased dose as per Martine with goal dose of 2 tabs 5 times a day.   Quantity:  900 tablet   Refills:  0            Where to get your medicines      Some of these will need a paper prescription and others can be bought over the counter.  Ask your nurse if you have questions.     Bring a paper prescription for each of these medications     carbidopa 25 MG Tabs tablet       You don't need a prescription for these medications     carbidopa-levodopa  MG per tablet                Primary Care Provider Office Phone # Fax #    Marisa Hoover [phone]       [address] [address]        Equal Access to Services     BINH BORDEN AH: Hadii geneva santana hadjohno Sotoby, waaxda luqadaha, qaybta kaalmada adeegyada, ewa singh. So [facility] [phone].    ATENCIÓN: Si habla español, tiene a sutton disposición servicios gratuitos de asistencia lingüística. Shahrzad al [phone].    We comply with applicable federal civil rights laws and [place] laws. We do not discriminate on the basis of race, color, national origin, age, disability, sex, sexual orientation, or gender identity.            Thank you!     Thank you for choosing [facility] NEUROLOG[facility]  for your care. Our goal is always to provide you  with excellent care. Hearing back from our patients is one way we can continue to improve our services. Please take a few minutes to complete the written survey that you may receive in the mail after your visit with us. Thank you!             Your Updated Medication List - Protect others around you: Learn how to safely use, store and throw away your medicines at www.disposemymeds.org.          This list is accurate as of 8/23/18 11:30 AM.  Always use your most recent med list.                   Brand Name Dispense Instructions for use Diagnosis    amLODIPine 10 MG tablet    NORVASC    90 tablet    TAKE 1 TABLET(10 MG) BY MOUTH DAILY    Essential hypertension       aspirin 81 MG tablet      Take 81 mg by mouth daily        buPROPion 150 MG 12 hr tablet    WELLBUTRIN SR    180 tablet    TAKE 1 TABLET(150 MG) BY MOUTH TWICE DAILY    Major depressive disorder, recurrent episode, mild (H)       calcium-vitamin D 600-400 MG-UNIT per tablet    CALTRATE     Take 1 tablet by mouth 2 times daily        carbidopa 25 MG Tabs tablet    LODOSYN    150 tablet    Take 1 tablet (25 mg) by mouth 4 times daily    Parkinsonism, unspecified Parkinsonism type (H)       carbidopa-levodopa  MG per tablet    SINEMET    900 tablet    Please increased dose as per Mychart with goal dose of 2 tabs 5 times a day.    Paralysis agitans (H)       FLUoxetine 20 MG capsule    PROzac    90 capsule    TAKE 3 CAPSULES(60 MG) BY MOUTH DAILY    Major depressive disorder, recurrent episode, mild (H)       letrozole 2.5 MG tablet    FEMARA     Take 2.5 mg by mouth daily        lidocaine 5 % Patch    LIDODERM     Place 2 patches onto the skin every 24 hours Apply to low back/left hip - on in AM, remove after 12 hours. 2603-1102, 3113-1838.        MULTIVITAMIN ADULT PO      Take 1 tablet by mouth daily        olmesartan 40 MG tablet    BENICAR    90 tablet    TAKE 1 TABLET(40 MG) BY MOUTH DAILY    Essential hypertension       polyethylene glycol powder     MIRALAX/GLYCOLAX     Take 17 g by mouth daily AND daily PRN        rosuvastatin 10 MG tablet    CRESTOR    30 tablet    TAKE 1 TABLET BY MOUTH DAILY    Hyperlipidemia LDL goal <100       senna-docusate 8.6-50 MG per tablet    SENOKOT-S;PERICOLACE     Take 2 tablets by mouth daily AND 1 tab QHS        TYLENOL PO      Take 1,000 mg by mouth 4 times daily

## 2018-08-23 NOTE — PROGRESS NOTES
"Department of Neurology  Movement Disorders Division     Patient: Zoey Armstrong   MRN: [id number]   : 1950   Date of Visit: 2018     History of Present Illness  Ms. Armstrong is a 68 year old R handed female with PMH of depression, posterior spinal fusion of L2-S1 due to neurogenic claudication, CAD, COPD, HLD, HTN that presents to [facility] for follow up regarding management of Parkinsonism with gait freezing and progressive gait instability with prevalence for falls. Patient was last seen on 2018 where a Santosh scan was ordered which revealed dopaminergic deficit in b/l putamen.      History obtained from patient and accompanied by . Patient reports that she attempted to take CD/LD 25/100mg 2.5 tabs qid but that led to nausea, headache, dizziness and she dropped back down to 2 tabs qid. Now taking 2 tab qid at 9am, 1pm, 4pm, 10-11pm. Not sick to stomach on this dose. Has breakfast at 9am, lunch 12-1pm, dinner 6-7pm. Compliant with this med. She does not believe that CD/LD is helping with movements. She feels that her movements are worse with standing and with fine motor hand skills. She reports shaking in both hands R > L, is worse. Continues to have issues with freezing gait when she starts and in the middle of walking and feels she is worse toward the end of the day. Walks with roller walker. Requires full assistance with ambulation and transfer as she is too shaky and unsteady. Feels like she is paralyzed and dependent on others. No falls recently as her  states \"we don't allow for opportunities for her to fall.\" She uses railing to get in and out of bed to roller walker. Patient is often frightened that she will fall and feels immobilized. Last fall was a couple weeks ago while walking to the fridge as she was truning too quickly as her walker was not locked.     Main concerns are tremors and freezing gait with assicaited falls and poor balance.  Deneis not recognizing " "arm/leg. Has no isues moving eyes or eyelids. No issues with vision. Denies positional dizziness. Having a hard time dressing, bathing, writing and with movement involving fine motor movements. Still able to feed self. Recently got a cleaning lady. Son and  do all the cooking.     Off Periods: doesn't notice med working   Dyskinesia: none    Memory Problems: No issues.   Hallucinations: None.  Falls: Was falling once every two weeks.   ADL's: Independent but requires assistance with transfers and walking. Stays in bed if  goes somewhere.  Assistive Devices: Roller walker.  Depression: She reports being steadily sad.   Anxiety: Uncertain is she is anxious or depressed.   Sleeps 8 hours. Does not act out dreams.  Insomnia: Not an issue.   REMSD: Denies.   RLS: Denies.   Drowsiness: Denies. \"My day is pretty mild. I don't really do much.\"  Impulsions: Denies.  Fatigue: Denies.  Apathy: Reports that her  has to work really hard to get her out of the house. She no longer likes to go out of the house and feels like it is a burden to get her ready to get out of the house.     Consider therapy  Review of Systems:  Other than that mentioned above, the remainder of 12 systems reviewed were negative.    Medications:  Current Outpatient Prescriptions   Medication Sig Dispense Refill     Acetaminophen (TYLENOL PO) Take 1,000 mg by mouth 4 times daily        amLODIPine (NORVASC) 10 MG tablet TAKE 1 TABLET(10 MG) BY MOUTH DAILY 90 tablet 0     aspirin 81 MG tablet Take 81 mg by mouth daily       buPROPion (WELLBUTRIN SR) 150 MG 12 hr tablet TAKE 1 TABLET(150 MG) BY MOUTH TWICE DAILY 180 tablet 0     calcium-vitamin D (CALTRATE) 600-400 MG-UNIT per tablet Take 1 tablet by mouth 2 times daily       carbidopa-levodopa (SINEMET)  MG per tablet Please increased dose as per Mychart with goal dose of 2.5 tab 4 times a day. 900 tablet 0     FLUoxetine (PROZAC) 20 MG capsule TAKE 3 CAPSULES(60 MG) BY MOUTH DAILY " "90 capsule 0     letrozole (FEMARA) 2.5 MG tablet Take 2.5 mg by mouth daily       lidocaine (LIDODERM) 5 % Patch Place 2 patches onto the skin every 24 hours Apply to low back/left hip - on in AM, remove after 12 hours. 4501-4035, 6240-3040.        Multiple Vitamins-Minerals (MULTIVITAMIN ADULT PO) Take 1 tablet by mouth daily       olmesartan (BENICAR) 40 MG tablet TAKE 1 TABLET(40 MG) BY MOUTH DAILY 90 tablet 1     polyethylene glycol (MIRALAX/GLYCOLAX) powder Take 17 g by mouth daily AND daily PRN       rosuvastatin (CRESTOR) 10 MG tablet TAKE 1 TABLET BY MOUTH DAILY 30 tablet 0     senna-docusate (SENOKOT-S;PERICOLACE) 8.6-50 MG per tablet Take 2 tablets by mouth daily AND 1 tab QHS            Movement Disorder-related Medications                   9am 1pm 4pm 10-11pm   CD/LD 25/100mg  2 tabs 2 2 2        Physical Exam:  The patient's  height is 1.651 m (5' 5\") and weight is 59.9 kg (132 lb). Her blood pressure is 100/64 and her pulse is 91. Her oxygen saturation is 97%.    Physical Exam:  General: Resting comfortably   Respiratory: No respiratory distress     Neuro Exam:  Mental status: Alert and oriented to person, place, time, and situation. Follows commands  CN: EOMIB, PERRL, facial sensation intact, facial movement symmetric, hearing grossly intact, tongue protrudes midline   Motor: Moves all extremities equally against gravity without difficulty or abnormalities   Sensation: intact to light touch throughout   Gait: requires roller walker, stooped gait with prominent freezing of gait. Trialed a Ustep walker with improvement in gait and observed to have more fluid stepping and less freezing.     UPDRS Values 8/23/2018   Time: 10:40 AM   Medication On   R Brain DBS: None   L Brain DBS: None   Speech 1   Facial Expression 1   Rigidity Neck 1   Rigidity RUE 1   Rigidity LUE 1   Rigidity RLE 0   Rigidity LLE 0   Finger Taps R 2   Finger Taps L 1   Hand Mvt R 0   Hand Mvt L 0   Pron-/Supinate R 0 "   Pron-/Supinate L 0   Toe Tap R 3   Toe Tap L 3   Leg Agility R 1   Leg Agility L 1   Arise From Chair 1   Gait 4   Gait Freezing 4   Postural Stability 4   Posture 2   Global Spont Mvt 2   Postural Tremor RUE 0   Postural Tremor LUE 0   Kinetic Tremor RUE 1   Kinetic Tremor LUE 1   Rest Tremor RUE 0   Rest Tremor LUE 0   Rest Tremor RLE 0   Rest Tremor LLE 0   Rest Tremor Lip/Jaw 0   Rest Tremor Constancy 0   Total Right 8   Total Left 7   Axial Total 20   Total 35   5/2018 score 26    Data Reviewed:   - 6/2018 Santosh scan revealed a 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  - 11/2016 CT H with appears to have ventriculomegaly that does appear out of proportion to sulcal atrophy.    Impression:  Ms. Armstrong is a 68 year old R handed female with PMH of depression, posterior spinal fusion of L2-S1 due to neurogenic claudication, CAD, COPD, HLD, HTN that presents to [facility]rology Movement clinic for follow up regarding management of Parkinsonism with gait freezing and progressive gait instability with prevalence for falls.     1. Atypical parkinsonism: Initial presentation with gait instability and freezing of gait with susceptibility for falls that has progressed over the last several years. Previous CT H in 11/2016 concerning for normal pressure hydrocephalus and is s/p large volume tap of 38mL with no improvement in gait. Patient seen by Dr. Cruz who did not suspect symptoms were associated with NPH. Patient has begun a trial of CD/LD 25/100mg, currently on 2 tabs qid, with no obvious improvement. Main concerns continue to be gait instability and freezing of gait.   2. Gait instability with associated falls: due to #1. Trialed a Ustep walker in clinic with improvement in gait and observed to have more fluid stepping and less freezing.   3. Freezing of gait: due to #1    Plan:   - U-Step walker ordered for gait instability - will ask assistance from [facility] nurse  - Gait training with  Physical Therapy using U-Step walker near their home in [place], [place] (perscription provided)  - Increase dose of Sinemet (carbidopa/levodopa) 25/100mg to goal dose of 2 tabs five times a day   - Increase Sinemet to take five times a day at 9am, 1130, 3pm, 7pm, 10pm. For the first week, take one tab only at 9am. Then after the second week, take 2 tabs at 9am and continue on 2 tabs five times a day.    - After a month, if you do not find benefit, you may start to titrate off this medication by decreasing by 1 tab every 5 days until you are off of the medication. Asked to call clinic if they decide to go this route.   - Start carbidopa 25mg 0.5 tab to take with Sinemet to offset the effects of nausea and GI symptoms (script printed); may also take ginger ale or ginger root capsule for nausea.   - To get maximum effects of Sinemet, take an 1 hour before a protein rich meal or 2 hours after a protein rich meal  - For freezing, discussed rocking back and forth or placing tape on the floor as a cue to step on a line to walk.  - We encourage  exercise daily on recumbent bike and to stay social  - Will continue to monitor mood. If patient continues to feel consistently depressed, we may consider changing antidepressant to mirtazapine.      RTC: 4 months     Time spent with patient: Greater than 50% of this 60 minute visit was spent in counseling and coordination of care related to the issues detailed above.      Heidi Melvin DO  Movement Disorders Fellow    Patient seen and discussed with Dr. Mart.    The patient was seen with the fellow. I was present for key portions of the history and physical examination and agree with the assessment and plan.    Jace Mart MD, PhD     Answers for HPI/ROS submitted by the patient on 8/23/2018   General Symptoms: No  Skin Symptoms: Yes  HENT Symptoms: No  EYE SYMPTOMS: No  HEART SYMPTOMS: No  LUNG SYMPTOMS: No  INTESTINAL SYMPTOMS: No  URINARY SYMPTOMS: Yes  GYNECOLOGIC  SYMPTOMS: No  BREAST SYMPTOMS: No  SKELETAL SYMPTOMS: No  BLOOD SYMPTOMS: Yes  NERVOUS SYSTEM SYMPTOMS: Yes  MENTAL HEALTH SYMPTOMS: No  Trouble holding urine or incontinence: Yes  Pain or burning: No  Trouble starting or stopping: Yes  Increased frequency of urination: Yes  Blood in urine: No  Decreased frequency of urination: Yes  Frequent nighttime urination: No  Flank pain: No  Difficulty emptying bladder: No  Anemia: No  Swollen glands: No  Easy bleeding or bruising: Yes  Edema or swelling: No  Trouble with coordination: Yes  Dizziness or trouble with balance: Yes  Fainting or black-out spells: No  Memory loss: No  Headache: No  Seizures: No  Speech problems: No  Tingling: Yes  Tremor: Yes  Weakness: Yes  Difficulty walking: Yes  Paralysis: No  Numbness: Yes

## 2018-08-23 NOTE — LETTER
"2018       RE: Zoey Armstrong  [address]  [place] [address]     Dear Colleague,    Thank you for referring your patient, Zoey Armstrong, to the [facility]UR[facility] at [facility]. Please see a copy of my visit note below.    Department of Neurology  Movement Disorders Division     Patient: Zoey Armstrong   MRN: [id number]   : 1950   Date of Visit: 2018     History of Present Illness  Ms. Armstrong is a 68 year old R handed female with PMH of depression, posterior spinal fusion of L2-S1 due to neurogenic claudication, CAD, COPD, HLD, HTN that presents to Neurology Movement clinic for follow up regarding management of Parkinsonism with gait freezing and progressive gait instability with prevalence for falls. Patient was last seen on 2018 where a Santosh scan was ordered which revealed dopaminergic deficit in b/l putamen.      History obtained from patient and accompanied by . Patient reports that she attempted to take CD/LD 25/100mg 2.5 tabs qid but that led to nausea, headache, dizziness and she dropped back down to 2 tabs qid. Now taking 2 tab qid at 9am, 1pm, 4pm, 10-11pm. Not sick to stomach on this dose. Has breakfast at 9am, lunch 12-1pm, dinner 6-7pm. Compliant with this med. She does not believe that CD/LD is helping with movements. She feels that her movements are worse with standing and with fine motor hand skills. She reports shaking in both hands R > L, is worse. Continues to have issues with freezing gait when she starts and in the middle of walking and feels she is worse toward the end of the day. Walks with roller walker. Requires full assistance with ambulation and transfer as she is too shaky and unsteady. Feels like she is paralyzed and dependent on others. No falls recently as her  states \"we don't allow for opportunities for her to fall.\" She uses railing to get in and out of bed to roller walker. Patient is often " "frightened that she will fall and feels immobilized. Last fall was a couple weeks ago while walking to the fridge as she was truning too quickly as her walker was not locked.     Main concerns are tremors and freezing gait with assicaited falls and poor balance.  Deneis not recognizing arm/leg. Has no isues moving eyes or eyelids. No issues with vision. Denies positional dizziness. Having a hard time dressing, bathing, writing and with movement involving fine motor movements. Still able to feed self. Recently got a cleaning lady. Son and  do all the cooking.     Off Periods: doesn't notice med working   Dyskinesia: none    Memory Problems: No issues.   Hallucinations: None.  Falls: Was falling once every two weeks.   ADL's: Independent but requires assistance with transfers and walking. Stays in bed if  goes somewhere.  Assistive Devices: Roller walker.  Depression: She reports being steadily sad.   Anxiety: Uncertain is she is anxious or depressed.   Sleeps 8 hours. Does not act out dreams.  Insomnia: Not an issue.   REMSD: Denies.   RLS: Denies.   Drowsiness: Denies. \"My day is pretty mild. I don't really do much.\"  Impulsions: Denies.  Fatigue: Denies.  Apathy: Reports that her  has to work really hard to get her out of the house. She no longer likes to go out of the house and feels like it is a burden to get her ready to get out of the house.     Consider therapy  Review of Systems:  Other than that mentioned above, the remainder of 12 systems reviewed were negative.    Medications:  Current Outpatient Prescriptions   Medication Sig Dispense Refill     Acetaminophen (TYLENOL PO) Take 1,000 mg by mouth 4 times daily        amLODIPine (NORVASC) 10 MG tablet TAKE 1 TABLET(10 MG) BY MOUTH DAILY 90 tablet 0     aspirin 81 MG tablet Take 81 mg by mouth daily       buPROPion (WELLBUTRIN SR) 150 MG 12 hr tablet TAKE 1 TABLET(150 MG) BY MOUTH TWICE DAILY 180 tablet 0     calcium-vitamin D (CALTRATE) " "600-400 MG-UNIT per tablet Take 1 tablet by mouth 2 times daily       carbidopa-levodopa (SINEMET)  MG per tablet Please increased dose as per Mychart with goal dose of 2.5 tab 4 times a day. 900 tablet 0     FLUoxetine (PROZAC) 20 MG capsule TAKE 3 CAPSULES(60 MG) BY MOUTH DAILY 90 capsule 0     letrozole (FEMARA) 2.5 MG tablet Take 2.5 mg by mouth daily       lidocaine (LIDODERM) 5 % Patch Place 2 patches onto the skin every 24 hours Apply to low back/left hip - on in AM, remove after 12 hours. 7514-2708, 6082-3075.        Multiple Vitamins-Minerals (MULTIVITAMIN ADULT PO) Take 1 tablet by mouth daily       olmesartan (BENICAR) 40 MG tablet TAKE 1 TABLET(40 MG) BY MOUTH DAILY 90 tablet 1     polyethylene glycol (MIRALAX/GLYCOLAX) powder Take 17 g by mouth daily AND daily PRN       rosuvastatin (CRESTOR) 10 MG tablet TAKE 1 TABLET BY MOUTH DAILY 30 tablet 0     senna-docusate (SENOKOT-S;PERICOLACE) 8.6-50 MG per tablet Take 2 tablets by mouth daily AND 1 tab QHS            Movement Disorder-related Medications                   9am 1pm 4pm 10-11pm   CD/LD 25/100mg  2 tabs 2 2 2        Physical Exam:  The patient's  height is 1.651 m (5' 5\") and weight is 59.9 kg (132 lb). Her blood pressure is 100/64 and her pulse is 91. Her oxygen saturation is 97%.    Physical Exam:  General: Resting comfortably   Respiratory: No respiratory distress     Neuro Exam:  Mental status: Alert and oriented to person, place, time, and situation. Follows commands  CN: EOMIB, PERRL, facial sensation intact, facial movement symmetric, hearing grossly intact, tongue protrudes midline   Motor: Moves all extremities equally against gravity without difficulty or abnormalities   Sensation: intact to light touch throughout   Gait: requires roller walker, stooped gait with prominent freezing of gait. Trialed a Ustep walker with improvement in gait and observed to have more fluid stepping and less freezing.     UPDRS Values 8/23/2018   Time: " 10:40 AM   Medication On   R Brain DBS: None   L Brain DBS: None   Speech 1   Facial Expression 1   Rigidity Neck 1   Rigidity RUE 1   Rigidity LUE 1   Rigidity RLE 0   Rigidity LLE 0   Finger Taps R 2   Finger Taps L 1   Hand Mvt R 0   Hand Mvt L 0   Pron-/Supinate R 0   Pron-/Supinate L 0   Toe Tap R 3   Toe Tap L 3   Leg Agility R 1   Leg Agility L 1   Arise From Chair 1   Gait 4   Gait Freezing 4   Postural Stability 4   Posture 2   Global Spont Mvt 2   Postural Tremor RUE 0   Postural Tremor LUE 0   Kinetic Tremor RUE 1   Kinetic Tremor LUE 1   Rest Tremor RUE 0   Rest Tremor LUE 0   Rest Tremor RLE 0   Rest Tremor LLE 0   Rest Tremor Lip/Jaw 0   Rest Tremor Constancy 0   Total Right 8   Total Left 7   Axial Total 20   Total 35   5/2018 score 26    Data Reviewed:   - 6/2018 Santosh scan revealed a 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  - 11/2016 CT H with appears to have ventriculomegaly that does appear out of proportion to sulcal atrophy.    Impression:  Ms. Armstrong is a 68 year old R handed female with PMH of depression, posterior spinal fusion of L2-S1 due to neurogenic claudication, CAD, COPD, HLD, HTN that presents to Neurology Movement clinic for follow up regarding management of Parkinsonism with gait freezing and progressive gait instability with prevalence for falls.     1. Atypical parkinsonism: Initial presentation with gait instability and freezing of gait with susceptibility for falls that has progressed over the last several years. Previous CT H in 11/2016 concerning for normal pressure hydrocephalus and is s/p large volume tap of 38mL with no improvement in gait. Patient seen by Dr. Cruz who did not suspect symptoms were associated with NPH. Patient has begun a trial of CD/LD 25/100mg, currently on 2 tabs qid, with no obvious improvement. Main concerns continue to be gait instability and freezing of gait.   2. Gait instability with associated falls: due to #1. Trialed  a Ustep walker in clinic with improvement in gait and observed to have more fluid stepping and less freezing.   3. Freezing of gait: due to #1    Plan:   - U-Step walker ordered for gait instability - will ask assistance from [facility] nurse  - Gait training with Physical Therapy using U-Step walker near their home in [place], [place] (perscription provided)  - Increase dose of Sinemet (carbidopa/levodopa) 25/100mg to goal dose of 2 tabs five times a day   - Increase Sinemet to take five times a day at 9am, 1130, 3pm, 7pm, 10pm. For the first week, take one tab only at 9am. Then after the second week, take 2 tabs at 9am and continue on 2 tabs five times a day.    - After a month, if you do not find benefit, you may start to titrate off this medication by decreasing by 1 tab every 5 days until you are off of the medication. Asked to call clinic if they decide to go this route.   - Start carbidopa 25mg 0.5 tab to take with Sinemet to offset the effects of nausea and GI symptoms (script printed); may also take ginger ale or ginger root capsule for nausea.   - To get maximum effects of Sinemet, take an 1 hour before a protein rich meal or 2 hours after a protein rich meal  - For freezing, discussed rocking back and forth or placing tape on the floor as a cue to step on a line to walk.  - We encourage  exercise daily on recumbent bike and to stay social  - Will continue to monitor mood. If patient continues to feel consistently depressed, we may consider changing antidepressant to mirtazapine.      RTC: 4 months     Time spent with patient: Greater than 50% of this 60 minute visit was spent in counseling and coordination of care related to the issues detailed above.      Heidi Melvin DO  Movement Disorders Fellow    Patient seen and discussed with Dr. Mart.    The patient was seen with the fellow. I was present for key portions of the history and physical examination and agree with the assessment and  plan.      Again, thank you for allowing me to participate in the care of your patient.      Sincerely,    Jace Mart MD

## 2018-08-23 NOTE — PATIENT INSTRUCTIONS
Plan:  - We will order a U-Step walker to improve your gait.  - Once you get your U-Step walker, we want you to do gait training with Physical Therapy using your U-Step walker.  - Increase dose of Sinemet (carbidopa/levodopa) 25/100mg to goal dose of 2 tabs five times a day   - Increase Sinemet to take five times a day at 9am, 1130, 3pm, 7pm, 10pm. For the first week, take one tab only at 9am. Then after the second week, take 2 tabs at 9am.    - After a month, if you do not find benefit, you may start to titrate off this medication by decreasing by 1 tab every 5 days until you are off of the medication. Call the clinic if you do this.   - We will prescribe an extra dose of carbidopa to take with each dose of the Sinemet to offset the effects of nausea and GI symptoms. You may also take ginger ale or ginger root capsule for nausea.   - To get maximum effects of Sinemet, take an 1 hour before a protein rich meal or 2 hours after a protein rich meal.  - For freezing, try rocking back and forth or placing tape on the floor as a cue to step on a line to walk.  - We encourage you to exercise daily on your recumbent bike and to stay social.     We will see you back in 4 months.

## 2018-08-25 DIAGNOSIS — F33.0 MAJOR DEPRESSIVE DISORDER, RECURRENT EPISODE, MILD (H): ICD-10-CM

## 2018-08-25 RX ORDER — ROSUVASTATIN CALCIUM 10 MG/1
TABLET, COATED ORAL
Qty: 30 TABLET | Refills: 0 | Status: SHIPPED | OUTPATIENT
Start: 2018-08-25 | End: 2018-09-24

## 2018-08-25 NOTE — TELEPHONE ENCOUNTER
PHQ-9 score:    PHQ-9 SCORE 4/2/2018   Total Score -   Total Score 11       Medication is being filled for 1 time refill only due to:  Patient needs to be seen because due for annual appointment in September.     [facility] message sent to patient advising her she is due for appointment.

## 2018-08-27 NOTE — TELEPHONE ENCOUNTER
"Requested Prescriptions   Pending Prescriptions Disp Refills     FLUoxetine (PROZAC) 20 MG capsule [Pharmacy Med Name: FLUOXETINE 20MG CAPSULES] 270 capsule 0    Last Written Prescription Date:  08/25/2018  Last Fill Quantity: 90,  # refills: 0   Last office visit: 8/30/2017 with prescribing provider:     Future Office Visit:   Sig: TAKE 3 CAPSULES BY MOUTH ONCE DAILY    SSRIs Protocol Failed    8/25/2018 11:58 AM       Failed - PHQ-9 score less than 5 in past 6 months    Please review last PHQ-9 score.          Failed - Recent (6 mo) or future (30 days) visit within the authorizing provider's specialty    Patient had office visit in the last 6 months or has a visit in the next 30 days with authorizing provider or within the authorizing provider's specialty.  See \"Patient Info\" tab in inbasket, or \"Choose Columns\" in Meds & Orders section of the refill encounter.           Passed - Patient is age 18 or older       Passed - No active pregnancy on record       Passed - No positive pregnancy test in last 12 months        "

## 2018-08-28 ENCOUNTER — TELEPHONE (OUTPATIENT)
Dept: NEUROLOGY | Facility: CLINIC | Age: 68
End: 2018-08-28

## 2018-08-28 DIAGNOSIS — G20.C PARKINSONISM, UNSPECIFIED PARKINSONISM TYPE (H): ICD-10-CM

## 2018-08-28 DIAGNOSIS — G20.A1 PARALYSIS AGITANS (H): ICD-10-CM

## 2018-08-28 RX ORDER — CARBIDOPA 25 MG/1
25 TABLET ORAL 4 TIMES DAILY
Qty: 360 TABLET | Refills: 3 | Status: SHIPPED | OUTPATIENT
Start: 2018-08-28 | End: 2019-01-01

## 2018-08-28 NOTE — LETTER
August 28, 2018    Zoey Armstrong  [address]  [facility] [address]    To Whom It May Concern:    My patient Zoey Armstrong has a diagnosis of Parkinsonism with gait freezing and progressive gait instability with prevalence for falls, for whom I am prescribing a U-STEP walking Stabilizer.  Her severe neurological condition has been present now for over 2 years and is progressive in nature.  Her gait and balance has been severely affected by the severity of her neurological condition requiring walking assistance.     After trying standard walkers, it is apparent that due to predictable gait and balance problems, she is now requiring an advanced walking aid with a multiple hand braking system and variable wheel resistance and a floor laser to ambulate safetly.     I am prescribing the U-STEP Walking Stabilizer ([facility] Model # US-PC2).  Medicare code , manufactured by [employer].  Due to fatigue and unpredictable gait stalling, the patient will also need a seat for the Walking Stabilizer, Medicare code .    The patient's medical condition indicates a lifetime length of need for this walker.     Overall, this patient's ability to carry out activities of daily living are directly altered by her inability to ambulate safely ithout falls due to gait stalling or rapid small stops and falling backward with standard lightweight walkers.  She has used the U-Step in my office with much improved safety, mobility, and will greatly reduce his high risk of falls and associated mobility.     Sincerely,     Jace Mart      NPI#  [id number]

## 2018-09-13 DIAGNOSIS — I10 ESSENTIAL HYPERTENSION: ICD-10-CM

## 2018-09-13 NOTE — TELEPHONE ENCOUNTER
"Requested Prescriptions   Pending Prescriptions Disp Refills     olmesartan (BENICAR) 40 MG tablet [Pharmacy Med Name: OLMESARTAN MEDOXOMIL 40MG TABLETS] 90 tablet 0    Last Written Prescription Date:  12/18/2017  Last Fill Quantity: 90,  # refills: 1   Last office visit: 8/30/2017 with prescribing provider:     Future Office Visit:   Sig: TAKE 1 TABLET(40 MG) BY MOUTH DAILY    Angiotensin-II Receptors Failed    9/13/2018  9:30 AM       Failed - Recent (12 mo) or future (30 days) visit within the authorizing provider's specialty    Patient had office visit in the last 12 months or has a visit in the next 30 days with authorizing provider or within the authorizing provider's specialty.  See \"Patient Info\" tab in inbasket, or \"Choose Columns\" in Meds & Orders section of the refill encounter.           Failed - Normal serum creatinine on file in past 12 months    Recent Labs   Lab Test 07/06/17   CR  0.96            Failed - Normal serum potassium on file in past 12 months    Recent Labs   Lab Test 07/06/17   POTASSIUM  3.9                   Passed - Blood pressure under 140/90 in past 12 months    BP Readings from Last 3 Encounters:   08/23/18 100/64   05/10/18 133/79   04/02/18 128/78                Passed - Patient is age 18 or older       Passed - No active pregnancy on record       Passed - No positive pregnancy test in past 12 months        "

## 2018-09-18 RX ORDER — OLMESARTAN MEDOXOMIL 40 MG/1
TABLET ORAL
Qty: 90 TABLET | Refills: 0 | Status: SHIPPED | OUTPATIENT
Start: 2018-09-18 | End: 2019-01-01

## 2018-09-18 NOTE — TELEPHONE ENCOUNTER
Routing refill request to provider for review/approval because:  Labs not current:  Cr and K+  Patient needs to be seen because it has been more than 1 year since last office visit.

## 2018-09-24 ENCOUNTER — TELEPHONE (OUTPATIENT)
Dept: INTERNAL MEDICINE | Facility: CLINIC | Age: 68
End: 2018-09-24

## 2018-09-24 DIAGNOSIS — F33.0 MAJOR DEPRESSIVE DISORDER, RECURRENT EPISODE, MILD (H): ICD-10-CM

## 2018-09-24 DIAGNOSIS — E78.5 HYPERLIPIDEMIA LDL GOAL <100: ICD-10-CM

## 2018-09-24 NOTE — TELEPHONE ENCOUNTER
"Requested Prescriptions   Pending Prescriptions Disp Refills     FLUoxetine (PROZAC) 20 MG capsule [Pharmacy Med Name: FLUOXETINE 20MG CAPSULES]  Last Written Prescription Date:  8/25/2018  Last Fill Quantity: 90,  # refills: 0   Last office visit: 8/30/2017 with prescribing provider:     Future Office Visit:   90 capsule 0     Sig: TAKE 3 CAPSULES BY MOUTH ONCE DAILY    SSRIs Protocol Failed    9/24/2018 10:41 AM       Failed - PHQ-9 score less than 5 in past 6 months    Please review last PHQ-9 score.          Failed - Recent (6 mo) or future (30 days) visit within the authorizing provider's specialty    Patient had office visit in the last 6 months or has a visit in the next 30 days with authorizing provider or within the authorizing provider's specialty.  See \"Patient Info\" tab in inbasket, or \"Choose Columns\" in Meds & Orders section of the refill encounter.           Passed - Patient is age 18 or older       Passed - No active pregnancy on record       Passed - No positive pregnancy test in last 12 months        rosuvastatin (CRESTOR) 10 MG tablet [Pharmacy Med Name: ROSUVASTATIN 10MG TABLETS]  Last Written Prescription Date:  8/25/2018  Last Fill Quantity: 30,  # refills: 0   Last office visit: 8/30/2017 with prescribing provider:     Future Office Visit:   30 tablet 0     Sig: TAKE 1 TABLET BY MOUTH ONCE DAILY    Statins Protocol Failed    9/24/2018 10:41 AM       Failed - LDL on file in past 12 months    Recent Labs   Lab Test  09/28/15   0800   LDL  87            Failed - Recent (12 mo) or future (30 days) visit within the authorizing provider's specialty    Patient had office visit in the last 12 months or has a visit in the next 30 days with authorizing provider or within the authorizing provider's specialty.  See \"Patient Info\" tab in inbasket, or \"Choose Columns\" in Meds & Orders section of the refill encounter.           Passed - No abnormal creatine kinase in past 12 months    No lab results found. "            Passed - Patient is age 18 or older       Passed - No active pregnancy on record       Passed - No positive pregnancy test in past 12 months

## 2018-09-26 ENCOUNTER — HOSPITAL ENCOUNTER (OUTPATIENT)
Dept: PHYSICAL THERAPY | Facility: CLINIC | Age: 68
Setting detail: THERAPIES SERIES
End: 2018-09-26
Attending: PSYCHIATRY & NEUROLOGY
Payer: MEDICARE

## 2018-09-26 PROCEDURE — 97112 NEUROMUSCULAR REEDUCATION: CPT | Mod: GP | Performed by: PHYSICAL THERAPIST

## 2018-09-26 PROCEDURE — 40000719 ZZHC STATISTIC PT DEPARTMENT NEURO VISIT: Performed by: PHYSICAL THERAPIST

## 2018-09-26 PROCEDURE — G8979 MOBILITY GOAL STATUS: HCPCS | Mod: GP,CK | Performed by: PHYSICAL THERAPIST

## 2018-09-26 PROCEDURE — G8978 MOBILITY CURRENT STATUS: HCPCS | Mod: GP,CM | Performed by: PHYSICAL THERAPIST

## 2018-09-26 PROCEDURE — 97162 PT EVAL MOD COMPLEX 30 MIN: CPT | Mod: GP | Performed by: PHYSICAL THERAPIST

## 2018-09-26 PROCEDURE — 97110 THERAPEUTIC EXERCISES: CPT | Mod: GP | Performed by: PHYSICAL THERAPIST

## 2018-09-26 RX ORDER — ROSUVASTATIN CALCIUM 10 MG/1
TABLET, COATED ORAL
Qty: 30 TABLET | Refills: 0 | Status: SHIPPED | OUTPATIENT
Start: 2018-09-26 | End: 2018-10-23

## 2018-09-26 NOTE — PROGRESS NOTES
09/26/18 0900   Quick Adds   Quick Adds Certification   Type of Visit Initial OP PT Evaluation   General Information   Start of Care Date 09/26/18   Referring Physician Dr. Lele Stahl   Orders Evaluate and Treat as Indicated   Additional Orders Please provide gait training therapy for pt with Parkinsonism associated with falls.    Order Date 08/23/18   Medical Diagnosis Parkinsonism, unspecified Parkinsonism type (G20)   Onset of illness/injury or Date of Surgery 08/23/18  (date of order)   Precautions/Limitations fall precautions   Surgical/Medical history reviewed Yes   Pertinent history of current problem (include personal factors and/or comorbidities that impact the POC) Zoey presents with orders as above.  JT present.  THey report recent dx of atypical parkinsonism in the last couple months but looking back feel she had symptoms for several years.   reports pt having gone through a series of surgeries this past year leading to further decline in function - R rotator cuff surgery, foot injury and back surgery, breast cancer.  Pt with history of lumbar decompression L2-S1 with anterior fusion L3-S1 6/23/17, depression, CAD, COPD, HLD, HTN.  Pt had a Santosh scan 5/2018 which showed dopaminergic deficit in b/l putamen.  She has just recently started taking sinemet per her report.  She feels her shaking is less.      Prior level of function comment Prior to the back surgery walking without a device, short distances.  Closer it got to the surgery was not able to do on her own anymore.    CUrrently son and  do the cooking and cleaining up,  has a lady coming in every couple of weeks to help with cleaining.   Son lives with them.   Does own showering , has a bench, railing to hold onto across the back of the tub to help with stepping in and out,  there if help is needed but she does it on her own.    Current Community Support Family/friend caregiver;Housekeeping service   Patient  "role/Employment history Retired  ()   Living environment House/[facility]   Home/Community Accessibility Comments 10 steps to enter from the garage, B railings , negotiates stairs on her own ithout problems per  report.  No stairs on main level.  step in tub with built in railing.   built parallel bars for pt to use after back surgery but has not used them,  looks like about 4 inch step to get onto base   Current Assistive Devices (Ustep walker)   ADL Devices Shower/Tub Chair   Assistive Devices Comments  built a set of parallel bars for pt.   she has a FWW , Ustep walker recently acquired. no q/c   Patient/Family Goals Statement \"wants to be able to walk\"  When asked for specifics she states to walk to the mail box -- 25 feet and slight incline.    States that she isn't really interested in returning to cooking, house cleaning she reports maybe the mirrors\"  cleaning of. She has difficulty with buttoning her shirts, needs help with fastening her bra otherwise able to dress herself.    General Information Comments they have a stationary bike to use as well but haven't yet.    Fall Risk Screen   Fall screen completed by PT   Have you fallen 2 or more times in the past year? Yes  (10 times approximately)   Have you fallen and had an injury in the past year? Yes   Timed Up and Go score (seconds) 3 min 11 seconds with Ustep walker and needing assist and vc for final turn to sit at end of testing   Is patient a fall risk? Yes;Department fall risk interventions implemented   Fall screen comments broke arm about a year ago fell landed on stairs.    Pain   Pain comments no pain currently.  Has had pain in legs \"like needles \"  from knees to hips.   One episode of the pain with sitting activity forward COM /ant pelvic tilt in sitting -appeared to be possible increase tone reaction hip flexors/back.    Cognitive Status Examination   Orientation orientation to person, place and time   Level of " Consciousness alert   Follows Commands and Answers Questions 75% of the time;unable to follow multi-step instructions   Personal Safety and Judgment intact   Cognitive Comment denies cognitive or memory issues.  At times slower with motor processing verbal directions   Observation   Observation , JT , with pt.   Soft voice - pt   Posture   Posture Comments sitting rounded posture and tilts to the left. Hholds feet off the floor R more than L but able to correct with cues and awareness of .  Not sure if tone driven or to limit forward COM over FRANK  Standing R ilium higher than the L forward at trunk, pelvis to the L and shoulders to the right , left lateral trunk flexion   Range of Motion (ROM)   ROM Comment slow with knee extension in sitting.  trunk rotation sitting 10 degrees B. Standing decreased hip extension, knee extension.  Dorsiflexion to neutral B AAROM   Strength   Strength Comments MMT 4+-5/5 for hip flexion, knee flex, knee ext and ankle dorsiflexion.     Bed Mobility   Bed Mobility Comments bedrail along side of bedat home and they report pt does not need assist. Not assessed today due to time constraints   Transfer Skills   Transfer Comments sit to stand not able to do without UE support. able to rise iwth UE support , slowly.   DIfficulty with transitions requiring turns to sit on walker seat or chair.    Standing off walker seat and preparing to walk 1 min 44 sec from start to gait initiation.    Locomotion   Wheel Chair Mobility Comments does not own a w/c    Gait   Gait Comments gait with Ustep walker. flexed posture, shuffling gait, small strides, step length about 4-5 inches. Feezing episodes. difficulties iwth narrow spaces, visually stimulating/ busy environments, over threshold.    Gait Special Tests 25 Foot Timed Walk   Seconds 53.9   Comments with Ustep walker, one episode of freezing with gait, CGA and reassurance safe needed to complete task   Steps 57 Steps   Balance   Balance  Comments not able to stand without UE support and assist .   sitting forward reach 4 inches.  Avoids forward COM over FRANK in sitting and in standing.    Balance Special Tests Romberg   Seconds 0 Seconds   Balance Special Tests Sit to Stand Reps in 30 Seconds   Comments not able to complete without UE support   Sensory Examination   Sensory Perception Comments denies issues   Coordination   Coordination Comments dysmetria, ataxic movements UE.  Possible hip flexor tone or increased trunk flexor tone.   decreased motor coordination and motor planning   Muscle Tone   Muscle Tone Comments tremor /shakiness with movements, SItting holds femurs IR and adducted, occasionally feet of the floor active flexion of hips.    Modality Interventions   Planned Modality Interventions Comments per therapist discretion   Planned Therapy Interventions   Planned Therapy Interventions balance training;bed mobility training;gait training;joint mobilization;motor coordination training;neuromuscular re-education;ROM;strengthening;stretching;transfer training;manual therapy;wheelchair management/propulsion training   Clinical Impression   Criteria for Skilled Therapeutic Interventions Met yes, treatment indicated   PT Diagnosis impaired gait, transfers, functional mobility and balance with atypical parkinsons   Influenced by the following impairments muscle shortening, decreased motor coordination and motor planning, generalized weakness, fear of falling. decreased abilit to bring COM past FRANK all directions especially forward sittting and standing   Functional limitations due to impairments needing assist and AD with gait, fall risk with history of falls with injury, needing assist with transfers, home cares, partial dressing assist.    Clinical Presentation Evolving/Changing   Clinical Presentation Rationale function changes depending on medication involvement and fatigue.    Clinical Decision Making (Complexity) Moderate complexity  "  Therapy Frequency 2 times/Week   Predicted Duration of Therapy Intervention (days/wks) 90 days   Risk & Benefits of therapy have been explained Yes   Patient, Family & other staff in agreement with plan of care Yes   Education Assessment   Preferred Learning Style Listening;Demonstration;Reading;Pictures/video   Barriers to Learning Physical   Goal 1   Goal Identifier 1 - TUG   Goal Description Zoey to improve her TUG time from 3 min 11 sec and assist needed to 1 min 45 seconds or less with supervision and AD to demonstrate improved functional mobility and safety with gait, decreased fall risk.    Target Date 12/25/18   Goal 2   Goal Identifier 2 - 25 foot walk   Goal Description Zoey to complete 25 foot walk in 25 sec or less and 30 steps or less to demosntrate improved coordination, increased gait efficiency and safety, while using AD.  (baseline 53.9 sec and 57 steps with U step walker , 1 freezing episode and CGA)   Target Date 12/25/18   Goal 3   Goal Identifier 3 - balance/motor coordination/planning   Goal Description Zoey to rise from Ustep walker seat to standing , turn and initiate walking in 40 sec or less to demonstrate improved motor coordination and planning and balance . (baseline 1 min 44 seconds at eval)   Target Date 12/25/18   Goal 4   Goal Identifier 4 HEP   Goal Description Zoey to be independent in appropriate HEP, assist from /family if needed, to safely continue to address her impairments following d/c from skilled PT intervention.    Target Date 12/25/18   Goal 5   Goal Identifier 5  community gait   Goal Description Zoey to ambulate 200 feet or greater with Ustep walker/AD with independent correction of \"freezing episodes\" to demosntrate improved abiltiy to ambulate in community as well as manage symptoms.   Target Date 12/25/18   Total Evaluation Time   Total Evaluation Time (Minutes) 35   Therapy Certification   Certification date from 09/26/18   Certification date to " 12/25/18   Medical Diagnosis Parkinsonism, unspecified Parkinsonism type (G20)

## 2018-09-26 NOTE — PROGRESS NOTES
[facility]        OUTPATIENT PHYSICAL THERAPY FUNCTIONAL EVALUATION  PLAN OF TREATMENT FOR OUTPATIENT REHABILITATION  (COMPLETE FOR INITIAL CLAIMS ONLY)  Patient's Last Name, First Name, M.I.  YOB: 1950  Zoey Armstrong     Provider's Name   [facility]   Medical Record No.  [id number]     Start of Care Date:  09/26/18   Onset Date:  08/23/18 (date of order)   Type:     _X__PT   ____OT  ____SLP Medical Diagnosis:  Parkinsonism, unspecified Parkinsonism type (G20)     PT Diagnosis:  impaired gait, transfers, functional mobility and balance with atypical parkinsons Visits from SOC:  1                              __________________________________________________________________________________  Plan of Treatment/Functional Goals:  balance training, bed mobility training, gait training, joint mobilization, motor coordination training, neuromuscular re-education, ROM, strengthening, stretching, transfer training, manual therapy, wheelchair management/propulsion training           GOALS  1 - TUG  Zoey to improve her TUG time from 3 min 11 sec and assist needed to 1 min 45 seconds or less with supervision and AD to demonstrate improved functional mobility and safety with gait, decreased fall risk.   12/25/18    2 - 25 foot walk  Zoey to complete 25 foot walk in 25 sec or less and 30 steps or less to demosntrate improved coordination, increased gait efficiency and safety, while using AD.  (baseline 53.9 sec and 57 steps with U step walker , 1 freezing episode and CGA)  12/25/18    3 - balance/motor coordination/planning  Zoey to rise from Ustep walker seat to standing , turn and initiate walking in 40 sec or less to demonstrate improved motor coordination and planning and balance . (baseline 1 min 44 seconds at eval)  12/25/18    4 HEP  Zoey to be independent in  "appropriate HEP, assist from /family if needed, to safely continue to address her impairments following d/c from skilled PT intervention.   12/25/18    5  community gait  Zoey to ambulate 200 feet or greater with Ustep walker/AD with independent correction of \"freezing episodes\" to demosntrate improved abiltiy to ambulate in community as well as manage symptoms.  12/25/18                                     Therapy Frequency:  2 times/Week   Predicted Duration of Therapy Intervention:  90 days    Desirae Irene, PT                                    I CERTIFY THE NEED FOR THESE SERVICES FURNISHED UNDER        THIS PLAN OF TREATMENT AND WHILE UNDER MY CARE     (Physician co-signature of this document indicates review and certification of the therapy plan).                Certification Date From:  09/26/18   Certification Date To:  12/25/18    Referring Provider:  Dr. Lele Stahl    Initial Assessment  See Epic Evaluation- Start of Care Date: 09/26/18           "

## 2018-09-26 NOTE — TELEPHONE ENCOUNTER
Routing refill request to provider for review/approval because:  Nadira given x1 and patient did not follow up, please advise  Patient needs to be seen because it has been more than 1 year since last office visit.    Patient was sent ZIPDIGS message and this was read by patient, but she has not scheduled an appointment.

## 2018-09-27 NOTE — TELEPHONE ENCOUNTER
Contacted patient and informed her she is due for appointment. One time refills were given to allow time to schedule. Patient states she will call later to day to schedule an appointment.

## 2018-10-08 ENCOUNTER — HOSPITAL ENCOUNTER (OUTPATIENT)
Dept: PHYSICAL THERAPY | Facility: CLINIC | Age: 68
Setting detail: THERAPIES SERIES
End: 2018-10-08
Attending: PSYCHIATRY & NEUROLOGY
Payer: MEDICARE

## 2018-10-08 PROCEDURE — 97530 THERAPEUTIC ACTIVITIES: CPT | Mod: GP | Performed by: PHYSICAL THERAPIST

## 2018-10-08 PROCEDURE — 40000719 ZZHC STATISTIC PT DEPARTMENT NEURO VISIT: Performed by: PHYSICAL THERAPIST

## 2018-10-08 PROCEDURE — 97112 NEUROMUSCULAR REEDUCATION: CPT | Mod: GP | Performed by: PHYSICAL THERAPIST

## 2018-10-09 ENCOUNTER — OFFICE VISIT (OUTPATIENT)
Dept: INTERNAL MEDICINE | Facility: CLINIC | Age: 68
End: 2018-10-09
Payer: MEDICARE

## 2018-10-09 VITALS
HEART RATE: 96 BPM | HEIGHT: 65 IN | WEIGHT: 122 LBS | BODY MASS INDEX: 20.33 KG/M2 | SYSTOLIC BLOOD PRESSURE: 100 MMHG | TEMPERATURE: 98 F | OXYGEN SATURATION: 94 % | DIASTOLIC BLOOD PRESSURE: 62 MMHG

## 2018-10-09 DIAGNOSIS — F32.0 MILD MAJOR DEPRESSION (H): ICD-10-CM

## 2018-10-09 DIAGNOSIS — E53.8 VITAMIN B12 DEFICIENCY (NON ANEMIC): ICD-10-CM

## 2018-10-09 DIAGNOSIS — J43.8 OTHER EMPHYSEMA (H): ICD-10-CM

## 2018-10-09 DIAGNOSIS — I10 BENIGN ESSENTIAL HYPERTENSION: ICD-10-CM

## 2018-10-09 DIAGNOSIS — G20.C PARKINSONISM, UNSPECIFIED PARKINSONISM TYPE (H): Primary | ICD-10-CM

## 2018-10-09 PROBLEM — F32.A DEPRESSION, UNSPECIFIED DEPRESSION TYPE: Status: RESOLVED | Noted: 2017-06-27 | Resolved: 2018-10-09

## 2018-10-09 PROCEDURE — 99214 OFFICE O/P EST MOD 30 MIN: CPT | Mod: 25 | Performed by: INTERNAL MEDICINE

## 2018-10-09 PROCEDURE — 82607 VITAMIN B-12: CPT | Performed by: INTERNAL MEDICINE

## 2018-10-09 PROCEDURE — 96372 THER/PROPH/DIAG INJ SC/IM: CPT | Performed by: INTERNAL MEDICINE

## 2018-10-09 PROCEDURE — 36415 COLL VENOUS BLD VENIPUNCTURE: CPT | Performed by: INTERNAL MEDICINE

## 2018-10-09 NOTE — NURSING NOTE
"/62  Pulse 96  Temp 98  F (36.7  C) (Oral)  Ht 5' 5\" (1.651 m)  Wt 122 lb (55.3 kg)  SpO2 94%  Breastfeeding? No  BMI 20.3 kg/m2    "

## 2018-10-09 NOTE — MR AVS SNAPSHOT
After Visit Summary   10/9/2018    Zoey Armstrong    MRN: [id number]           Patient Information     Date Of Birth          1950        Visit Information        Provider Department      10/9/2018 3:00 PM Marisa Hoover MD [facility]        Care Instructions    Ask about - Big and Loud therapy  B12 was 208    Increase prozac to 80mg          Follow-ups after your visit        Your next 10 appointments already scheduled     Oct 11, 2018  2:30 PM CDT   Neuro Treatment with Danielle Ocampo, PT   [facility] ([facility])    [address] [address]   [phone]            Oct 15, 2018  8:45 AM CDT   Neuro Treatment with Danielle Ocampo, PT   [facility] ([facility])    [address] [address]   [phone]            Oct 17, 2018  3:15 PM CDT   Neuro Treatment with Danielle Ocampo, PT   [facility] ([facility])    [address] [address]   [phone]            Oct 22, 2018  8:45 AM CDT   Neuro Treatment with Danielle Ocampo, PT   [facility] ([facility])    [address] [address]   [phone]            Oct 25, 2018  9:30 AM CDT   Neuro Treatment with Danielle Ocampo, PT   [facility] ([facility])    [address] [address]   [phone]            Oct 29, 2018  8:45 AM CDT   Neuro Treatment with Danielle Ocampo, PT   [facility] ([facility])    [address] [address]   [phone]            Nov 01, 2018  9:30 AM CDT   Neuro Treatment with Danielle Ocampo, PT   [facility] ([facility])    [address] [address]   [phone]            Nov 05, 2018 11:00 AM  "CST   MA SCREENING BILATERAL W/ SOFIA with RHBCMA2   [facility] ([facility])    [address] [facility], [address]  [facility] [address] [phone]           How do I prepare for my exam? (Food and drink instructions) No Food and Drink Restrictions.  How do I prepare for my exam? (Other instructions) Do not use any powder, lotion or deodorant under your arms or on your breast. If you do, we will ask you to remove it before your exam.  What should I wear: Wear comfortable, two-piece clothing.  How long does the exam take: Most scans will take 15 minutes.  What should I bring: Bring any previous mammograms from other facilities or have them mailed to the breast center.  Do I need a :  No  is needed.  What do I need to tell my doctor: If you have any allergies, tell your care team.  What should I do after the exam: No restrictions, You may resume normal activities.  What is this test: This test is an x-ray of the breast to look for breast disease. The breast is pressed between two plates to flatten and spread the tissue. An X-ray is taken of the breast from different angles.  Who should I call with questions: If you have any questions, please call the Imaging Department where you will have your exam. Directions, parking instructions, and other information is available on our website, [email or web address].  Other information about my exam Three-dimensional (3D) mammograms are available at [facility] locations in [facility], [facility], [facility], [place], and [place].  Health locations include [place] and the [facility] in [place].  Benefits of 3D mammograms include: * Improved rate of cancer detection * Decreases your chance of having to go back for more tests, which means fewer: * \"False-positive\" results (This means that there is an abnormal area but it isn't cancer.) * Invasive testing procedures, such as a biopsy or surgery * Can " provide clearer images of the breast if you have dense breast tissue.  *3D mammography is an optional exam that anyone can have with a 2D mammogram. It doesn't replace or take the place of a 2D mammogram. 2D mammograms remain an effective screening test for all women.  Not all insurance companies cover the cost of a 3D mammogram. Check with your insurance.            Nov 06, 2018  8:30 AM CST   Neuro Treatment with Danielle Ocampo, PT   [facility] ([facility])    [address] [facility] [address] [phone]            Nov 08, 2018  8:00 AM CST   Neuro Treatment with Desirae Irene, PT   [facility] ([facility])    [address] [facility] [address] [phone]              Who to contact     If you have questions or need follow up information about today's clinic visit or your schedule please contact [facility] directly at [phone].  Normal or non-critical lab and imaging results will be communicated to you by [employer], letter or phone within 4 business days after the clinic has received the results. If you do not hear from us within 7 days, please contact the clinic through [facility] or phone. If you have a critical or abnormal lab result, we will notify you by phone as soon as possible.  Submit refill requests through [facility] [employer] or call your pharmacy and they will forward the refill request to us. Please allow 3 business days for your refill to be completed.          Additional Information About Your Visit        [employer] [facility] Information     [facility] gives you secure access to your electronic health record. If you see a primary care provider, you can also send messages to your care team and make appointments. If you have questions, please call your primary care clinic.  If you do not have a primary care provider, please call [phone] and they will assist you.        Care EveryWhere ID     This is  "your Care EveryWhere ID. This could be used by other organizations to access your [facility] medical records  [id number]        Your Vitals Were     Pulse Temperature Height Pulse Oximetry Breastfeeding? BMI (Body Mass Index)    96 98  F (36.7  C) (Oral) 5' 5\" (1.651 m) 94% No 20.3 kg/m2       Blood Pressure from Last 3 Encounters:   10/09/18 100/62   08/23/18 100/64   05/10/18 133/79    Weight from Last 3 Encounters:   10/09/18 122 lb (55.3 kg)   08/23/18 132 lb (59.9 kg)   05/10/18 138 lb 6.4 oz (62.8 kg)              Today, you had the following     No orders found for display       Primary Care Provider Office Phone # Fax #    Marisa Hoover [phone]       [address] [facility] [address]        Equal Access to Services     [facility] : Hadii geneva bakero Sotoby, waaxda luqadaha, qaybta kaalmada adeegyada, ewa mcgovenr . So [facility] [phone].    ATENCIÓN: Si habla español, tiene a sutton disposición servicios gratuitos de asistencia lingüística. Llame al [phone].    We comply with applicable federal civil rights laws and [place] laws. We do not discriminate on the basis of race, color, national origin, age, disability, sex, sexual orientation, or gender identity.            Thank you!     Thank you for choosing [facility]  for your care. Our goal is always to provide you with excellent care. Hearing back from our patients is one way we can continue to improve our services. Please take a few minutes to complete the written survey that you may receive in the mail after your visit with us. Thank you!             Your Updated Medication List - Protect others around you: Learn how to safely use, store and throw away your medicines at www.disposemymeds.org.          This list is accurate as of 10/9/18  4:12 PM.  Always use your most recent med list.                   Brand Name Dispense Instructions for use Diagnosis    amLODIPine " 10 MG tablet    NORVASC    90 tablet    TAKE 1 TABLET(10 MG) BY MOUTH DAILY    Essential hypertension       aspirin 81 MG tablet      Take 81 mg by mouth daily        buPROPion 150 MG 12 hr tablet    WELLBUTRIN SR    180 tablet    TAKE 1 TABLET(150 MG) BY MOUTH TWICE DAILY    Major depressive disorder, recurrent episode, mild (H)       calcium carbonate 600 mg-vitamin D 400 units 600-400 MG-UNIT per tablet    CALTRATE     Take 1 tablet by mouth 2 times daily        carbidopa 25 MG Tabs tablet    LODOSYN    360 tablet    Take 1 tablet (25 mg) by mouth 4 times daily    Parkinsonism, unspecified Parkinsonism type (H)       carbidopa-levodopa  MG per tablet    SINEMET    900 tablet    Please increased dose as per Mychart with goal dose of 2 tabs 5 times a day.    Paralysis agitans (H)       FLUoxetine 20 MG capsule    PROzac    90 capsule    TAKE 3 CAPSULES BY MOUTH ONCE DAILY    Major depressive disorder, recurrent episode, mild (H)       letrozole 2.5 MG tablet    FEMARA     Take 2.5 mg by mouth daily        lidocaine 5 % Patch    LIDODERM     Place 2 patches onto the skin every 24 hours Apply to low back/left hip - on in AM, remove after 12 hours. 4802-9542, [id number].        MULTIVITAMIN ADULT PO      Take 1 tablet by mouth daily        olmesartan 40 MG tablet    BENICAR    90 tablet    TAKE 1 TABLET(40 MG) BY MOUTH DAILY    Essential hypertension       polyethylene glycol powder    MIRALAX/GLYCOLAX     Take 17 g by mouth daily AND daily PRN        roller walker Misc     1 each    1 Device daily    Parkinsonism, unspecified Parkinsonism type (H)       rosuvastatin 10 MG tablet    CRESTOR    30 tablet    TAKE 1 TABLET BY MOUTH ONCE DAILY    Hyperlipidemia LDL goal <100       senna-docusate 8.6-50 MG per tablet    SENOKOT-S;PERICOLACE     Take 2 tablets by mouth daily AND 1 tab QHS        TYLENOL PO      Take 1,000 mg by mouth 4 times daily

## 2018-10-09 NOTE — PROGRESS NOTES
SUBJECTIVE:   Zoey Armstrong is a 68 year old female who presents to clinic today for the following health issues:    Patient is present with her .   She reports that she was diagnosed with atypical parksionism.  She was started on sinemet.  She feels as if the medication is helping her, but only slightly.  She is undergoing physical therapy.    Hyperlipidemia Follow-Up      Rate your low fat/cholesterol diet?: fair    Taking statin?  Yes, no muscle aches from statin    Other lipid medications/supplements?:  none    Hypertension Follow-up      Outpatient blood pressures are not being checked.    Low Salt Diet: low salt      Depression Followup    Status since last visit: Worsened     See PHQ-9 for current symptoms.  Other associated symptoms: None    Complicating factors:   Significant life event:  Diagnosis of parkinsonism   Current substance abuse:  None  Anxiety or Panic symptoms:  No    PHQ 10/25/2016 6/5/2017 4/2/2018   PHQ-9 Total Score 4 8 11   Q9: Suicide Ideation Not at all Not at all Not at all   would not hurt herself    PHQ-9  English  PHQ-9   Any Language  Suicide Assessment Five-step Evaluation and Treatment (SAFE-T)  COPD Follow-Up    Symptoms are currently: stable    Current fatigue or dyspnea with ambulation: none    Shortness of breath: stable    Increased or change in Cough/Sputum: No    Fever(s): No    Baseline ambulation without stopping to rest: difficulty walking with parknsonism    Any ER/UC or hospital admissions since your last visit? No     History   Smoking Status     Former Smoker     Packs/day: 0.50     Years: 30.00     Types: Cigarettes     Start date: 7/1/1970     Quit date: 7/15/2015   Smokeless Tobacco     Never Used     Comment: Non-smoker     No results found for: FEV1, [id number]    Amount of exercise or physical activity: None    Problems taking medications regularly: No    Medication side effects: none    Diet: regular (no restrictions)        Problem list and histories  "reviewed & adjusted, as indicated.  Additional history: as documented    Labs reviewed in EPIC    Reviewed and updated as needed this visit by clinical staff  Tobacco  Allergies  Meds  Med Hx  Surg Hx  Fam Hx  Soc Hx      Reviewed and updated as needed this visit by Provider  Meds         ROS:  CONSTITUTIONAL: NEGATIVE for fever, chills, change in weight  RESP: NEGATIVE for significant cough or SOB  CV: NEGATIVE for chest pain, palpitations or peripheral edema  Psych: h/o depression    OBJECTIVE:     /62  Pulse 96  Temp 98  F (36.7  C) (Oral)  Ht 5' 5\" (1.651 m)  Wt 122 lb (55.3 kg)  SpO2 94%  Breastfeeding? No  BMI 20.3 kg/m2  Body mass index is 20.3 kg/(m^2).  GENERAL: healthy, alert and no distress  RESP: lungs clear to auscultation - no rales, rhonchi or wheezes  CV: regular rate and rhythm, normal S1 S2, no S3 or S4, no murmur, click or rub  Psych: depressed mood    ASSESSMENT/PLAN:       (G20) Parkinsonism, unspecified Parkinsonism type (H)  (primary encounter diagnosis)  Comment:   Plan: follow up with neurology  -consider big and loud therapy    (F32.0) Mild major depression (H)  Comment:   Plan: wellbutrin and prozac  -can increase prozac  Consider increasing wellbutrin, pending improvement    (J43.8) Other emphysema (H)  Comment:   Plan: inhalers    (E53.8) Vitamin B12 deficiency (non anemic)  Comment: assess  Plan: Vitamin B12            (I10) Benign essential hypertension  Comment: assess  Plan: claudette Hoover MD  [facility]    "

## 2018-10-10 PROBLEM — J43.8 OTHER EMPHYSEMA (H): Status: ACTIVE | Noted: 2018-10-10

## 2018-10-10 LAB — VIT B12 SERPL-MCNC: >6000 PG/ML (ref 193–986)

## 2018-10-10 NOTE — NURSING NOTE
The following medication was given:     MEDICATION: Vitamin B12  1000 mcg  ROUTE: IM  SITE: Deltoid - Left  DOSE: 1 ml  LOT #: [id number]  :  [employer]  EXPIRATION DATE:  6/2020  NDC#: 23559-901-01

## 2018-10-11 ENCOUNTER — HOSPITAL ENCOUNTER (OUTPATIENT)
Dept: PHYSICAL THERAPY | Facility: CLINIC | Age: 68
Setting detail: THERAPIES SERIES
End: 2018-10-11
Attending: PSYCHIATRY & NEUROLOGY
Payer: MEDICARE

## 2018-10-11 PROCEDURE — 40000719 ZZHC STATISTIC PT DEPARTMENT NEURO VISIT: Performed by: PHYSICAL THERAPIST

## 2018-10-11 PROCEDURE — 97112 NEUROMUSCULAR REEDUCATION: CPT | Mod: GP | Performed by: PHYSICAL THERAPIST

## 2018-10-11 PROCEDURE — 97530 THERAPEUTIC ACTIVITIES: CPT | Mod: GP | Performed by: PHYSICAL THERAPIST

## 2018-10-11 ASSESSMENT — PATIENT HEALTH QUESTIONNAIRE - PHQ9: SUM OF ALL RESPONSES TO PHQ QUESTIONS 1-9: 18

## 2018-10-12 ENCOUNTER — TELEPHONE (OUTPATIENT)
Dept: INTERNAL MEDICINE | Facility: CLINIC | Age: 68
End: 2018-10-12

## 2018-10-12 DIAGNOSIS — E53.8 VITAMIN B12 DEFICIENCY (NON ANEMIC): Primary | ICD-10-CM

## 2018-10-12 NOTE — TELEPHONE ENCOUNTER
Left V/M for pt letting her know that PCP wants her to have another b12 lab draw within a month. Future order done

## 2018-10-15 ENCOUNTER — HOSPITAL ENCOUNTER (OUTPATIENT)
Dept: PHYSICAL THERAPY | Facility: CLINIC | Age: 68
Setting detail: THERAPIES SERIES
End: 2018-10-15
Attending: PSYCHIATRY & NEUROLOGY
Payer: MEDICARE

## 2018-10-15 PROCEDURE — 40000719 ZZHC STATISTIC PT DEPARTMENT NEURO VISIT: Performed by: PHYSICAL THERAPIST

## 2018-10-15 PROCEDURE — 97530 THERAPEUTIC ACTIVITIES: CPT | Mod: GP | Performed by: PHYSICAL THERAPIST

## 2018-10-15 PROCEDURE — 97110 THERAPEUTIC EXERCISES: CPT | Mod: GP | Performed by: PHYSICAL THERAPIST

## 2018-10-15 PROCEDURE — 97112 NEUROMUSCULAR REEDUCATION: CPT | Mod: GP | Performed by: PHYSICAL THERAPIST

## 2018-10-16 ENCOUNTER — TELEPHONE (OUTPATIENT)
Dept: NEUROLOGY | Facility: CLINIC | Age: 68
End: 2018-10-16

## 2018-10-16 DIAGNOSIS — G20.C PARKINSONISM, UNSPECIFIED PARKINSONISM TYPE (H): Primary | ICD-10-CM

## 2018-10-16 NOTE — TELEPHONE ENCOUNTER
Thank you for the update. I agree with the Big and Loud therapy. I have placed this order. Discussed that patient should continue B12 shots or B12 supplements. Not necessary to take both. Asked patient to discuss this with the physician that has ordered the B12 shot. Patient's questions and concerns were answered.    Dr. Olegario REYNOLDS [facility]    Phone Message    May a detailed message be left on voicemail: yes    Reason for Call: Other: Pt's  is calling. Pt's PCP suggested Big and Loud therapy. They started gait therpay last week and are wondering if they should start the new therapy or wait until the other one is done. Pt also got a B-12 shot, and shw als o takes B-12 vitamins. Shouls she continue to take those. Please give them a call back.     Action Taken: Message routed to:  [facility] Neurology

## 2018-10-17 ENCOUNTER — HOSPITAL ENCOUNTER (OUTPATIENT)
Dept: PHYSICAL THERAPY | Facility: CLINIC | Age: 68
Setting detail: THERAPIES SERIES
End: 2018-10-17
Attending: PSYCHIATRY & NEUROLOGY
Payer: MEDICARE

## 2018-10-17 PROCEDURE — 97112 NEUROMUSCULAR REEDUCATION: CPT | Mod: GP | Performed by: PHYSICAL THERAPIST

## 2018-10-17 PROCEDURE — 97110 THERAPEUTIC EXERCISES: CPT | Mod: GP | Performed by: PHYSICAL THERAPIST

## 2018-10-17 PROCEDURE — 97530 THERAPEUTIC ACTIVITIES: CPT | Mod: GP | Performed by: PHYSICAL THERAPIST

## 2018-10-17 PROCEDURE — 40000719 ZZHC STATISTIC PT DEPARTMENT NEURO VISIT: Performed by: PHYSICAL THERAPIST

## 2018-10-22 ENCOUNTER — HOSPITAL ENCOUNTER (OUTPATIENT)
Dept: PHYSICAL THERAPY | Facility: CLINIC | Age: 68
Setting detail: THERAPIES SERIES
End: 2018-10-22
Attending: PSYCHIATRY & NEUROLOGY
Payer: MEDICARE

## 2018-10-22 PROCEDURE — 97530 THERAPEUTIC ACTIVITIES: CPT | Mod: GP | Performed by: PHYSICAL THERAPIST

## 2018-10-22 PROCEDURE — 97112 NEUROMUSCULAR REEDUCATION: CPT | Mod: GP | Performed by: PHYSICAL THERAPIST

## 2018-10-22 PROCEDURE — 40000719 ZZHC STATISTIC PT DEPARTMENT NEURO VISIT: Performed by: PHYSICAL THERAPIST

## 2018-10-22 NOTE — ADDENDUM NOTE
Encounter addended by: Danielle Ocampo PT on: 10/21/2018  8:26 PM<BR>     Actions taken: Flowsheet accepted

## 2018-10-22 NOTE — ADDENDUM NOTE
Encounter addended by: Danielle Ocampo PT on: 10/21/2018  8:11 PM<BR>     Actions taken: Flowsheet accepted

## 2018-10-23 DIAGNOSIS — E78.5 HYPERLIPIDEMIA LDL GOAL <100: ICD-10-CM

## 2018-10-23 DIAGNOSIS — F33.0 MAJOR DEPRESSIVE DISORDER, RECURRENT EPISODE, MILD (H): ICD-10-CM

## 2018-10-23 NOTE — TELEPHONE ENCOUNTER
"Requested Prescriptions   Pending Prescriptions Disp Refills     buPROPion (WELLBUTRIN SR) 150 MG 12 hr tablet [Pharmacy Med Name: BUPROPION SR 150MG TABLETS (12 H)]  Last Written Prescription Date:  5/10/18  Last Fill Quantity: 180,  # refills: 0   Last office visit: 10/9/2018 with prescribing provider:  Sneha   Future Office Visit:     180 tablet 0     Sig: TAKE 1 TABLET(150 MG) BY MOUTH TWICE DAILY    SSRIs Protocol Failed    10/23/2018  1:44 AM       Failed - PHQ-9 score less than 5 in past 6 months    Please review last PHQ-9 score.          Passed - Medication is Bupropion    If the medication is Bupropion (Wellbutrin), and the patient is taking for smoking cessation; OK to refill.         Passed - Patient is age 18 or older       Passed - No active pregnancy on record       Passed - No positive pregnancy test in last 12 months       Passed - Recent (6 mo) or future (30 days) visit within the authorizing provider's specialty    Patient had office visit in the last 6 months or has a visit in the next 30 days with authorizing provider or within the authorizing provider's specialty.  See \"Patient Info\" tab in inbasket, or \"Choose Columns\" in Meds & Orders section of the refill encounter.              "

## 2018-10-23 NOTE — TELEPHONE ENCOUNTER
"Requested Prescriptions   Pending Prescriptions Disp Refills     rosuvastatin (CRESTOR) 10 MG tablet [Pharmacy Med Name: ROSUVASTATIN 10MG TABLETS]  Last Written Prescription Date:  9/26/18  Last Fill Quantity: 30,  # refills: 0   Last office visit: 10/9/2018 with prescribing provider:  Sneha   Future Office Visit:     30 tablet 0     Sig: TAKE 1 TABLET BY MOUTH ONCE DAILY    Statins Protocol Failed    10/23/2018  1:44 AM       Failed - LDL on file in past 12 months    Recent Labs   Lab Test  09/28/15   0800   LDL  87            Passed - No abnormal creatine kinase in past 12 months    No lab results found.            Passed - Recent (12 mo) or future (30 days) visit within the authorizing provider's specialty    Patient had office visit in the last 12 months or has a visit in the next 30 days with authorizing provider or within the authorizing provider's specialty.  See \"Patient Info\" tab in inbasket, or \"Choose Columns\" in Meds & Orders section of the refill encounter.             Passed - Patient is age 18 or older       Passed - No active pregnancy on record       Passed - No positive pregnancy test in past 12 months          "

## 2018-10-24 RX ORDER — BUPROPION HYDROCHLORIDE 150 MG/1
TABLET, EXTENDED RELEASE ORAL
Qty: 180 TABLET | Refills: 1 | Status: SHIPPED | OUTPATIENT
Start: 2018-10-24 | End: 2019-01-01

## 2018-10-24 RX ORDER — ROSUVASTATIN CALCIUM 10 MG/1
TABLET, COATED ORAL
Qty: 30 TABLET | Refills: 0 | Status: SHIPPED | OUTPATIENT
Start: 2018-10-24 | End: 2018-12-02

## 2018-10-24 NOTE — TELEPHONE ENCOUNTER
PHQ-9 score:    PHQ-9 SCORE 10/10/2018   Total Score -   Total Score 18       Routing refill request to provider for review/approval because:  Most recent phq9 ouside standing order parameters.    Please advise, thanks.

## 2018-10-24 NOTE — TELEPHONE ENCOUNTER
Routing refill request to provider for review/approval because:  Nadira given x1 and patient did not follow up, please advise  Labs not current:  Last lipids 2015

## 2018-10-25 ENCOUNTER — TELEPHONE (OUTPATIENT)
Dept: INTERNAL MEDICINE | Facility: CLINIC | Age: 68
End: 2018-10-25

## 2018-10-25 ENCOUNTER — HOSPITAL ENCOUNTER (OUTPATIENT)
Dept: PHYSICAL THERAPY | Facility: CLINIC | Age: 68
Setting detail: THERAPIES SERIES
End: 2018-10-25
Attending: PSYCHIATRY & NEUROLOGY
Payer: MEDICARE

## 2018-10-25 DIAGNOSIS — F32.0 MILD MAJOR DEPRESSION (H): Primary | ICD-10-CM

## 2018-10-25 PROCEDURE — 40000719 ZZHC STATISTIC PT DEPARTMENT NEURO VISIT: Performed by: PHYSICAL THERAPIST

## 2018-10-25 PROCEDURE — 97530 THERAPEUTIC ACTIVITIES: CPT | Mod: GP | Performed by: PHYSICAL THERAPIST

## 2018-10-25 PROCEDURE — 97112 NEUROMUSCULAR REEDUCATION: CPT | Mod: GP | Performed by: PHYSICAL THERAPIST

## 2018-10-25 PROCEDURE — 97110 THERAPEUTIC EXERCISES: CPT | Mod: GP | Performed by: PHYSICAL THERAPIST

## 2018-10-25 NOTE — TELEPHONE ENCOUNTER
Patient's  calling--at last visit, he thought that MD was going to refer her to a counselor for patient's depression.  They have not gotten called to set up any visits and no referral seen.  Please advise.  Lorraine Russell RN

## 2018-10-26 NOTE — TELEPHONE ENCOUNTER
Contacted patient, spoke to patient's -consent to communicate on file. Informed him referral was placed and provided phone number for [facility].

## 2018-10-28 NOTE — ADDENDUM NOTE
Encounter addended by: Danielle Ocampo PT on: 10/27/2018  8:20 PM<BR>     Actions taken: Flowsheet accepted

## 2018-10-29 ENCOUNTER — HOSPITAL ENCOUNTER (OUTPATIENT)
Dept: PHYSICAL THERAPY | Facility: CLINIC | Age: 68
Setting detail: THERAPIES SERIES
End: 2018-10-29
Attending: PSYCHIATRY & NEUROLOGY
Payer: MEDICARE

## 2018-10-29 PROCEDURE — 97110 THERAPEUTIC EXERCISES: CPT | Mod: GP,KX | Performed by: PHYSICAL THERAPIST

## 2018-10-29 PROCEDURE — 97530 THERAPEUTIC ACTIVITIES: CPT | Mod: GP,KX | Performed by: PHYSICAL THERAPIST

## 2018-10-29 PROCEDURE — 40000719 ZZHC STATISTIC PT DEPARTMENT NEURO VISIT: Performed by: PHYSICAL THERAPIST

## 2018-11-01 ENCOUNTER — HOSPITAL ENCOUNTER (OUTPATIENT)
Dept: PHYSICAL THERAPY | Facility: CLINIC | Age: 68
Setting detail: THERAPIES SERIES
End: 2018-11-01
Attending: PSYCHIATRY & NEUROLOGY
Payer: MEDICARE

## 2018-11-01 PROCEDURE — 40000719 ZZHC STATISTIC PT DEPARTMENT NEURO VISIT: Performed by: PHYSICAL THERAPIST

## 2018-11-01 PROCEDURE — 97530 THERAPEUTIC ACTIVITIES: CPT | Mod: GP | Performed by: PHYSICAL THERAPIST

## 2018-11-01 PROCEDURE — 97110 THERAPEUTIC EXERCISES: CPT | Mod: GP | Performed by: PHYSICAL THERAPIST

## 2018-11-05 ENCOUNTER — HOSPITAL ENCOUNTER (OUTPATIENT)
Dept: MAMMOGRAPHY | Facility: CLINIC | Age: 68
Discharge: HOME | End: 2018-11-05
Attending: INTERNAL MEDICINE | Admitting: INTERNAL MEDICINE
Payer: MEDICARE

## 2018-11-05 DIAGNOSIS — Z12.31 VISIT FOR SCREENING MAMMOGRAM: ICD-10-CM

## 2018-11-05 PROCEDURE — 77067 SCR MAMMO BI INCL CAD: CPT

## 2018-11-06 ENCOUNTER — HOSPITAL ENCOUNTER (OUTPATIENT)
Dept: PHYSICAL THERAPY | Facility: CLINIC | Age: 68
Setting detail: THERAPIES SERIES
End: 2018-11-06
Attending: PSYCHIATRY & NEUROLOGY
Payer: MEDICARE

## 2018-11-06 PROCEDURE — G8978 MOBILITY CURRENT STATUS: HCPCS | Mod: GP,CM | Performed by: PHYSICAL THERAPIST

## 2018-11-06 PROCEDURE — G8979 MOBILITY GOAL STATUS: HCPCS | Mod: GP,CL | Performed by: PHYSICAL THERAPIST

## 2018-11-06 PROCEDURE — 40000719 ZZHC STATISTIC PT DEPARTMENT NEURO VISIT: Performed by: PHYSICAL THERAPIST

## 2018-11-06 PROCEDURE — 97530 THERAPEUTIC ACTIVITIES: CPT | Mod: GP | Performed by: PHYSICAL THERAPIST

## 2018-11-11 NOTE — ADDENDUM NOTE
Encounter addended by: Danielle Ocampo, PT on: 11/11/2018 11:25 AM<BR>     Actions taken: Flowsheet data copied forward, Flowsheet accepted

## 2018-11-11 NOTE — PROGRESS NOTES
"   11/06/18 0800   Signing Clinician's Name / Credentials   Signing clinician's name / credentials Danielle Ocampo PT, DPT   Session Number   Session Number PROGRESS NOTE: Episode of care 9/26/18-11/6/18. Pt has participated in 10 PT visits including evaluation and today's reassessment to address impaired gait, transfers, functional mobility and balance with atypical parkinsons. Details of reassessment below.    Authorization status KX modifier   Progress Note/Recertification   Recertification Due Date 12/25/18   PT Medicare Only G-code   G-code Mobility: Walking & Moving Around   Mobility: Walking & Moving Around   Mobility Current Status,  (eval/re-eval & every progress note) CM: 80-99% impairment   Current Mobility Modifier Rationale TUG, 25 foot walk, gait, clinical judgement   Mobility Goal,  (eval/re-eval, every progress note, & discharge) CL: 60-79% impairment   Mobility Comments minimal progress from baseline, no change to mobility modifier    Goal 1   Goal Identifier 1 - TUG   Goal Description Zoey to improve her TUG time from 3 min 11 sec and assist needed to 1 min 45 seconds or less with supervision and AD to demonstrate improved functional mobility and safety with gait, decreased fall risk.    Target Date 12/25/18  (11/6: 2'13\" w/ U-walker mod(I) following cues for transition)   Date Met (progress made, continue towards goal)   Goal 2   Goal Identifier 2 - 25 foot walk   Goal Description Zoey to complete 25 foot walk in 25 sec or less and 30 steps or less to demosntrate improved coordination, increased gait efficiency and safety, while using AD.  (baseline 53.9 sec and 57 steps with U step walker , 1 freezing episode and CGA)   Target Date 12/25/18  (11/6: Uwalker, 42.26 sec, 58 steps, 1 freezing episode)   Date Met (progress made, continue towards goal)   Goal 3   Goal Identifier 3 - balance/motor coordination/planning   Goal Description Zoey to rise from Ustep walker seat to standing , turn " "and initiate walking in 40 sec or less to demonstrate improved motor coordination and planning and balance . (baseline 1 min 44 seconds at eval)   Target Date 12/25/18  (11/6: unable to coordinate/balance turn to initate walking)   Date Met (decline, continue towards goal)   Goal 4   Goal Identifier 4 HEP   Goal Description Zoey to be independent in appropriate HEP, assist from /family if needed, to safely continue to address her impairments following d/c from skilled PT intervention.    Target Date 12/25/18  (11/6:limited (I) carryover of HEP,inconsistant assist spouse)   Date Met (inconcistant with HEP, continue towards goal)   Goal 5   Goal Identifier 5  community gait   Goal Description Zoey to ambulate 200 feet or greater with Ustep walker/AD with independent correction of \"freezing episodes\" to demosntrate improved abiltiy to ambulate in community as well as manage symptoms.   Target Date 12/25/18  (11/6:1x 180-200', CTG, cues for managing freezing/fear)   Date Met (min progress made, continue towards goal)   Subjective Report   Subjective Report No falls over the weekend reported. Pt has reported 1 fall throughout episode of care without injury. Sitting edge of bed to don socks and falling to floor. Reports questions on \"head turns\" exercise for HEP (pt referancing trunk rotation stretch in chair). Reports transfering from Uwalker at home to chair nearby but not turning to use walker for walking. Currently felling more \"shaky\".   Objective Measure 1   Objective Measure BALANCE/MOTORE COORDINATION/PLANNING:    Details unabe to rise from pt's 4ww walker to standing, fearful of falling, R LE tremulous; marixa to demonstrate transition from sitting on sturdier Ustep walker but unable to coordinate and balance for turn to initate walking; 180 degree TURNS: using Uwalker to chair 1'32\" , CTG and cues slow and fearful small steps freezing/ tremors R LE   Objective Measure 2   Objective Measure 25' walk " "  Details U-step walker, 42.26 sec, 58 steps, 1 freezing episodes, needs cues and encouragement to manage freezing with inc fear of falling   Objective Measure 3   Objective Measure TUG   Details 2'13\" with U-step walker.  Pt repeated sit <> stand transition with cues and guidance for hand placement on armrests of chair and foot placement on floor in order to safety stand, able to demo mod(I) following cues before completing TUG.   Therapeutic Activity   Minutes 45   Skilled Intervention cues, assist, education    Patient Response (see objectives)   Treatment Detail Reassessments: TUG, 25' walk, transitions from 4ww/Uwalker/chair to stand and walk or transfer to chair.GAIT TRAINING: 3 posterior steps with Uwalker, pt very fearful reporeting \"I can't do this\" and needs max encouragment and CTG from therapist, repeated renforcement of safety with threpaist assist for weight shifting and cues for stepping, continues to report and demo significant B quad pain that lasts for a few seocnds and is delayed following transition to standing; therpaist re-ed on encouraging pt to f/u with MD mejia. pain that has remained consistant throughout episode of care, partially limiting pt's progress. TURNS to SIT IN 4WW: pt demonstrating emotional distress and fear of falling, spouse encouraging assist vs approrpaite technique, pt requires maxA and cues for hand placement to prevent fall with pt attempting to side sit on walker, repeated cues and ed to pt and spouse  on safety with turning compltely before sitting to prevent falls. EDUCATION: to pt and spouse for pt needing assist with exercises at home d/t limited (I) carryover.    Progress TUG: PROGRESS MADE from baseline 3'11\" with AD and assist required to 2'13\" with U-step walker. However, initlly unable to stand with repeated attemps to pull herself up using handles of U-step walker demonstrating decreased carryover of repeated ed and practice of transitions during therapy sessions. " "Pt repeated sit <> stand transition with cues and guidance for hand placement on armrests of chair and foot placement on floor in order to safety stand, able to demo mod(I) following cues before completing TUG. 25' WALK: PROGRESS MADE from baseline: 53.9 sec, 57 steps with U step walker, 1 freezing episode and CGA to 42.26 sec, 58 steps, 1 freezing episodes, needs cues and encouragement to manage freezing with inc fear of falling. BALANCE/MOTORE COORDINATION/PLANNING: DECLINE, unabe to rise from pt's 4ww walker to standing, fearful of falling, R LE tremulous; marixa to demonstrate transition from sitting on sturdier Ustep walker but unable to coordinate and balance for turn to initate walking; 180 degree TURNS: using Uwalker to chair 1'32\", CTG and cues slow and fearful small steps freezing/ tremors R LE; HEP: INCONSISTANT, limited (I) carryover of HEP, d/t cognition and motivation sigificant emotional distress from physical impairements. Spouse has been supportive and engaged during therapy sessions attended but pt reports inconsistant assist spouse at home. COMMUNITY GAIT: MINIMAL PROGRESS, Demonstrates short step lenght R>L,  R LE freezing with turns without LOB, very fearful of falling, requiring SBA-CTG and encouragement. Best performance throughout episodes of care acheives approx 180-200' with 4ww indoors with CTG at hips, cues for large step length and posture, cues for breif standing rests to manage freezing and assist to correct for forward flexed posture. Further progress limited by: pt's fear of falling, depression, inconsistancy with HEP limited by cognition and motivation and spouse assist, ongoing physical limitations: fleeting quad pain in standing with unlear etiology, muscle shortening, decreased motor coordination and motor planning, generalized weakness.   Education   Learner Patient;Significant Other   Readiness Acceptance   Method Explanation;Video   Response Needs Reinforcement   Education " Comments (above)   Plan   Homework B LE pain in standing and recommend f/u with MD reveles,    Home program continue as provided with spouse guidance throughout: 1) repeated sit < > stand UE support chair, hands on walker in standing 2) sitting with B feet on ground during commercials 3) A<>P seated weight shifting before meals; 4) gait training parellel bars at home with spouse assist prn; 5) R hip flexor/quad stretch 6) R LE coordination task lifting leg on/off bed; new 7) relaxtion/rocking trunk rotation/B knee drop with spouse assist and relaxing sounds for ms relaxation 8) sit <> stand 180 degree turns walking to chair nearby 9) trunk rotation in chair 10) figure four stretch   Updates to plan of care Continue POC for progress towards all goals as above. No recommneded change to frequency or duration of care.   Plan for next session provide information on spiritual health services d/t duration of time prior to eval from counselor, continue 180 degree turns <> transitions to standing from u-step walker, standing balance, general increase in mobility in sitting and standing, review/progress HEP with spouse assist, gait training with Lissa and 4ww, continue to progress turns in narrow spaces to address difficulty turning in bathroom at home.    Comments   Comments KX modifier - PT services needed beyond the therapy cap in order to maximize function and safety for mobility in home and community with her caregivers.    Total Session Time   Timed Code Treatment Minutes 45   Total Treatment Time (sum of timed and untimed services) 45

## 2018-11-11 NOTE — ADDENDUM NOTE
Encounter addended by: Danielle Ocampo PT on: 11/11/2018 11:02 AM<BR>     Actions taken: Flowsheet accepted

## 2018-11-11 NOTE — ADDENDUM NOTE
Encounter addended by: Danielle Ocampo, PT on: 11/11/2018 11:00 AM<BR>     Actions taken: Flowsheet accepted

## 2018-11-11 NOTE — ADDENDUM NOTE
Encounter addended by: Danielle Ocampo PT on: 11/11/2018 12:30 PM<BR>     Actions taken: Sign clinical note, Flowsheet accepted

## 2018-11-12 ENCOUNTER — TRANSFERRED RECORDS (OUTPATIENT)
Dept: HEALTH INFORMATION MANAGEMENT | Facility: CLINIC | Age: 68
End: 2018-11-12

## 2018-11-12 ENCOUNTER — TRANSFERRED RECORDS (OUTPATIENT)
Dept: SURGERY | Facility: CLINIC | Age: 68
End: 2018-11-12

## 2018-11-20 DIAGNOSIS — F33.0 MAJOR DEPRESSIVE DISORDER, RECURRENT EPISODE, MILD (H): ICD-10-CM

## 2018-11-20 NOTE — TELEPHONE ENCOUNTER
"Requested Prescriptions   Pending Prescriptions Disp Refills     FLUoxetine (PROZAC) 20 MG capsule [Pharmacy Med Name: FLUOXETINE 20MG CAPSULES]  Last Written Prescription Date:  9/26/18  Last Fill Quantity: 90,  # refills: 0   Last office visit: 10/9/2018 with prescribing provider:  Sneha   Future Office Visit:   Next 5 appointments (look out 90 days)     Feb 06, 2019 10:30 AM CST   Return Visit with Fanny Barrios, [facility] ([facility])    [address] [place] [id number] [phone]                  90 capsule 0     Sig: TAKE 3 CAPSULES BY MOUTH ONCE DAILY    SSRIs Protocol Failed    11/20/2018 10:12 AM       Failed - PHQ-9 score less than 5 in past 6 months    Please review last PHQ-9 score.          Passed - Patient is age 18 or older       Passed - No active pregnancy on record       Passed - No positive pregnancy test in last 12 months       Passed - Recent (6 mo) or future (30 days) visit within the authorizing provider's specialty    Patient had office visit in the last 6 months or has a visit in the next 30 days with authorizing provider or within the authorizing provider's specialty.  See \"Patient Info\" tab in inbasket, or \"Choose Columns\" in Meds & Orders section of the refill encounter.              "

## 2018-11-23 NOTE — TELEPHONE ENCOUNTER
PHQ-9 score:    PHQ-9 SCORE 10/10/2018   Total Score -   Total Score 18       Prescription approved per [facility].

## 2018-11-26 ENCOUNTER — HOSPITAL ENCOUNTER (OUTPATIENT)
Dept: PHYSICAL THERAPY | Facility: CLINIC | Age: 68
Setting detail: THERAPIES SERIES
End: 2018-11-26
Attending: PSYCHIATRY & NEUROLOGY
Payer: MEDICARE

## 2018-11-26 PROCEDURE — 97116 GAIT TRAINING THERAPY: CPT | Mod: GP,KX | Performed by: PHYSICAL THERAPIST

## 2018-11-26 PROCEDURE — 97140 MANUAL THERAPY 1/> REGIONS: CPT | Mod: GP | Performed by: PHYSICAL THERAPIST

## 2018-11-26 PROCEDURE — 40000719 ZZHC STATISTIC PT DEPARTMENT NEURO VISIT: Performed by: PHYSICAL THERAPIST

## 2018-11-26 PROCEDURE — G8979 MOBILITY GOAL STATUS: HCPCS | Mod: GP,CL | Performed by: PHYSICAL THERAPIST

## 2018-11-26 PROCEDURE — G8980 MOBILITY D/C STATUS: HCPCS | Mod: GP,CM | Performed by: PHYSICAL THERAPIST

## 2018-11-26 PROCEDURE — 97112 NEUROMUSCULAR REEDUCATION: CPT | Mod: GP,KX | Performed by: PHYSICAL THERAPIST

## 2018-11-26 PROCEDURE — 97110 THERAPEUTIC EXERCISES: CPT | Mod: GP | Performed by: PHYSICAL THERAPIST

## 2018-11-26 NOTE — PROGRESS NOTES
"Outpatient Physical Therapy Discharge Note     Patient: Zoey Armstrong  : 1950    Beginning/End Dates of Reporting Period:  18 to 2018. Zoey was seen for 11 skilled PT sessions    Referring Provider: Dr. Lele Stahl    Therapy Diagnosis: impaired gait, transfers, functional mobility and balance with atypical parkinsons     Client Self Report: No falls. \"I feel like I have plateaud, and am therapied out\".  She feel that she would be able to do the exercises at home.   Has an appt with psychiatrist but not until March.   She is interested in information on Spiritual health services through [employer].     Objective Measurements:      See most recent PN dated 18         Goals:  PLEASE SEE PN DATED 18  As no changes  Goal Identifier 1 - TUG   Goal Description Zoey to improve her TUG time from 3 min 11 sec and assist needed to 1 min 45 seconds or less with supervision and AD to demonstrate improved functional mobility and safety with gait, decreased fall risk.    Target Date 18 (: 2'13\" w/ U-walker mod(I) following cues for transition)   Date Met   (progress made, continue towards goal)   Progress:     Goal Identifier 2 - 25 foot walk   Goal Description Zoey to complete 25 foot walk in 25 sec or less and 30 steps or less to demosntrate improved coordination, increased gait efficiency and safety, while using AD.  (baseline 53.9 sec and 57 steps with U step walker , 1 freezing episode and CGA)   Target Date 18 (: Uwalker, 42.26 sec, 58 steps, 1 freezing episode)   Date Met   (progress made, continue towards goal)   Progress:     Goal Identifier 3 - balance/motor coordination/planning   Goal Description Zoey to rise from Ustep walker seat to standing , turn and initiate walking in 40 sec or less to demonstrate improved motor coordination and planning and balance . (baseline 1 min 44 seconds at eval)   Target Date 18 (: unable to coordinate/balance turn to " "initate walking)   Date Met   (decline, continue towards goal)   Progress:     Goal Identifier 4 HEP   Goal Description Zoey to be independent in appropriate HEP, assist from /family if needed, to safely continue to address her impairments following d/c from skilled PT intervention.    Target Date 12/25/18 (11/6:limited (I) carryover of HEP,inconsistant assist spouse)   Date Met   (inconcistant with HEP, continue towards goal)   Progress:     Goal Identifier 5  community gait   Goal Description Zoey to ambulate 200 feet or greater with Ustep walker/AD with independent correction of \"freezing episodes\" to demosntrate improved abiltiy to ambulate in community as well as manage symptoms.   Target Date 12/25/18 (11/6:1x 180-200', CTG, cues for managing freezing/fear)   Date Met   (min progress made, continue towards goal)   Progress:         Progress Toward Goals:   Progress this reporting period: Zoey has made minimal change this episode of care.  Several barriers noted per Primary PT - memory, depression, multiple comorbidities.  Please see daily notes and most recent PN dated 11/6/18 for specifics.   Zoey plans on continuing with her HEP and follow up with neurologist and also is planning on seeking counseling to assist with depression.   Zoey and her  are without questions and wish to discharge from skilled PT at this time.     Plan:  Discharge from therapy.    Discharge:    Reason for Discharge: No further expectation of progress.    Equipment Issued: HEP, acquired Ustep walker from neurologist.     Discharge Plan: Patient to continue home program.  "

## 2018-12-02 DIAGNOSIS — E78.5 HYPERLIPIDEMIA LDL GOAL <100: ICD-10-CM

## 2018-12-04 NOTE — TELEPHONE ENCOUNTER
"Requested Prescriptions   Pending Prescriptions Disp Refills     rosuvastatin (CRESTOR) 10 MG tablet [Pharmacy Med Name: ROSUVASTATIN 10MG TABLETS]  Last Written Prescription Date:  10/24/2018  Last Fill Quantity: 30,  # refills: 0   Last office visit: 10/9/2018 with prescribing provider:     Future Office Visit:   Next 5 appointments (look out 90 days)     Feb 06, 2019 10:30 AM CST   [facility] BRIGHT Vidales   [facility]    [address] [place] [id number] [phone]                30 tablet 0     Sig: TAKE 1 TABLET BY MOUTH ONCE DAILY    Statins Protocol Failed    12/2/2018 12:54 PM       Failed - LDL on file in past 12 months    Recent Labs   Lab Test  09/28/15   0800   LDL  87            Passed - No abnormal creatine kinase in past 12 months    No lab results found.            Passed - Recent (12 mo) or future (30 days) visit within the authorizing provider's specialty    Patient had office visit in the last 12 months or has a visit in the next 30 days with authorizing provider or within the authorizing provider's specialty.  See \"Patient Info\" tab in inbasket, or \"Choose Columns\" in Meds & Orders section of the refill encounter.             Passed - Patient is age 18 or older       Passed - No active pregnancy on record       Passed - No positive pregnancy test in past 12 months        "

## 2018-12-04 NOTE — TELEPHONE ENCOUNTER
Routing refill request to provider for review/approval because:  Labs not current:    Lab Results   Component Value Date    CHOL 195 09/28/2015     Lab Results   Component Value Date    HDL 74 09/28/2015     Lab Results   Component Value Date    LDL 87 09/28/2015     Lab Results   Component Value Date    TRIG 170 09/28/2015     Lab Results   Component Value Date    CHOLHDLRATIO 2.6 09/28/2015

## 2018-12-05 RX ORDER — ROSUVASTATIN CALCIUM 10 MG/1
TABLET, COATED ORAL
Qty: 30 TABLET | Refills: 0 | Status: SHIPPED | OUTPATIENT
Start: 2018-12-05 | End: 2019-01-01

## 2018-12-31 NOTE — TELEPHONE ENCOUNTER
Panel Management Review      Patient has the following on her problem list:     Depression / Dysthymia review    Measure:  Needs PHQ-9 score of 4 or less during index window.  Administer PHQ-9 and if score is 5 or more, send encounter to provider for next steps.    5 - 7 month window range: UTD    PHQ-9 SCORE 6/5/2017 4/2/2018 10/10/2018   PHQ-9 Total Score - - -   PHQ-9 Total Score 8 11 18       If PHQ-9 recheck is 5 or more, route to provider for next steps.    Patient is due for:  None    Hypertension   Last three blood pressure readings:  BP Readings from Last 3 Encounters:   10/09/18 100/62   08/23/18 100/64   05/10/18 133/79     Blood pressure: Passed    HTN Guidelines:  Age 18-59 BP range:  Less than 140/90  Age 60-85 with Diabetes:  Less than 140/90  Age 60-85 without Diabetes:  less than 150/90      Composite cancer screening  Chart review shows that this patient is due/due soon for the following Colonoscopy  Summary:    Patient is due/failing the following:   COLONOSCOPY    Action needed:   Patient needs non-fasting lab only appointment and Patient needs referral/order: FIT test    Type of outreach:    Sent [employer] message.    Questions for provider review:    None                                                                                                                                    Reny Rosado CMA       Chart routed to no one .

## 2019-01-01 ENCOUNTER — TELEPHONE (OUTPATIENT)
Dept: NEUROLOGY | Facility: CLINIC | Age: 69
End: 2019-01-01

## 2019-01-01 ENCOUNTER — MEDICAL CORRESPONDENCE (OUTPATIENT)
Dept: HEALTH INFORMATION MANAGEMENT | Facility: CLINIC | Age: 69
End: 2019-01-01

## 2019-01-01 ENCOUNTER — OFFICE VISIT (OUTPATIENT)
Dept: SLEEP MEDICINE | Facility: CLINIC | Age: 69
End: 2019-01-01
Payer: MEDICARE

## 2019-01-01 ENCOUNTER — OFFICE VISIT (OUTPATIENT)
Dept: PSYCHOLOGY | Facility: CLINIC | Age: 69
End: 2019-01-01
Payer: MEDICARE

## 2019-01-01 ENCOUNTER — TELEPHONE (OUTPATIENT)
Dept: BEHAVIORAL HEALTH | Facility: CLINIC | Age: 69
End: 2019-01-01

## 2019-01-01 ENCOUNTER — OFFICE VISIT (OUTPATIENT)
Dept: BEHAVIORAL HEALTH | Facility: CLINIC | Age: 69
End: 2019-01-01

## 2019-01-01 ENCOUNTER — DOCUMENTATION ONLY (OUTPATIENT)
Dept: NEUROLOGY | Facility: CLINIC | Age: 69
End: 2019-01-01

## 2019-01-01 ENCOUNTER — HEALTH MAINTENANCE LETTER (OUTPATIENT)
Age: 69
End: 2019-01-01

## 2019-01-01 ENCOUNTER — PRE VISIT (OUTPATIENT)
Dept: OTOLARYNGOLOGY | Facility: CLINIC | Age: 69
End: 2019-01-01

## 2019-01-01 ENCOUNTER — HOSPITAL ENCOUNTER (OUTPATIENT)
Dept: MAMMOGRAPHY | Facility: CLINIC | Age: 69
Discharge: HOME | End: 2019-11-06
Attending: INTERNAL MEDICINE | Admitting: INTERNAL MEDICINE
Payer: MEDICARE

## 2019-01-01 ENCOUNTER — TELEPHONE (OUTPATIENT)
Dept: INTERNAL MEDICINE | Facility: CLINIC | Age: 69
End: 2019-01-01

## 2019-01-01 ENCOUNTER — DOCUMENTATION ONLY (OUTPATIENT)
Dept: INTERNAL MEDICINE | Facility: CLINIC | Age: 69
End: 2019-01-01

## 2019-01-01 ENCOUNTER — OFFICE VISIT (OUTPATIENT)
Dept: UROLOGY | Facility: CLINIC | Age: 69
End: 2019-01-01
Payer: MEDICARE

## 2019-01-01 ENCOUNTER — OFFICE VISIT (OUTPATIENT)
Dept: NEUROLOGY | Facility: CLINIC | Age: 69
End: 2019-01-01
Payer: MEDICARE

## 2019-01-01 ENCOUNTER — OFFICE VISIT (OUTPATIENT)
Dept: OTOLARYNGOLOGY | Facility: CLINIC | Age: 69
End: 2019-01-01
Attending: PSYCHIATRY & NEUROLOGY
Payer: MEDICARE

## 2019-01-01 ENCOUNTER — OFFICE VISIT (OUTPATIENT)
Dept: INTERNAL MEDICINE | Facility: CLINIC | Age: 69
End: 2019-01-01
Payer: MEDICARE

## 2019-01-01 ENCOUNTER — TELEPHONE (OUTPATIENT)
Facility: CLINIC | Age: 69
End: 2019-01-01

## 2019-01-01 ENCOUNTER — OFFICE VISIT (OUTPATIENT)
Dept: UROLOGY | Facility: CLINIC | Age: 69
End: 2019-01-01
Attending: FAMILY MEDICINE
Payer: MEDICARE

## 2019-01-01 ENCOUNTER — OFFICE VISIT (OUTPATIENT)
Dept: PHARMACY | Facility: CLINIC | Age: 69
End: 2019-01-01
Payer: COMMERCIAL

## 2019-01-01 ENCOUNTER — CARE COORDINATION (OUTPATIENT)
Dept: NEUROLOGY | Facility: CLINIC | Age: 69
End: 2019-01-01

## 2019-01-01 ENCOUNTER — TELEPHONE (OUTPATIENT)
Dept: OTOLARYNGOLOGY | Facility: CLINIC | Age: 69
End: 2019-01-01

## 2019-01-01 ENCOUNTER — OFFICE VISIT (OUTPATIENT)
Dept: OTOLARYNGOLOGY | Facility: CLINIC | Age: 69
End: 2019-01-01
Payer: MEDICARE

## 2019-01-01 ENCOUNTER — TRANSFERRED RECORDS (OUTPATIENT)
Dept: HEALTH INFORMATION MANAGEMENT | Facility: CLINIC | Age: 69
End: 2019-01-01

## 2019-01-01 ENCOUNTER — DOCUMENTATION ONLY (OUTPATIENT)
Dept: CARE COORDINATION | Facility: CLINIC | Age: 69
End: 2019-01-01

## 2019-01-01 ENCOUNTER — APPOINTMENT (OUTPATIENT)
Dept: GENERAL RADIOLOGY | Facility: CLINIC | Age: 69
End: 2019-01-01
Attending: PSYCHIATRY & NEUROLOGY
Payer: MEDICARE

## 2019-01-01 ENCOUNTER — ALLIED HEALTH/NURSE VISIT (OUTPATIENT)
Dept: NURSING | Facility: CLINIC | Age: 69
End: 2019-01-01
Payer: MEDICARE

## 2019-01-01 ENCOUNTER — TRANSFERRED RECORDS (OUTPATIENT)
Dept: SURGERY | Facility: CLINIC | Age: 69
End: 2019-01-01

## 2019-01-01 VITALS
HEART RATE: 95 BPM | DIASTOLIC BLOOD PRESSURE: 76 MMHG | SYSTOLIC BLOOD PRESSURE: 114 MMHG | OXYGEN SATURATION: 96 % | TEMPERATURE: 96.9 F | HEIGHT: 64 IN | BODY MASS INDEX: 19.79 KG/M2 | RESPIRATION RATE: 14 BRPM | WEIGHT: 115.9 LBS

## 2019-01-01 VITALS
HEART RATE: 82 BPM | SYSTOLIC BLOOD PRESSURE: 88 MMHG | WEIGHT: 126.3 LBS | BODY MASS INDEX: 21.02 KG/M2 | TEMPERATURE: 97.6 F | OXYGEN SATURATION: 94 % | DIASTOLIC BLOOD PRESSURE: 62 MMHG | RESPIRATION RATE: 16 BRPM

## 2019-01-01 VITALS
OXYGEN SATURATION: 98 % | WEIGHT: 115 LBS | BODY MASS INDEX: 19.63 KG/M2 | DIASTOLIC BLOOD PRESSURE: 70 MMHG | SYSTOLIC BLOOD PRESSURE: 106 MMHG | HEART RATE: 89 BPM | HEIGHT: 64 IN

## 2019-01-01 VITALS
HEIGHT: 65 IN | DIASTOLIC BLOOD PRESSURE: 66 MMHG | HEART RATE: 94 BPM | WEIGHT: 120 LBS | OXYGEN SATURATION: 97 % | SYSTOLIC BLOOD PRESSURE: 110 MMHG | BODY MASS INDEX: 19.99 KG/M2

## 2019-01-01 VITALS
HEART RATE: 88 BPM | DIASTOLIC BLOOD PRESSURE: 69 MMHG | OXYGEN SATURATION: 97 % | RESPIRATION RATE: 16 BRPM | SYSTOLIC BLOOD PRESSURE: 106 MMHG

## 2019-01-01 VITALS
OXYGEN SATURATION: 96 % | WEIGHT: 131.8 LBS | DIASTOLIC BLOOD PRESSURE: 80 MMHG | HEART RATE: 87 BPM | BODY MASS INDEX: 21.93 KG/M2 | SYSTOLIC BLOOD PRESSURE: 125 MMHG

## 2019-01-01 VITALS
WEIGHT: 120 LBS | BODY MASS INDEX: 19.99 KG/M2 | OXYGEN SATURATION: 97 % | RESPIRATION RATE: 16 BRPM | DIASTOLIC BLOOD PRESSURE: 74 MMHG | HEART RATE: 80 BPM | SYSTOLIC BLOOD PRESSURE: 133 MMHG | HEIGHT: 65 IN

## 2019-01-01 VITALS
HEIGHT: 64 IN | TEMPERATURE: 98.8 F | OXYGEN SATURATION: 94 % | HEART RATE: 109 BPM | SYSTOLIC BLOOD PRESSURE: 100 MMHG | RESPIRATION RATE: 14 BRPM | BODY MASS INDEX: 21.05 KG/M2 | DIASTOLIC BLOOD PRESSURE: 71 MMHG | WEIGHT: 123.3 LBS

## 2019-01-01 VITALS
SYSTOLIC BLOOD PRESSURE: 106 MMHG | BODY MASS INDEX: 21.8 KG/M2 | DIASTOLIC BLOOD PRESSURE: 71 MMHG | WEIGHT: 131 LBS | HEART RATE: 83 BPM | OXYGEN SATURATION: 97 %

## 2019-01-01 VITALS — BODY MASS INDEX: 19.97 KG/M2 | HEIGHT: 65 IN | RESPIRATION RATE: 16 BRPM

## 2019-01-01 VITALS
WEIGHT: 115 LBS | BODY MASS INDEX: 19.16 KG/M2 | SYSTOLIC BLOOD PRESSURE: 98 MMHG | HEIGHT: 65 IN | DIASTOLIC BLOOD PRESSURE: 58 MMHG

## 2019-01-01 VITALS
RESPIRATION RATE: 16 BRPM | DIASTOLIC BLOOD PRESSURE: 73 MMHG | HEART RATE: 95 BPM | OXYGEN SATURATION: 93 % | SYSTOLIC BLOOD PRESSURE: 110 MMHG

## 2019-01-01 VITALS
HEART RATE: 84 BPM | WEIGHT: 131 LBS | OXYGEN SATURATION: 94 % | DIASTOLIC BLOOD PRESSURE: 79 MMHG | BODY MASS INDEX: 21.8 KG/M2 | SYSTOLIC BLOOD PRESSURE: 120 MMHG

## 2019-01-01 VITALS — DIASTOLIC BLOOD PRESSURE: 72 MMHG | SYSTOLIC BLOOD PRESSURE: 120 MMHG

## 2019-01-01 DIAGNOSIS — R39.11 URINARY HESITANCY: ICD-10-CM

## 2019-01-01 DIAGNOSIS — G20.C ATYPICAL PARKINSONISM (H): ICD-10-CM

## 2019-01-01 DIAGNOSIS — R49.0 DYSPHONIA: ICD-10-CM

## 2019-01-01 DIAGNOSIS — R06.1 STRIDOR: ICD-10-CM

## 2019-01-01 DIAGNOSIS — G20.C ATYPICAL PARKINSONISM (H): Primary | ICD-10-CM

## 2019-01-01 DIAGNOSIS — Z13.6 SCREENING FOR HEART DISEASE: ICD-10-CM

## 2019-01-01 DIAGNOSIS — G20.A1 PARALYSIS AGITANS (H): ICD-10-CM

## 2019-01-01 DIAGNOSIS — R48.2 APRAXIA: ICD-10-CM

## 2019-01-01 DIAGNOSIS — R39.15 URINARY URGENCY: ICD-10-CM

## 2019-01-01 DIAGNOSIS — R13.12 OROPHARYNGEAL DYSPHAGIA: ICD-10-CM

## 2019-01-01 DIAGNOSIS — F32.A DEPRESSION: Primary | ICD-10-CM

## 2019-01-01 DIAGNOSIS — G24.1 DYSTONIA, TORSION, FRAGMENTS OF: ICD-10-CM

## 2019-01-01 DIAGNOSIS — J44.9 CHRONIC OBSTRUCTIVE PULMONARY DISEASE, UNSPECIFIED COPD TYPE (H): ICD-10-CM

## 2019-01-01 DIAGNOSIS — F32.2 MAJOR DEPRESSIVE DISORDER, SINGLE EPISODE, SEVERE (H): Primary | ICD-10-CM

## 2019-01-01 DIAGNOSIS — E55.9 VITAMIN D DEFICIENCY, UNSPECIFIED: ICD-10-CM

## 2019-01-01 DIAGNOSIS — R63.0 ANOREXIA: ICD-10-CM

## 2019-01-01 DIAGNOSIS — R39.15 URINARY URGENCY: Primary | ICD-10-CM

## 2019-01-01 DIAGNOSIS — R63.4 WEIGHT LOSS: Primary | ICD-10-CM

## 2019-01-01 DIAGNOSIS — E78.5 HYPERLIPIDEMIA LDL GOAL <100: ICD-10-CM

## 2019-01-01 DIAGNOSIS — N32.81 URGENCY-FREQUENCY SYNDROME: Primary | ICD-10-CM

## 2019-01-01 DIAGNOSIS — N39.41 URGE INCONTINENCE: Primary | ICD-10-CM

## 2019-01-01 DIAGNOSIS — E78.5 HYPERLIPIDEMIA, UNSPECIFIED HYPERLIPIDEMIA TYPE: ICD-10-CM

## 2019-01-01 DIAGNOSIS — E78.5 HYPERLIPIDEMIA LDL GOAL <70: ICD-10-CM

## 2019-01-01 DIAGNOSIS — R39.81 FUNCTIONAL INCONTINENCE: ICD-10-CM

## 2019-01-01 DIAGNOSIS — R13.10 DYSPHAGIA, UNSPECIFIED TYPE: Primary | ICD-10-CM

## 2019-01-01 DIAGNOSIS — R13.12 OROPHARYNGEAL DYSPHAGIA: Primary | ICD-10-CM

## 2019-01-01 DIAGNOSIS — Z78.9 TAKES DIETARY SUPPLEMENTS: ICD-10-CM

## 2019-01-01 DIAGNOSIS — G47.61 PERIODIC LIMB MOVEMENTS OF SLEEP: ICD-10-CM

## 2019-01-01 DIAGNOSIS — R35.0 URINARY FREQUENCY: ICD-10-CM

## 2019-01-01 DIAGNOSIS — M62.59 MUSCLE WASTING AND ATROPHY, NOT ELSEWHERE CLASSIFIED, MULTIPLE SITES: ICD-10-CM

## 2019-01-01 DIAGNOSIS — R45.3 APATHY: ICD-10-CM

## 2019-01-01 DIAGNOSIS — R35.0 INCREASED FREQUENCY OF URINATION: ICD-10-CM

## 2019-01-01 DIAGNOSIS — I10 BENIGN ESSENTIAL HYPERTENSION: Primary | ICD-10-CM

## 2019-01-01 DIAGNOSIS — I10 ESSENTIAL HYPERTENSION: ICD-10-CM

## 2019-01-01 DIAGNOSIS — R26.81 GAIT INSTABILITY: ICD-10-CM

## 2019-01-01 DIAGNOSIS — E78.5 HYPERLIPIDEMIA, UNSPECIFIED HYPERLIPIDEMIA TYPE: Primary | ICD-10-CM

## 2019-01-01 DIAGNOSIS — F43.21 ADJUSTMENT DISORDER WITH DEPRESSED MOOD: ICD-10-CM

## 2019-01-01 DIAGNOSIS — F33.1 MAJOR DEPRESSIVE DISORDER, RECURRENT, MODERATE (H): Primary | ICD-10-CM

## 2019-01-01 DIAGNOSIS — R32 URINARY INCONTINENCE, UNSPECIFIED TYPE: ICD-10-CM

## 2019-01-01 DIAGNOSIS — R49.8 HYPOPHONIA: ICD-10-CM

## 2019-01-01 DIAGNOSIS — Z12.31 VISIT FOR SCREENING MAMMOGRAM: ICD-10-CM

## 2019-01-01 DIAGNOSIS — R23.3 EASY BRUISING: ICD-10-CM

## 2019-01-01 DIAGNOSIS — F33.0 MAJOR DEPRESSIVE DISORDER, RECURRENT EPISODE, MILD (H): ICD-10-CM

## 2019-01-01 DIAGNOSIS — N39.8 VOIDING DYSFUNCTION: ICD-10-CM

## 2019-01-01 DIAGNOSIS — G23.2 MULTIPLE SYSTEM ATROPHY, PARKINSON VARIANT (H): Primary | ICD-10-CM

## 2019-01-01 DIAGNOSIS — F33.2 SEVERE EPISODE OF RECURRENT MAJOR DEPRESSIVE DISORDER, WITHOUT PSYCHOTIC FEATURES (H): ICD-10-CM

## 2019-01-01 DIAGNOSIS — C50.911 MALIGNANT NEOPLASM OF RIGHT FEMALE BREAST, UNSPECIFIED ESTROGEN RECEPTOR STATUS, UNSPECIFIED SITE OF BREAST (H): ICD-10-CM

## 2019-01-01 DIAGNOSIS — I25.10 CORONARY ARTERY DISEASE INVOLVING NATIVE CORONARY ARTERY OF NATIVE HEART WITHOUT ANGINA PECTORIS: ICD-10-CM

## 2019-01-01 DIAGNOSIS — K59.01 SLOW TRANSIT CONSTIPATION: ICD-10-CM

## 2019-01-01 DIAGNOSIS — F33.2 SEVERE EPISODE OF RECURRENT MAJOR DEPRESSIVE DISORDER, WITHOUT PSYCHOTIC FEATURES (H): Primary | ICD-10-CM

## 2019-01-01 DIAGNOSIS — F54 PSYCHOLOGICAL AND BEHAVIORAL FACTORS ASSOCIATED WITH DISORDERS OR DISEASES CLASSIFIED ELSEWHERE: ICD-10-CM

## 2019-01-01 DIAGNOSIS — R26.89 PRIMARY FREEZING OF GAIT: ICD-10-CM

## 2019-01-01 DIAGNOSIS — K59.00 CONSTIPATION, UNSPECIFIED CONSTIPATION TYPE: ICD-10-CM

## 2019-01-01 DIAGNOSIS — J10.1 INFLUENZA DUE TO INFLUENZA VIRUS, TYPE A, HUMAN: ICD-10-CM

## 2019-01-01 LAB
ALBUMIN SERPL-MCNC: 3.5 G/DL (ref 3.4–5)
ALBUMIN SERPL-MCNC: 3.9 G/DL (ref 3.4–5)
ALBUMIN UR-MCNC: 30 MG/DL
ALP SERPL-CCNC: 86 U/L (ref 40–150)
ALT SERPL W P-5'-P-CCNC: 22 U/L (ref 0–50)
ANION GAP SERPL CALCULATED.3IONS-SCNC: 12 MMOL/L (ref 3–14)
ANION GAP SERPL CALCULATED.3IONS-SCNC: 5 MMOL/L (ref 3–14)
APPEARANCE UR: CLEAR
AST SERPL W P-5'-P-CCNC: 32 U/L (ref 0–45)
BACTERIA #/AREA URNS HPF: ABNORMAL /HPF
BACTERIA SPEC CULT: NORMAL
BACTERIA SPEC CULT: NORMAL
BILIRUB SERPL-MCNC: 0.5 MG/DL (ref 0.2–1.3)
BILIRUB UR QL STRIP: ABNORMAL
BUN SERPL-MCNC: 26 MG/DL (ref 7–30)
BUN SERPL-MCNC: 31 MG/DL (ref 7–30)
CALCIUM SERPL-MCNC: 9.7 MG/DL (ref 8.5–10.1)
CALCIUM SERPL-MCNC: 9.7 MG/DL (ref 8.5–10.1)
CHLORIDE SERPL-SCNC: 105 MMOL/L (ref 94–109)
CHLORIDE SERPL-SCNC: 97 MMOL/L (ref 94–109)
CHOLEST SERPL-MCNC: 169 MG/DL
CO2 SERPL-SCNC: 23 MMOL/L (ref 20–32)
CO2 SERPL-SCNC: 28 MMOL/L (ref 20–32)
COLOR UR AUTO: YELLOW
CREAT SERPL-MCNC: 0.9 MG/DL (ref 0.52–1.04)
CREAT SERPL-MCNC: 0.93 MG/DL (ref 0.52–1.04)
DEPRECATED CALCIDIOL+CALCIFEROL SERPL-MC: 30 UG/L (ref 20–75)
FOLATE SERPL-MCNC: 3.1 NG/ML
GFR SERPL CREATININE-BSD FRML MDRD: 62 ML/MIN/{1.73_M2}
GFR SERPL CREATININE-BSD FRML MDRD: 64 ML/MIN/{1.73_M2}
GLUCOSE SERPL-MCNC: 81 MG/DL (ref 70–99)
GLUCOSE SERPL-MCNC: 83 MG/DL (ref 70–99)
GLUCOSE UR STRIP-MCNC: NEGATIVE MG/DL
HDLC SERPL-MCNC: 75 MG/DL
HGB UR QL STRIP: NEGATIVE
INTERPRETATION ECG - MUSE: NORMAL
KETONES UR STRIP-MCNC: 15 MG/DL
LDLC SERPL CALC-MCNC: 79 MG/DL
LEUKOCYTE ESTERASE UR QL STRIP: ABNORMAL
MAGNESIUM SERPL-MCNC: 2.2 MG/DL (ref 1.6–2.3)
NITRATE UR QL: NEGATIVE
NON-SQ EPI CELLS #/AREA URNS LPF: ABNORMAL /LPF
NONHDLC SERPL-MCNC: 94 MG/DL
PH UR STRIP: 6 PH (ref 5–7)
PHOSPHATE SERPL-MCNC: 2.2 MG/DL (ref 2.5–4.5)
POTASSIUM SERPL-SCNC: 2.8 MMOL/L (ref 3.4–5.3)
POTASSIUM SERPL-SCNC: 4.3 MMOL/L (ref 3.4–5.3)
PROT SERPL-MCNC: 7.3 G/DL (ref 6.8–8.8)
RBC #/AREA URNS AUTO: ABNORMAL /HPF
RESIDUAL VOLUME (RV) (EXTERNAL): 102
RESIDUAL VOLUME (RV) (EXTERNAL): 32
SODIUM SERPL-SCNC: 132 MMOL/L (ref 133–144)
SODIUM SERPL-SCNC: 138 MMOL/L (ref 133–144)
SOURCE: ABNORMAL
SP GR UR STRIP: 1.02 (ref 1–1.03)
SPECIMEN SOURCE: NORMAL
TRIGL SERPL-MCNC: 77 MG/DL
TSH SERPL DL<=0.005 MIU/L-ACNC: 2.72 MU/L (ref 0.4–4)
UROBILINOGEN UR STRIP-ACNC: 1 EU/DL (ref 0.2–1)
VIT B1 SERPL-MCNC: <2 NMOL/L (ref 4–15)
VIT B12 SERPL-MCNC: 1558 PG/ML (ref 193–986)
WBC #/AREA URNS AUTO: ABNORMAL /HPF

## 2019-01-01 PROCEDURE — 99214 OFFICE O/P EST MOD 30 MIN: CPT | Performed by: INTERNAL MEDICINE

## 2019-01-01 PROCEDURE — 81001 URINALYSIS AUTO W/SCOPE: CPT | Performed by: FAMILY MEDICINE

## 2019-01-01 PROCEDURE — 99203 OFFICE O/P NEW LOW 30 MIN: CPT | Mod: 25 | Performed by: UROLOGY

## 2019-01-01 PROCEDURE — 99214 OFFICE O/P EST MOD 30 MIN: CPT | Performed by: FAMILY MEDICINE

## 2019-01-01 PROCEDURE — 82746 ASSAY OF FOLIC ACID SERUM: CPT | Performed by: OTOLARYNGOLOGY

## 2019-01-01 PROCEDURE — 99213 OFFICE O/P EST LOW 20 MIN: CPT | Performed by: UROLOGY

## 2019-01-01 PROCEDURE — 99207 ZZC NO CHARGE NURSE ONLY: CPT

## 2019-01-01 PROCEDURE — 80061 LIPID PANEL: CPT | Performed by: INTERNAL MEDICINE

## 2019-01-01 PROCEDURE — 99607 MTMS BY PHARM ADDL 15 MIN: CPT | Performed by: PHARMACIST

## 2019-01-01 PROCEDURE — 77067 SCR MAMMO BI INCL CAD: CPT

## 2019-01-01 PROCEDURE — 51798 US URINE CAPACITY MEASURE: CPT | Performed by: UROLOGY

## 2019-01-01 PROCEDURE — 87086 URINE CULTURE/COLONY COUNT: CPT | Performed by: FAMILY MEDICINE

## 2019-01-01 PROCEDURE — 36415 COLL VENOUS BLD VENIPUNCTURE: CPT | Performed by: INTERNAL MEDICINE

## 2019-01-01 PROCEDURE — 77063 BREAST TOMOSYNTHESIS BI: CPT

## 2019-01-01 PROCEDURE — 99605 MTMS BY PHARM NP 15 MIN: CPT | Performed by: PHARMACIST

## 2019-01-01 PROCEDURE — 99205 OFFICE O/P NEW HI 60 MIN: CPT | Mod: GC | Performed by: STUDENT IN AN ORGANIZED HEALTH CARE EDUCATION/TRAINING PROGRAM

## 2019-01-01 PROCEDURE — 82306 VITAMIN D 25 HYDROXY: CPT | Performed by: OTOLARYNGOLOGY

## 2019-01-01 PROCEDURE — 80053 COMPREHEN METABOLIC PANEL: CPT | Performed by: INTERNAL MEDICINE

## 2019-01-01 RX ORDER — AMLODIPINE BESYLATE 10 MG/1
TABLET ORAL
Qty: 90 TABLET | Refills: 0 | OUTPATIENT
Start: 2019-01-01

## 2019-01-01 RX ORDER — LANOLIN ALCOHOL/MO/W.PET/CERES
1000 CREAM (GRAM) TOPICAL DAILY
COMMUNITY
End: 2019-01-01

## 2019-01-01 RX ORDER — MIRABEGRON 50 MG/1
50 TABLET, EXTENDED RELEASE ORAL DAILY
Qty: 30 TABLET | Refills: 3 | Status: SHIPPED | OUTPATIENT
Start: 2019-01-01

## 2019-01-01 RX ORDER — OLMESARTAN MEDOXOMIL 40 MG/1
TABLET ORAL
Qty: 30 TABLET | Refills: 0 | Status: SHIPPED | OUTPATIENT
Start: 2019-01-01 | End: 2019-01-01

## 2019-01-01 RX ORDER — ROSUVASTATIN CALCIUM 10 MG/1
TABLET, COATED ORAL
Qty: 90 TABLET | Refills: 7 | OUTPATIENT
Start: 2019-01-01

## 2019-01-01 RX ORDER — MIRABEGRON 25 MG/1
25 TABLET, FILM COATED, EXTENDED RELEASE ORAL DAILY
Qty: 30 TABLET | Refills: 3 | Status: SHIPPED | OUTPATIENT
Start: 2019-01-01

## 2019-01-01 RX ORDER — OLMESARTAN MEDOXOMIL 40 MG/1
TABLET ORAL
Qty: 30 TABLET | Refills: 7 | Status: SHIPPED | OUTPATIENT
Start: 2019-01-01 | End: 2019-01-01

## 2019-01-01 RX ORDER — AMLODIPINE BESYLATE 10 MG/1
TABLET ORAL
Qty: 30 TABLET | Refills: 0 | Status: SHIPPED | OUTPATIENT
Start: 2019-01-01 | End: 2019-01-01

## 2019-01-01 RX ORDER — BACLOFEN 5 MG/1
2.5 TABLET ORAL SEE ADMIN INSTRUCTIONS
Qty: 30 TABLET | Refills: 3 | Status: SHIPPED | OUTPATIENT
Start: 2019-01-01

## 2019-01-01 RX ORDER — AMLODIPINE BESYLATE 10 MG/1
TABLET ORAL
Qty: 30 TABLET | Refills: 7 | Status: SHIPPED | OUTPATIENT
Start: 2019-01-01 | End: 2019-01-01

## 2019-01-01 RX ORDER — CARBIDOPA AND LEVODOPA 25; 100 MG/1; MG/1
TABLET ORAL
Qty: 900 TABLET | Refills: 3 | Status: SHIPPED | OUTPATIENT
Start: 2019-01-01 | End: 2019-01-01

## 2019-01-01 RX ORDER — CARBIDOPA AND LEVODOPA 25; 100 MG/1; MG/1
TABLET ORAL
Qty: 900 TABLET | Refills: 3 | Status: SHIPPED | OUTPATIENT
Start: 2019-01-01

## 2019-01-01 RX ORDER — SENNOSIDES A AND B 8.6 MG/1
1 TABLET, FILM COATED ORAL DAILY
COMMUNITY
End: 2019-01-01

## 2019-01-01 RX ORDER — BUPROPION HYDROCHLORIDE 150 MG/1
150 TABLET, EXTENDED RELEASE ORAL 2 TIMES DAILY
Qty: 180 TABLET | Refills: 3 | Status: SHIPPED | OUTPATIENT
Start: 2019-01-01

## 2019-01-01 RX ORDER — OLMESARTAN MEDOXOMIL 40 MG/1
TABLET ORAL
Qty: 90 TABLET | Refills: 0 | OUTPATIENT
Start: 2019-01-01

## 2019-01-01 RX ORDER — ROSUVASTATIN CALCIUM 10 MG/1
10 TABLET, COATED ORAL DAILY
Qty: 30 TABLET | Refills: 7 | Status: SHIPPED | OUTPATIENT
Start: 2019-01-01 | End: 2019-01-01

## 2019-01-01 RX ORDER — OLMESARTAN MEDOXOMIL 40 MG/1
40 TABLET ORAL DAILY
Qty: 90 TABLET | Refills: 0 | Status: SHIPPED | OUTPATIENT
Start: 2019-01-01

## 2019-01-01 RX ORDER — POLYETHYLENE GLYCOL 3350 17 G/17G
1 POWDER, FOR SOLUTION ORAL DAILY
COMMUNITY

## 2019-01-01 RX ORDER — ROSUVASTATIN CALCIUM 10 MG/1
TABLET, COATED ORAL
Qty: 30 TABLET | Refills: 2 | Status: SHIPPED | OUTPATIENT
Start: 2019-01-01

## 2019-01-01 RX ORDER — UBIDECARENONE 75 MG
100 CAPSULE ORAL DAILY
COMMUNITY
End: 2019-01-01

## 2019-01-01 ASSESSMENT — ENCOUNTER SYMPTOMS
DECREASED LIBIDO: 0
NECK MASS: 0
HOARSE VOICE: 0
SHORTNESS OF BREATH: 0
TREMORS: 0
PANIC: 0
NAIL CHANGES: 0
RESPIRATORY PAIN: 0
TACHYCARDIA: 0
LIGHT-HEADEDNESS: 0
TROUBLE SWALLOWING: 0
WHEEZING: 0
NERVOUS/ANXIOUS: 0
BLOOD IN STOOL: 0
HEMATURIA: 0
NAUSEA: 0
MUSCLE WEAKNESS: 0
LEG PAIN: 0
BRUISES/BLEEDS EASILY: 0
WEAKNESS: 0
EYE IRRITATION: 0
CLAUDICATION: 0
EXTREMITY NUMBNESS: 0
TINGLING: 0
POSTURAL DYSPNEA: 0
EYE PAIN: 0
ARTHRALGIAS: 0
SNORES LOUDLY: 0
ALTERED TEMPERATURE REGULATION: 0
STIFFNESS: 0
EYE WATERING: 0
PARALYSIS: 0
MEMORY LOSS: 0
COUGH DISTURBING SLEEP: 0
POLYPHAGIA: 0
NIGHT SWEATS: 0
SEIZURES: 0
PALPITATIONS: 0
SLEEP DISTURBANCES DUE TO BREATHING: 0
SMELL DISTURBANCE: 0
FEVER: 0
DEPRESSION: 0
COUGH: 0
SYNCOPE: 0
CHILLS: 0
HEARTBURN: 0
EXERCISE INTOLERANCE: 0
BREAST MASS: 0
INCREASED ENERGY: 0
HYPERTENSION: 0
SINUS CONGESTION: 0
VOMITING: 0
MYALGIAS: 0
BOWEL INCONTINENCE: 0
SPEECH CHANGE: 0
DIARRHEA: 0
WEIGHT GAIN: 0
BLOATING: 0
SPUTUM PRODUCTION: 0
HEADACHES: 0
SORE THROAT: 0
FATIGUE: 0
HALLUCINATIONS: 0
SKIN CHANGES: 0
INSOMNIA: 0
HYPOTENSION: 0
MUSCLE CRAMPS: 0
WEIGHT LOSS: 0
DECREASED APPETITE: 0
BREAST PAIN: 0
DISTURBANCES IN COORDINATION: 0
DIFFICULTY URINATING: 0
SWOLLEN GLANDS: 0
CONSTIPATION: 1
DYSPNEA ON EXERTION: 0
NUMBNESS: 0
DOUBLE VISION: 0
EYE REDNESS: 0
LEG SWELLING: 0
HOT FLASHES: 0
DYSURIA: 0
ABDOMINAL PAIN: 0
FLANK PAIN: 0
POLYDIPSIA: 0
LOSS OF CONSCIOUSNESS: 0
HEMOPTYSIS: 0
NECK PAIN: 0
POOR WOUND HEALING: 0
BACK PAIN: 0
ORTHOPNEA: 0
DIZZINESS: 0
DECREASED CONCENTRATION: 0
TASTE DISTURBANCE: 0
JAUNDICE: 0
JOINT SWELLING: 0
SINUS PAIN: 0

## 2019-01-01 ASSESSMENT — UNIFIED PARKINSONS DISEASE RATING SCALE (UPDRS)
TOTAL_SCORE: 68
TOETAPPING_RIGHT: MODERATE: ANY OF THE FOLLOWING:  A) MORE THAN 5 INTERRUPTIONS  OR AT LEAST ONE LONGER ARREST (FREEZE) IN ONGOING MOVEMENT  B) MODERATE SLOWING C) THE AMPLITUDE DECREMENTS STARTING AFTER THE FIRST MOVEMENT.
TOTAL_SCORE_LEFT: 10
RIGIDITY_RUE: SLIGHT: RIGIDITY ONLY DETECTED WITH ACTIVATION MANEUVER.
HANDMOVEMENTS_RIGHT: NORMAL
TOETAPPING_LEFT: SEVERE: CANNOT OR CAN ONLY BARELY PERFORM THE TASK BECAUSE OF SLOWING, INTERRUPTIONS, OR DECREMENTS.
AMPLITUDE_LUE: NORMAL: NO TREMOR.
TOETAPPING_RIGHT: MODERATE: ANY OF THE FOLLOWING:  A) MORE THAN 5 INTERRUPTIONS  OR AT LEAST ONE LONGER ARREST (FREEZE) IN ONGOING MOVEMENT  B) MODERATE SLOWING C) THE AMPLITUDE DECREMENTS STARTING AFTER THE FIRST MOVEMENT.
FACIAL_EXPRESSION: MILD: IN ADDITION TO DECREASED EYE-BLINK FREQUENCY, MASKED FACIES PRESENT IN THE LOWER FACE AS WELL, NAMELY FEWER MOVEMENTS AROUND THE MOUTH, SUCH AS LESS SPONTANEOUS SMILING, BUT LIPS NOT PARTED.
AMPLITUDE_LIP_JAW: NORMAL: NO TREMOR.
LEG_AGILITY_LEFT: MODERATE: ANY OF THE FOLLOWING:  A) MORE THAN 5 INTERRUPTIONS  OR AT LEAST ONE LONGER ARREST (FREEZE) IN ONGOING MOVEMENT  B) MODERATE SLOWING C) THE AMPLITUDE DECREMENTS STARTING AFTER THE FIRST MOVEMENT.
RIGIDITY_LUE: SLIGHT: RIGIDITY ONLY DETECTED WITH ACTIVATION MANEUVER.
TOTAL_SCORE_LEFT: 16
PARKINSONS_MEDS: ON
FREEZING_GAIT: SEVERE: FREEZES MULTIPLE TIMES DURING STRAIGHT WALKING.
PRONATION_SUPINATION_RIGHT: MODERATE: ANY OF THE FOLLOWING:  A) MORE THAN 5 INTERRUPTIONS  OR AT LEAST ONE LONGER ARREST (FREEZE) IN ONGOING MOVEMENT  B) MODERATE SLOWING C) THE AMPLITUDE DECREMENTS STARTING AFTER THE FIRST MOVEMENT.
PARKINSONS_MEDS: ON
ARISING_CHAIR: SLIGHT: ARISING IS SLOWER THAN NORMAL, OR MAY NEED MORE THAN ONE ATTEMPT, OR MAY NEED TO MOVE FORWARD IN THE CHAIR TO ARISE.  NO NEED TO USE THE ARMS OF THE CHAIR.
SPEECH: SLIGHT: LOSS OF MODULATION, DICTION OR VOLUME, BUT STILL ALL WORDS EASY TO UNDERSTAND.
TOTAL_SCORE: 57
TOETAPPING_RIGHT: SEVERE: CANNOT OR CAN ONLY BARELY PERFORM THE TASK BECAUSE OF SLOWING, INTERRUPTIONS, OR DECREMENTS.
POSTURAL_STABILITY: SEVERE: VERY UNSTABLE, TENDS TO LOSE BALANCE SPONTANEOUSLY OR WITH JUST A GENTLE PULL ON THE SHOULDERS.
RIGIDITY_LUE: SLIGHT: RIGIDITY ONLY DETECTED WITH ACTIVATION MANEUVER.
TOTAL_SCORE: 21
TOTAL_SCORE: 18
FINGER_TAPPING_RIGHT: MODERATE: ANY OF THE FOLLOWING:  A) MORE THAN 5 INTERRUPTIONS OR AT LEAST ONE LONGER ARREST (FREEZE) IN ONGOING MOVEMENT  B) MODERATE SLOWING C) THE AMPLITUDE DECREMENTS STARTING AFTER THE FIRST MOVEMENT.
RIGIDITY_NECK: SLIGHT: RIGIDITY ONLY DETECTED WITH ACTIVATION MANEUVER.
AMPLITUDE_LLE: NORMAL: NO TREMOR.
SPEECH: MILD: LOSS OF MODULATION, DICTION OR VOLUME, WITH A FEW WORDS UNCLEAR, BUT THE OVERALL SENTENCES EASY TO FOLLOW.
RIGIDITY_RUE: MILD: RIGIDITY DETECTED WITHOUT THE ACTIVATION MANEUVER.  FULL RANGE OF MOTION IS EASILY ACHIEVED.
AMPLITUDE_LUE: NORMAL: NO TREMOR.
ARISING_CHAIR: SEVERE: UNABLE TO ARISE WITHOUT HELP.
AMPLITUDE_RLE: NORMAL: NO TREMOR.
RIGIDITY_RLE: MILD: RIGIDITY DETECTED WITHOUT THE ACTIVATION MANEUVER.  FULL RANGE OF MOTION IS EASILY ACHIEVED.
RIGIDITY_NECK: MILD: RIGIDITY DETECTED WITHOUT THE ACTIVATION MANEUVER.  FULL RANGE OF MOTION IS EASILY ACHIEVED.
FREEZING_GAIT: SEVERE: FREEZES MULTIPLE TIMES DURING STRAIGHT WALKING.
PARKINSONS_MEDS: ON
FINGER_TAPPING_LEFT: MODERATE: ANY OF THE FOLLOWING:  A) MORE THAN 5 INTERRUPTIONS  OR AT LEAST ONE LONGER ARREST (FREEZE) IN ONGOING MOVEMENT  B) MODERATE SLOWING C) THE AMPLITUDE DECREMENTS STARTING AFTER THE FIRST MOVEMENT.
FINGER_TAPPING_LEFT: MODERATE: ANY OF THE FOLLOWING:  A) MORE THAN 5 INTERRUPTIONS  OR AT LEAST ONE LONGER ARREST (FREEZE) IN ONGOING MOVEMENT  B) MODERATE SLOWING C) THE AMPLITUDE DECREMENTS STARTING AFTER THE FIRST MOVEMENT.
AMPLITUDE_RUE: NORMAL: NO TREMOR.
CONSTANCY_TREMOR_ATREST: NORMAL: NO TREMOR.
RIGIDITY_LLE: NORMAL
HANDMOVEMENTS_LEFT: MODERATE: ANY OF THE FOLLOWING:  A) MORE THAN 5 INTERRUPTIONS  OR AT LEAST ONE LONGER ARREST (FREEZE) IN ONGOING MOVEMENT  B) MODERATE SLOWING C) THE AMPLITUDE DECREMENTS STARTING AFTER THE FIRST MOVEMENT.
AMPLITUDE_LIP_JAW: NORMAL: NO TREMOR.
FACIAL_EXPRESSION: SLIGHT: MINIMAL MASKED FACIES MANIFESTED ONLY BY DECREASED FREQUENCY OF BLINKING.
AMPLITUDE_LIP_JAW: NORMAL: NO TREMOR.
PRONATION_SUPINATION_LEFT: MODERATE: ANY OF THE FOLLOWING:  A) MORE THAN 5 INTERRUPTIONS  OR AT LEAST ONE LONGER ARREST (FREEZE) IN ONGOING MOVEMENT  B) MODERATE SLOWING C) THE AMPLITUDE DECREMENTS STARTING AFTER THE FIRST MOVEMENT.
AMPLITUDE_LLE: NORMAL: NO TREMOR.
PRONATION_SUPINATION_RIGHT: MODERATE: ANY OF THE FOLLOWING:  A) MORE THAN 5 INTERRUPTIONS  OR AT LEAST ONE LONGER ARREST (FREEZE) IN ONGOING MOVEMENT  B) MODERATE SLOWING C) THE AMPLITUDE DECREMENTS STARTING AFTER THE FIRST MOVEMENT.
SPEECH: SLIGHT: LOSS OF MODULATION, DICTION OR VOLUME, BUT STILL ALL WORDS EASY TO UNDERSTAND.
AMPLITUDE_RLE: NORMAL: NO TREMOR.
TOETAPPING_LEFT: MODERATE: ANY OF THE FOLLOWING:  A) MORE THAN 5 INTERRUPTIONS OR AT LEAST ONE LONGER ARREST (FREEZE) IN ONGOING MOVEMENT  B) MODERATE SLOWING C) THE AMPLITUDE DECREMENTS STARTING AFTER THE FIRST MOVEMENT.
FREEZING_GAIT: SEVERE: FREEZES MULTIPLE TIMES DURING STRAIGHT WALKING.
LEG_AGILITY_LEFT: SLIGHT: ANY OF THE FOLLOWING: A) THE REGULAR RHYTHM IS BROKEN WITH ONE WITH ONE OR TWO INTERRUPTIONS OR HESITATIONS OF THE MOVEMENT B) SLIGHT SLOWING C) THE AMPLITUDE DECREMENTS NEAR THE END OF THE 10 MOVEMENTS.
RIGIDITY_RLE: NORMAL
RIGIDITY_RLE: NORMAL
RIGIDITY_NECK: SLIGHT: RIGIDITY ONLY DETECTED WITH ACTIVATION MANEUVER.
HANDMOVEMENTS_LEFT: MODERATE: ANY OF THE FOLLOWING:  A) MORE THAN 5 INTERRUPTIONS  OR AT LEAST ONE LONGER ARREST (FREEZE) IN ONGOING MOVEMENT  B) MODERATE SLOWING C) THE AMPLITUDE DECREMENTS STARTING AFTER THE FIRST MOVEMENT.
AMPLITUDE_LUE: NORMAL: NO TREMOR.
SPONTANEITY_OF_MOVEMENT: 2: MILD: MILD GLOBAL SLOWNESS AND POVERTY OF SPONTANEOUS MOVEMENTS.
LEG_AGILITY_RIGHT: SLIGHT: ANY OF THE FOLLOWING: A) THE REGULAR RHYTHM IS BROKEN WITH ONE WITH ONE OR TWO INTERRUPTIONS OR HESITATIONS OF THE MOVEMENT B) SLIGHT SLOWING C) THE AMPLITUDE DECREMENTS NEAR THE END OF THE 10 MOVEMENTS.
HANDMOVEMENTS_RIGHT: MODERATE: ANY OF THE FOLLOWING:  A) MORE THAN 5 INTERRUPTIONS  OR AT LEAST ONE LONGER ARREST (FREEZE) IN ONGOING MOVEMENT  B) MODERATE SLOWING C) THE AMPLITUDE DECREMENTS STARTING AFTER THE FIRST MOVEMENT.
RIGIDITY_LUE: SLIGHT: RIGIDITY ONLY DETECTED WITH ACTIVATION MANEUVER.
FINGER_TAPPING_RIGHT: MODERATE: ANY OF THE FOLLOWING:  A) MORE THAN 5 INTERRUPTIONS OR AT LEAST ONE LONGER ARREST (FREEZE) IN ONGOING MOVEMENT  B) MODERATE SLOWING C) THE AMPLITUDE DECREMENTS STARTING AFTER THE FIRST MOVEMENT.
FINGER_TAPPING_LEFT: SEVERE: CANNOT OR CAN ONLY BARELY PERFORM THE TASK BECAUSE OF SLOWING, INTERRUPTIONS, OR DECREMENTS.
GAIT: SEVERE: CANNOT WALK AT ALL OR ONLY WITH ANOTHER PERSON'S ASSISTANCE.
ARISING_CHAIR: MODERATE: NEEDS TO PUSH OFF, BUT TENDS TO FALL BACK, OR MAY HAVE TO TRY MORE THAN ONE TIME USING ARMS OF CHAIR, BUT CAN GET UP WITHOUT HELP.
POSTURAL_STABILITY: SEVERE: VERY UNSTABLE, TENDS TO LOSE BALANCE SPONTANEOUSLY OR WITH JUST A GENTLE PULL ON THE SHOULDERS.
HANDMOVEMENTS_RIGHT: MODERATE: ANY OF THE FOLLOWING:  A) MORE THAN 5 INTERRUPTIONS  OR AT LEAST ONE LONGER ARREST (FREEZE) IN ONGOING MOVEMENT  B) MODERATE SLOWING C) THE AMPLITUDE DECREMENTS STARTING AFTER THE FIRST MOVEMENT.
HANDMOVEMENTS_LEFT: NORMAL
SPONTANEITY_OF_MOVEMENT: 2: MILD: MILD GLOBAL SLOWNESS AND POVERTY OF SPONTANEOUS MOVEMENTS.
TOTAL_SCORE: 38
POSTURE: 2 MILD.  DEFINITE FLEXION, SCOLIOSIS OR LEANING OT ONE SIDE, BUT CAN CORRECT POSTURE TO NORMAL WHEN ASKED.
AXIAL_SCORE: 28
LEG_AGILITY_RIGHT: MODERATE: ANY OF THE FOLLOWING:  A) MORE THAN 5 INTERRUPTIONS  OR AT LEAST ONE LONGER ARREST (FREEZE) IN ONGOING MOVEMENT  B) MODERATE SLOWING C) THE AMPLITUDE DECREMENTS STARTING AFTER THE FIRST MOVEMENT.
AMPLITUDE_RUE: NORMAL: NO TREMOR.
TOTAL_SCORE_LEFT: 19
TOETAPPING_LEFT: MODERATE: ANY OF THE FOLLOWING:  A) MORE THAN 5 INTERRUPTIONS OR AT LEAST ONE LONGER ARREST (FREEZE) IN ONGOING MOVEMENT  B) MODERATE SLOWING C) THE AMPLITUDE DECREMENTS STARTING AFTER THE FIRST MOVEMENT.
PRONATION_SUPINATION_RIGHT: SLIGHT: ANY OF THE FOLLOWING: A) THE REGULAR RHYTHM IS BROKEN WITH ONE WITH ONE OR TWO INTERRUPTIONS OR HESITATIONS OF THE MOVEMENT B) SLIGHT SLOWING C) THE AMPLITUDE DECREMENTS NEAR THE END OF THE 10 MOVEMENTS.
FACIAL_EXPRESSION: MILD: IN ADDITION TO DECREASED EYE-BLINK FREQUENCY, MASKED FACIES PRESENT IN THE LOWER FACE AS WELL, NAMELY FEWER MOVEMENTS AROUND THE MOUTH, SUCH AS LESS SPONTANEOUS SMILING, BUT LIPS NOT PARTED.
POSTURAL_STABILITY: SEVERE: VERY UNSTABLE, TENDS TO LOSE BALANCE SPONTANEOUSLY OR WITH JUST A GENTLE PULL ON THE SHOULDERS.
PRONATION_SUPINATION_LEFT: MODERATE: ANY OF THE FOLLOWING:  A) MORE THAN 5 INTERRUPTIONS  OR AT LEAST ONE LONGER ARREST (FREEZE) IN ONGOING MOVEMENT  B) MODERATE SLOWING C) THE AMPLITUDE DECREMENTS STARTING AFTER THE FIRST MOVEMENT.
SPONTANEITY_OF_MOVEMENT: 3: MODERATE: MODERATE GLOBAL SLOWNESS AND POVERTY OF MOVEMENTS.
GAIT: SEVERE: CANNOT WALK AT ALL OR ONLY WITH ANOTHER PERSON'S ASSISTANCE.
RIGIDITY_LLE: NORMAL
FINGER_TAPPING_RIGHT: MILD: ANY OF THE FOLLOWING: A) 3 TO 5 INTERRUPTIONS DURING TAPPING B) MILD SLOWING C) THE AMPLITUDE DECREMENTS MIDWAY IN THE 10-MOVEMENT SEQUENCE
AXIAL_SCORE: 23
CONSTANCY_TREMOR_ATREST: NORMAL: NO TREMOR.
LEG_AGILITY_RIGHT: SEVERE: CANNOT OR CAN ONLY BARELY PERFORM THE TASK BECAUSE OF SLOWING, INTERRUPTIONS, OR DECREMENTS.
AMPLITUDE_RLE: NORMAL: NO TREMOR.
GAIT: SEVERE: CANNOT WALK AT ALL OR ONLY WITH ANOTHER PERSON'S ASSISTANCE.
TOTAL_SCORE: 8
LEG_AGILITY_LEFT: MODERATE: ANY OF THE FOLLOWING:  A) MORE THAN 5 INTERRUPTIONS  OR AT LEAST ONE LONGER ARREST (FREEZE) IN ONGOING MOVEMENT  B) MODERATE SLOWING C) THE AMPLITUDE DECREMENTS STARTING AFTER THE FIRST MOVEMENT.
POSTURE: 3 MODERATE.  STOOPED POSTURE, SCOLIOSIS, OR LEANING TO ONE SIDE THAT CANNOT BE CORRECTED VOLITIONALLY TO A NORMAL POSTURE BY THE PATIENT.
POSTURE: 2 MILD.  DEFINITE FLEXION, SCOLIOSIS OR LEANING OT ONE SIDE, BUT CAN CORRECT POSTURE TO NORMAL WHEN ASKED.
CONSTANCY_TREMOR_ATREST: NORMAL: NO TREMOR.
AXIAL_SCORE: 20
RIGIDITY_RUE: MILD: RIGIDITY DETECTED WITHOUT THE ACTIVATION MANEUVER.  FULL RANGE OF MOTION IS EASILY ACHIEVED.
RIGIDITY_LLE: MILD: RIGIDITY DETECTED WITHOUT THE ACTIVATION MANEUVER.  FULL RANGE OF MOTION IS EASILY ACHIEVED.
PRONATION_SUPINATION_LEFT: SLIGHT: ANY OF THE FOLLOWING: A) THE REGULAR RHYTHM IS BROKEN WITH ONE WITH ONE OR TWO INTERRUPTIONS OR HESITATIONS OF THE MOVEMENT B) SLIGHT SLOWING C) THE AMPLITUDE DECREMENTS NEAR THE END OF THE 10 MOVEMENTS.
AMPLITUDE_RUE: NORMAL: NO TREMOR.
AMPLITUDE_LLE: NORMAL: NO TREMOR.

## 2019-01-01 ASSESSMENT — ANXIETY QUESTIONNAIRES
GAD7 TOTAL SCORE: 0
5. BEING SO RESTLESS THAT IT IS HARD TO SIT STILL: NOT AT ALL
1. FEELING NERVOUS, ANXIOUS, OR ON EDGE: NOT AT ALL
6. BECOMING EASILY ANNOYED OR IRRITABLE: NOT AT ALL
2. NOT BEING ABLE TO STOP OR CONTROL WORRYING: NOT AT ALL
7. FEELING AFRAID AS IF SOMETHING AWFUL MIGHT HAPPEN: NOT AT ALL
GAD7 TOTAL SCORE: 0
3. WORRYING TOO MUCH ABOUT DIFFERENT THINGS: NOT AT ALL

## 2019-01-01 ASSESSMENT — PAIN SCALES - GENERAL
PAINLEVEL: NO PAIN (0)

## 2019-01-01 ASSESSMENT — PATIENT HEALTH QUESTIONNAIRE - PHQ9
SUM OF ALL RESPONSES TO PHQ QUESTIONS 1-9: 11
5. POOR APPETITE OR OVEREATING: NOT AT ALL

## 2019-01-01 ASSESSMENT — MIFFLIN-ST. JEOR
SCORE: 1037.52
SCORE: 1060.2
SCORE: 1059.29
SCORE: 1021.64
SCORE: 1025.72
SCORE: 1060.2

## 2019-01-10 PROBLEM — G20.C ATYPICAL PARKINSONISM (H): Status: ACTIVE | Noted: 2019-01-01

## 2019-01-10 NOTE — LETTER
1/10/2019       RE: Zoey Armstrong  [address]  [facility] [address]     Dear Colleague,    Thank you for referring your patient, Zoey Armstrong, to the [facility] NEUROLOGY at [facility]. Please see a copy of my visit note below.    Department of Neurology  Movement Disorders Division     Patient: Zoey Armstrong   MRN: [id number]   : 1950   Date of Visit: 2018     History of Present Illness  Ms. Armstrong is a 68 year old R handed female with PMH of depression, posterior spinal fusion of L2-S1 due to neurogenic claudication, CAD, COPD, HLD, HTN that presents to Neurology Movement clinic for follow up regarding management of Parkinsonism with gait freezing and progressive gait instability with prevalence for falls. Patient was last seen on 2018 where her dose of Sinemet 25/100mg was increased to goal dose of 2 tabs five times a day. Also ordered a U step walker and PT and gait training.     History obtained from patient and accompanied by .  Patient reports her symptoms are stable since last clinic visit.  She denies recent falls and last fell several months ago.  Receiving a new wheelchair for long distance traveling.  Has been reports that patient has not been walking as much since receiving her new wheelchair.  She completed gait training and physical therapy with the use of her U step walker.  They have since discharged her from physical therapy as they reports she is not showing improvement and continued PT is not providing benefit.  She continues to have difficulty with standing, walking and freezing.  She shares that the tone of her voice appears to be changing and sounds lower.  Denies dysphasia.  She continues to take carbidopa/levodopa 25/100 mg 2 tabs 4 times a day.  She reports that she did not increase the dose as she was following the instructions on her medication label.  She denies side effects to this medication and reports  compliance.  She continues to have issues with apathy and shares that she would rather stay at home and not bother anybody then go out.   is very supportive and encourages her often to go out and socialize.    Off Periods: She is uncertain of this.  Dyskinesia: None    Memory Problems: No issues.   Hallucinations: May see a bug that is stationary.  This does not bother her currently.  Falls: Was falling once every two weeks. But currently has not fallen in the past few months.   ADL's: Independent but requires assistance with transfers and walking.    Assistive Devices: Mostly using her new wheelchair in the past few weeks so she is able to travel long distances.   Depression: Not an issue at this time.   Anxiety: Not an issue at this time.   Sleeps 8 hours and has a 2 hour nap in the afternoon. Does not act out dreams. Uses Tylenol pm prn.  Insomnia: Not an issue.   REMSD: Denies.   RLS: Denies.   Drowsiness: Denies.   Impulsions: Denies.  Fatigue: Denies.  Apathy: Reports that her  has to work really hard to get her out of the house. She may be more motivated to get out of the house now that she has the wheelchair. Goes to salon every 4-5 weeks.     Consider therapy  Review of Systems:  Other than that mentioned above, the remainder of 12 systems reviewed were negative.    Medications:  Current Outpatient Medications   Medication Sig Dispense Refill     Acetaminophen (TYLENOL PO) Take 1,000 mg by mouth 4 times daily        amLODIPine (NORVASC) 10 MG tablet TAKE 1 TABLET(10 MG) BY MOUTH DAILY 90 tablet 0     aspirin 81 MG tablet Take 81 mg by mouth daily       buPROPion (WELLBUTRIN SR) 150 MG 12 hr tablet TAKE 1 TABLET(150 MG) BY MOUTH TWICE DAILY 180 tablet 1     calcium-vitamin D (CALTRATE) 600-400 MG-UNIT per tablet Take 1 tablet by mouth 2 times daily       carbidopa (LODOSYN) 25 MG TABS tablet Take 1 tablet (25 mg) by mouth 4 times daily 360 tablet 3     carbidopa-levodopa (SINEMET)  MG per  tablet Please increased dose as per Mychart with goal dose of 2 tabs 5 times a day. 900 tablet 0     FLUoxetine (PROZAC) 20 MG capsule TAKE 3 CAPSULES BY MOUTH ONCE DAILY 90 capsule 4     letrozole (FEMARA) 2.5 MG tablet Take 2.5 mg by mouth daily       lidocaine (LIDODERM) 5 % Patch Place 2 patches onto the skin every 24 hours Apply to low back/left hip - on in AM, remove after 12 hours. 5745-0182, 2002-4301.        Misc. Devices (ROLLER WALKER) MISC 1 Device daily 1 each 0     Multiple Vitamins-Minerals (MULTIVITAMIN ADULT PO) Take 1 tablet by mouth daily       olmesartan (BENICAR) 40 MG tablet TAKE 1 TABLET(40 MG) BY MOUTH DAILY 90 tablet 0     polyethylene glycol (MIRALAX/GLYCOLAX) powder Take 17 g by mouth daily AND daily PRN       rosuvastatin (CRESTOR) 10 MG tablet TAKE 1 TABLET BY MOUTH ONCE DAILY 30 tablet 0     senna-docusate (SENOKOT-S;PERICOLACE) 8.6-50 MG per tablet Take 2 tablets by mouth daily AND 1 tab QHS            Movement Disorder-related Medications                   9am 1pm 5pm 9pm   CD/LD 25/100mg  2 tabs 2 2 2        Physical Exam:  The patient's  weight is 59.4 kg (131 lb). Her blood pressure is 106/71 and her pulse is 83. Her oxygen saturation is 97%.    Physical Exam:  General: Resting comfortably   Respiratory: No respiratory distress     Neuro Exam:  Mental status: Alert and oriented to person, place, time, and situation. Follows commands  CN: EOMIB, PERRL, facial sensation intact, facial movement symmetric, hearing grossly intact, tongue protrudes midline   Motor: Moves all extremities equally against gravity without difficulty or abnormalities, possible apraxia of the right upper extremity  Sensation: intact to light touch throughout   Gait: requires assistance to stand from a seated position, stooped gait with prominent freezing of gait.    UPDRS III  UPDRS Values 1/10/2019   Time: 11:21 AM   Medication On   R Brain DBS: None   L Brain DBS: None   Speech 1   Facial Expression 1    Rigidity Neck 1   Rigidity RUE 1   Rigidity LUE 1   Rigidity RLE 0   Rigidity LLE 0   Finger Taps R 2   Finger Taps L 4   Hand Mvt R 0   Hand Mvt L 0   Pron-/Supinate R 1   Pron-/Supinate L 1   Toe Tap R 3   Toe Tap L 3   Leg Agility R 1   Leg Agility L 1   Arise From Chair 1   Gait 4   Gait Freezing 4   Postural Stability 4   Posture 2   Global Spont Mvt 2   Postural Tremor RUE 0   Postural Tremor LUE 0   Kinetic Tremor RUE 0   Kinetic Tremor LUE 0   Rest Tremor RUE 0   Rest Tremor LUE 0   Rest Tremor RLE 0   Rest Tremor LLE 0   Rest Tremor Lip/Jaw 0   Rest Tremor Constancy 0   Total Right 8   Total Left 10   Axial Total 20   Total 38   Previous score 35    Impression:  Ms. Armstrong is a 68 year old R handed female with PMH of depression, posterior spinal fusion of L2-S1 due to neurogenic claudication, CAD, COPD, HLD, HTN that presents to Neurology Movement clinic for follow up regarding management of Parkinsonism with gait freezing and progressive gait instability with prevalence for falls.     1. Atypical parkinsonism: Initial presentation with gait instability and freezing of gait with susceptibility for falls that has progressed over the last several years. Previous CT H in 11/2016 concerning for normal pressure hydrocephalus and is s/p large volume tap of 38mL with no improvement in gait. Patient seen by Dr. Cruz who did not suspect symptoms were associated with NPH. 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 Patient is currently on CD/LD 25/100mg, currently on 2 tabs qid, and will plan to increase this dose to 2 tab 5x/day. Main concerns continue to be gait instability and freezing of gait, as well as apathy and hypophonia.   2. Gait instability with associated falls: due to #1.   3. Freezing of gait: due to #1  4. Hypophonia: No issues with communication at this time but notices a lower tone in her voice. Discussed SLP [facility] program and she is considering  this.   5. Apathy: Provided information on this in relation to PD.     Plan:  - Increase dose of Sinemet (carbidopa/levodopa) 25/100mg to goal dose of 2 tabs five times a day (dose changed and refilled)   - Increase Sinemet to take five times a day at 9am, 1130, 3pm, 7pm, 10pm. For the first two weeks, take one tab only at 7pm. Then after 2 weeks, take 2 tabs at 7pm and continue on 2 tabs five times a day.    - To get maximum effects of Sinemet, take an 1 hour before a protein rich meal or 2 hours after a protein rich meal.   - New PD regimen detailed below:   Movement Disorder-related Medications                   9am 11:30am 3pm 7pm 10pm   CD/LD 25/100mg  2 tabs 2 2 2 2   - For freezing, discussed rocking back and forth or placing tape on the floor as a cue to step on a line to walk.  - We encourage exercise daily with walking or using a recumbent bike and to stay social  - Please call Isa Prakash from the [place] Chapter of [employer] at [phone] for information regarding PD support groups around your area.   - PD resources:  www.[email or web address].org  [email or web address]  - Shareda link to information about apathy in Parkinson disease:   [email or web address]/Non-Movement-Symptoms-Apathy  - Discussed Speech therapy for the [facility] program for treatment of hypophonia - patient currently uninterested but will contact clinic if she changes her mind.  - Will contact regarding current research in Parkinsonism.  - Will continue to monitor mood and may consider changing antidepressant to mirtazapine.      RTC: 4 months     Heidi Melvin DO  Movement Disorders Fellow    Patient seen and discussed with Dr. Mart.    The patient was seen with the fellow. I was present for key portions of the history and physical examination and agree with the assessment and plan.    Jace Mart MD, PhD

## 2019-01-10 NOTE — NURSING NOTE
Chief Complaint   Patient presents with     RECHECK     [facility] RETURN MOVEMENT DISORDER 4 MO F/U       Leslie Santos, EMT

## 2019-01-10 NOTE — PATIENT INSTRUCTIONS
Plan:  -  Increase dose of Sinemet (carbidopa/levodopa) 25/100mg to goal dose of 2 tabs five times a day   - Increase Sinemet to take five times a day at 9am, 1130, 3pm, 7pm, 10pm. For the first two weeks, take one tab only at 7pm. Then after 2 weeks, take 2 tabs at 7pm and continue on 2 tabs five times a day.   - New PD regimen below:   Movement Disorder-related Medications                   9am 11:30am 3pm 7pm 10pm   CD/LD 25/100mg  2 tabs 2 2 2 2   - To get maximum effects of Sinemet, take an 1 hour before a protein rich meal or 2 hours after a protein rich meal  - For freezing, discussed rocking back and forth or placing tape on the floor as a cue to step on a line to walk.  - We encourage exercise daily with walking or using a recumbent bike and to stay social  - Please call Isa Prakash from the [place] Chapter of [employer] at [phone] for information regarding PD support groups around your area.   - PD resources:  www.[email or web address].org  [email or web address]  -  Here's a link to information about apathy in Parkinson disease:   [email or web address]/Non-Movement-Symptoms-Apathy  - Consider Speech therapy for the [facility] program for your speech  - We will contact you regarding current research in Parkinsonism     We will see you back in 4 months. You're doing great!

## 2019-01-10 NOTE — PROGRESS NOTES
Department of Neurology  Movement Disorders Division     Patient: Zoey Armstrong   MRN: [id number]   : 1950   Date of Visit: 2018     History of Present Illness  Ms. Armstrong is a 68 year old R handed female with PMH of depression, posterior spinal fusion of L2-S1 due to neurogenic claudication, CAD, COPD, HLD, HTN that presents to Neurology Movement clinic for follow up regarding management of Parkinsonism with gait freezing and progressive gait instability with prevalence for falls. Patient was last seen on 2018 where her dose of Sinemet 25/100mg was increased to goal dose of 2 tabs five times a day. Also ordered a U step walker and PT and gait training.     History obtained from patient and accompanied by .  Patient reports her symptoms are stable since last clinic visit.  She denies recent falls and last fell several months ago.  Receiving a new wheelchair for long distance traveling.  Has been reports that patient has not been walking as much since receiving her new wheelchair.  She completed gait training and physical therapy with the use of her U step walker.  They have since discharged her from physical therapy as they reports she is not showing improvement and continued PT is not providing benefit.  She continues to have difficulty with standing, walking and freezing.  She shares that the tone of her voice appears to be changing and sounds lower.  Denies dysphasia.  She continues to take carbidopa/levodopa 25/100 mg 2 tabs 4 times a day.  She reports that she did not increase the dose as she was following the instructions on her medication label.  She denies side effects to this medication and reports compliance.  She continues to have issues with apathy and shares that she would rather stay at home and not bother anybody then go out.   is very supportive and encourages her often to go out and socialize.    Off Periods: She is uncertain of this.  Dyskinesia: None    Memory  Problems: No issues.   Hallucinations: May see a bug that is stationary.  This does not bother her currently.  Falls: Was falling once every two weeks. But currently has not fallen in the past few months.   ADL's: Independent but requires assistance with transfers and walking.    Assistive Devices: Mostly using her new wheelchair in the past few weeks so she is able to travel long distances.   Depression: Not an issue at this time.   Anxiety: Not an issue at this time.   Sleeps 8 hours and has a 2 hour nap in the afternoon. Does not act out dreams. Uses Tylenol pm prn.  Insomnia: Not an issue.   REMSD: Denies.   RLS: Denies.   Drowsiness: Denies.   Impulsions: Denies.  Fatigue: Denies.  Apathy: Reports that her  has to work really hard to get her out of the house. She may be more motivated to get out of the house now that she has the wheelchair. Goes to salon every 4-5 weeks.     Consider therapy  Review of Systems:  Other than that mentioned above, the remainder of 12 systems reviewed were negative.    Medications:  Current Outpatient Medications   Medication Sig Dispense Refill     Acetaminophen (TYLENOL PO) Take 1,000 mg by mouth 4 times daily        amLODIPine (NORVASC) 10 MG tablet TAKE 1 TABLET(10 MG) BY MOUTH DAILY 90 tablet 0     aspirin 81 MG tablet Take 81 mg by mouth daily       buPROPion (WELLBUTRIN SR) 150 MG 12 hr tablet TAKE 1 TABLET(150 MG) BY MOUTH TWICE DAILY 180 tablet 1     calcium-vitamin D (CALTRATE) 600-400 MG-UNIT per tablet Take 1 tablet by mouth 2 times daily       carbidopa (LODOSYN) 25 MG TABS tablet Take 1 tablet (25 mg) by mouth 4 times daily 360 tablet 3     carbidopa-levodopa (SINEMET)  MG per tablet Please increased dose as per Mychart with goal dose of 2 tabs 5 times a day. 900 tablet 0     FLUoxetine (PROZAC) 20 MG capsule TAKE 3 CAPSULES BY MOUTH ONCE DAILY 90 capsule 4     letrozole (FEMARA) 2.5 MG tablet Take 2.5 mg by mouth daily       lidocaine (LIDODERM) 5 %  Patch Place 2 patches onto the skin every 24 hours Apply to low back/left hip - on in AM, remove after 12 hours. 5340-4023, 0504-0779.        Misc. Devices (ROLLER WALKER) MISC 1 Device daily 1 each 0     Multiple Vitamins-Minerals (MULTIVITAMIN ADULT PO) Take 1 tablet by mouth daily       olmesartan (BENICAR) 40 MG tablet TAKE 1 TABLET(40 MG) BY MOUTH DAILY 90 tablet 0     polyethylene glycol (MIRALAX/GLYCOLAX) powder Take 17 g by mouth daily AND daily PRN       rosuvastatin (CRESTOR) 10 MG tablet TAKE 1 TABLET BY MOUTH ONCE DAILY 30 tablet 0     senna-docusate (SENOKOT-S;PERICOLACE) 8.6-50 MG per tablet Take 2 tablets by mouth daily AND 1 tab QHS            Movement Disorder-related Medications                   9am 1pm 5pm 9pm   CD/LD 25/100mg  2 tabs 2 2 2        Physical Exam:  The patient's  weight is 59.4 kg (131 lb). Her blood pressure is 106/71 and her pulse is 83. Her oxygen saturation is 97%.    Physical Exam:  General: Resting comfortably   Respiratory: No respiratory distress     Neuro Exam:  Mental status: Alert and oriented to person, place, time, and situation. Follows commands  CN: EOMIB, PERRL, facial sensation intact, facial movement symmetric, hearing grossly intact, tongue protrudes midline   Motor: Moves all extremities equally against gravity without difficulty or abnormalities, possible apraxia of the right upper extremity  Sensation: intact to light touch throughout   Gait: requires assistance to stand from a seated position, stooped gait with prominent freezing of gait.    UPDRS III  UPDRS Values 1/10/2019   Time: 11:21 AM   Medication On   R Brain DBS: None   L Brain DBS: None   Speech 1   Facial Expression 1   Rigidity Neck 1   Rigidity RUE 1   Rigidity LUE 1   Rigidity RLE 0   Rigidity LLE 0   Finger Taps R 2   Finger Taps L 4   Hand Mvt R 0   Hand Mvt L 0   Pron-/Supinate R 1   Pron-/Supinate L 1   Toe Tap R 3   Toe Tap L 3   Leg Agility R 1   Leg Agility L 1   Arise From Chair 1   Gait  4   Gait Freezing 4   Postural Stability 4   Posture 2   Global Spont Mvt 2   Postural Tremor RUE 0   Postural Tremor LUE 0   Kinetic Tremor RUE 0   Kinetic Tremor LUE 0   Rest Tremor RUE 0   Rest Tremor LUE 0   Rest Tremor RLE 0   Rest Tremor LLE 0   Rest Tremor Lip/Jaw 0   Rest Tremor Constancy 0   Total Right 8   Total Left 10   Axial Total 20   Total 38   Previous score 35    Impression:  Ms. Armstrong is a 68 year old R handed female with PMH of depression, posterior spinal fusion of L2-S1 due to neurogenic claudication, CAD, COPD, HLD, HTN that presents to Neurology Movement clinic for follow up regarding management of Parkinsonism with gait freezing and progressive gait instability with prevalence for falls.     1. Atypical parkinsonism: Initial presentation with gait instability and freezing of gait with susceptibility for falls that has progressed over the last several years. Previous CT H in 11/2016 concerning for normal pressure hydrocephalus and is s/p large volume tap of 38mL with no improvement in gait. Patient seen by Dr. Cruz who did not suspect symptoms were associated with NPH. 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 Patient is currently on CD/LD 25/100mg, currently on 2 tabs qid, and will plan to increase this dose to 2 tab 5x/day. Main concerns continue to be gait instability and freezing of gait, as well as apathy and hypophonia.   2. Gait instability with associated falls: due to #1.   3. Freezing of gait: due to #1  4. Hypophonia: No issues with communication at this time but notices a lower tone in her voice. Discussed S[facility] program and she is considering this.   5. Apathy: Provided information on this in relation to PD.     Plan:  - Increase dose of Sinemet (carbidopa/levodopa) 25/100mg to goal dose of 2 tabs five times a day (dose changed and refilled)   - Increase Sinemet to take five times a day at 9am, 1130, 3pm, 7pm, 10pm. For the  first two weeks, take one tab only at 7pm. Then after 2 weeks, take 2 tabs at 7pm and continue on 2 tabs five times a day.    - To get maximum effects of Sinemet, take an 1 hour before a protein rich meal or 2 hours after a protein rich meal.   - New PD regimen detailed below:   Movement Disorder-related Medications                   9am 11:30am 3pm 7pm 10pm   CD/LD 25/100mg  2 tabs 2 2 2 2   - For freezing, discussed rocking back and forth or placing tape on the floor as a cue to step on a line to walk.  - We encourage exercise daily with walking or using a recumbent bike and to stay social  - Please call Isa Prakash from the [place] Chapter of [employer] at [phone] for information regarding PD support groups around your area.   - PD resources:  www.[email or web address].org  [email or web address]  - Shareda link to information about apathy in Parkinson disease:   [email or web address]/Non-Movement-Symptoms-Apathy  - Discussed Speech therapy for the [facility] program for treatment of hypophonia - patient currently uninterested but will contact clinic if she changes her mind.  - Will contact regarding current research in Parkinsonism.  - Will continue to monitor mood and may consider changing antidepressant to mirtazapine.      RTC: 4 months     Heidi Melvin DO  Movement Disorders Fellow    Patient seen and discussed with Dr. Mart.    The patient was seen with the fellow. I was present for key portions of the history and physical examination and agree with the assessment and plan.    Jace Mart MD, PhD

## 2019-01-14 NOTE — LETTER
[facility]  [address], [address]  [place], [place]  [address]                                            TEL:[phone]  FAX:[phone]      Zoey Armstrong  [address] [place] [address] [address]  [facility] [address]      January 17, 2019    Dear Zoey       We have received a refill request from your pharmacy, however, we were only able to provide a one time fill because you are due for fasting labs. Please call [phone] to schedule an lab only appointment before you are due for your next refill.      Thank you,     JOIE Martinez

## 2019-01-14 NOTE — TELEPHONE ENCOUNTER
"Requested Prescriptions   Pending Prescriptions Disp Refills     olmesartan (BENICAR) 40 MG tablet [Pharmacy Med Name: OLMESARTAN MEDOXOMIL 40MG TABLETS] 90 tablet 0    Last Written Prescription Date:  09/18/2018  Last Fill Quantity: 90,  # refills: 0   Last office visit: 10/9/2018 with prescribing provider:     Future Office Visit:   Sig: TAKE 1 TABLET(40 MG) BY MOUTH DAILY    Angiotensin-II Receptors Failed - 1/14/2019  1:19 PM       Failed - Normal serum creatinine on file in past 12 months    Recent Labs   Lab Test 07/06/17   CR 0.96            Failed - Normal serum potassium on file in past 12 months    Recent Labs   Lab Test 07/06/17   POTASSIUM 3.9                   Passed - Blood pressure under 140/90 in past 12 months    BP Readings from Last 3 Encounters:   01/10/19 106/71   10/09/18 100/62   08/23/18 100/64                Passed - Recent (12 mo) or future (30 days) visit within the authorizing provider's specialty    Patient had office visit in the last 12 months or has a visit in the next 30 days with authorizing provider or within the authorizing provider's specialty.  See \"Patient Info\" tab in inbasket, or \"Choose Columns\" in Meds & Orders section of the refill encounter.             Passed - Medication is active on med list       Passed - Patient is age 18 or older       Passed - No active pregnancy on record       Passed - No positive pregnancy test in past 12 months        "

## 2019-01-14 NOTE — TELEPHONE ENCOUNTER
"Requested Prescriptions   Pending Prescriptions Disp Refills     amLODIPine (NORVASC) 10 MG tablet [Pharmacy Med Name: AMLODIPINE BESYLATE 10MG TABLETS] 90 tablet 0    Last Written Prescription Date:  06/12/2018  Last Fill Quantity: 90,  # refills: 0   Last office visit: 10/9/2018 with prescribing provider:     Future Office Visit:   Sig: TAKE 1 TABLET BY MOUTH ONCE DAILY    Calcium Channel Blockers Protocol  Failed - 1/14/2019  1:19 PM       Failed - Normal serum creatinine on file in past 12 months    Recent Labs   Lab Test 07/06/17   CR 0.96            Passed - Blood pressure under 140/90 in past 12 months    BP Readings from Last 3 Encounters:   01/10/19 106/71   10/09/18 100/62   08/23/18 100/64                Passed - Recent (12 mo) or future (30 days) visit within the authorizing provider's specialty    Patient had office visit in the last 12 months or has a visit in the next 30 days with authorizing provider or within the authorizing provider's specialty.  See \"Patient Info\" tab in inbasket, or \"Choose Columns\" in Meds & Orders section of the refill encounter.             Passed - Medication is active on med list       Passed - Patient is age 18 or older       Passed - No active pregnancy on record       Passed - No positive pregnancy test in past 12 months        "

## 2019-01-17 NOTE — TELEPHONE ENCOUNTER
"Requested Prescriptions   Pending Prescriptions Disp Refills     olmesartan (BENICAR) 40 MG tablet [Pharmacy Med Name: OLMESARTAN MEDOXOMIL 40MG TABLETS] 90 tablet 0    Last Written Prescription Date:  01/17/2019  Last Fill Quantity: 90,  # refills: 0   Last office visit: 10/9/2018 with prescribing provider:     Future Office Visit:   Sig: TAKE 1 TABLET BY MOUTH DAILY    Angiotensin-II Receptors Failed - 1/17/2019  9:04 AM       Failed - Normal serum creatinine on file in past 12 months    Recent Labs   Lab Test 07/06/17   CR 0.96            Failed - Normal serum potassium on file in past 12 months    Recent Labs   Lab Test 07/06/17   POTASSIUM 3.9                   Passed - Blood pressure under 140/90 in past 12 months    BP Readings from Last 3 Encounters:   01/10/19 106/71   10/09/18 100/62   08/23/18 100/64                Passed - Recent (12 mo) or future (30 days) visit within the authorizing provider's specialty    Patient had office visit in the last 12 months or has a visit in the next 30 days with authorizing provider or within the authorizing provider's specialty.  See \"Patient Info\" tab in inbasket, or \"Choose Columns\" in Meds & Orders section of the refill encounter.             Passed - Medication is active on med list       Passed - Patient is age 18 or older       Passed - No active pregnancy on record       Passed - No positive pregnancy test in past 12 months        amLODIPine (NORVASC) 10 MG tablet [Pharmacy Med Name: AMLODIPINE BESYLATE 10MG TABLETS] 90 tablet 0    Last Written Prescription Date:  01/17/2019  Last Fill Quantity: 30,  # refills: 0   Last office visit: 10/9/2018 with prescribing provider:     Future Office Visit:   Sig: TAKE 1 TABLET BY MOUTH ONCE DAILY    Calcium Channel Blockers Protocol  Failed - 1/17/2019  9:04 AM       Failed - Normal serum creatinine on file in past 12 months    Recent Labs   Lab Test 07/06/17   CR 0.96            Passed - Blood pressure under 140/90 in past " "12 months    BP Readings from Last 3 Encounters:   01/10/19 106/71   10/09/18 100/62   08/23/18 100/64                Passed - Recent (12 mo) or future (30 days) visit within the authorizing provider's specialty    Patient had office visit in the last 12 months or has a visit in the next 30 days with authorizing provider or within the authorizing provider's specialty.  See \"Patient Info\" tab in inbasket, or \"Choose Columns\" in Meds & Orders section of the refill encounter.             Passed - Medication is active on med list       Passed - Patient is age 18 or older       Passed - No active pregnancy on record       Passed - No positive pregnancy test in past 12 months        "

## 2019-01-17 NOTE — TELEPHONE ENCOUNTER
Medication is being filled for 1 time refill only due to:  Patient needs labs before next refill.      Lab orders placed for CMP and Lipid panel.  Letter mailed to patient as she does not appear to be checking her [employer] messages.

## 2019-01-17 NOTE — TELEPHONE ENCOUNTER
Medication is being filled for 1 time refill only due to:  Patient needs labs before next refill.     Labs ordered in additional 1/14/19 refill encounter, letter mailed to patient.

## 2019-02-22 NOTE — TELEPHONE ENCOUNTER
Requested Prescriptions   Pending Prescriptions Disp Refills     rosuvastatin (CRESTOR) 10 MG tablet  Last Written Prescription Date:  12/05/19  Last Fill Quantity: 30,  # refills: 0   Last office visit: 10/9/2018 with prescribing provider:  10/09/18   Future Office Visit:     30 tablet 0     Sig: Take 1 tablet (10 mg) by mouth daily    There is no refill protocol information for this order

## 2019-02-25 NOTE — TELEPHONE ENCOUNTER
"Requested Prescriptions   Pending Prescriptions Disp Refills     olmesartan (BENICAR) 40 MG tablet [Pharmacy Med Name: OLMESARTAN MEDOXOMIL 40MG TABLETS]  Last Written Prescription Date:  1/17/2019  Last Fill Quantity: 30,  # refills: 0   Last office visit: 10/9/2018 with prescribing provider:     Future Office Visit:   30 tablet 0     Sig: TAKE 1 TABLET BY MOUTH DAILY    Angiotensin-II Receptors Passed - 2/22/2019 12:22 AM       Passed - Blood pressure under 140/90 in past 12 months    BP Readings from Last 3 Encounters:   01/10/19 106/71   10/09/18 100/62   08/23/18 100/64                Passed - Recent (12 mo) or future (30 days) visit within the authorizing provider's specialty    Patient had office visit in the last 12 months or has a visit in the next 30 days with authorizing provider or within the authorizing provider's specialty.  See \"Patient Info\" tab in inbasket, or \"Choose Columns\" in Meds & Orders section of the refill encounter.             Passed - Medication is active on med list       Passed - Patient is age 18 or older       Passed - No active pregnancy on record       Passed - Normal serum creatinine on file in past 12 months    Recent Labs   Lab Test 02/12/19  1027   CR 0.93            Passed - Normal serum potassium on file in past 12 months    Recent Labs   Lab Test 02/12/19  1027   POTASSIUM 4.3                   Passed - No positive pregnancy test in past 12 months        amLODIPine (NORVASC) 10 MG tablet [Pharmacy Med Name: AMLODIPINE BESYLATE 10MG TABLETS]  Last Written Prescription Date:  1/17/2019  Last Fill Quantity: 30,  # refills: 0   Last office visit: 10/9/2018 with prescribing provider:     Future Office Visit:   30 tablet 0     Sig: TAKE 1 TABLET BY MOUTH ONCE DAILY    Calcium Channel Blockers Protocol  Passed - 2/22/2019 12:22 AM       Passed - Blood pressure under 140/90 in past 12 months    BP Readings from Last 3 Encounters:   01/10/19 106/71   10/09/18 100/62   08/23/18 100/64 " "               Passed - Recent (12 mo) or future (30 days) visit within the authorizing provider's specialty    Patient had office visit in the last 12 months or has a visit in the next 30 days with authorizing provider or within the authorizing provider's specialty.  See \"Patient Info\" tab in inbasket, or \"Choose Columns\" in Meds & Orders section of the refill encounter.             Passed - Medication is active on med list       Passed - Patient is age 18 or older       Passed - No active pregnancy on record       Passed - Normal serum creatinine on file in past 12 months    Recent Labs   Lab Test 02/12/19  1027   CR 0.93            Passed - No positive pregnancy test in past 12 months        "

## 2019-02-25 NOTE — TELEPHONE ENCOUNTER
"Requested Prescriptions   Pending Prescriptions Disp Refills     rosuvastatin (CRESTOR) 10 MG tablet 30 tablet 0     Sig: Take 1 tablet (10 mg) by mouth daily    Statins Protocol Passed - 2/22/2019  8:59 AM       Passed - LDL on file in past 12 months    Recent Labs   Lab Test 02/12/19  1027   LDL 79            Passed - No abnormal creatine kinase in past 12 months    No lab results found.            Passed - Recent (12 mo) or future (30 days) visit within the authorizing provider's specialty    Patient had office visit in the last 12 months or has a visit in the next 30 days with authorizing provider or within the authorizing provider's specialty.  See \"Patient Info\" tab in inbasket, or \"Choose Columns\" in Meds & Orders section of the refill encounter.             Passed - Medication is active on med list       Passed - Patient is age 18 or older       Passed - No active pregnancy on record       Passed - No positive pregnancy test in past 12 months        Last office visit 10/9/18    Prescription approved per [facility] Refill Protocol.    "

## 2019-02-26 NOTE — TELEPHONE ENCOUNTER
"Requested Prescriptions   Pending Prescriptions Disp Refills     rosuvastatin (CRESTOR) 10 MG tablet [Pharmacy Med Name: ROSUVASTATIN 10MG TABLETS]  Last Written Prescription Date:  2/25/19  Last Fill Quantity: 30,  # refills: 7   Last office visit: 10/9/2018 with prescribing provider:  Sneha   Future Office Visit:     90 tablet 7     Sig: TAKE 1 TABLET(10 MG) BY MOUTH DAILY    Statins Protocol Passed - 2/25/2019  4:44 PM       Passed - LDL on file in past 12 months    Recent Labs   Lab Test 02/12/19  1027   LDL 79            Passed - No abnormal creatine kinase in past 12 months    No lab results found.            Passed - Recent (12 mo) or future (30 days) visit within the authorizing provider's specialty    Patient had office visit in the last 12 months or has a visit in the next 30 days with authorizing provider or within the authorizing provider's specialty.  See \"Patient Info\" tab in inbasket, or \"Choose Columns\" in Meds & Orders section of the refill encounter.             Passed - Medication is active on med list       Passed - Patient is age 18 or older       Passed - No active pregnancy on record       Passed - No positive pregnancy test in past 12 months          "

## 2019-03-13 NOTE — TELEPHONE ENCOUNTER
Panel Management Review      Patient has the following on her problem list:     Depression / Dysthymia review    Measure:  Needs PHQ-9 score of 4 or less during index window.  Administer PHQ-9 and if score is 5 or more, send encounter to provider for next steps.    5 - 7 month window range: yes    PHQ-9 SCORE 6/5/2017 4/2/2018 10/10/2018   PHQ-9 Total Score - - -   PHQ-9 Total Score 8 11 18       If PHQ-9 recheck is 5 or more, route to provider for next steps.    Patient is due for:  PHQ9    Hypertension   Last three blood pressure readings:  BP Readings from Last 3 Encounters:   01/10/19 106/71   10/09/18 100/62   08/23/18 100/64     Blood pressure: Passed    HTN Guidelines:  Age 18-59 BP range:  Less than 140/90  Age 60-85 with Diabetes:  Less than 140/90  Age 60-85 without Diabetes:  less than 150/90      Composite cancer screening  Chart review shows that this patient is due/due soon for the following Fecal Colorectal (FIT)  Summary:    Patient is due/failing the following:   FIT    Action needed:   Patient needs office visit for follow up.    Type of outreach:    Sent letter.    Questions for provider review:    None                                                                                                                                    Reny Rosado CMA       Chart routed to no one

## 2019-03-13 NOTE — LETTER
[facility]  [address], [address]  [place], [place]  [address]                                            TEL:[phone]  FAX:[phone]        Zoey Armstrong  [address] [place] [id number]      March 13, 2019    Dear Zoey,    At [facility] we care about your health and well-being.  A review of your chart has indicated that you are due for a follow up.  Please contact us at [phone] to schedule an appointment.          Sincerely,      Marisa Hoover M.D.

## 2019-04-08 NOTE — NURSING NOTE
"Vital signs:  Temp: 97.6  F (36.4  C) Temp src: Oral BP: (!) 88/62 Pulse: 82   Resp: 16 SpO2: 94 %     Height: (patient sits in wheel chair) Weight: 57.3 kg (126 lb 4.8 oz)  Estimated body mass index is 21.02 kg/m  as calculated from the following:    Height as of 10/9/18: 1.651 m (5' 5\").    Weight as of this encounter: 57.3 kg (126 lb 4.8 oz).     Reviewed health maintenance- pt due for FIT.     Patient have decided to decline.         "

## 2019-04-08 NOTE — PROGRESS NOTES
SUBJECTIVE:                                                    Zoey Armstrong is a 69 year old female who presents to clinic today for the following health issues:      Hypertension Follow-up      Outpatient blood pressures are not being checked.    Low Salt Diet: low salt    Depression Followup    Status since last visit: improved    See PHQ-9 for current symptoms.  Other associated symptoms: None    Complicating factors:   Significant life event:  No   Current substance abuse:  None  Anxiety or Panic symptoms:  No    PHQ 4/2/2018 10/10/2018 4/14/2019   PHQ-9 Total Score 11 18 11   Q9: Thoughts of better off dead/self-harm past 2 weeks Not at all More than half the days Not at all       PHQ-9  English  PHQ-9   Any Language  Suicide Assessment Five-step Evaluation and Treatment (SAFE-T)      Amount of exercise or physical activity: 15-20 mins on bike every morning    Problems taking medications regularly: No    Medication side effects: none    Diet: low salt      Other problems:   1. COPD: breathing stable  2. Parkinsonism: stable, sees neurology  3. Breast cancer: no recurrence, up-to-date on mammogram  4. CAD: no chest pain    Current Concerns:   Complains of urinary urgency for a few years.  This will come on abruptly but sometimes has very little urine comes out.  She is not having nocturia.  There is no leakage.  There is no associated burning.  She has a couple caffeine per day.    Patient Active Problem List   Diagnosis     Chronic rhinitis     Advance Care Planning     Mild major depression (H)     Hyperlipidemia LDL goal <70     CAD (coronary artery disease)     Benign essential hypertension     Spondylolisthesis of lumbar region     Spinal stenosis of lumbar region without neurogenic claudication     COPD (chronic obstructive pulmonary disease) (H)     Atypical Parkinsonism (H)     Malignant neoplasm of right female breast (H)       Current Outpatient Medications   Medication Sig Dispense Refill      Acetaminophen (TYLENOL PO) Take 1,000 mg by mouth 4 times daily        buPROPion (WELLBUTRIN SR) 150 MG 12 hr tablet TAKE 1 TABLET(150 MG) BY MOUTH TWICE DAILY 180 tablet 1     calcium-vitamin D (CALTRATE) 600-400 MG-UNIT per tablet Take 1 tablet by mouth 2 times daily       carbidopa-levodopa (SINEMET)  MG tablet Take 2 tabs 5 times a day. 900 tablet 3     cyanocobalamin (VITAMIN B-12) 100 MCG tablet Take 100 mcg by mouth daily       FLUoxetine (PROZAC) 20 MG capsule TAKE 3 CAPSULES BY MOUTH ONCE DAILY 90 capsule 4     olmesartan (BENICAR) 40 MG tablet TAKE 1 TABLET BY MOUTH DAILY 30 tablet 7     rosuvastatin (CRESTOR) 10 MG tablet Take 1 tablet (10 mg) by mouth daily 30 tablet 7     aspirin 81 MG tablet Take 81 mg by mouth daily       letrozole (FEMARA) 2.5 MG tablet Take 2.5 mg by mouth daily       lidocaine (LIDODERM) 5 % Patch Place 2 patches onto the skin every 24 hours Apply to low back/left hip - on in AM, remove after 12 hours. [id number], [id number].        Misc. Devices (ROLLER WALKER) MISC 1 Device daily (Patient not taking: Reported on 4/8/2019) 1 each 0     Multiple Vitamins-Minerals (MULTIVITAMIN ADULT PO) Take 1 tablet by mouth daily       polyethylene glycol (MIRALAX/GLYCOLAX) powder Take 17 g by mouth daily AND daily PRN       senna-docusate (SENOKOT-S;PERICOLACE) 8.6-50 MG per tablet Take 2 tablets by mouth daily AND 1 tab HS           Social History     Tobacco Use     Smoking status: Former Smoker     Packs/day: 0.50     Years: 30.00     Pack years: 15.00     Types: Cigarettes     Start date: 7/1/1970     Last attempt to quit: 7/15/2015     Years since quitting: 3.7     Smokeless tobacco: Never Used     Tobacco comment: Non-smoker   Substance Use Topics     Alcohol use: Yes     Alcohol/week: 0.0 oz     Comment: Occasional cocktail/glass of wine          ROS:  No fever, chills, no dyspnea on exertion, no chest pain, palpitations, PND, orthopnea, edema, syncope, headache, abdominal pain      OBJECTIVE:     BP (!) 88/62   Pulse 82   Temp 97.6  F (36.4  C) (Oral)   Resp 16   Wt 57.3 kg (126 lb 4.8 oz)   SpO2 94%   BMI 21.02 kg/m    Body mass index is 21.02 kg/m .      BP Readings from Last 6 Encounters:   04/08/19 (!) 88/62   01/10/19 106/71   10/09/18 100/62   08/23/18 100/64   05/10/18 133/79   04/02/18 128/78         Lungs: clear  CV: normal S1, S2 without murmur, S3 or S4.   No edema  Pulses full, no carotid bruits    Lab Results   Component Value Date    HDL 75 02/12/2019    LDL 79 02/12/2019    CHOL 169 02/12/2019    TRIG 77 02/12/2019              ASSESSMENT/PLAN:       1. Benign essential hypertension  Her blood pressure today was read is quite low, she has felt a little lightheadedness.  Recommend she stop the amlodipine completely.  Recommend a nurse blood pressure check in 2 weeks and if her blood pressure goes up, may be able to restart at a lower dose.    2. Major depressive disorder, recurrent episode, mild (H)  Improved, continue medication.  She does not feel she wants to change anything.   - buPROPion (WELLBUTRIN SR) 150 MG 12 hr tablet; Take 1 tablet (150 mg) by mouth 2 times daily  Dispense: 180 tablet; Refill: 3  - FLUoxetine (PROZAC) 20 MG capsule; Take 3 capsules (60 mg) by mouth daily  Dispense: 90 capsule; Refill: 11    3. Chronic obstructive pulmonary disease, unspecified COPD type (H)  Stable without any symptoms    4. Malignant neoplasm of right female breast, unspecified estrogen receptor status, unspecified site of breast (H)  Stable, continue medication, follow-up oncology    5. Coronary artery disease involving native coronary artery of native heart without angina pectoris  Stable, continue medication    6. Hyperlipidemia LDL goal <70  Close to goal, continue medication    7. Urinary frequency  Advised to avoid caffeine as much as possible as that will likely aggravate her symptoms, try to remember to go to bathroom more frequently,      Ramona Sullivan MD  [place]  [facility]

## 2019-04-14 PROBLEM — Z98.1 S/P SPINAL FUSION: Status: RESOLVED | Noted: 2017-06-27 | Resolved: 2019-01-01

## 2019-04-14 PROBLEM — Z98.890 STATUS POST LUMBAR SPINE SURGERY FOR DECOMPRESSION OF SPINAL CORD: Status: RESOLVED | Noted: 2017-06-27 | Resolved: 2019-01-01

## 2019-04-14 PROBLEM — J44.9 COPD (CHRONIC OBSTRUCTIVE PULMONARY DISEASE) (H): Status: ACTIVE | Noted: 2018-10-10

## 2019-04-15 NOTE — TELEPHONE ENCOUNTER
"Requested Prescriptions   Pending Prescriptions Disp Refills     FLUoxetine (PROZAC) 20 MG capsule [Pharmacy Med Name: FLUOXETINE 20MG CAPSULES] 270 capsule 11     Sig: TAKE 3 CAPSULES BY MOUTH DAILY   Last Written Prescription Date:  04/14/2019  Last Fill Quantity: 90,  # refills: 11   Last office visit: 4/8/2019 with prescribing provider:     Future Office Visit:   Next 5 appointments (look out 90 days)    Apr 30, 2019 11:15 AM CDT  Nurse Only with [facility] IM NURSE  [facility] ([facility] [address]  [facility] [address]  [phone]           SSRIs Protocol Failed - 4/14/2019  2:38 PM        Failed - PHQ-9 score less than 5 in past 6 months     Please review last PHQ-9 score.           Passed - Medication is active on med list        Passed - Patient is age 18 or older        Passed - No active pregnancy on record        Passed - No positive pregnancy test in last 12 months        Passed - Recent (6 mo) or future (30 days) visit within the authorizing provider's specialty     Patient had office visit in the last 6 months or has a visit in the next 30 days with authorizing provider or within the authorizing provider's specialty.  See \"Patient Info\" tab in inbasket, or \"Choose Columns\" in Meds & Orders section of the refill encounter.            "

## 2019-04-30 NOTE — NURSING NOTE
"Chief Complaint   Patient presents with     Allied Health Visit     BP check      initial /72 (BP Location: Right arm, Patient Position: Chair, Cuff Size: Adult Regular)  Estimated body mass index is 21.02 kg/m  as calculated from the following:    Height as of 10/9/18: 1.651 m (5' 5\").    Weight as of 4/8/19: 57.3 kg (126 lb 4.8 oz)..  bp completed using cuff size regular      Pt stopped the Amlodipine on 4/8 after last office visit, pt still having some lightheaded on/off. Please advise.    "

## 2019-05-01 NOTE — NURSING NOTE
Spoke with pt advised of Dr Sullivan's message to stay off Amlodipine and follow up in 6 months, pt wants to continue to see Dr Sullivan.

## 2019-05-23 NOTE — PATIENT INSTRUCTIONS
For constipation:  Start Senokot (Senna-Docusate) 8.6-50 mg (available over the counter)  - Take one tab once daily for at least one week  - If after one week, constipation is not controlled, increase to 2 tabs daily  - If after another week, constipation is still not controlled, increase to 2 tabs in the morning and one at night  - Maximum dose is 2 tabs twice daily    We discussed depression:  1. Wait to hear from Radha (Jerry Pompa's referral) - [place] near [place]. This is to see if you could meet with a psychologist near you.    2. If you don't hear from Radha, call Sandy Mcfarlane or [facility] psychology to set up psychology appointment. [phone].      3. [facility] Downs will call for psychiatry appointment. If you do not hear anything within a few days, call [phone].    If [facility] is not within your network, then you could set up psychiatry at the [facility].

## 2019-05-23 NOTE — LETTER
"5/23/2019       RE: Zoey Armstrong  [address]  [facility] [address]     Dear Colleague,    Thank you for referring your patient, Zoey Armstrong, to the [facility] NEUROLOGY at [facility]. Please see a copy of my visit note below.    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 She was last seen six months ago at which time carbidopa/levodopa was increased from QID to 5x daily dosing. She sometimes misses doses during the day, but overall feels balance is better on levodopa. She feels less \"dizzy\" on levodopa. Although she is now walking less, relying on her walker inside and a wheelchair in the community.     Over the last six months, her function has declined. She cannot tolerate very much walking and only walks short distances in her home with the U-step walker. She is very cautious due to previous falls. For this reason she uses a wheelchair in the community outside her home. She struggles with fatigue. Over the course of the day she is more limited in her ability to do things independently or walk. She naps in the afternoon - but even after her nap she is still tired. She is also more sleepy. Sleep overnight is good. She gets up 1-2 times to use the bathroom.     She is tolerating levodopa well with no nausea, dyskinesia, or hallucinations. No wearing off. She denied dystonia. Very mild tremor has resolved on levodopa.     Mood is poor. She feels despair, hopeless, depressed. She has been treated for depression for a long time, but she is feeling much worse now. Described suicidal ideation, but denied intent or plan. Said she had a plan two years ago, but not any longer. She feels she does not get any enjoyment " "out of life with her poor function and fatigue. She ruminates on all the activities and hobbies she has lost and how dependent she is on others.      She denied lightheadedness on standing. Reported urgency and hesitation with urination. She is able to empty her bladder. Does feel that she empties completely, although sometimes she gets an urge with a \"false alarm.\" Constipation is poorly controlled. Has a bowel movement every other day and it is uncomfortable. She is using Metamucil only at this time.     Denied dysphagia.     MEDICATIONS:  Outpatient Medications Marked as Taking for the 5/23/19 encounter (Office Visit) with Jace Mart MD   Medication Sig     Acetaminophen (TYLENOL PO) Take 1,000 mg by mouth 4 times daily      aspirin 81 MG tablet Take 81 mg by mouth daily     buPROPion (WELLBUTRIN SR) 150 MG 12 hr tablet Take 1 tablet (150 mg) by mouth 2 times daily     calcium-vitamin D (CALTRATE) 600-400 MG-UNIT per tablet Take 1 tablet by mouth 2 times daily     carbidopa-levodopa (SINEMET)  MG tablet Take 2 tabs 5 times a day.     cyanocobalamin (VITAMIN B-12) 100 MCG tablet Take 100 mcg by mouth daily     FLUoxetine (PROZAC) 20 MG capsule Take 3 capsules (60 mg) by mouth daily     letrozole (FEMARA) 2.5 MG tablet Take 2.5 mg by mouth daily     lidocaine (LIDODERM) 5 % Patch Place 2 patches onto the skin every 24 hours Apply to low back/left hip - on in AM, remove after 12 hours. 6893-9457, 4613-1791.      Misc. Devices (ROLLER WALKER) MISC 1 Device daily     Multiple Vitamins-Minerals (MULTIVITAMIN ADULT PO) Take 1 tablet by mouth daily     olmesartan (BENICAR) 40 MG tablet TAKE 1 TABLET BY MOUTH DAILY     polyethylene glycol (MIRALAX/GLYCOLAX) powder Take 17 g by mouth daily AND daily PRN     rosuvastatin (CRESTOR) 10 MG tablet Take 1 tablet (10 mg) by mouth daily     senna-docusate (SENOKOT-S;PERICOLACE) 8.6-50 MG per tablet Take 2 tablets by mouth daily AND 1 tab QHS       Movement " Disorder-related Medications                   9am 11:30am 3pm 7pm 10pm   CD/LD 25/100 mg  2 tabs 2 2 2 2    Senna-docusate 8.6-50 mg  Not taking        Metamucil             ALLERGIES:  Betadine [povidone iodine]; Iodine-131; No clinical screening - see comments; and Soap     PAST MEDICAL HISTORY:  Medical history reviewed and updated in Epic, no new problems    REVIEW OF SYSTEMS:  12 point review of systems completed. Pertinent positives and negatives above and in HPI    PHYSICAL EXAM:  VITALS: /80   Pulse 87   Wt 59.8 kg (131 lb 12.8 oz)   SpO2 96%   BMI 21.93 kg/m     GENERAL: Cooperative, no acute distress  HEENT: Normocephalic and nontraumatic, sclera white, moist mucous membranes  CARDIAC: Regular rate and rhythm   RESPIRATORY: Nonlabored breathing  EXTREMITIES: Distal pulses intact. No UE or LE edema  PSYCHIATRIC: Affect is sad. Intermittent eye contact. Endorsed suicidal ideation but denied intent/plan.     NEUROLOGIC:  MENTAL STATUS: Fully alert, attentive and oriented.  SPEECH: Mild hypophonia, no inarticulation  FACIAL EXPRESSION:  Mildly reduced facial expression    CRANIAL NERVES:  EOM full with smooth pursuit. No nystagmus. Facial movements symmetric. Palate elevation symmetric, uvula midline. No dysarthria. Tongue protrusion midline.    MOTOR:   INVOLUNTARY MOVEMENTS - No tremor, dyskinesia, myoclonus, or chorea noted. No dystonia.   AGILITY - Moderate bradykinesia upper and lower extremities, symmetric  TONE - Slight to mild axial and appendicular rigidity    COORDINATION: No dysmetria with FNF bilaterally  GAIT: Pushes off the arm rest to rise from the chair. Posture stooped. Not able to walk unassisted.       IMPRESSION:   1. Atypical Parkinsonism - concern for MSA with autonomic involvement (urinary hesitation, urgency) or PSP (early postural instability and freezing of gait). I did not test her saccades today. She has not had a robust response to levodopa except for improvement of very  mild tremor. Will continue to follow.   2. Major depression, current episode    Zoey Armstrong is a 69 year old woman with atypical Parkinsonism with early postural instability, freezing of gait and falls. She has not responded robustly to fair trial of levodopa (200 mg 5x daily). This raises concern for a condition such as PSP. Her gait has continued to decline over the last 6 months and she is less and less ambulatory.     The majority of the visit today was spent in discussion of depression. She expressed serious worsening of her mood in the last few months as her condition has deteriorated. While she endorsed suicidal ideation, she denied intent or plan. Her risk for suicide is thought to be low at this time, but she needs intervention.     PLAN:  We discussed getting her connected with a psychiatrist and psychologist. She was briefly introduced to Dr. Jerry Pompa today and was encouraged to make an appointment with [facility] psychology. A referral to [facility] was sent for psychiatry.     [facility] referral to go over medications for depression. For now continue Wellbutrin & fluoxetine. EKG ordered today in anticipation of possible medication changes.     No change in carbidopa/levodopa.   Instructions outlined for starting Senokot for constipation.    Return in 3 months    Patient seen and discussed with Dr. Mart.    Lexis Fuller MD  Movement Disorders Fellow    The patient was seen with the fellow. I was present for key portions of the history and physical examination and agree with the assessment and plan.    Jace Mart MD, PhD

## 2019-05-23 NOTE — PROGRESS NOTES
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 She was last seen six months ago at which time carbidopa/levodopa was increased from QID to 5x daily dosing. She sometimes misses doses during the day, but overall feels balance is better on levodopa. She feels less \"dizzy\" on levodopa. Although she is now walking less, relying on her walker inside and a wheelchair in the community.     Over the last six months, her function has declined. She cannot tolerate very much walking and only walks short distances in her home with the U-step walker. She is very cautious due to previous falls. For this reason she uses a wheelchair in the community outside her home. She struggles with fatigue. Over the course of the day she is more limited in her ability to do things independently or walk. She naps in the afternoon - but even after her nap she is still tired. She is also more sleepy. Sleep overnight is good. She gets up 1-2 times to use the bathroom.     She is tolerating levodopa well with no nausea, dyskinesia, or hallucinations. No wearing off. She denied dystonia. Very mild tremor has resolved on levodopa.     Mood is poor. She feels despair, hopeless, depressed. She has been treated for depression for a long time, but she is feeling much worse now. Described suicidal ideation, but denied intent or plan. Said she had a plan two years ago, but not any longer. She feels she does not get any enjoyment out of life with her poor function and fatigue. She ruminates on all the activities and hobbies she has lost and how dependent she is on others.      She denied lightheadedness on standing. Reported urgency and hesitation with urination. She is able to empty her bladder. Does feel " "that she empties completely, although sometimes she gets an urge with a \"false alarm.\" Constipation is poorly controlled. Has a bowel movement every other day and it is uncomfortable. She is using Metamucil only at this time.     Denied dysphagia.     MEDICATIONS:  Outpatient Medications Marked as Taking for the 5/23/19 encounter (Office Visit) with Jace Mart MD   Medication Sig     Acetaminophen (TYLENOL PO) Take 1,000 mg by mouth 4 times daily      aspirin 81 MG tablet Take 81 mg by mouth daily     buPROPion (WELLBUTRIN SR) 150 MG 12 hr tablet Take 1 tablet (150 mg) by mouth 2 times daily     calcium-vitamin D (CALTRATE) 600-400 MG-UNIT per tablet Take 1 tablet by mouth 2 times daily     carbidopa-levodopa (SINEMET)  MG tablet Take 2 tabs 5 times a day.     cyanocobalamin (VITAMIN B-12) 100 MCG tablet Take 100 mcg by mouth daily     FLUoxetine (PROZAC) 20 MG capsule Take 3 capsules (60 mg) by mouth daily     letrozole (FEMARA) 2.5 MG tablet Take 2.5 mg by mouth daily     lidocaine (LIDODERM) 5 % Patch Place 2 patches onto the skin every 24 hours Apply to low back/left hip - on in AM, remove after 12 hours. 4323-0099, 5499-5588.      Misc. Devices (ROLLER WALKER) MISC 1 Device daily     Multiple Vitamins-Minerals (MULTIVITAMIN ADULT PO) Take 1 tablet by mouth daily     olmesartan (BENICAR) 40 MG tablet TAKE 1 TABLET BY MOUTH DAILY     polyethylene glycol (MIRALAX/GLYCOLAX) powder Take 17 g by mouth daily AND daily PRN     rosuvastatin (CRESTOR) 10 MG tablet Take 1 tablet (10 mg) by mouth daily     senna-docusate (SENOKOT-S;PERICOLACE) 8.6-50 MG per tablet Take 2 tablets by mouth daily AND 1 tab QHS       Movement Disorder-related Medications                   9am 11:30am 3pm 7pm 10pm   CD/LD 25/100 mg  2 tabs 2 2 2 2    Senna-docusate 8.6-50 mg  Not taking        Metamucil             ALLERGIES:  Betadine [povidone iodine]; Iodine-131; No clinical screening - see comments; and Soap     PAST MEDICAL " HISTORY:  Medical history reviewed and updated in Epic, no new problems    REVIEW OF SYSTEMS:  12 point review of systems completed. Pertinent positives and negatives above and in HPI    PHYSICAL EXAM:  VITALS: /80   Pulse 87   Wt 59.8 kg (131 lb 12.8 oz)   SpO2 96%   BMI 21.93 kg/m    GENERAL: Cooperative, no acute distress  HEENT: Normocephalic and nontraumatic, sclera white, moist mucous membranes  CARDIAC: Regular rate and rhythm   RESPIRATORY: Nonlabored breathing  EXTREMITIES: Distal pulses intact. No UE or LE edema  PSYCHIATRIC: Affect is sad. Intermittent eye contact. Endorsed suicidal ideation but denied intent/plan.     NEUROLOGIC:  MENTAL STATUS: Fully alert, attentive and oriented.  SPEECH: Mild hypophonia, no inarticulation  FACIAL EXPRESSION:  Mildly reduced facial expression    CRANIAL NERVES:  EOM full with smooth pursuit. No nystagmus. Facial movements symmetric. Palate elevation symmetric, uvula midline. No dysarthria. Tongue protrusion midline.    MOTOR:   INVOLUNTARY MOVEMENTS - No tremor, dyskinesia, myoclonus, or chorea noted. No dystonia.   AGILITY - Moderate bradykinesia upper and lower extremities, symmetric  TONE - Slight to mild axial and appendicular rigidity    COORDINATION: No dysmetria with FNF bilaterally  GAIT: Pushes off the arm rest to rise from the chair. Posture stooped. Not able to walk unassisted.       IMPRESSION:   1. Atypical Parkinsonism - concern for MSA with autonomic involvement (urinary hesitation, urgency) or PSP (early postural instability and freezing of gait). I did not test her saccades today. She has not had a robust response to levodopa except for improvement of very mild tremor. Will continue to follow.   2. Major depression, current episode    Zoey Armstrong is a 69 year old woman with atypical Parkinsonism with early postural instability, freezing of gait and falls. She has not responded robustly to fair trial of levodopa (200 mg 5x daily). This  raises concern for a condition such as PSP. Her gait has continued to decline over the last 6 months and she is less and less ambulatory.     The majority of the visit today was spent in discussion of depression. She expressed serious worsening of her mood in the last few months as her condition has deteriorated. While she endorsed suicidal ideation, she denied intent or plan. Her risk for suicide is thought to be low at this time, but she needs intervention.     PLAN:  We discussed getting her connected with a psychiatrist and psychologist. She was briefly introduced to Dr. Jerry Pompa today and was encouraged to make an appointment with [facility] psychology. A referral to [facility] was sent for psychiatry.     [facility] referral to go over medications for depression. For now continue Wellbutrin & fluoxetine. EKG ordered today in anticipation of possible medication changes.     No change in carbidopa/levodopa.   Instructions outlined for starting Senokot for constipation.    Return in 3 months    Patient seen and discussed with Dr. Mart.    Lexis Fuller MD  Movement Disorders Fellow    The patient was seen with the fellow. I was present for key portions of the history and physical examination and agree with the assessment and plan.    Jace Mart MD, PhD

## 2019-05-23 NOTE — PROGRESS NOTES
[facility] (Neurology referral)  May 23, 2019      Behavioral Health Clinician Progress Note    Patient Name: Zoey Armstrong           Service Type:  Individual      Service Location:   Face to Face in Clinic     Session Start Time: 1145am Session End Time: 1155am      Session Length: 10 minutes     Attendees: Patient and Spouse / Significant Other    Visit Activities (Refresh list every visit): NEW, [facility] Only and Warm-handoff     See Flowsheets for today's PHQ-9 and CHUY-7 results  Previous PHQ-9:   PHQ-9 SCORE 4/2/2018 10/10/2018 4/14/2019   PHQ-9 Total Score - - -   PHQ-9 Total Score 11 18 11     Previous CHUY-7:   CHUY-7 SCORE 5/14/2015 2/16/2016 4/14/2019   Total Score 10 - -   Total Score - 0 0         Session Note:  I met with Zoey at her health care team's request to assess her current behavioral health needs and provide appropriate intervention. I was invited to meet her and her  briefly, to introduce myself and discuss options for behavioral health care. We metin the neurology clinic.    Zoey has a hx of depression, and her  reports that this has increased lately, to the point where he feels she needs to have some psychotherapy support. They have begun checking out options through [facility], and are having her antideressant medication dosage checked, as well.    We discussed some options for support in the [facility] system, and I will check out some things and follow up with them in the next week or so. They will continue to check on [facility]. I did provide my contact information for follow-up as needed by them with me, as well.    I affirmed the steps this patient has taken to address physical and behavioral health issues, and offered continued behavioral health services or referral, now or in the future, as needed by the patient.    Assessment: Current Emotional / Mental Status (status of significant symptoms):  Risk status (Self / Other harm or suicidal  ideation)  Patient denies a history of suicidal ideation, suicide attempts, self-injurious behavior, homicidal ideation, homicidal behavior and and other safety concerns  Patient denies current fears or concerns for personal safety.  Patient denies current or recent suicidal ideation or behaviors.  Patient denies current or recent homicidal ideation or behaviors.  Patient denies current or recent self injurious behavior or ideation.  Patient denies other safety concerns.  A safety and risk management plan has not been developed at this time, however patient was encouraged to call [place] [phone] should there be a change in any of these risk factors.    Appearance:   Appropriate   Eye Contact:   Good   Psychomotor Behavior: Normal   Attitude:   Cooperative   Orientation:   All  Speech   Rate / Production: Normal    Volume:  Normal   Mood:    Depressed  Sad   Affect:    Appropriate   Thought Content:  Clear   Thought Form:  Coherent  Logical   Insight:    Fair     Diagnoses:  No diagnosis found.    Collateral Reports Completed:  Not Applicable    Plan: (Homework, other):  Patient was given information about behavioral services and encouraged to schedule a follow up appointment with the clinic [facility] as needed.  She was also given information about mental health symptoms and treatment options .  CD Recommendations: No indications of CD issues.     We discussed some options for support in the [facility], and I will check out some things and follow up with them in the next week or so. They will continue to check on [facility]. I did provide my contact information for follow-up as needed by them with me, as well.      Jerry Pompa MA, BRYANFT  Lead Behavioral Health Clinician  [facility]    [email or web address]       Phone: [phone] (mobility extension)  Pager: [phone]

## 2019-05-30 NOTE — TELEPHONE ENCOUNTER
Visit Activities (Refresh list every visit): phone call    I called Zoey and her  to follow up on our conversation last week about finding resources to support Zoey and her current issues with depression.    No answer. Left VM remindingthem who I am, why I am calling, and inviting them to call back. I did also let them know that their insurance won;t work for them to see the Behavioral Health Clinician at [facility], as I had hoped, so I wanted to offer them support in finding a provider who will fit their needs.    VM included my phone number for return call: [phone].    I will also put in a referral to [facility] (Behavioral Health Providers: [phone]) to have someone from their intake reach out to help them connect with a therapist in their area.        Jerry Pompa MA, LMFT  Lead Behavioral Health Clinician  [facility]    [email or web address]       Phone: [phone] (mobility extension)  Pager: [phone]

## 2019-06-03 NOTE — Clinical Note
Thanks for the referral. During our visit, they did not express concerns about Zoey's mood like they did during yours. They have not scheduled appts with psychiatry and psychology, so I reinforced this. In fact, the  started reducing the dose of bupropion, so I advised against this until they can see psychiatry. I'm happy to help in the meantime if there are other interventions I can provide, but they were unfortunately not interested in medication changes at this time, other than going off of medications. Dominga Ordoñez, Pharm.D.Medication Therapy Management PharmacistPhone: [phone]

## 2019-06-03 NOTE — PATIENT INSTRUCTIONS
Recommendations from today's MTM visit:                                                    MTM (medication therapy management) is a service provided by a clinical pharmacist designed to help you get the most of out of your medicines.     1. You are due for a DEXA scan, which can help us determine if you need the calcium.   2. You can call psychiatry and Jerry Pompa back to schedule appointments.  3. Start taking that second dose of bupropion in the afternoon/evening. If you're forgetting that dose, we may be able to switch to the extended-release pills.  4. I would suggest getting another Vitamin B12 level checked again.    Next MTM visit: as needed    To schedule another MTM appointment, please call the clinic directly or you may call the MTM scheduling line at [phone] or toll-free at [phone].     My Clinical Pharmacist's contact information:                                                      It was a pleasure talking with you today!  Please feel free to contact me with any questions or concerns you have.      Dominga Ordoñez, Pharm.D.  Medication Therapy Management Pharmacist  Phone: [phone]    You may receive a survey about the MTM services you received by email and/or US Mail.  I would appreciate your feedback to help me serve you better in the future. Your comments will be anonymous.

## 2019-06-03 NOTE — Clinical Note
Dr. Sullivan,I saw this patient for MTM follow up in neurology. I am referring her back to you for 1) DEXA and 2) Vit B12 level and dose adjustments. Please let me know if I can help in any way!Dominga Ordoñez, Pharm.D.Medication Therapy Management PharmacistPhone: [phone]

## 2019-06-03 NOTE — PROGRESS NOTES
"SUBJECTIVE/OBJECTIVE:                           Zoey Armstrong is a 69 year old female coming in for an initial visit for Medication Therapy Management.  She was referred to me from Dr. Fuller/Dr. Mart. , Bobby, also present for today's visit.    Chief Complaint: Initial MTM visit in neurology; follow up to MTM visit with Lauren Bloch, PharmD on 4/2/18    Allergies/ADRs: Reviewed in Epic  Tobacco: No tobacco use  Alcohol: none  Caffeine: 1 cups/day of coffee or soda  Activity: minimal, has exercise equipment at home  PMH: Reviewed in Epic    Medication Adherence/Access:  Patient uses pill box(es) and has assistance with medication administration: family member.  Patient takes medications 5 time(s) per day.   Per patient, misses medication 0 times per week - recently stopped taking second dose of bupropion every day  Medication barriers: none.   The patient fills medications at [place]: NO, fills medications at [facility].    Parkinsonism:  Current medications include: Carbidopa-levodopa  mg 2 tabs 5 times daily, about 2-3 hours apart without consistent times. Patient states that there has been an unclear benefit from the medication, even with dose changes. She feels that she has been more weak than usual and also has had increase in stiffness, which has interfered with some of her ADLs. Patient reports feeling best in the morning. She tried physical therapy but states she became \"burned out\" because it was taxing on her body. She denies engaging in activities outside the home unless her  has plans for them (ie: went to a graduation party over the weekend). 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    Depression:  Current medications include: Fluoxetine 60 mg once daily and Bupropion  mg once daily.  recently decreased the dose of bupropion from BID to once daily dosing because he feels her " "mood has been better. Patient states that she still feels \"down\" at times but does not think she is depressed. They have received voicemails from both psychiatry and psychology last week and they have not returned the calls.  states he will call them back today to set up appointments. Patient is interested in seeing a therapist and would prefer to manage her mood issues in this manner than add more medications.    PHQ-9 SCORE 4/2/2018 10/10/2018 4/14/2019   PHQ-9 Total Score - - -   PHQ-9 Total Score 11 18 11     Hypertension: Current medications include olmesartan 40 mg daily.  Patient does not self-monitor BP.  Patient reports no current medication side effects. She was previously on amlodipine but this medication has been discontinued within the last year.  BP Readings from Last 3 Encounters:   06/03/19 120/79   05/23/19 125/80   04/30/19 120/72     Hyperlipidemia: Current therapy includes rosuvastatin 10 mg once daily.  Pt reports no significant myalgias or other side effects.  The 10-year ASCVD risk score (Lizzy TETE Jr., et al., 2013) is: 11.4%    Values used to calculate the score:      Age: 69 years      Sex: Female      Is Non- : No      Diabetic: No      Tobacco smoker: No      Systolic Blood Pressure: 120 mmHg      Is BP treated: Yes      HDL Cholesterol: 75 mg/dL      Total Cholesterol: 169 mg/dL    Constipation: Currently taking sennakot one tablet daily, which was started a couple days ago. The directions stated that she should take 3 pills/day but she would like to start with 1 tablet daily.     Vitamins/supplements: Currently taking daily MVI, Vitamin B12 1000 mcg daily, and calcium/vitamin D gummy 250 mg/500 units daily. She previously received B12 injections and is unsure if she needs to continue these. They are also wondering if she still needs the calcium. Last DEXA was January 2017 and showed lowest T-score of -1.6     Today's Vitals: /79 (BP Location: Left arm, " Patient Position: Sitting, Cuff Size: Adult Regular)   Pulse 84   Wt 131 lb (59.4 kg)   SpO2 94%   BMI 21.80 kg/m        ASSESSMENT:                             Current medications were reviewed today.     Medication Adherence: good, needs improvement - see below (change to bupropion)     Parkinsonism:  Needs improvement. Defer to neurologists for management of her atypical parkinsonism given that she is not responding well to levodopa therapy.     Depression:  Needs improvement. Patient would benefit from scheduling appointments with psychiatry and psychology, as referred by her providers. In addition, patient may benefit from returning to her full dose of bupropion until seen by these specialists.     Hypertension: Stable.     Hyperlipidemia: Appears stable.     Constipation: Improving. Agree to start with 1 tablet of sennakot and slowly increase every 1-2 weeks if needed to no more than 4 tablets/day.     Vitamins/supplements: Needs improvement. Patient may benefit from a repeat DEXA scan given that her last result showed osteopenia, in order to appropriately dose calcium and vitamin D. In addition, the patient may benefit from follow up with PCP for Vitamin B12 and to determine whether IM injections should be continued or if oral replacement is sufficient.    PLAN:                            1. Increase bupropion back to 150 mg SR twice daily, as prescribed.   2. Patient to return calls to psychiatry and psychology to schedule appointments.  3. Due for DEXA scan; can reassess ongoing use of calcium at that time with PCP.  4. Due for Vitamin B12 level; can reassess dose/formulation at that time with PCP.    I spent 30 minutes with this patient today. I offer these suggestions for consideration by Dr. Sullivan and Dr. Mart. A copy of the visit note was provided to the patient's primary care and referring provider.    Will follow up in one year or sooner if needed; with primary care MTM pharmacist or neurology MTM  pharmacist.    The patient was given a summary of these recommendations as an after visit summary.     Bhavani DavisD.  Medication Therapy Management Pharmacist  Phone: [phone]

## 2019-06-04 NOTE — TELEPHONE ENCOUNTER
GENERAL ANESTHESIA OR SEDATION ADULT DISCHARGE INSTRUCTIONS   SPECIAL PRECAUTIONS FOR 24 HOURS AFTER SURGERY    IT IS NOT UNUSUAL TO FEEL LIGHT-HEADED OR FAINT, UP TO 24 HOURS AFTER SURGERY OR WHILE TAKING PAIN MEDICATION.  IF YOU HAVE THESE SYMPTOMS; SIT FOR A FEW MINUTES BEFORE STANDING AND HAVE SOMEONE ASSIST YOU WHEN YOU GET UP TO WALK OR USE THE BATHROOM.    YOU SHOULD REST AND RELAX FOR THE NEXT 24 HOURS AND YOU MUST MAKE ARRANGEMENTS TO HAVE SOMEONE STAY WITH YOU FOR AT LEAST 24 HOURS AFTER YOUR DISCHARGE.  AVOID HAZARDOUS AND STRENUOUS ACTIVITIES.  DO NOT MAKE IMPORTANT DECISIONS FOR 24 HOURS.    DO NOT DRIVE ANY VEHICLE OR OPERATE MECHANICAL EQUIPMENT FOR 24 HOURS FOLLOWING THE END OF YOUR SURGERY.  EVEN THOUGH YOU MAY FEEL NORMAL, YOUR REACTIONS MAY BE AFFECTED BY THE MEDICATION YOU HAVE RECEIVED.    DO NOT DRINK ALCOHOLIC BEVERAGES FOR 24 HOURS FOLLOWING YOUR SURGERY.    DRINK CLEAR LIQUIDS (APPLE JUICE, GINGER ALE, 7-UP, BROTH, ETC.).  PROGRESS TO YOUR REGULAR DIET AS YOU FEEL ABLE.    YOU MAY HAVE A DRY MOUTH, A SORE THROAT, MUSCLES ACHES OR TROUBLE SLEEPING.  THESE SHOULD GO AWAY AFTER 24 HOURS.    CALL YOUR DOCTOR FOR ANY OF THE FOLLOWING:  SIGNS OF INFECTION (FEVER, GROWING TENDERNESS AT THE SURGERY SITE, A LARGE AMOUNT OF DRAINAGE OR BLEEDING, SEVERE PAIN, FOUL-SMELLING DRAINAGE, REDNESS OR SWELLING.    IT HAS BEEN OVER 8 TO 10 HOURS SINCE SURGERY AND YOU ARE STILL NOT ABLE TO URINATE (PASS WATER).       DILATION AND CURETTAGE AND DILATION AND EVACUATION DISCHARGE INSTRUCTIONS    PLEASE RETURN TO THE CLINIC IN:  4-6 WEEKS  MAKE THIS APPOINTMENT AFTER YOU GET HOME IF IT HAS NOT ALREADY BEEN SCHEDULED.    DO NOT DRIVE A CAR, DRINK ALCOHOL OR USE MACHINERY FOR THE NEXT 24 HOURS.  YOU SHOULD WAIT UNTIL YOU HAVE RECOVERED BEFORE MAKING ANY IMPORTANT DECISIONS.    PAIN AND DISCOMFORT  YOU MAY HAVE CRAMPS OR A LOW BACKACHE FOR 24 TO 48 HOURS.  TYLENOL (ACETAMINOPHEN) OR MOTRIN (IBUPROFEN) MAY HELP, OR YOUR  Visit Activities (Refresh list every visit): Phone Encounter    I spoke with Bobby today to follow ip. He will reach out to [facility] and has also gotten a recommendation to see aSndy Mcfarlane LP, at the [facility] for health psychology. I did provide him with phone contacts for both of these resources today.  He also has my phone number for follow-up, as needed.        Jerry Pompa MA, LMFT  Lead Behavioral Health Clinician  [facility]    [email or web address]       Phone: [phone] (mobility extension)  Pager: [phone]         DOCTOR MAY GIVE YOU PAIN MEDICINE.  CALL YOUR DOCTOR IF PAIN CANNOT BE CONTROLLED.  YOU MAY FEEL DROWSY AND WEAK FOR A DAY OR TWO.    VAGINAL DISCHARGE  YOU MAY HAVE SOME BLEEDING OR DISCHARGE FOR UP TO TWO WEEKS.  DO NOT DOUCHE, USE TAMPONS OR HAVE SEX (INTERCOURSE) IN THE FIRST WEEK.  CALL YOUR DOCTOR IF YOU SOAK MORE THAN ONE MAXI PAD (SANITARY NAPKIN) PER HOUR, OR IF YOU PASS LARGE BLOOD CLOTS.    OTHER SYMPTOMS  YOU MAY HAVE A LOW FEVER FOR THE FIRST TWO DAYS.  CALL YOUR DOCTOR IF YOUR FEVER GOES OVER 101 DEGREES FAHRENHEIT.    IF YOU HAVE NAUSEA (FEEL SICK TO YOUR STOMACH), STAY IN BED.  TRY DRINKING A SMALL AMOUNT 7-UP, TEA OR SOUP.    DIET AND ACTIVITY  EAT LIGHT MEALS AND DRINK PLENTY OF FLUIDS FOR THE FIRST 24 HOURS (OR LONGER, IF YOU HAVE NAUSEA).    YOU MAY BATHE, SHOWER AND CLIMB STAIRS.  MOST WOMEN CAN RETURN TO WORK AFTER 24 HOURS.  YOU MAY GO BACK TO YOUR OTHER ACTIVITIES AFTER YOUR PAIN GOES AWAY.          You received Toradol, an IV form of ibuprofen (Motrin) at 7:50 am.  Do not take any ibuprofen products until 1:50 pm.

## 2019-06-13 NOTE — TELEPHONE ENCOUNTER
[facility] Call Center    Phone Message    May a detailed message be left on voicemail: yes    Reason for Call: Other: Bobby calling to state that Lindas symptoms are getting worst. Please call him back to discuss.      Action Taken: Message routed to:  [facility] ([facility] nathaniel neuro

## 2019-06-14 NOTE — TELEPHONE ENCOUNTER
"Situation:  Zoey Armstrong is a 69 year old female who receives care for atypical Parkinson's disease.    Background:  Patient is taking:  carbidopa-levodopa (SINEMET)  MG tablet 900 tablet 3 1/10/2019  No   Sig: Take 2 tabs 5 times a day.         Movement Disorder-related Medications                   9am 11:30am 3pm 7pm 10pm   CD/LD 25/100 mg  2 tabs 2 2 2 2     senna (SENNA-LAX) 8.6 MG tablet     No   Sig - Route: Take 1 tablet by mouth daily - Oral     Assessment:  Symptoms: more shaky with her hands/arms and legs \"its hard to describe,\" Increased difficulty with standing/walking - feels like she is about to fall over. Needs assistance to get from bed to wheel chair, getting worse the past 3 weeks. Her standing/walking is slightly better in the morning.    Shaking is there only when she try's to do tasks, cannot tell if carbidopa/levodopa is helping.     Bobby asked if there is a physical test that he can give her to measure her condition from week-week or month to month.    Are you having any new symptoms such as,   Fever/chills  No   Recent illness  No   Dehydration  No   Eating OK  Yes   Hit your head  No   Mental stress  No   Emotional stress No   Confusion  No   Hallucinations  No   Urinating OK?  Several false alarms, urgency   Any discomfort while urinating? No, Strong odor-  unknown for how long   BM: once every 2-3 days, senna has helped     Recommendation:  1. I advised Bobby to make an appointment with Zoey's PCP to rule out an infection.    2. I will discuss with Dr. Fuller and give him a call back.      "

## 2019-06-17 NOTE — TELEPHONE ENCOUNTER
Called Bobby and Zoey advised them of Dr. Fuller recommendation and response.    Bobby stated he would like her carbidopa/levodopa increased to help manage her worsening symptoms.    I informed Bobby and Zoey that the main priority is to get her clear and an infection if she has one. This is because illness/infections make symptoms worse even with medication dose increases.    Bobby would like me to ask Dr. Fuller about increase the carbidopa/levodopa as they do not know if this helping and would like to try to increase it to manage her symptoms.    Bobby and Zoey would like a call back with a response.  Per note from Dr. Fuller:

## 2019-06-18 NOTE — TELEPHONE ENCOUNTER
Called Bobby and Zoey and informed them of Dr. Fuller's recommendation below.  Bobby was thankful for the call back.    Dr. Fuller note:

## 2019-06-19 NOTE — PROGRESS NOTES
CHIEF COMPLAINT    Problem with urgency of urination for 1 to 2 years.      HISTORY    Zoey, who has developed atypical Parkinson's disease over 2 years, presents here with her  and the concern is urgency of urination.  She will get the urge and then attempt to pass urine but many times is unable to.  She states she actually does pass urine about 3 times per day but has many other visits to the bathroom without success.  Additionally there is concern about malodorous urine.  Interestingly, she has not been diagnosed with a UTI in the last couple years.    Patient has had progressive movement disorder, atypical Parkinson's.  It has come on over 2 years.  She is taken care of by the neurologist at the [facility].  She is on Sinemet 5 times per day.  Patient's  indicates that she really cannot ambulate on her own anymore, may be can stand for a few seconds.  Apparently the urinary situation has been discussed with neurology but they have recommended her to have work-up for this.      Patient Active Problem List   Diagnosis     Chronic rhinitis     Advance Care Planning     Mild major depression (H)     Hyperlipidemia LDL goal <70     CAD (coronary artery disease)     Benign essential hypertension     Spondylolisthesis of lumbar region     Spinal stenosis of lumbar region without neurogenic claudication     COPD (chronic obstructive pulmonary disease) (H)     Atypical Parkinsonism (H)     Malignant neoplasm of right female breast (H)     Current Outpatient Medications   Medication Sig Dispense Refill     buPROPion (WELLBUTRIN SR) 150 MG 12 hr tablet Take 1 tablet (150 mg) by mouth 2 times daily 180 tablet 3     carbidopa-levodopa (SINEMET)  MG tablet Take 2 tabs 5 times a day. 900 tablet 3     FLUoxetine (PROZAC) 20 MG capsule Take 3 capsules (60 mg) by mouth daily 90 capsule 11     Misc. Devices (ROLLER WALKER) MISC 1 Device daily 1 each 0     Multiple Vitamins-Minerals (MULTIVITAMIN  "ADULT PO) Take 1 tablet by mouth daily       olmesartan (BENICAR) 40 MG tablet TAKE 1 TABLET BY MOUTH DAILY 30 tablet 7     rosuvastatin (CRESTOR) 10 MG tablet Take 1 tablet (10 mg) by mouth daily 30 tablet 7     senna (SENNA-LAX) 8.6 MG tablet Take 1 tablet by mouth daily         REVIEW OF SYSTEMS    No fevers or chills.  No respiratory problems or S OB.    No chest pain.  No nausea, vomiting, abdominal pain.  No confusion.      Past Medical History:   Diagnosis Date     Back pain      CAD (coronary artery disease)      COPD (chronic obstructive pulmonary disease) (H) 02/06/2016     Depression      Depressive disorder Approx 15 yrs     Falls      Hyperlipidemia      Hypertension      Smoker        EXAM  /71 (BP Location: Right arm, Patient Position: Sitting, Cuff Size: Adult Regular)   Pulse 109   Temp 98.8  F (37.1  C) (Oral)   Resp 14   Ht 1.626 m (5' 4\")   Wt 55.9 kg (123 lb 4.8 oz)   LMP  (LMP Unknown)   SpO2 94%   BMI 21.16 kg/m       Thin woman in wheelchair.  HEENT unremarkable.  Neck without masses.  Nonlabored breathing.  Abdomen nondistended.  Nontender.  No tremors.  Muscle wasting.  Speech clear.      (R39.15) Urinary urgency  (primary encounter diagnosis)  Comment:     Patient was unable to void for UA today.  She will attempt to obtain one in the morning and  will bring it in.  I did feel she would need to be checked out by urology.  It did not appear to me that any of her medications would be necessarily contributing to this condition but it may be related to her neurologic problem.    Plan: UROLOGY ADULT REFERRAL, UA reflex to         Microscopic and Culture            (R35.0) Increased frequency of urination  Comment: Basically frequency of attempts  Plan: UA reflex to Microscopic and Culture, CANCELED:        UA reflex to Microscopic and Culture          (G20) Atypical Parkinsonism (H)  Comment: See exam above.  Plan: UROLOGY ADULT REFERRAL          One additional issue.  " Patient has been taking supplemental vitamin B12 orally.  Her last level was very high.  I suggested that she go off this for 3 months and then have a recheck since documented low B12 level has not been established.

## 2019-07-03 NOTE — ADDENDUM NOTE
Encounter addended by: Danielle Ocampo, PT on: 12/8/2017  8:27 AM<BR>     Actions taken: Pend clinical note, Sign clinical note, Flowsheet data copied forward, Flowsheet accepted I am tired of being in the hospital.

## 2019-07-03 NOTE — TELEPHONE ENCOUNTER
See her message.     Urine culture is Negative.     Please advise.   Dr Simpson out this week.       Urine looks concentrated.  Advised a couple extra glasses of water daily.    Also looks like there may be an infection.  Since this is a long-term problem we will will await the formal culture results and sensitivities.    Shabbir Simpson MD on 6/19/2019 at 3:36 PM

## 2019-07-03 NOTE — TELEPHONE ENCOUNTER
The urine culture sowed mixed bacterial luciano. No definite infection.   What symptoms is she having?

## 2019-07-03 NOTE — TELEPHONE ENCOUNTER
Patient seen Dr Simpson and was told she would get a call back with what medication she should be on. She is not feeling better. No one has called and patient is not on any medication    Ok to call and  [phone]

## 2019-07-05 NOTE — TELEPHONE ENCOUNTER
Patients  calls back.  Symptoms include frequency and urge to go and unable to urinate multiple times during day and at night. No blood in urine or vaginal irritation.  Please address.

## 2019-07-17 NOTE — PROGRESS NOTES
July 17, 2019    Referring Provider: Sahbbir Simpson MD  [address] [place] [address]    Primary Care Provider: Ramona Sullivan    CC: Urinary incontinence    HPI:  Zoey Armstrong is a 69 year old female who presents for evaluation of her pelvic floor symptoms.  She is accompanied by her .  They are here for evaluation from her neurologist.  Patient has atypical parkinsonism.  She is here today because of urinary urgency frequency, voiding dysfunction.  She is in a wheelchair so they note that there is some functional incontinence as it takes her awhile to get to the washroom when she gets the urge.  Sometimes they note when she gets the urge, however that she is unable to go right away.  She has urgency frequency, wears a diaper because the incontinence can be large volume and unpredictable.    Denies gross hematuria, UTI or ever having a urologic evaluation    Past Medical History:   Diagnosis Date     Back pain      CAD (coronary artery disease)      COPD (chronic obstructive pulmonary disease) (H) 02/06/2016     Depression      Depressive disorder Approx 15 yrs     Falls      Hyperlipidemia      Hypertension      Mumps      Parkinsons disease (H)      Smoker      Past Surgical History:   Procedure Laterality Date     ANGIOGRAM  10-    Moderate non-obstructive coronary disease involving all 3 vessels. LAD and RCA lesions are non-flow-limiting. Recommend aggressive risk factor management and possible pulmonary evaluation for dyspnea     ARTHROPLASTY TOE(S) Right 2/24/2016    Procedure: ARTHROPLASTY TOE(S);  Surgeon: Levar Matias DPM;  Location: [facility]     ARTHROTOMY SHOULDER, ROTATOR CUFF REPAIR, COMBINED Right 7/12/2016    Procedure: COMBINED ARTHROTOMY SHOULDER, ROTATOR CUFF REPAIR;  Surgeon: Reggie Cisse MD;  Location: [facility] OR     AS LUMBAR SPINE FUSION,ANTER APPRCH      L2-S1     BIOPSY  December 2016    Breast cancer     BREAST SURGERY  December 2016    Lumpectomy     HEAD  & NECK SURGERY      Jaw surgery     LUMPECTOMY BREAST, SEED LOCALIZATION, SENTINEL NODE Right 2016    Procedure: LUMPECTOMY BREAST, SEED LOCALIZATION, SENTINEL NODE;  Surgeon: Tiffanie Cabrera MD;  Location: [facility] OR     ORTHOPEDIC SURGERY      left foot surgery / bunion     OSTEOTOMY FOOT Right 2016    Procedure: OSTEOTOMY FOOT;  Surgeon: Levar Matias DPM;  Location:  [facility]     RECONSTRUCT FOREFOOT WITH METATARSOPHALANGEAL (MTP) FUSION Right 2016    Procedure: RECONSTRUCT FOREFOOT WITH METATARSOPHALANGEAL (MTP) FUSION;  Surgeon: Levar Matias DPM;  Location:  [facility]     REMOVE HARDWARE FOOT Right 2017    Procedure: REMOVE HARDWARE FOOT;  Surgeon: Levar Matias DPM;  Location: [facility]     SOFT TISSUE SURGERY  2016/Dec 2016    Rotator cuff repair surgery / Lumpectomy       Social History     Socioeconomic History     Marital status:      Spouse name: Not on file     Number of children: Not on file     Years of education: Not on file     Highest education level: Not on file   Occupational History     Not on file   Social Needs     Financial resource strain: Not on file     Food insecurity:     Worry: Not on file     Inability: Not on file     Transportation needs:     Medical: Not on file     Non-medical: Not on file   Tobacco Use     Smoking status: Former Smoker     Packs/day: 0.50     Years: 30.00     Pack years: 15.00     Types: Cigarettes     Start date: 1970     Last attempt to quit: 7/15/2015     Years since quittin.0     Smokeless tobacco: Never Used     Tobacco comment: Non-smoker   Substance and Sexual Activity     Alcohol use: Yes     Alcohol/week: 0.0 oz     Comment: Occasional cocktail/glass of wine     Drug use: No     Sexual activity: Yes     Partners: Male     Birth control/protection: Post-menopausal   Lifestyle     Physical activity:     Days per week: Not on file     Minutes per session: Not on file     Stress: Not on file   Relationships      Social connections:     Talks on phone: Not on file     Gets together: Not on file     Attends [employer] service: Not on file     Active member of club or organization: Not on file     Attends meetings of clubs or organizations: Not on file     Relationship status: Not on file     Intimate partner violence:     Fear of current or ex partner: Not on file     Emotionally abused: Not on file     Physically abused: Not on file     Forced sexual activity: Not on file   Other Topics Concern     Parent/sibling w/ CABG, MI or angioplasty before 65F 55M? Yes     Comment: Father, about 50 yrs old.      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 She lives with her SO and son. She has another son who is local.     Family History   Problem Relation Age of Onset     Breast Cancer Mother      C.A.D. Father         MI     Lipids Father      Coronary Artery Disease Father      Hyperlipidemia Father      Breast Cancer Other         maternal aunt     Cancer Other         skin     Migraines Cousin      Coronary Artery Disease Cousin      Multiple Sclerosis Paternal Uncle      Breast Cancer Other      Review of Systems     Constitutional:  Negative for fever, chills, weight loss, weight gain, fatigue, decreased appetite, night sweats, recent stressors, height gain, height loss, post-operative complications, incisional pain, hallucinations, increased energy, hyperactivity and confused.   HENT:  Negative for ear pain, hearing loss, tinnitus,  nosebleeds, trouble swallowing, hoarse voice, mouth sores, sore throat, ear discharge, tooth pain, gum tenderness, taste disturbance, smell disturbance, hearing aid, bleeding gums, dry mouth, sinus pain, sinus congestion and neck mass.    Eyes:  Negative for double vision, pain, redness, eye pain, decreased vision, eye watering, eye bulging, eye dryness, flashing lights, spots, floaters, strabismus, tunnel vision, jaundice and eye irritation.   Respiratory:   Negative for cough, hemoptysis, sputum production, shortness of breath, wheezing, sleep disturbances due to breathing, snores loudly, respiratory pain, dyspnea on exertion, cough disturbing sleep and postural dyspnea.    Cardiovascular:  Negative for chest pain, dyspnea on exertion, palpitations, orthopnea, claudication, leg swelling, fingers/toes turn blue, hypertension, hypotension, syncope, history of heart murmur, chest pain on exertion, chest pain at rest, pacemaker, few scattered varicosities, leg pain, sleep disturbances due to breathing, tachycardia, light-headedness, exercise intolerance and edema.   Gastrointestinal:  Positive for constipation. Negative for heartburn, nausea, vomiting, abdominal pain, diarrhea, blood in stool, melena, bloating, bowel incontinence, jaundice, coffee ground emesis and change in stool.   Genitourinary:  Positive for urgency and nocturia. Negative for bladder incontinence, dysuria, hematuria, flank pain, vaginal discharge, difficulty urinating, genital sores, dyspareunia, decreased libido, voiding less frequently, arousal difficulty, abnormal vaginal bleeding, excessive menstruation, menstrual changes, hot flashes, vaginal dryness and postmenopausal bleeding.   Musculoskeletal:  Negative for myalgias, back pain, joint swelling, arthralgias, stiffness, muscle cramps, neck pain, bone pain, muscle weakness and fracture.   Skin:  Negative for nail changes, itching, poor wound healing, rash, hair changes, skin changes, acne,  "warts, poor wound healing, scarring, flaky skin, Raynaud's phenomenon, sensitivity to sunlight and skin thickening.   Neurological:  Negative for dizziness, tingling, tremors, speech change, seizures, loss of consciousness, weakness, light-headedness, numbness, headaches, disturbances in coordination, extremity numbness, memory loss, difficulty walking and paralysis.   Endo/Heme:  Negative for anemia, swollen glands and bruises/bleeds easily.   Psychiatric/Behavioral:  Negative for depression, hallucinations, memory loss, decreased concentration, mood swings and panic attacks.    Breast:  Negative for breast discharge, breast mass, breast pain and nipple retraction.   Endocrine:  Negative for altered temperature regulation, polyphagia, polydipsia, unwanted hair growth and change in facial hair.    Allergies   Allergen Reactions     Betadine [Povidone Iodine] Hives     Iodine Hives     Iodine-131      No Clinical Screening - See Comments Swelling     Water softener salts     Povidone-Iodine Hives     Soap Hives     Perfumed soaps  Other reaction(s): Edema  Perfumed soaps - ?     Sodium Chloride Hives     Other reaction(s): Edema  Water softener salts      Current Outpatient Medications   Medication     buPROPion (WELLBUTRIN SR) 150 MG 12 hr tablet     carbidopa-levodopa (SINEMET)  MG tablet     FLUoxetine (PROZAC) 20 MG capsule     Misc. Devices (ROLLER WALKER) MISC     Multiple Vitamins-Minerals (MULTIVITAMIN ADULT PO)     olmesartan (BENICAR) 40 MG tablet     rosuvastatin (CRESTOR) 10 MG tablet     senna (SENNA-LAX) 8.6 MG tablet     No current facility-administered medications for this visit.      /70 (BP Location: Left arm, Patient Position: Sitting, Cuff Size: Adult Regular)   Pulse 89   Ht 1.626 m (5' 4\")   Wt 52.2 kg (115 lb)   LMP  (LMP Unknown)   SpO2 98%   BMI 19.74 kg/m   No LMP recorded (lmp unknown). Patient is postmenopausal. Body mass index is 19.74 kg/m .  She is alert and oriented.  " She is well groomed, comfortable in no acute distress comfortable in her wheelchair.  Eyes have anicteric sclerae, moist conjunctivae.  Normal mood and affect.   Normocephalic, atraumatic without masses, lesions, obvious abnormalities.  Non-labored breathing.      Urine dip unable to void in clinic    PVR 32 mL by bladder scan    A/P: Zoey Armstrong is a 69 year old F with atypical parkinsonism, urgency frequency, voiding dysfunction, functional incontinence and urinary incontinence of unclear etiology    Bladder diary to assess for modifiable factors to start    Daily fiber supplement for the constipation    RTC one month with the bladder diary, sooner if needed    30 minutes were spent with the patient today, > 50% in counseling and coordination of care    Madiha Pete MD MPH    Urology    CC  Patient Care Team:  Ramona Sullivan MD as PCP - General (Internal Medicine)  Jace Mart MD as MD (Neurology)  Ramona Sullivan MD as Assigned PCP  Dominga Ordoñez RPH as Pharmacist (Pharmacist)  Dominga Ordoñez RPH (Pharmacist)  [facility]BDI

## 2019-07-17 NOTE — NURSING NOTE
"Chief Complaint   Patient presents with     Urgency     Patient also state she have some leakage as well       Blood pressure 106/70, pulse 89, height 1.626 m (5' 4\"), weight 52.2 kg (115 lb), SpO2 98 %, not currently breastfeeding. Body mass index is 19.74 kg/m .    Patient Active Problem List   Diagnosis     Chronic rhinitis     Advance Care Planning     Mild major depression (H)     Hyperlipidemia LDL goal <70     CAD (coronary artery disease)     Benign essential hypertension     Spondylolisthesis of lumbar region     Spinal stenosis of lumbar region without neurogenic claudication     COPD (chronic obstructive pulmonary disease) (H)     Atypical Parkinsonism (H)     Malignant neoplasm of right female breast (H)       Allergies   Allergen Reactions     Betadine [Povidone Iodine] Hives     Iodine Hives     Iodine-131      No Clinical Screening - See Comments Swelling     Water softener salts     Povidone-Iodine Hives     Soap Hives     Perfumed soaps  Other reaction(s): Edema  Perfumed soaps - ?     Sodium Chloride Hives     Other reaction(s): Edema  Water softener salts        Current Outpatient Medications   Medication Sig Dispense Refill     buPROPion (WELLBUTRIN SR) 150 MG 12 hr tablet Take 1 tablet (150 mg) by mouth 2 times daily 180 tablet 3     carbidopa-levodopa (SINEMET)  MG tablet Take 2 tabs 5 times a day. 900 tablet 3     FLUoxetine (PROZAC) 20 MG capsule Take 3 capsules (60 mg) by mouth daily 90 capsule 11     Misc. Devices (ROLLER WALKER) MISC 1 Device daily 1 each 0     Multiple Vitamins-Minerals (MULTIVITAMIN ADULT PO) Take 1 tablet by mouth daily       olmesartan (BENICAR) 40 MG tablet TAKE 1 TABLET BY MOUTH DAILY 30 tablet 7     rosuvastatin (CRESTOR) 10 MG tablet Take 1 tablet (10 mg) by mouth daily 30 tablet 7     senna (SENNA-LAX) 8.6 MG tablet Take 1 tablet by mouth daily         Social History     Tobacco Use     Smoking status: Former Smoker     Packs/day: 0.50     Years: 30.00     " Pack years: 15.00     Types: Cigarettes     Start date: 1970     Last attempt to quit: 7/15/2015     Years since quittin.0     Smokeless tobacco: Never Used     Tobacco comment: Non-smoker   Substance Use Topics     Alcohol use: Yes     Alcohol/week: 0.0 oz     Comment: Occasional cocktail/glass of wine     Drug use: No       PVR 32ML

## 2019-07-17 NOTE — LETTER
7/17/2019       RE: Zoey Armstrong  [address]  [facility] [address]     Dear Colleague,    Thank you for referring your patient, Zoey Armstrong, to the [facility] [place] at [facility]. Please see a copy of my visit note below.    July 17, 2019    Referring Provider: Shabbir Simpson MD  [address] [place] [address]    Primary Care Provider: Ramona Sullivan    CC: Urinary incontinence    HPI:  Zoey Armstrong is a 69 year old female who presents for evaluation of her pelvic floor symptoms.  She is accompanied by her .  They are here for evaluation from her neurologist.  Patient has atypical parkinsonism.  She is here today because of urinary urgency frequency, voiding dysfunction.  She is in a wheelchair so they note that there is some functional incontinence as it takes her awhile to get to the washroom when she gets the urge.  Sometimes they note when she gets the urge, however that she is unable to go right away.  She has urgency frequency, wears a diaper because the incontinence can be large volume and unpredictable.    Denies gross hematuria, UTI or ever having a urologic evaluation    Past Medical History:   Diagnosis Date     Back pain      CAD (coronary artery disease)      COPD (chronic obstructive pulmonary disease) (H) 02/06/2016     Depression      Depressive disorder Approx 15 yrs     Falls      Hyperlipidemia      Hypertension      Mumps      Parkinsons disease (H)      Smoker      Past Surgical History:   Procedure Laterality Date     ANGIOGRAM  10-    Moderate non-obstructive coronary disease involving all 3 vessels. LAD and RCA lesions are non-flow-limiting. Recommend aggressive risk factor management and possible pulmonary evaluation for dyspnea     ARTHROPLASTY TOE(S) Right 2/24/2016    Procedure: ARTHROPLASTY TOE(S);  Surgeon: Levar Matias DPM;  Location: [facility]     ARTHROTOMY  SHOULDER, ROTATOR CUFF REPAIR, COMBINED Right 2016    Procedure: COMBINED ARTHROTOMY SHOULDER, ROTATOR CUFF REPAIR;  Surgeon: Reggie Cisse MD;  Location: [facility] OR     AS LUMBAR SPINE FUSION,ANTER APPRCH      L2-S1     BIOPSY  2016    Breast cancer     BREAST SURGERY  2016    Lumpectomy     HEAD & NECK SURGERY      Jaw surgery     LUMPECTOMY BREAST, SEED LOCALIZATION, SENTINEL NODE Right 2016    Procedure: LUMPECTOMY BREAST, SEED LOCALIZATION, SENTINEL NODE;  Surgeon: Tiffanie Cabrera MD;  Location: [facility] OR     ORTHOPEDIC SURGERY      left foot surgery / bunion     OSTEOTOMY FOOT Right 2016    Procedure: OSTEOTOMY FOOT;  Surgeon: Levar Matias DPM;  Location:  [facility]     RECONSTRUCT FOREFOOT WITH METATARSOPHALANGEAL (MTP) FUSION Right 2016    Procedure: RECONSTRUCT FOREFOOT WITH METATARSOPHALANGEAL (MTP) FUSION;  Surgeon: Levar Matias DPM;  [facility]     REMOVE HARDWARE FOOT Right 2017    Procedure: REMOVE HARDWARE FOOT;  Surgeon: Levar Matias DPM;  Location: [facility] OR     SOFT TISSUE SURGERY  2016/Dec 2016    Rotator cuff repair surgery / Lumpectomy       Social History     Socioeconomic History     Marital status:      Spouse name: Not on file     Number of children: Not on file     Years of education: Not on file     Highest education level: Not on file   Occupational History     Not on file   Social Needs     Financial resource strain: Not on file     Food insecurity:     Worry: Not on file     Inability: Not on file     Transportation needs:     Medical: Not on file     Non-medical: Not on file   Tobacco Use     Smoking status: Former Smoker     Packs/day: 0.50     Years: 30.00     Pack years: 15.00     Types: Cigarettes     Start date: 1970     Last attempt to quit: 7/15/2015     Years since quittin.0     Smokeless tobacco: Never Used     Tobacco comment: Non-smoker   Substance and Sexual Activity     Alcohol use: Yes      Alcohol/week: 0.0 oz     Comment: Occasional cocktail/glass of wine     Drug use: No     Sexual activity: Yes     Partners: Male     Birth control/protection: Post-menopausal   Lifestyle     Physical activity:     Days per week: Not on file     Minutes per session: Not on file     Stress: Not on file   Relationships     Social connections:     Talks on phone: Not on file     Gets together: Not on file     Attends Temple service: Not on file     Active member of club or organization: Not on file     Attends meetings of clubs or organizations: Not on file     Relationship status: Not on file     Intimate partner violence:     Fear of current or ex partner: Not on file     Emotionally abused: Not on file     Physically abused: Not on file     Forced sexual activity: Not on file   Other Topics Concern     Parent/sibling w/ CABG, MI or angioplasty before 65F 55M? Yes     Comment: Father, about 50 yrs old.      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 She lives with her SO and son. She has another son who is local.     Family History   Problem Relation Age of Onset     Breast Cancer Mother      C.A.D. Father         MI     Lipids Father      Coronary Artery Disease Father      Hyperlipidemia Father      Breast Cancer Other         maternal aunt     Cancer Other         skin     Migraines Cousin      Coronary Artery Disease Cousin      Multiple Sclerosis Paternal Uncle      Breast Cancer Other      Review of Systems      Constitutional:  Negative for fever, chills, weight loss, weight gain, fatigue, decreased appetite, night sweats, recent stressors, height gain, height loss, post-operative complications, incisional pain, hallucinations, increased energy, hyperactivity and confused.   HENT:  Negative for ear pain, hearing loss, tinnitus, nosebleeds, trouble swallowing, hoarse voice, mouth sores, sore throat, ear discharge, tooth pain, gum tenderness, taste disturbance, smell disturbance, hearing aid, bleeding gums, dry mouth, sinus pain, sinus congestion and neck mass.    Eyes:  Negative for double vision, pain, redness, eye pain, decreased vision, eye watering, eye bulging, eye dryness, flashing lights, spots, floaters, strabismus, tunnel vision, jaundice and eye irritation.   Respiratory:   Negative for cough, hemoptysis, sputum production, shortness of breath, wheezing, sleep disturbances due to breathing, snores loudly, respiratory pain, dyspnea on exertion, cough disturbing sleep and postural dyspnea.    Cardiovascular:  Negative for chest pain, dyspnea on exertion, palpitations, orthopnea, claudication, leg swelling, fingers/toes turn blue, hypertension, hypotension, syncope, history of heart murmur, chest pain on exertion, chest pain at rest, pacemaker, few scattered varicosities, leg pain, sleep disturbances due to breathing, tachycardia, light-headedness, exercise intolerance and edema.   Gastrointestinal:  Positive for constipation. Negative for heartburn, nausea, vomiting, abdominal pain, diarrhea, blood in stool, melena, bloating, bowel incontinence, jaundice, coffee ground emesis and change in stool.   Genitourinary:  Positive for urgency and nocturia. Negative for bladder incontinence, dysuria, hematuria, flank pain, vaginal discharge, difficulty urinating, genital sores, dyspareunia, decreased libido, voiding less frequently, arousal difficulty, abnormal vaginal bleeding, excessive menstruation, menstrual changes,  hot flashes, vaginal dryness and postmenopausal bleeding.   Musculoskeletal:  Negative for myalgias, back pain, joint swelling, arthralgias, stiffness, muscle cramps, neck pain, bone pain, muscle weakness and fracture.   Skin:  Negative for nail changes, itching, poor wound healing, rash, hair changes, skin changes, acne, warts, poor wound healing, scarring, flaky skin, Raynaud's phenomenon, sensitivity to sunlight and skin thickening.   Neurological:  Negative for dizziness, tingling, tremors, speech change, seizures, loss of consciousness, weakness, light-headedness, numbness, headaches, disturbances in coordination, extremity numbness, memory loss, difficulty walking and paralysis.   Endo/Heme:  Negative for anemia, swollen glands and bruises/bleeds easily.   Psychiatric/Behavioral:  Negative for depression, hallucinations, memory loss, decreased concentration, mood swings and panic attacks.    Breast:  Negative for breast discharge, breast mass, breast pain and nipple retraction.   Endocrine:  Negative for altered temperature regulation, polyphagia, polydipsia, unwanted hair growth and change in facial hair.    Allergies   Allergen Reactions     Betadine [Povidone Iodine] Hives     Iodine Hives     Iodine-131      No Clinical Screening - See Comments Swelling     Water softener salts     Povidone-Iodine Hives     Soap Hives     Perfumed soaps  Other reaction(s): Edema  Perfumed soaps - ?     Sodium Chloride Hives     Other reaction(s): Edema  Water softener salts      Current Outpatient Medications   Medication     buPROPion (WELLBUTRIN SR) 150 MG 12 hr tablet     carbidopa-levodopa (SINEMET)  MG tablet     FLUoxetine (PROZAC) 20 MG capsule     Misc. Devices (ROLLER WALKER) MISC     Multiple Vitamins-Minerals (MULTIVITAMIN ADULT PO)     olmesartan (BENICAR) 40 MG tablet     rosuvastatin (CRESTOR) 10 MG tablet     senna (SENNA-LAX) 8.6 MG tablet     No current facility-administered medications for this  "visit.      /70 (BP Location: Left arm, Patient Position: Sitting, Cuff Size: Adult Regular)   Pulse 89   Ht 1.626 m (5' 4\")   Wt 52.2 kg (115 lb)   LMP  (LMP Unknown)   SpO2 98%   BMI 19.74 kg/m    No LMP recorded (lmp unknown). Patient is postmenopausal. Body mass index is 19.74 kg/m .  She is alert and oriented.  She is well groomed, comfortable in no acute distress comfortable in her wheelchair.  Eyes have anicteric sclerae, moist conjunctivae.  Normal mood and affect.   Normocephalic, atraumatic without masses, lesions, obvious abnormalities.  Non-labored breathing.      Urine dip unable to void in clinic    PVR 32 mL by bladder scan    A/P: Zoey Armstrong is a 69 year old F with atypical parkinsonism, urgency frequency, voiding dysfunction, functional incontinence and urinary incontinence of unclear etiology    Bladder diary to assess for modifiable factors to start    Daily fiber supplement for the constipation    RTC one month with the bladder diary, sooner if needed    30 minutes were spent with the patient today, > 50% in counseling and coordination of care    Madiha Pete MD MPH    Urology    CC  Patient Care Team:  Ramona Sullivan MD as PCP - General (Internal Medicine)  Jace Mart MD as MD (Neurology)  Dominga Ordoñez [facility] as Pharmacist (Pharmacist)  BRIDGET ABDI                  "

## 2019-07-17 NOTE — PATIENT INSTRUCTIONS
Daily fiber supplement the kind you mix in the water    Please do the bladder diary    Please return to see us in about month with the diary    It was a pleasure meeting with you today.  Thank you for allowing me and my team the privilege of caring for you today.  YOU are the reason we are here, and I truly hope we provided you with the excellent service you deserve.  Please let us know if there is anything else we can do for you so that we can be sure you are leaving completely satisfied with your care experience.

## 2019-07-30 NOTE — PROGRESS NOTES
Subjective     Zoey Armstrong is a 69 year old female who presents to clinic today for the following health issues:    HPI     1.  Constipation: She has had issues with constipation for a few months.  She is on senna 1 tablet daily, has used some liquid senna occasionally when she has not had a bowel movement for 4 to 5 days.  And her  would like some recommendations on management of this problem.    2.  Dysphagia: She reports some difficulty swallowing, 1-2 times a week.  It may be when eating, not always with eating.  She will feel like she is trying to initiate swallowing and she cannot swallow, feels like her throat stops, feels like she cannot breathe the evening for few seconds only.  They resolved and she is able to swallow normally.  She has not aspirated, she does not cough, nothing gets stuck in the esophagus.  Is been going on for 3 to 4 weeks.  She has not spoken with her neurologist about it, she does not have a neurology appointment coming up for a few months.    3.  She has easy bruising on her legs: She is not taking aspirin or NSAIDs.  The bruises are not painful.  She is not having any bleeding from her gums, no blood in her urine or stool    4.  Anorexia: She complains of lack of appetite.  She never feels very hungry, no abdominal pain, nausea or vomiting.  She eats small amounts.  She has lost weight, 40 pounds in the last several years as her neurologic condition is declined.    Problems:  Parkinsonian condition: She has had progressive decrease in function.      Hypertension: Blood pressures been controlled    COPD: Breathing overall stable    Patient Active Problem List   Diagnosis     Chronic rhinitis     Advance Care Planning     Mild major depression (H)     Hyperlipidemia LDL goal <70     CAD (coronary artery disease)     Benign essential hypertension     Spondylolisthesis of lumbar region     Spinal stenosis of lumbar region without neurogenic claudication     COPD (chronic  "obstructive pulmonary disease) (H)     Atypical Parkinsonism (H)     Malignant neoplasm of right female breast (H)     Slow transit constipation     Current Outpatient Medications   Medication Sig Dispense Refill     buPROPion (WELLBUTRIN SR) 150 MG 12 hr tablet Take 1 tablet (150 mg) by mouth 2 times daily 180 tablet 3     carbidopa-levodopa (SINEMET)  MG tablet Take 2 tabs 5 times a day. 900 tablet 3     FLUoxetine (PROZAC) 20 MG capsule Take 3 capsules (60 mg) by mouth daily 90 capsule 11     Multiple Vitamins-Minerals (MULTIVITAMIN ADULT PO) Take 1 tablet by mouth daily       olmesartan (BENICAR) 40 MG tablet TAKE 1 TABLET BY MOUTH DAILY 30 tablet 7     rosuvastatin (CRESTOR) 10 MG tablet Take 1 tablet (10 mg) by mouth daily 30 tablet 7     senna (SENNA-LAX) 8.6 MG tablet Take 1 tablet by mouth daily        Reviewed and updated as needed this visit by Provider         Review of Systems   No fever, chills, night sweats, nausea, vomiting, abdominal pain, significant cough, blood in the stool, enlarged lymph nodes      Objective    /76 (BP Location: Right arm, Patient Position: Sitting, Cuff Size: Adult Regular)   Pulse 95   Temp 96.9  F (36.1  C) (Axillary)   Resp 14   Ht 1.626 m (5' 4\")   Wt 52.6 kg (115 lb 14.4 oz)   LMP  (LMP Unknown)   SpO2 96%   BMI 19.89 kg/m    Body mass index is 19.89 kg/m .  Physical Exam     Skin: There are multiple superficial contusions of her legs, multiple small abrasions which she says comes from bumping her legs against wheelchair, no other objects        Assessment & Plan     1. Weight loss  Advised most likely this is related to her diminished activity levels with muscle loss as well as her decreased oral intake.  Possible could be related to hyperthyroidism though no other significant symptoms to suggest consider future labs, discussed importance of good protein intake and supplements, foods that are high urine protein    2. Atypical Parkinsonism " (H)  Continue follow-up with neurology, progressive changes.  Advised that it is possible of her swallowing issues are related to the parkinsonian condition, it is less likely to be related to her medication.  Advised they can contact her neurologist through computer to see if swallow study would be considered appropriate or if there may be other concerns, causes    3. Chronic obstructive pulmonary disease, unspecified COPD type (H)  Overall stable.    4. Slow transit constipation  Advised on use of senna, gradually increased dose, and MiraLAX if not improving    5. Easy bruising  Advised likely related to thinning skin and trauma of the legs.    6. Anorexia  Encouraged her to other supplements, try to push protein        No follow-ups on file.    Ramona Sullivan MD  [facility]

## 2019-07-30 NOTE — NURSING NOTE
"/76 (BP Location: Right arm, Patient Position: Sitting, Cuff Size: Adult Regular)   Pulse 95   Temp 96.9  F (36.1  C) (Axillary)   Resp 14   Ht 1.626 m (5' 4\")   Wt 52.6 kg (115 lb 14.4 oz)   LMP  (LMP Unknown)   SpO2 96%   BMI 19.89 kg/m    Patient here for loss of appetite, minor choking, constipation bruising on both legs and recheck medication.  "

## 2019-07-31 PROBLEM — K59.01 SLOW TRANSIT CONSTIPATION: Status: ACTIVE | Noted: 2019-01-01

## 2019-08-05 NOTE — TELEPHONE ENCOUNTER
[facility] Call Center    Phone Message    May a detailed message be left on voicemail: yes    Reason for Call: Symptoms or Concerns       Current symptom or concern: Bobby ran into Dr. Mart today on [place] (8/5), he informed Dr. Mart that Zoey has been having trouble with her throat.  Zoey was seen by Primary Care provider, Ramona Sullivan ([facility]), and Dr. Sullivan indicated that she would send a message to Dr. Mart.     Is been going on for 3 to 4 weeks    Dr. Ivey directed Bobby to speak with Genesis Menjivar.        Has patient previously been seen for this? Yes    By : Dr. Ramona Sullivan    Date: 7.30.19    Are there any new or worsening symptoms? Yes      Action Taken: Message routed to:  [facility] neurology

## 2019-08-06 NOTE — TELEPHONE ENCOUNTER
"Situation:  Zoey Armstrong is a 69 year old female who receives support for atypical parkinsonism.  Lynsey (spouse) calls today with concerns that Zoey is having trouble with her throat.    Background:    7/30/2019 - Patient saw Dr. Sullivan who recommended she follow up with neurology regarding swallowing issues  Patient is taking:  carbidopa-levodopa (SINEMET)  MG tablet 900 tablet 3 1/10/2019  No   Sig: Take 2 tabs 5 times a day.     New Medications:   Psychiatrist recommended to decrease Prozac and start Viibryd -This change has not been made yet. Lynsey will be contacting psychiatry about getting the Viibryd prescription.    Assessment:  Difficulty swallowing 1-2 times a week, has not choked on food/liquids, difficulty swallowing does not always occur with eating/drinking. Will come on suddenly and Zoey will be unable to breath for about 3-4 seconds \"like a tightness in her throat\"  Denies heartburn, nausea  Occurring for 2-3 months    Recommendation:  1. I advised Lynsey to have Zoey sit up 90 degrees when eating or drinking.    2. I advised lynsey that Zoey should be seen sooner than her October appointment with Dr. Mart. Zoey will see Dr. Melvin on August 28th at 5 PM. Message sent to clinic coordinators to get her scheduled.    3. I will discuss with Dr. Melvin about getting speech therapy involved.      "

## 2019-08-08 NOTE — TELEPHONE ENCOUNTER
I have placed a speech therapy consult for evaluation of patient's complaints of dysphagia to liquids/solids occurring 1-2 times a week.    Heidi Melvin DO  Movement Disorders Fellow  [facility]

## 2019-08-14 NOTE — PATIENT INSTRUCTIONS
Try the medication    Please return in 6-8 weeks to check your blood pressure and how well you are emptying your bladder    It was a pleasure meeting with you today.  Thank you for allowing me and my team the privilege of caring for you today.  YOU are the reason we are here, and I truly hope we provided you with the excellent service you deserve.  Please let us know if there is anything else we can do for you so that we can be sure you are leaving completely satisfied with your care experience.

## 2019-08-14 NOTE — NURSING NOTE
"Chief Complaint   Patient presents with     Follow Up     Pt is here for follow up with bladder diary     Urgency of Urination       Patient Active Problem List   Diagnosis     Chronic rhinitis     Advance Care Planning     Mild major depression (H)     Hyperlipidemia LDL goal <70     CAD (coronary artery disease)     Benign essential hypertension     Spondylolisthesis of lumbar region     Spinal stenosis of lumbar region without neurogenic claudication     COPD (chronic obstructive pulmonary disease) (H)     Atypical Parkinsonism (H)     Malignant neoplasm of right female breast (H)     Slow transit constipation       Allergies   Allergen Reactions     Betadine [Povidone Iodine] Hives     Iodine Hives     Iodine-131      No Clinical Screening - See Comments Swelling     Water softener salts     Povidone-Iodine Hives     Soap Hives     Perfumed soaps  Other reaction(s): Edema  Perfumed soaps - ?     Sodium Chloride Hives     Other reaction(s): Edema  Water softener salts        BP 98/58   Ht 1.651 m (5' 5\")   Wt 52.2 kg (115 lb)   LMP  (LMP Unknown)   BMI 19.14 kg/m            Vijay Boudreaux, EMT-B  8/14/2019         "

## 2019-08-14 NOTE — LETTER
"8/14/2019       RE: Zoey Armstrong  [address]  [facility] [address]     Dear Colleague,    Thank you for referring your patient, Zoey Armstrong, to the [facility] [place] at [facility]. Please see a copy of my visit note below.    August 14, 2019    Return visit    Patient returns today for follow up.  Bladder diary shows that she only drinks 12-24 oz of fluid (water and coke), only voids 3 times a day but will have several false alarms in between with voids 100-300mL.  She denies any changes in her health since last visit.    BP 98/58   Ht 1.651 m (5' 5\")   Wt 52.2 kg (115 lb)   LMP  (LMP Unknown)   BMI 19.14 kg/m     She is comfortable, in no distress, non-labored breathing.     A/P: 69 year old F with urinary urgency, hesitancy, urgency incontinence in setting of atypical parkinsonism    Discussed options and they have elected to try a medication.  They want to avoid any possible cognitive side effects and wish to try the mirabegron    RTC 6-8 weeks for BP and PVR check, sooner if needed    15 minutes were spent with the patient today, > 50% in counseling and coordination of care    Madiha Pete MD MPH   of Urology    CC  Patient Care Team:  Ramona Sullivan MD as PCP - General (Internal Medicine)  Jace Mart MD as MD (Neurology)  Dominga Ordoñez [facility] as Pharmacist (Pharmacist)  "

## 2019-08-14 NOTE — PROGRESS NOTES
"August 14, 2019    Return visit    Patient returns today for follow up.  Bladder diary shows that she only drinks 12-24 oz of fluid (water and coke), only voids 3 times a day but will have several false alarms in between with voids 100-300mL.  She denies any changes in her health since last visit.    BP 98/58   Ht 1.651 m (5' 5\")   Wt 52.2 kg (115 lb)   LMP  (LMP Unknown)   BMI 19.14 kg/m    She is comfortable, in no distress, non-labored breathing.     A/P: 69 year old F with urinary urgency, hesitancy, urgency incontinence in setting of atypical parkinsonism    Discussed options and they have elected to try a medication.  They want to avoid any possible cognitive side effects and wish to try the mirabegron    RTC 6-8 weeks for BP and PVR check, sooner if needed    15 minutes were spent with the patient today, > 50% in counseling and coordination of care    Madiha Pete MD MPH   of Urology    CC  Patient Care Team:  Ramona Sullivan MD as PCP - General (Internal Medicine)  Jace Mart MD as MD (Neurology)  Ramona Sullivan MD as Assigned PCP  Dominga Ordoñez RPH as Pharmacist (Pharmacist)  Dominga Ordoñez RPH (Pharmacist)                "
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
## 2019-08-22 NOTE — TELEPHONE ENCOUNTER
----- Message from Aruna Simmons sent at 8/20/2019  8:25 AM CDT -----  Regarding: RE: Home Therapy  Please reach out to patient about outpatient therapy. I believe there is some confusion on what she is being treated for and home therapy.           Thank you!  Sincerely   [facility]  ----- Message -----  From: Heidi Melvin MD  Sent: 8/9/2019   6:18 PM  To: Aruna Simmons  Subject: RE: Home Therapy                                 Hello, is this the order that was placed for PT on 1/2019? If so, we would like for her to participate in Physical Therapy, specifically the [facility]. I don't believe this is an option that can be done at home and this was discussed with the patient. Please schedule for PT to complete the [facility] Program.    Thank you for your assistance.    Dr. Melvin     ----- Message -----  From: Aruna Simmons  Sent: 8/9/2019   9:08 AM  To: Heidi Melvin MD  Subject: Home Therapy                                     Hello,  We received an order for the above patient for outpatient therapy. We contacted your patient to schedule for the outpatient therapy, but they indicated this is to be done in the home. Please place an order for home care services.  Thank you for your help!  Sincerely, [facility]

## 2019-08-22 NOTE — TELEPHONE ENCOUNTER
Called patient to clarify an order regarding PT, specifically the Big program, that was placed in 10/2018. PT has called to schedule this appointment, however, patient is apparently interested in home PT care. I called to clarify her questions, however, there was no answer. Left a voicemail explaining that this is not an option to do as home therapy and is an outpatient PT program. The order still stands if she is interested in participating. She was encouraged to call back for further questions.      Heidi Melvin DO  Movement Disorders Fellow  [facility]

## 2019-08-28 PROBLEM — R49.8 HYPOPHONIA: Status: ACTIVE | Noted: 2019-01-01

## 2019-08-28 PROBLEM — K59.00 CONSTIPATION: Status: ACTIVE | Noted: 2019-01-01

## 2019-08-28 PROBLEM — R48.2 APRAXIA: Status: ACTIVE | Noted: 2019-01-01

## 2019-08-28 NOTE — NURSING NOTE
Chief Complaint   Patient presents with     New Patient     [facility] NEW MOVEMENT DISORDER    Louisa Hammer CMA

## 2019-08-28 NOTE — PROGRESS NOTES
Department of Neurology  Movement Disorders Division     Patient: Zoey Armstrong   MRN: [id number]   : 1950   Date of Visit: 2019     Reason for visit: follow up for atypical parkinsonism     History of Present Illness  Ms. Armstrong is a 69 year old R handed female that presents to Neurology Movement clinic for follow up regarding management of Atypical parkinsonism. Patient was last seen on 2019 where the main concern was patient's depressed mood and Dr. Jerry Pompa assisted in encouraging patient to seek help. She was referred to psychiatry and psychology. No changes made to CD/LD regimen.     History obtained from patient and accompanied by , Bobby. Patient reports that her main concerns this visit are transient events described as her throat closing up with 2-3 seconds of paucity of breath and jerking movements in her sleep. Further details are described below.     Off Periods: Doesn't really notice profound benefit from CD/LD.   Dyskinesia: None    Memory Problems: No issues.   Hallucinations: Denies.  Falls: Denies recent falls. She only is able to stand for 3-4 seconds with assisted transfers. Mostly uses wheelchair.  Feels unsteady with standing. Able to stand unassisted, however, feels unsteady standing unassisted and unable to take a step on her own. She states that her legs feel frozen and she is unable to move them. She notices trouble moving her legs when sitting also and will involuntarily cross and she will have a difficulty time uncrossing them requiring assistance from her , at times.   ADL's:   Needs helps with tranfers but able to bathe self and brush teeth and comb and dry hair on own.  Assistive Devices: Mostly uses wheelchair.    Constipation: She was taking daily Senna and having diarrhea. Now takes Senna every other day which has helped decrease diarrhea. Having one BM every other day. Uses adult diapers.    Dysphagia: Not an issue.  Breathing: Has instances where  throat closes ad has a hard time breathing for 1-2 seconds. These event occur spontaneously at any times and are not reproducible. She has not recognized any triggers.  reports that after a few seconds, she begins to relax and breathe normal again.    Depression: Depression is unchanged. She is following with Psychology and Psychiatry, which has been helping. No thoughts or plan to harm self or others. Taking bupropion 150 mg SR bid. Will be starting vilazodone. Weaning off fluoxetine.    Anxiety:  Not an issue.   Sleeps: Sleeps adequately at night. Takes several naps in the afternoon. Does not act out dreams.  has noticed involuntary movements, possible jerks, of an arm or leg during her sleep. Patient does not recall these movements.   Insomnia: Not an issue.   REMSD: Denies.   RLS: Denies.    Impulsions: Denies.     Takes CD/LD 25/100 mg 2 tabs 5 times a day every 2-3 hours on non set times. Denies SEs to this med including hallucinations or dyskinesias.     Bobby reports coping well with patient's progressing condition. He is reports being able to manage his wife's care on his own at the moment. He was encouraged to express caregiver burnout or fatigue if he were to experience it.     Review of Systems:  Other than that mentioned above, the remainder of 12 systems reviewed were negative.    Past Medical History:   Diagnosis Date     Back pain      CAD (coronary artery disease)      COPD (chronic obstructive pulmonary disease) (H) 02/06/2016     Depression      Depressive disorder Approx 15 yrs     Falls      Hyperlipidemia      Hypertension      Mumps      Parkinsons disease (H)      Smoker      Past Surgical History:   Procedure Laterality Date     ANGIOGRAM  10-    Moderate non-obstructive coronary disease involving all 3 vessels. LAD and RCA lesions are non-flow-limiting. Recommend aggressive risk factor management and possible pulmonary evaluation for dyspnea     ARTHROPLASTY TOE(S)  Right 2/24/2016    Procedure: ARTHROPLASTY TOE(S);  Surgeon: Levar Matias DPM;  [facility]     ARTHROTOMY SHOULDER, ROTATOR CUFF REPAIR, COMBINED Right 7/12/2016    Procedure: COMBINED ARTHROTOMY SHOULDER, ROTATOR CUFF REPAIR;  Surgeon: Reggie Cisse MD;  Location: [facility] OR     AS LUMBAR SPINE FUSION,ANTER APPRCH      L2-S1     BIOPSY  December 2016    Breast cancer     BREAST SURGERY  December 2016    Lumpectomy     HEAD & NECK SURGERY      Jaw surgery     LUMPECTOMY BREAST, SEED LOCALIZATION, SENTINEL NODE Right 12/2/2016    Procedure: LUMPECTOMY BREAST, SEED LOCALIZATION, SENTINEL NODE;  Surgeon: Tiffanie Cabrera MD;  Location:  OR     ORTHOPEDIC SURGERY  1994    left foot surgery / bunion     OSTEOTOMY FOOT Right 2/24/2016    Procedure: OSTEOTOMY FOOT;  Surgeon: Levar Matias DPM;  Location: [facility]     RECONSTRUCT FOREFOOT WITH METATARSOPHALANGEAL (MTP) FUSION Right 2/24/2016    Procedure: RECONSTRUCT FOREFOOT WITH METATARSOPHALANGEAL (MTP) FUSION;  Surgeon: Levar Matias DPM;  Location:  [facility]     REMOVE HARDWARE FOOT Right 1/18/2017    Procedure: REMOVE HARDWARE FOOT;  Surgeon: Levar Matias DPM;  Location: [facility]     SOFT TISSUE SURGERY  July 2016/Dec 2016    Rotator cuff repair surgery / Lumpectomy     Family History   Problem Relation Age of Onset     Breast Cancer Mother      C.A.D. Father         MI     Lipids Father      Coronary Artery Disease Father      Hyperlipidemia Father      Breast Cancer Other         maternal aunt     Cancer Other         skin     Migraines Cousin      Coronary Artery Disease Cousin      Multiple Sclerosis Paternal Uncle      Breast Cancer Other      Social History     Socioeconomic History     Marital status:      Spouse name: Not on file     Number of children: Not on file     Years of education: Not on file     Highest education level: Not on file   Occupational History     Not on file   Social Needs     Financial resource strain:  Not on file     Food insecurity:     Worry: Not on file     Inability: Not on file     Transportation needs:     Medical: Not on file     Non-medical: Not on file   Tobacco Use     Smoking status: Former Smoker     Packs/day: 0.50     Years: 30.00     Pack years: 15.00     Types: Cigarettes     Start date: 1970     Last attempt to quit: 7/15/2015     Years since quittin.1     Smokeless tobacco: Never Used     Tobacco comment: Non-smoker   Substance and Sexual Activity     Alcohol use: Yes     Alcohol/week: 0.0 oz     Comment: Occasional cocktail/glass of wine     Drug use: No     Sexual activity: Yes     Partners: Male     Birth control/protection: Post-menopausal   Lifestyle     Physical activity:     Days per week: Not on file     Minutes per session: Not on file     Stress: Not on file   Relationships     Social connections:     Talks on phone: Not on file     Gets together: Not on file     Attends Confucianism service: Not on file     Active member of club or organization: Not on file     Attends meetings of clubs or organizations: Not on file     Relationship status: Not on file     Intimate partner violence:     Fear of current or ex partner: Not on file     Emotionally abused: Not on file     Physically abused: Not on file     Forced sexual activity: Not on file   Other Topics Concern     Parent/sibling w/ CABG, MI or angioplasty before 65F 55M? Yes     Comment: Father, about 50 yrs old.      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 She  is currently related. She lives with her SO and son. She has another son who is local.       Medications:  Current Outpatient Medications   Medication Sig Dispense Refill     buPROPion (WELLBUTRIN SR) 150 MG 12 hr tablet Take 1 tablet (150 mg) by mouth 2 times daily 180 tablet 3     carbidopa-levodopa (SINEMET)  MG tablet Take 2 tabs 5 times a day. 900 tablet 3     FLUoxetine (PROZAC) 20 MG capsule Take 3 capsules (60 mg) by mouth daily 90 capsule 11     mirabegron (MYRBETRIQ) 25 MG 24 hr tablet Take 1 tablet (25 mg) by mouth daily 30 tablet 3     Multiple Vitamins-Minerals (MULTIVITAMIN ADULT PO) Take 1 tablet by mouth daily       olmesartan (BENICAR) 40 MG tablet TAKE 1 TABLET BY MOUTH DAILY 30 tablet 7     rosuvastatin (CRESTOR) 10 MG tablet Take 1 tablet (10 mg) by mouth daily 30 tablet 7     senna (SENNA-LAX) 8.6 MG tablet Take 1 tablet by mouth daily          Movement Disorder-related Medications                   9 am 1130 am 3 pm 7 pm 10 pm   CD/LD 25/100 mg 2 2 2 2 2   Senna 1 tab every other day         Last taken at 12 pm       Allergies   Allergen Reactions     Betadine [Povidone Iodine] Hives     Iodine Hives     Iodine-131      No Clinical Screening - See Comments Swelling     Water softener salts     Povidone-Iodine Hives     Soap Hives     Perfumed soaps  Other reaction(s): Edema  Perfumed soaps - ?     Sodium Chloride Hives     Other reaction(s): Edema  Water softener salts       Physical Exam:  The patient's  blood pressure is 110/73 and her pulse is 95. Her respiration is 16 and oxygen saturation is 93%.    Physical Exam:  GENERAL: alert, active, attentive, appropriately groomed   HEENT: normocephalic, eyes open with no discharge, nares patent, oropharynx clear-no lesions  CHEST: normal configuration, chest rise equal b/l, non labored breathing   EXTREMITIES: no edema/cyanosis in BUE/BLE, distal pulses intact  PSYCH: mood melancholy, flat affect     Neurologic Exam:  MENTAL STATUS: Alert and  oriented to person, place, time, and situation. Follows commands. Recent and remote memory intact. Attention span and concentration intact.   SPEECH: verbal output mildly decreased, intact comprehension, hypophonic, mildly dysarthric   CN: visual fields intact in all fields, EOMIB except some decrease in upgaze,  PERRL, facial sensation intact, facial movement symmetric, hearing grossly intact to conversation, tongue protrudes midline   MOTOR: Moves all extremities equally against gravity  Involuntary movements: refer to UPDRS III  Agility: moderately bradykinetic with finger tapping with decreased amplitude at the beginning of the action, toe tapping markedly bradykinetic and reduced amplitude and slowing upon initiation of movement. Unable to perform figure 8s with BLE and moderate difficulty with performing HTS b/l consistent with apraxia.   SENSATION: intact to light touch throughout   COORDINATION: no dysmetria with FTN  GAIT: able to rise unassisted from a seated position with using her arms to lift herself up, unable to keep her stance without holding on arms of wheelchair, reports feeling frozen and unable to take a step without assistance     UPDRS III  UPDRS Values 1/10/2019 8/28/2019   Time: 11:21 AM 5:24 PM   Medication On On   R Brain DBS: None None   L Brain DBS: None None   Speech 1 1   Facial Expression 1 2   Rigidity Neck 1 1   Rigidity RUE 1 2   Rigidity LUE 1 1   Rigidity RLE 0 0   Rigidity LLE 0 0   Finger Taps R 2 3   Finger Taps L 4 3   Hand Mvt R 0 3   Hand Mvt L 0 3   Pron-/Supinate R 1 3   Pron-/Supinate L 1 3   Toe Tap R 3 3   Toe Tap L 3 3   Leg Agility R 1 3   Leg Agility L 1 3   Arise From Chair 1 3   Gait 4 4   Gait Freezing 4 4   Postural Stability 4 4   Posture 2 2   Global Spont Mvt 2 2   Postural Tremor RUE 0 1   Postural Tremor LUE 0 0   Kinetic Tremor RUE 0 0   Kinetic Tremor LUE 0 0   Rest Tremor RUE 0 0   Rest Tremor LUE 0 0   Rest Tremor RLE 0 0   Rest Tremor LLE 0 0   Rest  Tremor Lip/Jaw 0 0   Rest Tremor Constancy 0 0   Total Right 8 18   Total Left 10 16   Axial Total 20 23   Total 38 57       Impression:  Zoey Armstrong is a 69 year old female with atypical Parkinsonism here for follow up. Her main concern this visit is difficulty breathing in which patient describes difficulty with inspiratory pressure associated with paucity of breath for 1-2 seconds and gasping which eventually resolves after a few seconds. These occur spontaneously and no triggers have been identified. These events are concerning for stridor which would be treated with BIPAP.  also reports events of arm or leg moving and jerking while patient is sleeping, which is suspicious for periodic limb movements of sleep. Will refer to sleep medicine for a video sleep study to evaluate for stridor and potential BIPAP treatment and PLMS.     1. Atypical parkinsonism: Initially presented with gait instability and freezing of gait with susceptibility for falls that has progressed over the last several years. Despite treatment with levodopa 200 mg five times a day, she has not shown benefit.  Previous CT H in 11/2016 concerning for normal pressure hydrocephalus and is s/p large volume tap of 38mL with no improvement in gait. Patient seen by Dr. Cruz who did not suspect symptoms were associated with NPH. 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 The other consideration is PSP (early postural instability and freezing of gait).     2. Possible stridor:  This condition has been associated with MSA-P.  3. Possible periodic limb movements of sleep  4. Depression: Following with Psychiatry and Psychology, Dr. Sandy Mcfarlane clinical health psychologist.  5. Constipation  6. Hypophonia: No issues with communication at this time but notices a lower tone in her voice.  Discussed SLP [facility] program and she is currently not interested.   7. 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  - Patient will consider PT for treatment of apraxia and leg exercises on next visit  - Continue following with Psychiatry and Psychology, Dr. Sandy Mcfarlane clinical health psychology     RTC: 2 months    Heidi Melvin DO  Movement Disorders Fellow    Patient discussed with Dr. Benson.

## 2019-08-28 NOTE — PATIENT INSTRUCTIONS
Plan:  - MRI Brain   - Sleep study to [place] for stridor  - Follow PD regimen, described below:  Movement Disorder-related Medications                   9 am 1130 am 3 pm 7 pm 10 pm   CD/LD 25/100 mg 2 2 2 2 2     I will see you back in 2 months.

## 2019-10-08 NOTE — NURSING NOTE
Pt unable to void today.  Pt last voided approx 1.5-2 hours ago.  pvr by [id number]  Pt denies dysuria.  YADI Bocanegra CMA

## 2019-10-08 NOTE — LETTER
"10/8/2019       RE: Zoey Armstrong  [address]  [facility] [address]     Dear Colleague,    Thank you for referring your patient, Zoey Armstrong, to the [facility] [place] at [facility]. Please see a copy of my visit note below.    October 8, 2019    Return visit    Patient returns today for follow up after starting mirabegron 25mg.  She and her  think that she is about 25-30% better.   Only up once at night now.  She continues to have these occasional false alarms during the day. She denies any changes in her health since last visit.    /66   Pulse 94   Ht 1.651 m (5' 5\")   Wt 54.4 kg (120 lb)   LMP  (LMP Unknown)   SpO2 97%   BMI 19.97 kg/m     She is comfortable, in no distress, non-labored breathing.      Random bladder scan 102 mL by bladder scan    A/P: 69 year old F with urinary urgency, hesitancy, urgency incontinence in setting of atypical parkinsonism    Will try higher dose of the mirabegron    They will work to see if there are any triggers or patterns to the day time false alarms to see if there is a role for bladder training or other modifiable factors    RTC 2 months, sooner if needed    15 minutes were spent with the patient today, > 50% in counseling and coordination of care    Madiha Pete MD MPH   of Urology    CC  Patient Care Team:  Ramona Sullivan MD as PCP - General (Internal Medicine)  Jace Mart MD as MD (Neurology)  Dominga Ordoñez [facility] as Pharmacist (Pharmacist)              "

## 2019-10-08 NOTE — PROGRESS NOTES
"October 8, 2019    Return visit    Patient returns today for follow up after starting mirabegron 25mg.  She and her  think that she is about 25-30% better.   Only up once at night now.  She continues to have these occasional false alarms during the day. She denies any changes in her health since last visit.    /66   Pulse 94   Ht 1.651 m (5' 5\")   Wt 54.4 kg (120 lb)   LMP  (LMP Unknown)   SpO2 97%   BMI 19.97 kg/m    She is comfortable, in no distress, non-labored breathing.      Random bladder scan 102 mL by bladder scan    A/P: 69 year old F with urinary urgency, hesitancy, urgency incontinence in setting of atypical parkinsonism    Will try higher dose of the mirabegron    They will work to see if there are any triggers or patterns to the day time false alarms to see if there is a role for bladder training or other modifiable factors    RTC 2 months, sooner if needed    15 minutes were spent with the patient today, > 50% in counseling and coordination of care    Madiha Pete MD MPH   of Urology    CC  Patient Care Team:  Ramona Sullivan MD as PCP - General (Internal Medicine)  Jace Mart MD as MD (Neurology)  Ramona Sullivan MD as Assigned PCP  Dominga Ordoñez RPH as Pharmacist (Pharmacist)  Dominga Ordoñez RPH (Pharmacist)                "

## 2019-10-08 NOTE — PATIENT INSTRUCTIONS
"We will try the increased dose of mirabegron    Websites with free information:    [employer] patient website: www.voicesforpfd.org    Total Control Program: www.totalcontrolprogram.com    Try to see if there is a pattern when you get the \"false alarm urges.\"     Please return to see us in about 2 months, sooner if needed    It was a pleasure meeting with you today.  Thank you for allowing me and my team the privilege of caring for you today.  YOU are the reason we are here, and I truly hope we provided you with the excellent service you deserve.  Please let us know if there is anything else we can do for you so that we can be sure you are leaving completely satisfied with your care experience.    "

## 2019-10-24 NOTE — NURSING NOTE
Chief Complaint   Patient presents with     RECHECK     ump return movement disorder 3 mo f/u    Louisa Hammer cma

## 2019-10-24 NOTE — PROGRESS NOTES
Department of Neurology  Movement Disorders Division     Patient: Zoey Armstrong   MRN: [id number]   : 1950   Date of Visit: 10/24/2019     Reason for visit: follow up for atypical parkinsonism    History of Present Illness  Ms. 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 Patient was last seen on 2019 where the main concern was possible stridor and PLMS thus a sleep medicine referral was placed for a video sleep study to evaluate for stridor and potential BIPAP treatment and PLMS. MRI bran was also ordered as her previous MRI brain was several years ago.     History obtained from patient and accompanied by , Bobby. Patient reports that her voice is becoming softer, slower and she is beginning to slur her words more often, and this is patient's biggest concern.  Bobby states that he is concerned over patient's inability to move her legs at well.  He states that patient has difficulty uncrossing her legs which makes it difficult for him to perform cares such as using the restroom and clothing her.  Bobby says that patient prefers to cross her legs at her ankles and then her legs become locked up in this position.  She states that she has difficulty keeping herself in an upright position and feels as if she will fall if left unaided.  She states that she feels as if there is a disconnect between her brain and her legs, thus her legs do not move the way she wants them to.  She has a difficult time feeding herself now. She states that she has not been called by the schedulers for speech therapy and for sleep medicine.  She is also not completed her MRI brain yet.        Off Periods: Does not notice if the CD/LD are working. May miss 3-4 doses in the week but does not notice a difference in her symptoms.  Memory Problems: No issues.   Hallucinations: Denies.  Falls: Denies recent falls.    ADL's:   Needs helps with tranfers but able to bathe self and brush teeth and comb and dry hair on own.  Assistive Devices: Mostly uses wheelchair or lays in bed.    Constipation: Takes Miralax every other day which has helped decrease constipation. Having one BM every other day. Uses adult diapers.    Dysphagia: Not an issue.  Breathing: Still having the paucity of breathing lasting 2-3 seconds. Never has appeared hypoxic. Occurs 2-3 times a week, but inconsistent. No pattern. Unable to control and is unaware when it occurs.   Depression: Depression is unchanged. She is following with Psychology and Psychiatry, which has been helping. No thoughts or plan to harm self or others. Taking bupropion 150 mg SR bid. Will not be starting vilazodone as it was too expensive. She will remain on fluoxetine.    Anxiety:  Not an issue.   Sleeps: Sleeping a lot. Sleep at 9 pm, up by 9 am. Takes one nap in the day about 2 hours.   Insomnia: Not an issue.   REMSD: Denies.   RLS: Denies.    Impulsions: Denies.     Takes CD/LD 25/100 mg 2 tabs 5 times a day every 2-3 hours on non set times. Denies SEs to this med including hallucinations or dyskinesias.    Bobby continues to admit that he is capable of taking care of his wife on his own.  He states that his mood has been stable.    Review of Systems:  Other than that mentioned above, the remainder of 12 systems reviewed were negative.    PMH: unchanged  PSH: unchanged  FH: unchanged  SH: unchanged    Medications:  Current Outpatient Medications   Medication Sig Dispense Refill     buPROPion (WELLBUTRIN SR) 150 MG 12 hr tablet Take 1 tablet (150 mg) by mouth 2 times daily 180 tablet 3     carbidopa-levodopa (SINEMET)  MG tablet Take 2 tabs 5 times a day. 900 tablet 3     FLUoxetine (PROZAC) 20 MG capsule Take 3 capsules (60 mg) by mouth daily 90 capsule 11     mirabegron (MYRBETRIQ) 25 MG 24 hr tablet Take 1 tablet (25 mg) by mouth daily 30 tablet 3     mirabegron (MYRBETRIQ)  50 MG 24 hr tablet Take 1 tablet (50 mg) by mouth daily 30 tablet 3     Multiple Vitamins-Minerals (MULTIVITAMIN ADULT PO) Take 1 tablet by mouth daily       olmesartan (BENICAR) 40 MG tablet TAKE 1 TABLET BY MOUTH DAILY 30 tablet 7     polyethylene glycol (MIRALAX/GLYCOLAX) packet Take 1 packet by mouth daily       rosuvastatin (CRESTOR) 10 MG tablet Take 1 tablet (10 mg) by mouth daily 30 tablet 7           Movement Disorder-related Medications                   9 am  12 pm 3 pm 6 pm 9 pm   CD/LD 25/100 mg 2 2 2 2 2   Miralax 1 tablespoon  1           CD/LD last taken at 9 am.      Physical Exam:  The patient's  blood pressure is 106/69 and her pulse is 88. Her respiration is 16 and oxygen saturation is 97%.      Physical Exam:  GENERAL: alert, active, attentive, appropriately groomed   HEENT: normocephalic, eyes open with no discharge, nares patent, oropharynx clear-no lesions  CHEST: normal configuration, chest rise equal b/l, non labored breathing   EXTREMITIES: no edema/cyanosis in BUE/BLE, distal pulses intact  PSYCH: mood melancholy, flat affect      Neurologic Exam:  MENTAL STATUS: Alert and oriented to person, place, time, and situation. Follows commands. Recent and remote memory intact. Attention span and concentration intact.   SPEECH: verbal output mildly decreased, intact comprehension, hypophonic, mildly dysarthric   CN: visual fields intact in all fields, EOMIB except some decrease in upgaze,  PERRL, facial sensation intact, facial movement symmetric, hearing grossly intact to conversation, tongue protrudes midline   MOTOR: Moves all extremities equally against gravity  Involuntary movements: refer to UPDRS III  Agility: moderately bradykinetic with finger tapping with decreased amplitude at the beginning of the action, toe tapping markedly bradykinetic and reduced amplitude and slowing upon initiation of movement. Difficulty with performing figure 8s with BLE and HTS b/l, consistent with apraxia.    SENSATION: intact to light touch throughout   COORDINATION: no dysmetria with FTN  GAIT: able to rise unassisted from a seated position with using her arms to lift herself up, unable to keep her stance without holding on arms of wheelchair, reports feeling frozen and unable to take a step without assistance     UPDRS III  UPDRS Values 10/24/2019   Time: 11:15 AM   Medication On   R Brain DBS: None   L Brain DBS: None   Speech 2   Facial Expression 2   Rigidity Neck 2   Rigidity RUE 2   Rigidity LUE 1   Rigidity RLE 2   Rigidity LLE 2   Finger Taps R 3   Finger Taps L 3   Hand Mvt R 3   Hand Mvt L 3   Pron-/Supinate R 3   Pron-/Supinate L 3   Toe Tap R 4   Toe Tap L 4   Leg Agility R 4   Leg Agility L 3   Arise From Chair 4   Gait 4   Gait Freezing 4   Postural Stability 4   Posture 3   Global Spont Mvt 3   Postural Tremor RUE 0   Postural Tremor LUE 0   Kinetic Tremor RUE 0   Kinetic Tremor LUE 0   Rest Tremor RUE 0   Rest Tremor LUE 0   Rest Tremor RLE 0   Rest Tremor LLE 0   Rest Tremor Lip/Jaw 0   Rest Tremor Constancy 0   Total Right 21   Total Left 19   Axial Total 28   Total 68   Previous 57    Impression:  Zoey Armstrong is a 69 year old female with atypical Parkinsonism here for follow up, likely MSA-P. Her main concern this visit is dysphonia and  is concerned over patient's inability to volitionally move her legs. Exam reveals apraxia of BLE R > L and UPDRS III reveals progressive worsening. Patient's mood also continues to be depressed, though, her  is a wonderful support and caregiver.     1. 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 She has also developed urinary hesitation, urgency and possible stridor, which mostly coincides with the diagnosis of MSA-P. Despite treatment with levodopa 200 mg five times a day, she has not shown benefit.  Previous CT H  in 11/2016 concerning for normal pressure hydrocephalus and is s/p large volume tap of 38mL with no improvement in gait. Patient seen by Dr. Cruz who did not suspect symptoms were associated with NPH. 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     2. Possible stridor:  This condition has been associated with MSA-P.  3. Possible periodic limb movements of sleep  4. Hypophonia: Having difficulty with communicating at this time and notices a lower tone in her voice. Discussed SLP L[facility] program and she is interested but in an in-home version.  5. Depression: Following with Psychiatry and Psychology, Dr. Sandy Mcfarlane clinical health psychologist.  6. Constipation: controlled on Miralax  7. 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 Urinary incontinence/urgency     Plan:   - No changes to PD meds: CD/LD refilled   Movement Disorder-related Medications                   9 am  12 pm 3 pm 6 pm 9 pm   CD/LD 25/100 mg 2 2 2 2 2   Miralax 1 tablespoon  1           - We will contact Dr. Corrales to schedule your sleep evaluation.  - Referrals to  Home health for home assessment of assistive devices and, PT, OT, SLP to evaluate and treat issues associate with difficulty with BUE (eating) and BLE (requires assistance with all transfers, standing, difficulty with volitional movement of BLE, apraxia in BLE) and dysphonia.  - Filled out form for handicap parking.  - Advised go to the Emergency Department for evaluation if you experience paucity of breathing associated with discoloration of the face, prolonged apneic spell, loss of consciousness, confusion.   - Continue following with Urology for urinary incontinence/urgency.     Will return to our clinic in 3 months or sooner as needed.    Heidi Melvin DO  Movement Disorders Fellow      Patient seen and discussed with Dr. Mart.     The patient was seen with the fellow. I was present for key portions of the history and physical examination and  agree with the assessment and plan.    Jace Mart MD, PhD

## 2019-10-24 NOTE — TELEPHONE ENCOUNTER
[facility] Call Center    Phone Message    May a detailed message be left on voicemail: yes    Reason for Call: Other: Frieda, nurse from  Home care called and would like a confirmation that there is no referral for outpatient treatment due to the home care being able to provide that for pt. Please call back Frieda per Frieda request of confirmation. Thanks.     Action Taken: Message routed to:  [facility] ([facility] Neuro

## 2019-10-24 NOTE — PATIENT INSTRUCTIONS
Plan:   - No changes to PD meds: CD/LD refilled   Movement Disorder-related Medications                   9 am  12 pm 3 pm 6 pm 9 pm   CD/LD 25/100 mg 2 2 2 2 2   Miralax 1 tablespoon  1           - We will contact Dr. Corrales to schedule your sleep evaluation.  - Speech therapy referral to evaluate and treat your soft voice.  - Referrals to  Home health, PT, OT for treatment and home assessment.  - Filled out form for handicap parking.  - Please go to the Emergency Department for evaluation if you experience paucity of breathing associated with discoloration of the face, prolonged apneic spell, loss of consciousness, confusion.   - Continue following with Urology for urinary incontinence/urgency.     We will see you back in 3 months.

## 2019-10-24 NOTE — LETTER
10/24/2019       RE: Zoey Armstrong  [address]  [place] [address]     Dear Colleague,    Thank you for referring your patient, Zoey Armstrong, to the [facility] NEUROLOGY at [facility]. Please see a copy of my visit note below.    Department of Neurology  Movement Disorders Division     Patient: Zoey Armstrong   MRN: [id number]   : 1950   Date of Visit: 10/24/2019     Reason for visit: follow up for atypical parkinsonism    History of Present Illness  Ms. 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  Patient was last seen on 2019 where the main concern was possible stridor and PLMS thus a sleep medicine referral was placed for a video sleep study to evaluate for stridor and potential BIPAP treatment and PLMS.  MRI bran was also ordered as her previous MRI brain was several years ago.     History obtained from patient and accompanied by , Bobby. Patient reports that her voice is becoming softer, slower and she is beginning to slur her words more often, and this is patient's biggest concern.  Bobby states that he is concerned over patient's inability to move her legs at well.  He states that patient has difficulty uncrossing her legs which makes it difficult for him to perform cares such as using the restroom and clothing her.  Bobby says that patient prefers to cross her legs at her ankles and then her legs become locked up in this position.  She states that she has difficulty keeping herself in an upright position and feels as if she will fall if left unaided.  She states that she feels as if there is a disconnect between her brain and her legs, thus her legs do not move the way she wants them to.  She has a difficult time feeding herself now. She states that she has not been called by the schedulers for speech therapy and for sleep  medicine.  She is also not completed her MRI brain yet.        Off Periods: Does not notice if the CD/LD are working. May miss 3-4 doses in the week but does not notice a difference in her symptoms.  Memory Problems: No issues.   Hallucinations: Denies.  Falls: Denies recent falls.   ADL's:   Needs helps with tranfers but able to bathe self and brush teeth and comb and dry hair on own.  Assistive Devices: Mostly uses wheelchair  or lays in bed.    Constipation: Takes Miralax every other day which has helped decrease  constipation. Having one BM every other day. Uses adult diapers.    Dysphagia: Not an issue.  Breathing: Still having the paucity of breathing lasting 2-3 seconds. Never has appeared hypoxic. Occurs 2-3 times a week, but inconsistent. No pattern. Unable to control and is unaware when it occurs.   Depression: Depression is unchanged. She is following with Psychology and Psychiatry, which has been helping. No thoughts or plan to harm self or others. Taking bupropion 150 mg SR bid. Will not be starting vilazodone  as it was too expensive. She will remain on fluoxetine.    Anxiety:  Not an issue.   Sleeps: Sleeping a lot. Sleep at 9 pm, up by 9 am. Takes one nap in the day about 2 hours.   Insomnia: Not an issue.   REMSD: Denies.   RLS: Denies.    Impulsions: Denies.     Takes CD/LD 25/100 mg 2 tabs 5 times a day every 2-3 hours on non set times. Denies SEs to this med including hallucinations or dyskinesias.    Bobby continues to admit that he is capable of taking care of his wife on his own.  He states that his mood has been stable.    Review of Systems:  Other than that mentioned above, the remainder of 12 systems reviewed were negative.    PMH: unchanged  PSH: unchanged  FH: unchanged  SH: unchanged    Medications:  Current Outpatient Medications   Medication Sig Dispense Refill     buPROPion (WELLBUTRIN SR) 150 MG 12 hr tablet Take 1 tablet (150 mg) by mouth 2 times daily 180 tablet 3      carbidopa-levodopa (SINEMET)  MG tablet Take 2 tabs 5 times a day. 900 tablet 3     FLUoxetine (PROZAC) 20 MG capsule Take 3 capsules (60 mg) by mouth daily 90 capsule 11     mirabegron (MYRBETRIQ) 25 MG 24 hr tablet Take 1 tablet (25 mg) by mouth daily 30 tablet 3     mirabegron (MYRBETRIQ) 50 MG 24 hr tablet Take 1 tablet (50 mg) by mouth daily 30 tablet 3     Multiple Vitamins-Minerals (MULTIVITAMIN ADULT PO) Take 1 tablet by mouth daily       olmesartan (BENICAR) 40 MG tablet TAKE 1 TABLET BY MOUTH DAILY 30 tablet 7     polyethylene glycol (MIRALAX/GLYCOLAX) packet Take 1 packet by mouth daily       rosuvastatin (CRESTOR) 10 MG tablet Take 1 tablet (10 mg) by mouth daily 30 tablet 7           Movement Disorder-related Medications                   9 am  12 pm 3 pm 6 pm 9 pm   CD/LD 25/100 mg 2 2 2 2 2   Miralax 1 tablespoon  1           CD/LD last taken at 9 am.      Physical Exam:  The patient's  blood pressure is 106/69 and her pulse is 88. Her respiration is 16 and oxygen saturation is 97%.      Physical Exam:  GENERAL: alert, active, attentive, appropriately groomed   HEENT: normocephalic, eyes open with no discharge, nares patent, oropharynx clear-no lesions  CHEST: normal configuration, chest rise equal b/l, non labored breathing   EXTREMITIES: no edema/cyanosis in BUE/BLE, distal pulses intact  PSYCH: mood melancholy, flat affect      Neurologic Exam:  MENTAL STATUS: Alert and oriented to person, place, time, and situation. Follows commands. Recent and remote memory intact. Attention span and concentration intact.   SPEECH: verbal output mildly decreased, intact comprehension, hypophonic, mildly dysarthric   CN: visual fields intact in all fields, EOMIB except some decrease in upgaze,  PERRL, facial sensation intact, facial movement symmetric, hearing grossly intact to conversation, tongue protrudes midline   MOTOR: Moves all extremities equally against gravity  Involuntary movements: refer to UPDRS  III  Agility: moderately bradykinetic with finger tapping with decreased amplitude at the beginning of the action, toe tapping markedly bradykinetic and reduced amplitude and slowing upon initiation of movement. Difficulty with performing figure 8s with BLE and HTS b/l, consistent with apraxia.   SENSATION: intact to light touch throughout   COORDINATION: no dysmetria with FTN  GAIT: able to rise unassisted from a seated position with using her arms to lift herself up, unable to keep her stance without holding on arms of wheelchair, reports feeling frozen and unable to take a step without assistance     UPDRS III  UPDRS Values 10/24/2019   Time: 11:15 AM   Medication On   R Brain DBS: None   L Brain DBS: None   Speech 2   Facial Expression 2   Rigidity Neck 2   Rigidity RUE 2   Rigidity LUE 1   Rigidity RLE 2   Rigidity LLE 2   Finger Taps R 3   Finger Taps L 3   Hand Mvt R 3   Hand Mvt L 3   Pron-/Supinate R 3   Pron-/Supinate L 3   Toe Tap R 4   Toe Tap L 4   Leg Agility R 4   Leg Agility L 3   Arise From Chair 4   Gait 4   Gait Freezing 4   Postural Stability 4   Posture 3   Global Spont Mvt 3   Postural Tremor RUE 0   Postural Tremor LUE 0   Kinetic Tremor RUE 0   Kinetic Tremor LUE 0   Rest Tremor RUE 0   Rest Tremor LUE 0   Rest Tremor RLE 0   Rest Tremor LLE 0   Rest Tremor Lip/Jaw 0   Rest Tremor Constancy 0   Total Right 21   Total Left 19   Axial Total 28   Total 68   Previous 57    Impression:  Zoey Armstrong is a 69 year old female with atypical Parkinsonism here for follow up, likely MSA-P. Her main concern this visit is dysphonia and  is concerned over patient's inability to volitionally move her legs. Exam reveals apraxia of BLE R > L and UPDRS III reveals progressive worsening. Patient's mood also continues to be depressed, though, her  is a wonderful support and caregiver.     1. 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 She has also developed urinary hesitation, urgency and possible stridor, which mostly coincides with the diagnosis of MSA-P. Despite treatment with levodopa 200 mg five times a day, she has not shown benefit.  Previous CT H in 11/2016 concerning for normal pressure hydrocephalus and is s/p large volume tap of 38mL with no improvement in gait. Patient seen by Dr. Cruz who did not suspect symptoms were associated with NPH. 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     2. Possible stridor:  This condition has been associated with MSA-P.  3. Possible periodic limb movements of sleep  4. Hypophonia:  Having difficulty with communicating at this time and notices a lower tone in her voice. Discussed SLP [facility] program and she is interested  but in an in-home version.  5. Depression: Following with Psychiatry and Psychology, Dr. Sandy Mcfarlane clinical health psychologist.  6. Constipation: controlled on Miralax  7. 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 Urinary incontinence/urgency     Plan:   - No changes to PD meds: CD/LD refilled   Movement Disorder-related Medications                   9 am  12 pm 3 pm 6 pm 9 pm   CD/LD 25/100 mg 2 2 2 2 2   Miralax 1 tablespoon  1           - We will contact Dr. Corrales to schedule your sleep evaluation.  - Referrals to  Home health for home assessment of assistive devices and, PT, OT, SLP to evaluate and treat issues associate with difficulty with BUE (eating) and BLE (requires assistance with all transfers, standing, difficulty with volitional movement of BLE, apraxia in BLE) and dysphonia.  - Filled out form for handicap parking.  - Advised go to the Emergency Department for evaluation if you experience paucity of breathing associated with discoloration of the face, prolonged apneic spell, loss of consciousness, confusion.   - Continue following with Urology for urinary  incontinence/urgency.     Will return to our clinic in 3 months or sooner as needed.    Heidi Melvin, DO  Movement Disorders Fellow     The total time spent with the patient was 30 minutes, and greater than 50% of this time was spent in counseling and coordination of care.    Patient seen and discussed with Dr. Mart.     Again, thank you for allowing me to participate in the care of your patient.      Sincerely,    Jace Mart MD

## 2019-10-25 NOTE — TELEPHONE ENCOUNTER
Called Frieda, nurse from  Home care, and she wanted to clarify to start home care PT, OT and SLP on 10/28, per patient's preference, and we discussed that this is acceptable. No other questions.    Heidi Melvin,   Movement Disorders Fellow  [facility]

## 2019-10-28 NOTE — TELEPHONE ENCOUNTER
Spoke with pts  this morning to confirm first visit for home care. Per spouse he caught a cold and is refusing anyone to come into the home right now. Would like to delay SOC until Friday Nov 1. Need new order for home care for start date of Nov 1 2019.     JOIE Cisse

## 2019-10-31 NOTE — TELEPHONE ENCOUNTER
Dr. Sullivan  Attempted to schedule [facility] admission assessment with this patient and spouse is refusing assessment for the second time. Spouse would like to move visit to Monday 11/4/19. Spouse sounds uncertain in regards of accepting services. Will have our clinical coordinator call to discuss services further, however episode will be closed if he refuses further services.  Thank you.  Collette Weaver RN, BSN  [email or web address]  [phone]

## 2019-11-04 NOTE — PROGRESS NOTES
"Dr. Sullivan,  We have tried since 10\24 to get a nurse out to see Zoey for homecare assessment.  Her  has not been open to having someone out, he has been feeling poorly himself with a cold.  He keeps saying that he has a \"sore throat  and runny nose, so he does not want anyone to come out again this week.  Can you call me back next week\".     Writer has closed this current episode, and requested that he let your office know when he is willing to accept a homecare nurse visit for assessment.    Thank you for this referral,    Frieda Blackwell Rn,BSN  Clinical Coordinator  [facility]  [phone]  "

## 2019-11-08 NOTE — TELEPHONE ENCOUNTER
"Requested Prescriptions   Pending Prescriptions Disp Refills     olmesartan (BENICAR) 40 MG tablet 90 tablet      Sig: Take 1 tablet (40 mg) by mouth daily       Angiotensin-II Receptors Passed - 11/8/2019 11:52 AM        Passed - Last blood pressure under 140/90 in past 12 months     BP Readings from Last 3 Encounters:   10/24/19 106/69   10/08/19 110/66   08/28/19 110/73                 Passed - Recent (12 mo) or future (30 days) visit within the authorizing provider's specialty     Patient has had an office visit with the authorizing provider or a provider within the authorizing providers department within the previous 12 mos or has a future within next 30 days. See \"Patient Info\" tab in inbasket, or \"Choose Columns\" in Meds & Orders section of the refill encounter.              Passed - Medication is active on med list        Passed - Patient is age 18 or older        Passed - No active pregnancy on record        Passed - Normal serum creatinine on file in past 12 months     Recent Labs   Lab Test 02/12/19  1027   CR 0.93             Passed - Normal serum potassium on file in past 12 months     Recent Labs   Lab Test 02/12/19  1027   POTASSIUM 4.3                    Passed - No positive pregnancy test in past 12 months        Medication is being filled for 1 time refill only due to:  Patient needs to be seen because due for follow up end of January.    "

## 2019-11-13 NOTE — PROGRESS NOTES
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   She is followed at the [facility] where she was last seen in 2019. The biggest concern at that time was with airway control, with worsening dysarthria and hypophonia. At that visit she denied hallucinations, impulsiveneess, restless leg symptoms, and dysphagia. She endorsed sleeping quite a bit, from 9 PM to 9 AM, with about a 2-hour nap during the day.    Zoey presents today with her .  The big issue that they are concerned about is her having apneic episodes while she is awake.  For the past few months she has been having sudden episodes where, while she is trying to breathe in, she is suddenly obstructed and unable to breathe.  She sounds like she will gasp for air and this will subside after a few seconds and she will then be able to breathe normally.  She does not turn blue or gray in the face, doesn't lose consciousness.  Her and her  frankly deny any apneic episodes while she is asleep, she doesn't snore nor does she make stridorous sounds.  She denies waking up with morning headaches, but does endorse waking up with a dry mouth (her  says that she sleeps with her mouth open).   Zoey enies akathisia.  Her  endorses occasional limb movements that could be considered physiologic myoclonus, but there can be a few of these within a minute which might be concerning for periodic limb movements.  These movements do not bother the  patient or her .   Zoey does not have nightmares, night terrors, hallucinations nor does she have trouble distinguishing reality.  She does not sleep walk nor did she before her atypical parkinsonism.  She denies episodes of cataplexy, no sleep paralysis.  She does not engage in dream enactment currently nor has she in the past.  She endorses loss of smell and taste over the past 6 months.  She has also had worsening constipation the past few months, resolved with daily Miralax.   Zoey typically goes to bed at 9 PM and wakes around 9 AM getting 12 hours of sleep routinely.  She says that her sleep is restful.  He takes for about 10 minutes to fall asleep.  She wakes up maybe 1 or 2 times a night and is quickly able to fall asleep afterward.  She takes about a 2-hour nap nearly every day and is rested afterward.  During the day she is mainly sedentary watching TV or reading books.  Caffeinated beverages primarily in the morning.  Her [place] Sleepiness Scale score is 4 with a slight chance of dozing during passive activities.    Daytime sleepiness or fatigue: Yes  Difficulty falling asleep: No  Difficulty staying asleep: No  Restless legs symptoms: No  Caffeine use: In the morning  Medication use: No aid to fall asleep    STOP-BANG  Snoring: No  Tired/Fatigued during the day: No  Observed apnea/choking: No  (Blood) pressure elevation: No  BMI > 35: No  Age > 50 years: Yes  Neck circumference (M 43cm, F 41cm): not measured but likely < 41cm  Gender (male): No    ASSESSMENT AND PLAN:  Zoey Armstrong is a 69-year-old female with a diagnosis of atypical parkinsonism presenting for evaluation of breathing difficulty and possible periodic limb movements.   She does not really endorse sleep disordered breathing, rather she describes a breathing issue related to being awake.  Rather than a typical obstructive sleep apnea, this sounds possibly like a floppy epiglottis, which can sometimes be seen in atypical  "parkinsonism.  Her presentation does not fit the picture of a central sleep apnea.   She has been choking on solid foods and has experienced weight loss over the past few months, and in conjunction with the breathing dysfunction might lend support to an epiglottal issue, so she should be evaluated ASAP by ENT.   She does not suffer from REM sleep behavior disorder as some patients with atypical parkinsonism can.  Additionally, she denies akathisia that would suggest restless leg syndrome.  Her last ferritin level was 226 (H) in 2011.  It is possible she has a mild periodic limb movement disorder which we can evaluate with a PSG, but regardless these movements don't bother her or her .    Plan:  -comprehensive sleep study  -ENT referral for nasal endoscopy to evaluate epiglottis and laryngeal dysfunction  -consider SLP referral after ENT evaluation for feeding/breathing difficulties  -RTC after PSG    The plan was formulated with Dr. Antony.    Andres Barbour MD  Neuromodulation Fellow  Department of Neurology  [facility]    Sleep Staff Addendum  I have seen and evaluated the patient today with Dr Barbour and agree with the documentation.      MEHRDAD ANTONY MD         PHYSICAL EXAMINATION:  Vitals: /74   Pulse 80   Resp 16   Ht 1.651 m (5' 5\")   Wt 54.4 kg (120 lb)   LMP  (LMP Unknown)   SpO2 97%   BMI 19.97 kg/m    General: appears stated age, no apparent distress  HEENT: Harrison classification 1  Cardiac: normotensive  Pulmonary: non-labored on room air  Psych: cooperative, appropriate  Neuro:   Mental status: alert; language is fluent with intact comprehension   Cranial nerves: PERRL, EOMI with intact smooth pursuit, very mild impairment in upgaze, full visual fields, no facial asymmetry or ptosis, facial sensation intact and symmetric, hearing intact to conversation, moderate hypophonia and dysarthria   Motor: rigidity in all limbs, L arm is flexed at the elbow with L hand " contracted into fist, L leg is crossed over R and rigid; 5/5    Reflexes: not tested   Sensory: Intact to light touch in all extremities   Coordination: severely bradykinetic in all limbs   Gait: deferred    INVESTIGATIONS:  No recent MRI for review. No previous sleep study.    REVIEW OF SYSTEMS: 12-point RoS negative except as per HPI.    ALLERGIES:  Allergies   Allergen Reactions     Betadine [Povidone Iodine] Hives     Iodine Hives     Iodine-131      No Clinical Screening - See Comments Swelling     Water softener salts     Povidone-Iodine Hives     Soap Hives     Perfumed soaps  Other reaction(s): Edema  Perfumed soaps - ?     Sodium Chloride Hives     Other reaction(s): Edema  Water softener salts      MEDICATIONS:  Current Outpatient Medications   Medication Sig Dispense Refill     buPROPion (WELLBUTRIN SR) 150 MG 12 hr tablet Take 1 tablet (150 mg) by mouth 2 times daily 180 tablet 3     carbidopa-levodopa (SINEMET)  MG tablet Take 2 tabs 5 times a day. 900 tablet 3     FLUoxetine (PROZAC) 20 MG capsule Take 3 capsules (60 mg) by mouth daily 90 capsule 11     mirabegron (MYRBETRIQ) 25 MG 24 hr tablet Take 1 tablet (25 mg) by mouth daily 30 tablet 3     mirabegron (MYRBETRIQ) 50 MG 24 hr tablet Take 1 tablet (50 mg) by mouth daily 30 tablet 3     Multiple Vitamins-Minerals (MULTIVITAMIN ADULT PO) Take 1 tablet by mouth daily       olmesartan (BENICAR) 40 MG tablet Take 1 tablet (40 mg) by mouth daily 90 tablet 0     polyethylene glycol (MIRALAX/GLYCOLAX) packet Take 1 packet by mouth daily       rosuvastatin (CRESTOR) 10 MG tablet Take 1 tablet (10 mg) by mouth daily 30 tablet 7     PAST MEDICAL HISTORY:  Past Medical History:   Diagnosis Date     Back pain      CAD (coronary artery disease)      COPD (chronic obstructive pulmonary disease) (H) 02/06/2016     Depression      Depressive disorder Approx 15 yrs     Falls      Hyperlipidemia      Hypertension      Mumps      Parkinsons disease (H)       Smoker      PAST SURGICAL HISTORY:  Past Surgical History:   Procedure Laterality Date     ANGIOGRAM  10-    Moderate non-obstructive coronary disease involving all 3 vessels. LAD and RCA lesions are non-flow-limiting. Recommend aggressive risk factor management and possible pulmonary evaluation for dyspnea     ARTHROPLASTY TOE(S) Right 2/24/2016    Procedure: ARTHROPLASTY TOE(S);  Surgeon: Levar Matias DPM;  Location: [facility]     ARTHROTOMY SHOULDER, ROTATOR CUFF REPAIR, COMBINED Right 7/12/2016    Procedure: COMBINED ARTHROTOMY SHOULDER, ROTATOR CUFF REPAIR;  Surgeon: Reggie Cisse MD;  Location: [facility] OR     AS LUMBAR SPINE FUSION,ANTER APPRCH      L2-S1     BIOPSY  December 2016    Breast cancer     BREAST SURGERY  December 2016    Lumpectomy     HEAD & NECK SURGERY      Jaw surgery     LUMPECTOMY BREAST, SEED LOCALIZATION, SENTINEL NODE Right 12/2/2016    Procedure: LUMPECTOMY BREAST, SEED LOCALIZATION, SENTINEL NODE;  Surgeon: Tiffanie Cabrera MD;  Location:  OR     ORTHOPEDIC SURGERY  1994    left foot surgery / bunion     OSTEOTOMY FOOT Right 2/24/2016    Procedure: OSTEOTOMY FOOT;  Surgeon: Levar Matias DPM;  Location: [facility]     RECONSTRUCT FOREFOOT WITH METATARSOPHALANGEAL (MTP) FUSION Right 2/24/2016    Procedure: RECONSTRUCT FOREFOOT WITH METATARSOPHALANGEAL (MTP) FUSION;  Surgeon: Levar Matias DPM;  Location: [facility]     REMOVE HARDWARE FOOT Right 1/18/2017    Procedure: REMOVE HARDWARE FOOT;  Surgeon: Levar Matias DPM;  Location: [facility] OR     SOFT TISSUE SURGERY  July 2016/Dec 2016    Rotator cuff repair surgery / Lumpectomy     FAMILY HISTORY:  Family History   Problem Relation Age of Onset     Breast Cancer Mother      C.A.D. Father         MI     Lipids Father      Coronary Artery Disease Father      Hyperlipidemia Father      Breast Cancer Other         maternal aunt     Cancer Other         skin     Migraines Cousin      Coronary Artery Disease Cousin       Multiple Sclerosis Paternal Uncle      Breast Cancer Other      SOCIAL HISTORY:  Social History     Socioeconomic History     Marital status:      Spouse name: Not on file     Number of children: Not on file     Years of education: Not on file     Highest education level: Not on file   Occupational History     Not on file   Social Needs     Financial resource strain: Not on file     Food insecurity:     Worry: Not on file     Inability: Not on file     Transportation needs:     Medical: Not on file     Non-medical: Not on file   Tobacco Use     Smoking status: Former Smoker     Packs/day: 0.50     Years: 30.00     Pack years: 15.00     Types: Cigarettes     Start date: 1970     Last attempt to quit: 7/15/2015     Years since quittin.3     Smokeless tobacco: Never Used     Tobacco comment: Non-smoker   Substance and Sexual Activity     Alcohol use: Yes     Alcohol/week: 0.0 standard drinks     Comment: Occasional cocktail/glass of wine     Drug use: No     Sexual activity: Yes     Partners: Male     Birth control/protection: Post-menopausal   Lifestyle     Physical activity:     Days per week: Not on file     Minutes per session: Not on file     Stress: Not on file   Relationships     Social connections:     Talks on phone: Not on file     Gets together: Not on file     Attends Hoahaoism service: Not on file     Active member of club or organization: Not on file     Attends meetings of clubs or organizations: Not on file     Relationship status: Not on file     Intimate partner violence:     Fear of current or ex partner: Not on file     Emotionally abused: Not on file     Physically abused: Not on file     Forced sexual activity: Not on file   Other Topics Concern     Parent/sibling w/ CABG, MI or angioplasty before 65F 55M? Yes     Comment: Father, about 50 yrs old.      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 She is currently related. She lives with her SO and son. She has another son who is local.     This note was written with the assistance of the Dragon voice-dictation technology software. The final document, although reviewed, may contain errors. For corrections, please contact the office.

## 2019-11-25 NOTE — TELEPHONE ENCOUNTER
Health Call Center    Phone Message    May a detailed message be left on voicemail: yes    Reason for Call: Order(s): Home Care Orders: Other: Previous home care orders have .  Could a new order be sent to both the previous agency and [facility] Fax [phone].       Reason for Call: Medication Question or concern regarding medication   Prescription Request  Name of Medication: Pain Medication Request  Prescribing Provider: Dr. Mart or Dr. Melvin   Pharmacy: [facility] in [place] [address]   Request? Anything to assist Zoey with the pain in her legs and limbs.  She is experiencing quit a bit of pain.          Action Taken: Message routed to:  [facility] neurology

## 2019-11-26 NOTE — TELEPHONE ENCOUNTER
"Called patient and spoke to her , Bobby. He reports that Zoey is \"not doing very well.\" She has not appetite and is not eating. She has difficulty swallowing foods and has been choking. She was evaluated by Dr. Mahmood, sleep medicine, and this was thought to be due to a floppy epiglottis and referred to ENT for nasal endoscopy to evaluate epiglottis and laryngeal dysfunction ASAP. Bobby says he has not heard from them and he was encouraged to call the clinic to schedule this appointment. She will be having a sleep study also.  described that patient complains of pain in her legs which ache despite whatever position she is in. Denies edema, erythema. Patient was treated with liquid tylenol with some relief.  describes that Zoey's legs lock up and it is difficult for her to move them. Suspect that patient is experiencing dystonia (documented in previous notes) with associated pain from inactivity. We discussed continuing the tylenol as needed, but not to exceed the recommended dose, and starting a small dose of baclofen. We discussed SEs of this medication which include sleepiness and fatigue. All other questions and concerns were addressed.    Plan:  - They will schedule the ENT evaluation ASAP.   - Continue Tylenol prn.  - Start baclofen to goal dose of 2.5 mg bid.   - Start 2.5 mg at bedtime x 7 days then increase to 2.5 mg bid, if able to tolerate.    - SEs discussed.    Heidi Melvin, DO  Movement Disorders Fellow  [facility]        "

## 2019-11-29 NOTE — TELEPHONE ENCOUNTER
Patient's  called and stated that his wife has been referred to ENT Clinic by Dr. Mahmood, and can be reached at [phone] for scheduling.

## 2019-11-29 NOTE — TELEPHONE ENCOUNTER
Returned patient's 's call. I informed him that I have forwarded the referral on to our clinical team to advise for scheduling.    Informed him that once they receive the message, I will see to giving them a call back to get scheduled per their recommendations. Informed him that this may be some time next week as none of our providers are in clinic today due to holiday. Patient's  stated understanding.

## 2019-12-02 NOTE — TELEPHONE ENCOUNTER
FUTURE VISIT INFORMATION      FUTURE VISIT INFORMATION:    Date: 12/13/19    Time: 12:20PM    [place] [facility] [place]  REFERRAL INFORMATION:    Referring provider:  Marquez Mahmood MD    Referring providers clinic:   [facility]     Reason for visit/diagnosis : Nasal endoscopy to evaluate epiglottis and laryngeal dysfunction     RECORDS REQUESTED FROM:       Clinic name Comments Records Status Imaging Status    [facility]  11/13/19 notes with Dr Mahmood EPIC

## 2019-12-03 NOTE — TELEPHONE ENCOUNTER
"Requested Prescriptions   Pending Prescriptions Disp Refills     rosuvastatin (CRESTOR) 10 MG tablet [Pharmacy Med Name: ROSUVASTATIN 10MG TABLETS] 30 tablet 0     Sig: TAKE 1 TABLET(10 MG) BY MOUTH DAILY       Statins Protocol Passed - 11/30/2019 12:33 PM        Passed - LDL on file in past 12 months     Recent Labs   Lab Test 02/12/19  1027   LDL 79             Passed - No abnormal creatine kinase in past 12 months     No lab results found.             Passed - Recent (12 mo) or future (30 days) visit within the authorizing provider's specialty     Patient has had an office visit with the authorizing provider or a provider within the authorizing providers department within the previous 12 mos or has a future within next 30 days. See \"Patient Info\" tab in inbasket, or \"Choose Columns\" in Meds & Orders section of the refill encounter.              Passed - Medication is active on med list        Passed - Patient is age 18 or older        Passed - No active pregnancy on record        Passed - No positive pregnancy test in past 12 months          "

## 2019-12-06 NOTE — TELEPHONE ENCOUNTER
[facility] Call Center    Phone Message    May a detailed message be left on voicemail: yes    Reason for Call: Other: Bobby calling to request updated HomeCare orders be sent. Bobby says the recent orders that were sent were ones that were cancelled in November, and the HomeCare workers won't accpet those orders. Bobby is hoping new HomeCare orders can be reissued soon. Please give Bobby a call back with any questions regarding this request.     Action Taken: Message routed to:  [facility]

## 2019-12-09 NOTE — TELEPHONE ENCOUNTER
Previsit Call Details  Reason for previsit call: eCheck-in Assistance  Number of days until appointment: 5  Call attempt number: 1  Call outcome: Unsuccessful - Patient Unavailable  Notes: LM for patient requesting she go [facility] to [facility] [employer] and complete check in and questioners associate with visit to allow for provider to review responses prior to appointment and allow more time with provider during visit.

## 2019-12-09 NOTE — TELEPHONE ENCOUNTER
I replaced the referrals to  Home health for home assessment of assistive devices and, PT, OT, ST to evaluate and treat issues associate with difficulty with BUE (eating) and BLE (requires assistance with all transfers, standing, difficulty with volitional movement of BLE, apraxia in BLE) and dysphonia. For some reason the previous orders were cancelled.     Heidi Melvin, DO  Movement Disorders Fellow  [facility]

## 2019-12-11 NOTE — TELEPHONE ENCOUNTER
Health Call Center    Phone Message    May a detailed message be left on voicemail: yes    Reason for Call: Order(s): Home Care Orders: Physical Therapy (PT): Patient is requesting that start of care be postponed.  Home care requesting new start of care order for physical therapy.  Start date Friday, December 13, 2019.  Verbal ok .  Provider: Dr. Mart/Dr. Melvin  Action Taken: Message routed to:  [facility] neurology

## 2019-12-12 NOTE — TELEPHONE ENCOUNTER
"Situation:  Zoey Armstrong is a 69 year old female who receives support for Multiple System Atrophy. Zoey's spouse Bobby calls today with concerns for extreme pain.    Background:    11/25/2019 - Telephone call with Dr. Melvin:   described that patient complains of pain in her legs which ache despite whatever position she is in. Denies edema, erythema. Patient was treated with liquid tylenol with some relief.  describes that Zoey's legs lock up and it is difficult for her to move them. Suspect that patient is experiencing dystonia (documented in previous notes) with associated pain from inactivity. We discussed continuing the tylenol as needed, but not to exceed the recommended dose, and starting a small dose of baclofen.    Patient is taking:  Baclofen 5 MG TABS 30 tablet 3 11/26/2019  --   Sig - Route: Take 2.5 mg by mouth See Admin Instructions Take 2.5 mg at bedtime for 7 days. If able to tolerate, increase to 2.5 mg twice a day. - Oral     carbidopa-levodopa (SINEMET)  MG tablet 900 tablet 3 10/24/2019  No   Sig: Take 2 tabs 5 times a day.     Assessment:  Bobby stated her pain is everywhere/all over her body. This started 2-3 weeks ago in her legs and has been getting worse. Her legs lock up and her ankles cross over each other. It is difficult to get her ankles uncrossed and is painful.    She took advil PM this morning and this has helped. Last night was a bad night for her and Bobby gave her oxycodone to help.  Bobby stated the Baclofen did not help.    Bobby later stated in the conversation that she does not scream but she moans when he moves her and says \"god help me.\" He needs to move her because she is incontinent and needs cleaning. Bobby stated she is also no longer able to hold a fork or feed herself. He would really like to get her to the ENT appointment tomorrow for her trouble and pain with swallowing. He does not think she is to the point where she needs to be seen in " the emergency room.    Abilio questions:  What else can we do to help with the pain?  Is her ankles locking up part of the Parkinson's disease?  Is her through issues from the Parkinson's disease?    Education:  1. As Dr. Melvin discussed with you it sounds like you are describing Dystonia and this can be a symptom of Parkinson's disease.    2. Trouble swallow can be from Parkinson's disease as well. I am not sure about the pain however.    Plan:  1. If pain gets worse/unbearable and she is screaming in pain take her to the emergency room    2. I will let Dr. Melvin know what is going on.

## 2019-12-12 NOTE — TELEPHONE ENCOUNTER
Health Call Center    Phone Message    May a detailed message be left on voicemail: yes    Reason for Call: Symptoms or Concerns     If patient has red-flag symptoms, warm transfer to triage line    Current symptom or concern: Severe pain.  Pt's  Bobby called to tell Dr. Mart pt has been in extreme pain.  When Bobby tried to move her last night she screamed in pain, he gave her an Oxycontin he had left from a surgery and it helped.  Bobby would like a call about this pain today    Symptoms have been present for:  2 day(s)    Has patient previously been seen for this? Yes    By Dr. Mart    Date: Today    Are there any new or worsening symptoms? Yes:       Action Taken: Message routed to:  [facility] Neuro

## 2019-12-12 NOTE — TELEPHONE ENCOUNTER
Elicia stated they had already been given the okay to start physical therapy on 12/13. No further action needed.

## 2019-12-12 NOTE — PROGRESS NOTES
I returned phone call to care coordinator letting her know I had received a verbal order from Dr. Melvin that it is okay to postpone PT treatment to start on 12/13/19.     Ana SALTER

## 2019-12-13 NOTE — LETTER
2019       RE: Zoey Armstrong  [address]  [facility] [address]     Dear Colleague,    Thank you for referring your patient, Zoey Armstrong, to the [facility] EAR NOSE AND THROAT at [facility]. Please see a copy of my visit note below.             [facility]   at the [facility]   Otolaryngology Clinic     Patient: Zoey Armstrong    MRN: [id number]    : 1950    Age/Gender: 69 year old female  Date of Service: 2019  Rendering Provider:   Jessica Gaines MD       Referring Provider   PCP: Ramona Sullivan    Referring Physician:   Andres Barbour MD  [facility]  [id number]  [place] [address]    Reason for Consultation   Dysphagia  Dysphonia  Dypsnea    History     HISTORY OF PRESENT ILLNESS:   I was asked to consult on Zoey Armstrong, by Marquez Mahmood for evaluation of epiglottic and laryngeal dysfunction    She comes here today with her  who provides the history. 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 Symptoms have been going on for a long time and it took a while to reach the diagnosis. Things have worsened in the past few months. She is here because she has short episodes when she appears to not be breathing that are self resolving. These are very concerning to the family though the  reassures them this self resolves. She denies snoring and apneic episodes at night. She was first referred to sleep and now here given daytime symptoms Additionally he reports her swallowing is very impaired. She only takes a few bits and then stops eating. He has not seen her choke or cough with food. She has been losing weight. He denies PNAs. Lastly he reports her voice is weak and worsening. She cannot keep a conversation. She is very tired and lethargic. She has severe rigidity in her body. He also states that she points to her stomach and states she has pain. They have not looked  into this yet. She does not have noisy breathing when these episodes happen. They have been going on for a weeks now.     PAST MEDICAL HISTORY:   Past Medical History:   Diagnosis Date     Back pain      CAD (coronary artery disease)      COPD (chronic obstructive pulmonary disease) (H) 02/06/2016     Depression      Depressive disorder Approx 15 yrs     Falls      Hyperlipidemia      Hypertension      Mumps      Parkinsons disease (H)      Smoker          PAST SURGICAL HISTORY:   Past Surgical History:   Procedure Laterality Date     ANGIOGRAM  10-    Moderate non-obstructive coronary disease involving all 3 vessels. LAD and RCA lesions are non-flow-limiting. Recommend aggressive risk factor management and possible pulmonary evaluation for dyspnea     ARTHROPLASTY TOE(S) Right 2/24/2016    Procedure: ARTHROPLASTY TOE(S);  Surgeon: Levar Matias DPM;  Location: [facility]     ARTHROTOMY SHOULDER, ROTATOR CUFF REPAIR, COMBINED Right 7/12/2016    Procedure: COMBINED ARTHROTOMY SHOULDER, ROTATOR CUFF REPAIR;  Surgeon: Reggie Cisse MD;  Location:  OR     AS LUMBAR SPINE FUSION,ANTER APPRCH      L2-S1     BIOPSY  December 2016    Breast cancer     BREAST SURGERY  December 2016    Lumpectomy     HEAD & NECK SURGERY      Jaw surgery     LUMPECTOMY BREAST, SEED LOCALIZATION, SENTINEL NODE Right 12/2/2016    Procedure: LUMPECTOMY BREAST, SEED LOCALIZATION, SENTINEL NODE;  Surgeon: Tiffanie Cabrera MD;  Location:  OR     ORTHOPEDIC SURGERY  1994    left foot surgery / bunion     OSTEOTOMY FOOT Right 2/24/2016    Procedure: OSTEOTOMY FOOT;  Surgeon: Levar Matias DPM;  Location: [facility]     RECONSTRUCT FOREFOOT WITH METATARSOPHALANGEAL (MTP) FUSION Right 2/24/2016    Procedure: RECONSTRUCT FOREFOOT WITH METATARSOPHALANGEAL (MTP) FUSION;  Surgeon: Levar Matias DPM;  Location: [facility]     REMOVE HARDWARE FOOT Right 1/18/2017    Procedure: REMOVE HARDWARE FOOT;  Surgeon: Levar Matias DPM;   Location: [facility] OR     SOFT TISSUE SURGERY  July 2016/Dec 2016    Rotator cuff repair surgery / Lumpectomy         CURRENT MEDICATIONS:   Current Outpatient Medications:      Baclofen 5 MG TABS, Take 2.5 mg by mouth See Admin Instructions Take 2.5 mg at bedtime for 7 days. If able to tolerate, increase to 2.5 mg twice a day., Disp: 30 tablet, Rfl: 3     buPROPion (WELLBUTRIN SR) 150 MG 12 hr tablet, Take 1 tablet (150 mg) by mouth 2 times daily, Disp: 180 tablet, Rfl: 3     FLUoxetine (PROZAC) 20 MG capsule, Take 3 capsules (60 mg) by mouth daily, Disp: 90 capsule, Rfl: 11     mirabegron (MYRBETRIQ) 50 MG 24 hr tablet, Take 1 tablet (50 mg) by mouth daily, Disp: 30 tablet, Rfl: 3     olmesartan (BENICAR) 40 MG tablet, Take 1 tablet (40 mg) by mouth daily, Disp: 90 tablet, Rfl: 0     polyethylene glycol (MIRALAX/GLYCOLAX) packet, Take 1 packet by mouth daily, Disp: , Rfl:      rosuvastatin (CRESTOR) 10 MG tablet, TAKE 1 TABLET(10 MG) BY MOUTH DAILY, Disp: 30 tablet, Rfl: 2     carbidopa-levodopa (SINEMET)  MG tablet, Take 2 tabs 5 times a day. (Patient not taking: Reported on 12/13/2019), Disp: 900 tablet, Rfl: 3     mirabegron (MYRBETRIQ) 25 MG 24 hr tablet, Take 1 tablet (25 mg) by mouth daily, Disp: 30 tablet, Rfl: 3     Multiple Vitamins-Minerals (MULTIVITAMIN ADULT PO), Take 1 tablet by mouth daily, Disp: , Rfl:   No current facility-administered medications for this visit.       ALLERGIES: Betadine [povidone iodine]; Iodine; Iodine-131; No clinical screening - see comments; Povidone-iodine; Soap; and Sodium chloride      SOCIAL HISTORY:    Social History     Socioeconomic History     Marital status:      Spouse name: Not on file     Number of children: Not on file     Years of education: Not on file     Highest education level: Not on file   Occupational History     Not on file   Social Needs     Financial resource strain: Not on file     Food insecurity:     Worry: Not on file     Inability: Not on  file     Transportation needs:     Medical: Not on file     Non-medical: Not on file   Tobacco Use     Smoking status: Former Smoker     Packs/day: 0.50     Years: 30.00     Pack years: 15.00     Types: Cigarettes     Start date: 1970     Last attempt to quit: 7/15/2015     Years since quittin.4     Smokeless tobacco: Never Used     Tobacco comment: Non-smoker   Substance and Sexual Activity     Alcohol use: Yes     Alcohol/week: 0.0 standard drinks     Comment: Occasional cocktail/glass of wine     Drug use: No     Sexual activity: Yes     Partners: Male     Birth control/protection: Post-menopausal   Lifestyle     Physical activity:     Days per week: Not on file     Minutes per session: Not on file     Stress: Not on file   Relationships     Social connections:     Talks on phone: Not on file     Gets together: Not on file     Attends Scientologist service: Not on file     Active member of club or organization: Not on file     Attends meetings of clubs or organizations: Not on file     Relationship status: Not on file     Intimate partner violence:     Fear of current or ex partner: Not on file     Emotionally abused: Not on file     Physically abused: Not on file     Forced sexual activity: Not on file   Other Topics Concern     Parent/sibling w/ CABG, MI or angioplasty before 65F 55M? Yes     Comment: Father, about 50 yrs old.      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 She lives with her SO and son. She has  another son who is local.           FAMILY HISTORY: Non-contributory for problems with anesthesia      REVIEW OF SYSTEMS:   The patient was asked a 14 point review of systems regarding constitutional symptoms, eye symptoms, ears, nose, mouth, throat symptoms, cardiovascular symptoms, respiratory symptoms, gastrointestinal symptoms, genitourinary symptoms, musculoskeletal symptoms, integumentary symptoms, neurological symptoms, psychiatric symptoms, endocrine symptoms, hematologic/lymphatic symptoms, and allergic/ immunologic symptoms.   The pertinent factors have been included in the HPI and below.  Patient Supplied Answers to Review of Systems  [facility] ENT ROS 12/12/2019   Constitutional Weight loss, Appetite change   Neurology Numbness   Psychology Frequently feeling depressed or sad   Ears, Nose, Throat Trouble swallowing   Cardiopulmonary Cough, Breathing problems, Chest pain   Gastrointestinal/Genitourinary Heartburn/indigestion   Musculoskeletal Sore or stiff joints   Hematologic Easy bruising   Endocrine Thirst           Physical Examination     The patient underwent a physical examination as described below. The pertinent positive and negative findings are summarized after the description of the examination.    Constitutional: The patient's developmental and nutritional status was assessed. The patient's voice quality was assessed.  Head and Face: The head and face were inspected for deformities. The sinuses were palpated. The salivary glands were palpated. Facial muscle strength was assessed bilaterally.  Eyes: Extraocular movements and primary gaze alignment were assessed.  Ears, Nose, Mouth and Throat: The ears and nose were examined for deformities. The nasal septum, mucosa, and turbinates were inspected by anterior rhinoscopy. The lips, teeth, and gums were examined for abnormalities. The oral mucosa, tongue, palate, tonsils, lateral and posterior pharynx were inspected for the presence of asymmetry or  mucosal lesions.    Neck: The tracheal position was noted, and the neck mass palpated to determine if there were any asymmetries, abnormal neck masses, thyromegally, or thyroid nodules.  Respiratory: The nature of the breathing and chest expansion/symmetry was observed.  Cardiovascular: The patient was examined to determine the presence of any edema or jugular venous distension.  Abdomen: The contour of the abdomen was noted.  Lymphatic: The patient was examined for infraclavicular lymphadenopathy.  Musculoskeletal: The patient was inspected for the presence of skeletal deformities.  Extremities: The extremities were examined for any clubbing or cyanosis.  Skin: The skin was examined for inflammatory or neoplastic conditions.  Neurologic: The patient's orientation, mood, and affect were noted. The cranial nerve  functions were examined.    Other pertinent positive and negative findings on physical examination:    masked facies  Anterior rhinoscopy: no lesions  OC/OP: no lesions, uvula midline, soft palate elevates symmetrically, FOM/BOT soft  Neck: no lesions, no TH tenderness to palpation  Difficult to initiate movement    All other physical examination findings were within normal limits and noncontributory.    Procedures   Video Laryngoscopy with Stroboscopy (CPT 76610) and Behavioral & Qualitative Evaluation of Voice and Resonence     Preoperative Diagnosis:  Dysphonia and throat symptoms  Postoperative Diagnosis: Dysphonia and throat symptoms  Indication:  Patient has new or persistent dysphonia and throat symptoms.  This requires evaluation by stroboscopy to fully delineate the laryngeal functioning; especially mucosal wave assessment, ultrasharp visualization of lesions on the vocal folds, and overall functioning of the larynx.  Details of Procedure: A 70 degree rigid telescopic laryngoscope or a distal chip flexible scope was used. The lighting was obtained via a light cable connected to a stroboscopic unit.  The telescope was inserted either transorally or transnasally until the vocal folds could be visualized. The patient was instructed to sustain the vowel  ee  at a comfortable pitch and loudness as the voice was monitored by a microphone connected to a fundamental frequency tracker. This circuit tracked vocal periodicity, allowing the light to flash in synchrony with the glottal cycles. A setting on the stroboscope was set to change the phase of light strobing with relation to the vocal fundamental frequency, producing an image of 1 to 2 glottal cycles every second. The video images were recorded for analysis. Use of the variable speed, slow and stop scan allowed careful study of pertinent segments of laryngeal function to increase accuracy of clinical assessments of function and dysfunction.  In particular, features of glottal closure, mucosal wave on the vocal fold cover and laryngeal symmetry were analyzed. Lastly, the patient was asked to phonate speech samples and auditory/perceptual evaluation of voice and resonance were performed.  The vocal quality was carefully evaluated for hoarseness, breathiness, loudness, phrase length and intelligibility to determine the source of dysphonia.    Findings:   A. BEHAVIORAL & QUALITATIVE EVALUATION OF VOICE AND RESONENCE   Comments: F0 200 MPT: 1 s  Vocal Quality: breathy    Pitch Range:  Severely reduced   Phrase Length:  Severely reduced  Vocal Loudness: Severely reduced  Dysarthria: No    B. LARYNGOVIDEOSTROBOSCOPY   Anatomic/Physiological Deviations:  Presbylarynx, mucus over vocal fold, short period of adduction  unuable to visualize mucosal wave because not enough breath support and adduction to allow for vibration  Mucosal wave: Right:  unable to visualize      Left: Unable to visualize  Bilateral Vocal Fold Vibration:n/a  Vocal Process: Right: No restriction    Left:  No restriction  Vocal Fold closure: Complete glottal closure    Complication(s): None  Disposition:  Patient tolerated the procedure well                    Fiberoptic Endoscopic Evaluation of Swallowing (CPT 94396)  and Interpretation of Swallowing (CPT 16084)    Indications: See above notes for complete history and physical. Patient complains of dysphagia to both solids and liquids and/or there is suggestion on history and endoscopic exam of the presence of dysphagia causing medical complaints.  Swallowing evaluation is being performed to assess the presence and degree of dysphagia, and to recommend a safe diet.     Pulmonary Status:  No PNA   Current Diet:              regular                                        thin liquids      Consistency Amounts:  Thin Liquid: 1 tsp   Puree: 1 tsp  Solid: none            Positioning: upright in a chair  Oral Peripheral Exam: See physical exam section.  Anatomic Notes: See Videostroboscopy report for assessment of anatomy and laryngeal functioning  Pharyngeal secretions prior to administration of liquid or food: No  Oral Phase Abnormal Findings: Slow oral phase  Behavioral Adaptations: No abnormal behavior observed  Pharyngeal Phase Abnormal Findings: delayed pharyngeal response, vallecular residue, pharyngeal wall residue, did not self initiate repeat dry swallow to clear pharyngeal residue  and no penetration, no aspiration      Recommended Diet:  NPO                                        with tube feeds for nutrition and pleasure feeding with small bites and water follow throughs      Review of Relevant Clinical Data     Notes:   Marquez Mahmood 11/13/19  Jace Mart 10/24/19    Radiology: none    Pathology:   12/2/16  FINAL DIAGNOSIS:   A. Lymph node and lymphatic, right axillary/sentinel node #1, biopsy:   - No evidence of malignancy (0/1).     B. Breast, right/lower outer quadrant, radioactive seed localization   lumpectomy:     -Tumor Site:  Lower outer quadrant.     -Tumor Type: Infiltrating ductal carcinoma with mucinous features (mixed   carcinoma:  Infiltrating ductal carcinoma ,nos - mucinous carcinoma).     - Marielena Grade:  Histologic grade 1     - Marielena Score (tubule formation + nuclear pleomorphism + mitotic   activity): 2 + 2 + 1= score 5.   -Invasive Tumor Size:  1.9 cm.   -In-Situ Tumor:   Ductal carcinoma in situ, cribriform and micropapillary, with mucinous   features, low nuclear grade.     Lobular carcinoma in situ, classic, with involvement of intraductal   papilloma.     -Lymph-Vascular Invasion:  Not identified.     -Tumor Extent (Skin/Skeletal Muscle Involvement): No skin or skeletal   muscle present for evaluation.     -MARGINS: Negative.   -Invasive Carcinoma:  Margins negative for invasive carcinoma.   Invasive carcinoma at 0.3 cm from lateral margin, 0.3 cm from anterior   margin, 0.5 cm from medial margin, 0.9 cm from inferior margin and 1 cm   or greater from all other margins.     -In-situ Carcinoma:  Margins negative for ductal carcinoma in situ.   Ductal carcinoma in situ at 0.2 cm from medial margin, 0.3 cm from   lateral margin, 0.5 cm from anterior margin and 1 cm or greater from all   other margins.     -LYMPH NODES:   -Total # of Lymph Nodes Examined:  1 (SN).   -Number of Lymph Nodes with metastatic tumor: 0   -Number with Macrometastases:  0   -Number with Micrometastases:  0   -Number with Isolated Tumor Cell Clusters:  0   -Size of Largest Metastatic Deposit:  N/A.   -Extranodal Extension:  N/A.     Procedures: none    Labs:  Lab Results   Component Value Date    TSH 2.72 12/13/2019     Lab Results   Component Value Date     (L) 12/13/2019    CO2 23 12/13/2019    BUN 31 (H) 12/13/2019    PHOS 2.2 (L) 12/13/2019     Lab Results   Component Value Date    WBC 7.0 07/13/2017    HGB 10.2 (L) 07/13/2017    HCT 31.2 (L) 07/13/2017    MCV 96 07/13/2017     (H) 07/13/2017     Lab Results   Component Value Date    PTT 31 09/29/2015    INR 0.99 06/05/2017     No results found for: BIGG  No components found for:  "RHEUMATOIDFACTOR,  RF  No results found for: CRP  No components found for: CKTOT, URICACID  No components found for: C3, C4, DSDNAAB, NDNAABIFA  No results found for: MPOAB    Patient reported Quality of Life (QOL) Measures     Patient Supplied Answers To VHI Questionnaire  Voice Handicap Index (VHI-10) 12/13/2019   My voice makes it difficult for people to hear me 4   People have difficulty understanding me in a noisy room 4   My voice difficulties restrict my personal and social life.  4   I feel left out of conversations because of my voice 4   My voice problem causes me to lose income 4   I feel as though I have to strain to produce voice 4   The clarity of my voice is unpredictable 4   My voice problem upsets me 4   My voice makes me feel handicapped 4   People ask, \"What's wrong with your voice?\" 2   VHI-10 38         Patient Supplied Answers To EAT Questionnaire  Eating Assessment Tool (EAT-10) 12/13/2019   My swallowing problem has caused me to lose weight 4   My swallowing problem interferes with my ability to go out for meals 4   Swallowing liquids takes extra effort 4   Swallowing solids takes extra effort 4   Swallowing pills takes extra effort 4   Swallowing is painful 3   The pleasure of eating is affected by my swallowing 3   When I swallow food sticks in my throat 4   I cough when I eat 3   Swallowing is stressful 4   EAT-10 37         Patient Supplied Answers To CSI Questionnaire  Cough Severity Index (CSI) 12/13/2019   My cough is worse when I lie down 2   My coughing problem causes me to restrict my personal and social life 4   I tend to avoid places because of my cough problem 4   I feel embarrassed because of my coughing problem 4   People ask, ''What's wrong?'' because I cough a lot 2   I run out of air when I cough 3   My coughing problem affects my voice 2   My coughing problem limits my physical activity 3   My coughing problem upsets me 3   People ask me if I am sick because I cough a lot 2 "   CSI Score 29     Reflux Symptom Index  Within the last month, how did the following problems affect the patient?  0 = no problem; 5= severe problem  Hoarseness or a problem with your voice 5   Clearing your throat  4   Excess throat mucous or postnasal drip 0   Difficulty swallowing food, liquid or pills 5   Coughing after you ate or after lying down  2   Breathing difficulties or choking episodes 5   Troublesome or annoying cough 4   Sensations of something sticking in your throat or a lump in your throat  4   Heartburn, chest pain, indigestion, or stomach acid coming up 3         Total: 32    Impression & Plan     IMPRESSION: Ms. Armstrong is a 69 year old female who is being seen for the followin. Dyspnea  - episodes of dyspnea due to loss of tone and posterior tongue base collapse. No epiglottic dysfunction seen on exam, no paroxysmal vocal fold motion. However multiple times on exam base of tongue would collapse and it would take a few seconds to move given rigidity. There is no stridor associated with these episodes therefore therapy for paroxysmal vocal fold motion is unclear if it would provide benefit.  - unclear what intervention would be useful other than encouraging her to be active and engage  - she is very lethargic and weak during today's office visit and tires easily   - I don't think these episodes are life threatening given that she is able to self resolve them  - additionally resection of the epiglottis would result in aspiration in her case given her pharyngeal weakness  - if these episodes do increase in frequency and result in hypoxia then a trach would be indicated, this was not mentioned to the family today    2. Dysphagia  - with difficulty maintain weight and even weight loss per   - FEES in clinic today demonstrates slow oral phase, no penetration or aspiration with small boluses, there is mild to moderate pharyngeal residue and with mulitple boluses there is likely  penetration and even aspiration   - only liquids and puree tried today, no solid cookie consistency  -she has diminished laryngeal sensation therefore cough reflex would not kick in which was explained to her   - discussed consideration of PEG tube given weight loss. Described procedure, time course and need for nutrition afterwards to find best tube feed and toleration  - she also complains of stomach pain therefore both can be addressed at the time of PEG evaluation  - will discuss with her neurologist to find the appropriate expedited referral  - she is in the process of changing PCP  - additionally they report very limited caloric intake therefore will check today a basic chemistry panel, vitamin levels. Albumin will be added on to the labs  - would like to proceed with Xray Video Swallow Exam with esophagram however I think given findings on FEES proceeding with PEG tube should take precedence and swallow xray can be used afterwards to determine what pleasure foods can be used safely.  - good oral hygiene    3. Dysphonia  - due to presbylarynx and atypical parkinsonism which prevents her from foreful closure of her vocal fold  - big and loud voice therapy would be very useful however this only when she has improved her nutritional status. Today she tires very easily and would be hard to participate in therapy  - follow up in 3 months for voice evaluation and will provider referral at that time.     RETURN VISIT: follow up 3 months, or earlier as needed.     Thank you for the kind referral and for allowing me to share in the care of Ms. Armstrong. If you have any questions, please do not hesitate to contact me.        Jessica Gaines MD    of Otolaryngology - Head and Neck Surgery   Voice, Airway, and Swallowing Disorders   [facility] at the [facility] [address] [address]  Phone: [phone]  Fax:  [phone]    [address] [address]  Phone: [phone]  Fax: [phone]     CC:   Marquez Mahmood, Jace Eden             Again, thank you for allowing me to participate in the care of your patient.      Sincerely,    Jessica Pinto MD

## 2019-12-13 NOTE — PATIENT INSTRUCTIONS
"_ Swabs above can be useful for helping to clean the mouth, for oral moisture, and to provide \"pleasure feedings\" (ie: small amounts of water, milk, soda, if safe, for some taste)    For helping with oral moisture    "

## 2019-12-13 NOTE — PATIENT INSTRUCTIONS
You were seen in the ENT Clinic today by Dr. Gaines  If you have any questions or concerns after your appointment, please call   -  ENT Clinic: [phone] scheduling option 1 and nurse advice option 3.    -  Recommendations: We will call you with results and recommendations after labs and video swallow.   - Patient can have small (a few bites) pleasure feedings with water in between bits.   - Will consider Speech therapy for voice in 3 -4 months, after nutrition has been well maintained via feeding tube.   - Flu shot given today         Thank you for choosing [facility] and allowing us to be apart of your care team!  Hoa Beltran LPN        Patient Education     Modified Barium Swallow  A modified barium swallow (also called a video fluoroscopic swallowing exam) is a test that checks your ability to swallow different consistencies of fluids and solid materials. It also helps in planning treatment, if needed. During the test, a substance called barium is mixed with a variety of materials that you will swallow. The material mixed with barium lets the healthcare providers see the parts involved with swallowing (the tongue, mouth, throat, and esophagus) clearly on X-rays. The test is needed if you have problems swallowing and are at risk of choking or food or liquid going into the lungs (aspiration). It is also needed if you have a feeding tube and your healthcare provider wants to check whether you can go back to eating by mouth.  What happens before the test?    Let your healthcare provider know of any medicines you re taking. This includes vitamins, herbs, and over-the-counter medicines. You may be told to stop certain medicines in the days before the test.    Stop eating and drinking 4 hours before the test.    Follow any other instructions given by your healthcare provider.  Let the technologist know  For your safety, let the technologist know if you:    Are taking any medicines    Had recent X-rays or tests  involving barium    Had recent surgery    Have other health problems, especially those affecting the lungs.    Have any allergies    Are pregnant or may be pregnant   What happens during the test?  The test takes place in an X-ray department. It s done by a speech therapist or feeding specialist. These people are trained to help you with swallowing or feeding problems. A radiology technologist is also present. A radiologist will be present as well. A radiologist is a doctor who specializes in diagnosis of disease and injury by interpreting medical imaging such as X-rays.    You ll likely be seated in a special chair that is next to an X-ray table. The X-ray table is set upright.    You re given tiny amounts of foods and liquids of different textures to swallow. This may include thin liquids (such as water) or thick liquids (such as milk). You may also be given soft foods (such as pudding). The foods and liquids contain a small amount of barium.    The speech pathologist or feeding specialist watches your swallowing. You re observed carefully for signs of problems as each food or liquid is swallowed. If problems do occur as you re swallowing, steps are quickly taken to treat these problems.     An X-ray video is also taken as each food or liquid is swallowed. The barium in the foods and liquids shows up clearly on the video.  What happens after the test?    You may become constipated after the test. This is due to the barium. If you can drink liquids, drinking water may help to prevent this constipation.    Your stool will appear chalky white or light for 1 to 2 days. This is a sign of the barium passing from your system.  When to call your healthcare provider  Call your healthcare provider if:    You have severe constipation.    You do not have signs of the barium passing (white or light stools) within 24 hours.  Follow-up care  The X-ray video and notes from the test are studied by the healthcare providers that  were present for your test. These results are then discussed with your main healthcare provider. Your provider will meet with you to go over the results. You ll be advised about what foods or liquids are safe for you. In severe cases of swallowing disorder, it won t be possible for you to eat or drink safely. This means you ll need a feeding tube if you don t already have one. Your healthcare provider can tell you more about this, if needed.  What are the risks and complications?  Risks and possible complications of a modified barium swallow include:    Liquid going into the lungs (aspiration)    Radiation exposure from X-rays    Blockage of the bowel due to retained barium    Allergic reaction to the barium   Date Last Reviewed: 2/1/2017 2000-2018 [employer]. [address] [place] [address], [place] [id number]. All rights reserved. This information is not intended as a substitute for professional medical care. Always follow your healthcare professional's instructions.           Patient Education     Deciding About Artificial Feeding   When you have a serious illness, your healthcare provider will review treatment choices with you as your illness progresses. Some of these treatments help support or sustain life if your body can no longer perform certain functions on its own. Artificial feeding is one such treatment. It supplies artificial nutrition to your body if you can no longer take in food by mouth. This sheet tells you more about artificial feeding and what you need to know when deciding about this treatment.     You and your loved ones can meet with the health care provider and health care team to learn more about artificial feeding and what it means for you.   How is artificial feeding given?  Artificial feeding can be given in a number of ways. Each way involves the use of a tube to send liquid food to the body. Some types of tubes include:    Nasogastric (NG) tube. This tube is placed through the  nose and down into the stomach. It sends liquid food directly to the stomach.    Gastrostomy tube (G-tube) or percutaneous endoscopic gastrostomy tube (PEG tube). This tube is placed through a small hole in the stomach. It sends liquid food directly into the stomach.    IV tube. This tube is placed into a vein. It sends liquid food directly into the blood vessels.  What are the risks of artificial feeding?  Risks can include bleeding or infection at the tube site and problems with the tube. Pneumonia, a life-threatening condition, can result when artificial nutrition goes into the lungs instead of the stomach.  What happens if I choose to have artificial feeding?  You will receive artificial nutrition to help your body function. This may help you feel better and improve your quality of life. If you are near the end of your life, you may find it hard to tolerate the problems that can happen with the treatment. In this case, your healthcare provider may recommend against artificial feeding if it is too much of a burden on your body, or if it will not prolong life or provide symptom relief.   What happens if I choose not to have artificial feeding?  You will continue to receive comfort care. This includes measures to relieve pain and other symptoms. If you can still chew or swallow, you may be offered food for pleasure. This is done by spoon feeding or careful hand feeding. If you cannot take in any food by mouth and choose not to have artificial feeding, your body will slowly shut down. Death will likely happen within a few days or weeks. You may find it reassuring to know that most patients near the end of death do not typically feel hunger or thirst. Dry mouth is a more common problem. This can be relieved by keeping the lips and mouth moist. Ice chips and small sips of water can also be given, if desired.  How do I decide if I want artificial feeding?  Your healthcare provider and other members of your healthcare  team can tell you more about artificial feeding and what it means for you. If you want, you may include family and friends in these discussions. As you make your decision, here are some things to think about or ask your health care team:    Will my illness improve? Or will it worsen? How likely is a cure?    How will artificial feeding affect my health? Will having the treatment change the outcome of my illness?    What are the risks and benefits of artificial feeding? And what problems can it cause? Will I be able to live with these problems?    How will artificial feeding affect my comfort and quality of life?    How long will I have to have the feeding tube for?     Feeding tubes for patients with advanced dementia are no longer recommended due to pain and suffering that does not change the inevitable progression of the disease.  Consider your own values or taye. Also ask for advice from those who share your values.  Note: If you re having trouble deciding about artificial feeding, ask your healthcare provider if you can try it for a short time to see if it helps you feel better. When the trial is done, you may then choose whether to continue or stop the treatment.   How do I state my decision about artificial feeding?  You can make your decision known by telling your healthcare provider directly. It is best to also put your treatment wishes in writing with advance directives. These are legal forms related to healthcare decisions. Laws about advance directives vary from state to state. Ask your healthcare provider about what forms are needed to make sure your wishes will be followed. Some common forms include:     A durable power of  for healthcare or health care proxy form. This form allows you to name a person to make treatment decisions on your behalf when you can t. This person is often called a healthcare proxy, medical or healthcare power of , surrogate decision maker, or agent.    A living  will. This form tells others the kinds of treatment you want or don t want when you become too ill or injured to speak for yourself.  Keep in mind that you can change or cancel an advance directive at any time. Make it a practice to review your decisions each time there is a change in your health or goals of care. Also be sure to tell your healthcare proxy and loved ones of any changes in your decisions.  Deciding about artificial feeding for a loved one  Ideally, the decision about artificial feeding is made with the patient s consent. But in some cases, the decision may fall to the patient s healthcare proxy or other adult. If you need to decide about artificial feeding for a loved one, start by talking to his or her healthcare provider. Discuss the goals of care and the benefits and burdens of the treatment on your loved one s health. Also think about your loved one s wishes and values. If needed, seek advice from other healthcare team members, like a  or .   Date Last Reviewed: 4/1/2017 2000-2018 [employer]. [address] [place] [address]. All rights reserved. This information is not intended as a substitute for professional medical care. Always follow your healthcare professional's instructions.           Patient Education     Deciding About Artificial Feeding   When you have a serious illness, your healthcare provider will review treatment choices with you as your illness progresses. Some of these treatments help support or sustain life if your body can no longer perform certain functions on its own. Artificial feeding is one such treatment. It supplies artificial nutrition to your body if you can no longer take in food by mouth. This sheet tells you more about artificial feeding and what you need to know when deciding about this treatment.     You and your loved ones can meet with the health care provider and health care team to learn more about  artificial feeding and what it means for you.   How is artificial feeding given?  Artificial feeding can be given in a number of ways. Each way involves the use of a tube to send liquid food to the body. Some types of tubes include:    Nasogastric (NG) tube. This tube is placed through the nose and down into the stomach. It sends liquid food directly to the stomach.    Gastrostomy tube (G-tube) or percutaneous endoscopic gastrostomy tube (PEG tube). This tube is placed through a small hole in the stomach. It sends liquid food directly into the stomach.    IV tube. This tube is placed into a vein. It sends liquid food directly into the blood vessels.  What are the risks of artificial feeding?  Risks can include bleeding or infection at the tube site and problems with the tube. Pneumonia, a life-threatening condition, can result when artificial nutrition goes into the lungs instead of the stomach.  What happens if I choose to have artificial feeding?  You will receive artificial nutrition to help your body function. This may help you feel better and improve your quality of life. If you are near the end of your life, you may find it hard to tolerate the problems that can happen with the treatment. In this case, your healthcare provider may recommend against artificial feeding if it is too much of a burden on your body, or if it will not prolong life or provide symptom relief.   What happens if I choose not to have artificial feeding?  You will continue to receive comfort care. This includes measures to relieve pain and other symptoms. If you can still chew or swallow, you may be offered food for pleasure. This is done by spoon feeding or careful hand feeding. If you cannot take in any food by mouth and choose not to have artificial feeding, your body will slowly shut down. Death will likely happen within a few days or weeks. You may find it reassuring to know that most patients near the end of death do not typically  feel hunger or thirst. Dry mouth is a more common problem. This can be relieved by keeping the lips and mouth moist. Ice chips and small sips of water can also be given, if desired.  How do I decide if I want artificial feeding?  Your healthcare provider and other members of your healthcare team can tell you more about artificial feeding and what it means for you. If you want, you may include family and friends in these discussions. As you make your decision, here are some things to think about or ask your health care team:    Will my illness improve? Or will it worsen? How likely is a cure?    How will artificial feeding affect my health? Will having the treatment change the outcome of my illness?    What are the risks and benefits of artificial feeding? And what problems can it cause? Will I be able to live with these problems?    How will artificial feeding affect my comfort and quality of life?    How long will I have to have the feeding tube for?     Feeding tubes for patients with advanced dementia are no longer recommended due to pain and suffering that does not change the inevitable progression of the disease.  Consider your own values or taye. Also ask for advice from those who share your values.  Note: If you re having trouble deciding about artificial feeding, ask your healthcare provider if you can try it for a short time to see if it helps you feel better. When the trial is done, you may then choose whether to continue or stop the treatment.   How do I state my decision about artificial feeding?  You can make your decision known by telling your healthcare provider directly. It is best to also put your treatment wishes in writing with advance directives. These are legal forms related to healthcare decisions. Laws about advance directives vary from state to state. Ask your healthcare provider about what forms are needed to make sure your wishes will be followed. Some common forms include:     A durable  power of  for healthcare or health care proxy form. This form allows you to name a person to make treatment decisions on your behalf when you can t. This person is often called a healthcare proxy, medical or healthcare power of , surrogate decision maker, or agent.    A living will. This form tells others the kinds of treatment you want or don t want when you become too ill or injured to speak for yourself.  Keep in mind that you can change or cancel an advance directive at any time. Make it a practice to review your decisions each time there is a change in your health or goals of care. Also be sure to tell your healthcare proxy and loved ones of any changes in your decisions.  Deciding about artificial feeding for a loved one  Ideally, the decision about artificial feeding is made with the patient s consent. But in some cases, the decision may fall to the patient s healthcare proxy or other adult. If you need to decide about artificial feeding for a loved one, start by talking to his or her healthcare provider. Discuss the goals of care and the benefits and burdens of the treatment on your loved one s health. Also think about your loved one s wishes and values. If needed, seek advice from other healthcare team members, like a  or .   Date Last Reviewed: 4/1/2017 2000-2018 [employer]. [address] [place] [address], [place] [address]. All rights reserved. This information is not intended as a substitute for professional medical care. Always follow your healthcare professional's instructions.

## 2019-12-13 NOTE — TELEPHONE ENCOUNTER
Spoke with Bobby. Zoey is having pain in her legs as Bobby attempts to care for her and complains of pain when he tries to adjust her legs to change her clothes or care for her. Bobby shares that she is becoming increasingly stiff. 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 We discussed increasing her dose of baclofen. Plan detailed below. Bobby also discussed their appointment with ENT and that they have recommended placement of a feeding tube. Patient and  are considering this.     Plan:  - Increase dose of baclofen 5 mg to goal dose of one tab tid. Medication called in to patient's pharmacy at [facility]. Follow the titration schedule below:  Baclofen 5 mg titration qam noon qpm     Day 1-3 0.5 0.5 0.5     Day 4-6   1 0.5 0.5     Day 7-9                           1 1 0.5     Day 10 and on 1 1 1     - Call clinic to update in 1.5 weeks.    Heidi Melvin DO  Movement Disorders Fellow  [facility]

## 2019-12-13 NOTE — LETTER
12/13/2019       RE: Zoey Armstrong  [address]  [place] [address]     Dear Colleague,    Thank you for referring your patient, Zoey Armstrong, to the [facility]. Please see a copy of my visit note below.    [facility]  Harjinder Duval Jr., M.D., F.A.C.S.  Koki Lerma M.D., M.P.H.  Flor Zayas, Ph.D., CCC/SLP  Desirae Guzman M.M. (voice), M.A., CCC/SLP  Joselito Hrenandez M.M. (voice), M.A., CCC/SLP    Evaluation report    Clinician: Desirae Guzman M.M. (voice), M.A., CCC/SLP  Seen in conjunction with: Dr. Gaines  Referring physician:  Self  Patient: Zoey Armstrong  Date of Visit: 12/13/2019    HISTORY  Chief complaint: Zoey Armstrong is a 69 year old presenting today for evaluation of dysphagia and dysphonia.  Salient history: She has a history significant for atypical Parkinson's disease.  She is joined by her  for today's evaluation    Onset: symptoms have been ongoing for a while, but she was diagnosed with atypical parkinsonism one year ago.  Course: gradually worsening    CURRENT SYMPTOMS INCLUDE  VOICE    Severely poor; mostly aphonic.    SWALLOWING    Takes a few bites with assistance from his , and then reports that she wants to stop, or complains about her stomach hurting.    No recent Hx of pneumonia    RESPIRATION    Within functional limits; she does not use assistance to breathe.    Patient denies significant cough and pain.     OTHER PERTINENT HISTORY    Complex medical history: please also refer to Dr. Gaines's dictation.     Past Medical History:   Diagnosis Date     Back pain      CAD (coronary artery disease)      COPD (chronic obstructive pulmonary disease) (H) 02/06/2016     Depression      Depressive disorder Approx 15 yrs     Falls      Hyperlipidemia      Hypertension      Mumps      Parkinsons disease (H)      Smoker      Past Surgical History:   Procedure Laterality Date     ANGIOGRAM   10-    Moderate non-obstructive coronary disease involving all 3 vessels. LAD and RCA lesions are non-flow-limiting. Recommend aggressive risk factor management and possible pulmonary evaluation for dyspnea     ARTHROPLASTY TOE(S) Right 2/24/2016    Procedure: ARTHROPLASTY TOE(S);  Surgeon: Levar Matias DPM;  Location: [facility]     ARTHROTOMY SHOULDER, ROTATOR CUFF REPAIR, COMBINED Right 7/12/2016    Procedure: COMBINED ARTHROTOMY SHOULDER, ROTATOR CUFF REPAIR;  Surgeon: Reggie Cisse MD;  Location: [facility] OR     AS LUMBAR SPINE FUSION,ANTER APPRCH      L2-S1     BIOPSY  December 2016    Breast cancer     BREAST SURGERY  December 2016    Lumpectomy     HEAD & NECK SURGERY      Jaw surgery     LUMPECTOMY BREAST, SEED LOCALIZATION, SENTINEL NODE Right 12/2/2016    Procedure: LUMPECTOMY BREAST, SEED LOCALIZATION, SENTINEL NODE;  Surgeon: Tiffanie Cabrera MD;  Location:  OR     ORTHOPEDIC SURGERY  1994    left foot surgery / bunion     OSTEOTOMY FOOT Right 2/24/2016    Procedure: OSTEOTOMY FOOT;  Surgeon: Levar Matias DPM;  Location: [facility]     RECONSTRUCT FOREFOOT WITH METATARSOPHALANGEAL (MTP) FUSION Right 2/24/2016    Procedure: RECONSTRUCT FOREFOOT WITH METATARSOPHALANGEAL (MTP) FUSION;  Surgeon: Levar Matias DPM;  Location: [facility]     REMOVE HARDWARE FOOT Right 1/18/2017    Procedure: REMOVE HARDWARE FOOT;  Surgeon: Levar Matias DPM;  Location: [facility] OR     SOFT TISSUE SURGERY  July 2016/Dec 2016    Rotator cuff repair surgery / Lumpectomy       OBJECTIVE  PATIENT REPORTED MEASURES  Patient Supplied Answers To VHI Questionnaire  Voice Handicap Index (VHI-10) 12/13/2019   My voice makes it difficult for people to hear me 4   People have difficulty understanding me in a noisy room 4   My voice difficulties restrict my personal and social life.  4   I feel left out of conversations because of my voice 4   My voice problem causes me to lose income 4   I feel as though I have to strain  "to produce voice 4   The clarity of my voice is unpredictable 4   My voice problem upsets me 4   My voice makes me feel handicapped 4   People ask, \"What's wrong with your voice?\" 2   VHI-10 38       Patient Supplied Answers To CSI Questionnaire  Cough Severity Index (CSI) 12/13/2019   My cough is worse when I lie down 2   My coughing problem causes me to restrict my personal and social life 4   I tend to avoid places because of my cough problem 4   I feel embarrassed because of my coughing problem 4   People ask, ''What's wrong?'' because I cough a lot 2   I run out of air when I cough 3   My coughing problem affects my voice 2   My coughing problem limits my physical activity 3   My coughing problem upsets me 3   People ask me if I am sick because I cough a lot 2   CSI Score 29       Patient Supplied Answers To EAT Questionnaire  Eating Assessment Tool (EAT-10) 12/13/2019   My swallowing problem has caused me to lose weight 4   My swallowing problem interferes with my ability to go out for meals 4   Swallowing liquids takes extra effort 4   Swallowing solids takes extra effort 4   Swallowing pills takes extra effort 4   Swallowing is painful 3   The pleasure of eating is affected by my swallowing 3   When I swallow food sticks in my throat 4   I cough when I eat 3   Swallowing is stressful 4   EAT-10 37       PERCEPTUAL EVALUATION (CPT 77901)  POSTURE / TENSION:     upper body    neck and shoulders    moderate to severe stiffness of the neck; head often positioned with chin elevated    BREATHING:     shallow    phonation is not coordinated with respiration    LARYNGEAL PALPATION:     firm musculature    reduced thyrohyoid space    VOICE:    Roughness: Mild    Breathiness: Moderate to severe    Strain: Moderate to severe    Often aphonic after speaking a short phrase    Loudness    Conversational speech:  Severely reduced    Projected speech:  Unable to project    Pitch:    Conversational speech:  Mild to " moderately elevated    Pitch glide: limited    Resonance:    Conversational speech:  laryngeal pharyngeal resonance    Singing vs. Speech:  n/a    CAPE-V Overall Severity:  70/100    COUGH/THROAT CLEARING:    Not observed    LARYNGEAL FUNCTION STUDIES (CPT 38292)  Not completed. Would be difficult for her to physically and mentally complete        LARYNGEAL EXAMINATION  Procedure: Flexible endoscopy with chip-tip technology with stroboscopy, right nostril; topical anesthesia with 3% Lidocaine and 0.25% phenylephrine was applied.   Performed by: Dr. Gianes   The laryngeal and pharyngeal structures were evaluated for gross appearance, mobility, function, and focal lesions / abnormalities of the associated mucosa.  Stroboscopy was warranted to evaluate closure, symmetry, and vibratory characteristics of the vocal folds.  All findings were within normal limits with the exception of the following salient features:   This exam shows:    Base of tongue would intermittently move posteriorly and reduce, but not obstruct airway    Laryngeal and Vocal Fold Mucosa    Essentially healthy laryngeal mucosa    No remarkable signs of reflux    Within normal limits, with no lesions and straight vibratory margins    Neurological and Functional Integrity of the Larynx    Vocal folds are mobile and meet at midline; movement is brisk and symmetric; exam is neurologically normal     Airway is patent    The addition of stroboscopy allowed evaluation of the mucosal wave:    Amplitude: Unable to visualize     Symmetry: Unable to visualize    Closure pattern: complete.     Closure plane: at glottic level.     Phase distribution: normal.       Pharynx crowded by base of tongue    The laryngeal exam was reviewed with Ms. Armstrong, and I provided pertinent explanations, as well as written and oral information.    CLINICAL SWALLOW EVALUATION (CPT 90922)  Clinician Completing CSE: Desirae Guzman M.M. (voice), M.A., CCC/SLP     ORAL MOTOR  EXAMINATION:  Face: Masked  Oral Musculature: generally intact  Structural Abnormalities: none present  Dentition: present and adequate  Secretion Management: WNL  Mucosal Quality: adequate  Mandibular Strength and Mobility: impaired coordination  Oral Labial Strength and Mobility: impaired coordination  Lingual Strength and Mobility: Impaired coordination  Velar Elevation: intact  Buccal Strength and Mobility: impaired  Laryngeal Function: Cough, Throat Clear, Swallow, Voicing Initiated, Dry swallow palpated  and all are weak    FIBEROPTIC ENDOSCOPIC EVALUATION OF SWALLOWING (FEES)  Procedure: Flexible endoscopy with chip-tip technology without stroboscopy, right nostril; topical anesthesia with 3% Lidocaine and 0.25% phenylephrine was applied.  . Anesthetization spray was applied during laryngeal exam, but not reapplied during FEES.   Performed by:  Dr. Gaines  Indications for FEES:  See above notes for complete history. Patient complains of dysphagia and/or there is suggestion on history of the presence of dysphagia causing medical complaints. Therefore, Dr. Gaines deemed it medically necessary to proceed with the FEES screening protocol. PO trials were evaluated under fiberoptic endoscopy completed by Dr. Gaines.    I provided the PO trials, the protocol of instructions, and therapy probes for the patient. I also provided skilled evaluation of the swallow, and feedback/explanation to the patient.    Swallowing evaluation is being performed to assess the presence and degree of dysphagia, and to recommend a safe diet.    Pre-Swallow Secretions:    Location and Amount: mild presence of thick, bubbly secretions    Thin Liquid Trials:    Amount and Mode of Presentation: stray    Unable to suck from straw today ( reported this as her typical mode of intake of liquids)    Puree Texture Trails:    Amount and Mode of Presentation: spoon    Premature Spillage/Delayed Swallow: base of tongue    Penetration/Aspiration:  possible; minimal pharyngeal sensation    Residual Location and Amount: throughout the larynx    Therapy Probes and Efficacy: tried head down and leaning forward; no significant benefit.  Body would freeze and become very rigid for several seconds.    Solid Texture Trials: Not completed    Diet Recommendations: NPO with tube feedings. Possible pleasure feedings with toothettes.    ASSESSMENT / PLAN  IMPRESSIONS: Zoey Armstrong is a 69 year old female, presenting today with R49.0 (Dysphonia) and R13.10 (Dysphagia) in the context of atypical parkinsonism, as evidenced by evaluation and the laryngeal exam.     RECOMMENDATIONS:     Eval Only    A course of speech therapy is possibly recommended by Dr. Gaines in the future, if her symptoms improved with improved diet, nutrition and hydration.    I provided information regarding lemon-glycerine swabsticks, and toothettes.    We provided education to her  regarding her disease; this was helpful    TOTAL SERVICE TIME: 60 minutes  EVALUATION OF VOICE AND RESONANCE (98535)  NO CHARGE FACILITY FEE (24104)    Desirae Guzman M.M. (voice), MZacharyA., CCC/SLP  Speech-Language Pathologist  [facility] Trained Vocologist  [facility] Voice [facility]  [phone]  [email or web address]  Prounouns: she/her

## 2019-12-13 NOTE — NURSING NOTE
"Chief Complaint   Patient presents with     Consult     Nasal endoscopy to evaluate epiglottis and laryngeal dysfunction     Resp. rate 16, height 1.651 m (5' 5\"), not currently breastfeeding.    Hillary Fofana, EMT  "

## 2019-12-14 NOTE — PROGRESS NOTES
[facility]   at the [facility]   Otolaryngology Clinic     Patient: Zoey Armstrong    MRN: [id number]    : 1950    Age/Gender: 69 year old female  Date of Service: 2019  Rendering Provider:   Jessica Gaines MD       Referring Provider   PCP: Ramona Sullivan    Referring Physician:   Andres Barbour MD  [id number]  [place] [address]    Reason for Consultation   Dysphagia  Dysphonia  Dypsnea    History     HISTORY OF PRESENT ILLNESS:   I was asked to consult on Zoey Armstrong, by Marquez Mahmood for evaluation of epiglottic and laryngeal dysfunction    She comes here today with her  who provides the history. 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 Symptoms have been going on for a long time and it took a while to reach the diagnosis. Things have worsened in the past few months. She is here because she has short episodes when she appears to not be breathing that are self resolving. These are very concerning to the family though the  reassures them this self resolves. She denies snoring and apneic episodes at night. She was first referred to sleep and now here given daytime symptoms Additionally he reports her swallowing is very impaired. She only takes a few bits and then stops eating. He has not seen her choke or cough with food. She has been losing weight. He denies PNAs. Lastly he reports her voice is weak and worsening. She cannot keep a conversation. She is very tired and lethargic. She has severe rigidity in her body. He also states that she points to her stomach and states she has pain. They have not looked into this yet. She does not have noisy breathing when these episodes happen. They have been going on for a weeks now.     PAST MEDICAL HISTORY:   Past Medical History:   Diagnosis Date     Back pain      CAD (coronary artery disease)      COPD (chronic obstructive pulmonary disease) (H) 2016      Depression      Depressive disorder Approx 15 yrs     Falls      Hyperlipidemia      Hypertension      Mumps      Parkinsons disease (H)      Smoker          PAST SURGICAL HISTORY:   Past Surgical History:   Procedure Laterality Date     ANGIOGRAM  10-    Moderate non-obstructive coronary disease involving all 3 vessels. LAD and RCA lesions are non-flow-limiting. Recommend aggressive risk factor management and possible pulmonary evaluation for dyspnea     ARTHROPLASTY TOE(S) Right 2/24/2016    Procedure: ARTHROPLASTY TOE(S);  Surgeon: Levar Matias DPM;  Location: [facility]     ARTHROTOMY SHOULDER, ROTATOR CUFF REPAIR, COMBINED Right 7/12/2016    Procedure: COMBINED ARTHROTOMY SHOULDER, ROTATOR CUFF REPAIR;  Surgeon: Reggie Cisse MD;  Location: [facility] OR     AS LUMBAR SPINE FUSION,ANTER APPRCH      L2-S1     BIOPSY  December 2016    Breast cancer     BREAST SURGERY  December 2016    Lumpectomy     HEAD & NECK SURGERY      Jaw surgery     LUMPECTOMY BREAST, SEED LOCALIZATION, SENTINEL NODE Right 12/2/2016    Procedure: LUMPECTOMY BREAST, SEED LOCALIZATION, SENTINEL NODE;  Surgeon: Tiffanie Cabrera MD;  Location:  OR     ORTHOPEDIC SURGERY  1994    left foot surgery / bunion     OSTEOTOMY FOOT Right 2/24/2016    Procedure: OSTEOTOMY FOOT;  Surgeon: Levar Matias DPM;  Location: [facility]     RECONSTRUCT FOREFOOT WITH METATARSOPHALANGEAL (MTP) FUSION Right 2/24/2016    Procedure: RECONSTRUCT FOREFOOT WITH METATARSOPHALANGEAL (MTP) FUSION;  Surgeon: Levar Matias DPM;  Location: [facility]     REMOVE HARDWARE FOOT Right 1/18/2017    Procedure: REMOVE HARDWARE FOOT;  Surgeon: Levar Matias DPM;  Location: [facility] OR     SOFT TISSUE SURGERY  July 2016/Dec 2016    Rotator cuff repair surgery / Lumpectomy         CURRENT MEDICATIONS:   Current Outpatient Medications:      Baclofen 5 MG TABS, Take 2.5 mg by mouth See Admin Instructions Take 2.5 mg at bedtime for 7 days. If able to tolerate, increase  to 2.5 mg twice a day., Disp: 30 tablet, Rfl: 3     buPROPion (WELLBUTRIN SR) 150 MG 12 hr tablet, Take 1 tablet (150 mg) by mouth 2 times daily, Disp: 180 tablet, Rfl: 3     FLUoxetine (PROZAC) 20 MG capsule, Take 3 capsules (60 mg) by mouth daily, Disp: 90 capsule, Rfl: 11     mirabegron (MYRBETRIQ) 50 MG 24 hr tablet, Take 1 tablet (50 mg) by mouth daily, Disp: 30 tablet, Rfl: 3     olmesartan (BENICAR) 40 MG tablet, Take 1 tablet (40 mg) by mouth daily, Disp: 90 tablet, Rfl: 0     polyethylene glycol (MIRALAX/GLYCOLAX) packet, Take 1 packet by mouth daily, Disp: , Rfl:      rosuvastatin (CRESTOR) 10 MG tablet, TAKE 1 TABLET(10 MG) BY MOUTH DAILY, Disp: 30 tablet, Rfl: 2     carbidopa-levodopa (SINEMET)  MG tablet, Take 2 tabs 5 times a day. (Patient not taking: Reported on 12/13/2019), Disp: 900 tablet, Rfl: 3     mirabegron (MYRBETRIQ) 25 MG 24 hr tablet, Take 1 tablet (25 mg) by mouth daily, Disp: 30 tablet, Rfl: 3     Multiple Vitamins-Minerals (MULTIVITAMIN ADULT PO), Take 1 tablet by mouth daily, Disp: , Rfl:   No current facility-administered medications for this visit.       ALLERGIES: Betadine [povidone iodine]; Iodine; Iodine-131; No clinical screening - see comments; Povidone-iodine; Soap; and Sodium chloride      SOCIAL HISTORY:    Social History     Socioeconomic History     Marital status:      Spouse name: Not on file     Number of children: Not on file     Years of education: Not on file     Highest education level: Not on file   Occupational History     Not on file   Social Needs     Financial resource strain: Not on file     Food insecurity:     Worry: Not on file     Inability: Not on file     Transportation needs:     Medical: Not on file     Non-medical: Not on file   Tobacco Use     Smoking status: Former Smoker     Packs/day: 0.50     Years: 30.00     Pack years: 15.00     Types: Cigarettes     Start date: 7/1/1970     Last attempt to quit: 7/15/2015     Years since quitting:  4.4     Smokeless tobacco: Never Used     Tobacco comment: Non-smoker   Substance and Sexual Activity     Alcohol use: Yes     Alcohol/week: 0.0 standard drinks     Comment: Occasional cocktail/glass of wine     Drug use: No     Sexual activity: Yes     Partners: Male     Birth control/protection: Post-menopausal   Lifestyle     Physical activity:     Days per week: Not on file     Minutes per session: Not on file     Stress: Not on file   Relationships     Social connections:     Talks on phone: Not on file     Gets together: Not on file     Attends Congregation service: Not on file     Active member of club or organization: Not on file     Attends meetings of clubs or organizations: Not on file     Relationship status: Not on file     Intimate partner violence:     Fear of current or ex partner: Not on file     Emotionally abused: Not on file     Physically abused: Not on file     Forced sexual activity: Not on file   Other Topics Concern     Parent/sibling w/ CABG, MI or angioplasty before 65F 55M? Yes     Comment: Father, about 50 yrs old.      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 She lives with her SO and son. She has another son who is local.           FAMILY HISTORY: Non-contributory for problems with anesthesia      REVIEW OF SYSTEMS:   The patient was asked a 14 point review of systems regarding constitutional symptoms, eye symptoms, ears, nose, mouth, throat symptoms, cardiovascular symptoms, respiratory  symptoms, gastrointestinal symptoms, genitourinary symptoms, musculoskeletal symptoms, integumentary symptoms, neurological symptoms, psychiatric symptoms, endocrine symptoms, hematologic/lymphatic symptoms, and allergic/ immunologic symptoms.   The pertinent factors have been included in the HPI and below.  Patient Supplied Answers to Review of Systems  [facility] E[facility] ROS 12/12/2019   Constitutional Weight loss, Appetite change   Neurology Numbness   Psychology Frequently feeling depressed or sad   Ears, Nose, Throat Trouble swallowing   Cardiopulmonary Cough, Breathing problems, Chest pain   Gastrointestinal/Genitourinary Heartburn/indigestion   Musculoskeletal Sore or stiff joints   Hematologic Easy bruising   Endocrine Thirst           Physical Examination     The patient underwent a physical examination as described below. The pertinent positive and negative findings are summarized after the description of the examination.    Constitutional: The patient's developmental and nutritional status was assessed. The patient's voice quality was assessed.  Head and Face: The head and face were inspected for deformities. The sinuses were palpated. The salivary glands were palpated. Facial muscle strength was assessed bilaterally.  Eyes: Extraocular movements and primary gaze alignment were assessed.  Ears, Nose, Mouth and Throat: The ears and nose were examined for deformities. The nasal septum, mucosa, and turbinates were inspected by anterior rhinoscopy. The lips, teeth, and gums were examined for abnormalities. The oral mucosa, tongue, palate, tonsils, lateral and posterior pharynx were inspected for the presence of asymmetry or mucosal lesions.    Neck: The tracheal position was noted, and the neck mass palpated to determine if there were any asymmetries, abnormal neck masses, thyromegally, or thyroid nodules.  Respiratory: The nature of the breathing and chest expansion/symmetry was observed.  Cardiovascular: The patient  was examined to determine the presence of any edema or jugular venous distension.  Abdomen: The contour of the abdomen was noted.  Lymphatic: The patient was examined for infraclavicular lymphadenopathy.  Musculoskeletal: The patient was inspected for the presence of skeletal deformities.  Extremities: The extremities were examined for any clubbing or cyanosis.  Skin: The skin was examined for inflammatory or neoplastic conditions.  Neurologic: The patient's orientation, mood, and affect were noted. The cranial nerve  functions were examined.    Other pertinent positive and negative findings on physical examination:    masked facies  Anterior rhinoscopy: no lesions  OC/OP: no lesions, uvula midline, soft palate elevates symmetrically, FOM/BOT soft  Neck: no lesions, no TH tenderness to palpation  Difficult to initiate movement    All other physical examination findings were within normal limits and noncontributory.    Procedures   Video Laryngoscopy with Stroboscopy (CPT 31051) and Behavioral & Qualitative Evaluation of Voice and Resonence     Preoperative Diagnosis:  Dysphonia and throat symptoms  Postoperative Diagnosis: Dysphonia and throat symptoms  Indication:  Patient has new or persistent dysphonia and throat symptoms.  This requires evaluation by stroboscopy to fully delineate the laryngeal functioning; especially mucosal wave assessment, ultrasharp visualization of lesions on the vocal folds, and overall functioning of the larynx.  Details of Procedure: A 70 degree rigid telescopic laryngoscope or a distal chip flexible scope was used. The lighting was obtained via a light cable connected to a stroboscopic unit. The telescope was inserted either transorally or transnasally until the vocal folds could be visualized. The patient was instructed to sustain the vowel  ee  at a comfortable pitch and loudness as the voice was monitored by a microphone connected to a fundamental frequency tracker. This circuit  tracked vocal periodicity, allowing the light to flash in synchrony with the glottal cycles. A setting on the stroboscope was set to change the phase of light strobing with relation to the vocal fundamental frequency, producing an image of 1 to 2 glottal cycles every second. The video images were recorded for analysis. Use of the variable speed, slow and stop scan allowed careful study of pertinent segments of laryngeal function to increase accuracy of clinical assessments of function and dysfunction.  In particular, features of glottal closure, mucosal wave on the vocal fold cover and laryngeal symmetry were analyzed. Lastly, the patient was asked to phonate speech samples and auditory/perceptual evaluation of voice and resonance were performed.  The vocal quality was carefully evaluated for hoarseness, breathiness, loudness, phrase length and intelligibility to determine the source of dysphonia.    Findings:   A. BEHAVIORAL & QUALITATIVE EVALUATION OF VOICE AND RESONENCE   Comments: F0 200 MPT: 1 s  Vocal Quality: breathy    Pitch Range:  Severely reduced   Phrase Length:  Severely reduced  Vocal Loudness: Severely reduced  Dysarthria: No    B. LARYNGOVIDEOSTROBOSCOPY   Anatomic/Physiological Deviations:  Presbylarynx, mucus over vocal fold, short period of adduction  unuable to visualize mucosal wave because not enough breath support and adduction to allow for vibration  Mucosal wave: Right:  unable to visualize      Left: Unable to visualize  Bilateral Vocal Fold Vibration:n/a  Vocal Process: Right: No restriction    Left:  No restriction  Vocal Fold closure: Complete glottal closure    Complication(s): None  Disposition: Patient tolerated the procedure well                    Fiberoptic Endoscopic Evaluation of Swallowing (CPT 99041)  and Interpretation of Swallowing (CPT 15774)    Indications: See above notes for complete history and physical. Patient complains of dysphagia to both solids and liquids and/or  there is suggestion on history and endoscopic exam of the presence of dysphagia causing medical complaints.  Swallowing evaluation is being performed to assess the presence and degree of dysphagia, and to recommend a safe diet.     Pulmonary Status:  No PNA   Current Diet:              regular                                        thin liquids      Consistency Amounts:  Thin Liquid: 1 tsp   Puree: 1 tsp  Solid: none            Positioning: upright in a chair  Oral Peripheral Exam: See physical exam section.  Anatomic Notes: See Videostroboscopy report for assessment of anatomy and laryngeal functioning  Pharyngeal secretions prior to administration of liquid or food: No  Oral Phase Abnormal Findings: Slow oral phase  Behavioral Adaptations: No abnormal behavior observed  Pharyngeal Phase Abnormal Findings: delayed pharyngeal response, vallecular residue, pharyngeal wall residue, did not self initiate repeat dry swallow to clear pharyngeal residue  and no penetration, no aspiration      Recommended Diet:  NPO                                        with tube feeds for nutrition and pleasure feeding with small bites and water follow throughs      Review of Relevant Clinical Data     Notes:   Marquez Mahmood 11/13/19  Jace Mart 10/24/19    Radiology: none    Pathology:   12/2/16  FINAL DIAGNOSIS:   A. Lymph node and lymphatic, right axillary/sentinel node #1, biopsy:   - No evidence of malignancy (0/1).     B. Breast, right/lower outer quadrant, radioactive seed localization   lumpectomy:     -Tumor Site:  Lower outer quadrant.     -Tumor Type: Infiltrating ductal carcinoma with mucinous features (mixed   carcinoma: Infiltrating ductal carcinoma ,nos - mucinous carcinoma).     - [place] Grade:  Histologic grade 1     - [place] Score (tubule formation + nuclear pleomorphism + mitotic   activity): 2 + 2 + 1= score 5.   -Invasive Tumor Size:  1.9 cm.   -In-Situ Tumor:   Ductal carcinoma in situ, cribriform  and micropapillary, with mucinous   features, low nuclear grade.     Lobular carcinoma in situ, classic, with involvement of intraductal   papilloma.     -Lymph-Vascular Invasion:  Not identified.     -Tumor Extent (Skin/Skeletal Muscle Involvement): No skin or skeletal   muscle present for evaluation.     -MARGINS: Negative.   -Invasive Carcinoma:  Margins negative for invasive carcinoma.   Invasive carcinoma at 0.3 cm from lateral margin, 0.3 cm from anterior   margin, 0.5 cm from medial margin, 0.9 cm from inferior margin and 1 cm   or greater from all other margins.     -In-situ Carcinoma:  Margins negative for ductal carcinoma in situ.   Ductal carcinoma in situ at 0.2 cm from medial margin, 0.3 cm from   lateral margin, 0.5 cm from anterior margin and 1 cm or greater from all   other margins.     -LYMPH NODES:   -Total # of Lymph Nodes Examined:  1 (SN).   -Number of Lymph Nodes with metastatic tumor: 0   -Number with Macrometastases:  0   -Number with Micrometastases:  0   -Number with Isolated Tumor Cell Clusters:  0   -Size of Largest Metastatic Deposit:  N/A.   -Extranodal Extension:  N/A.     Procedures: none    Labs:  Lab Results   Component Value Date    TSH 2.72 12/13/2019     Lab Results   Component Value Date     (L) 12/13/2019    CO2 23 12/13/2019    BUN 31 (H) 12/13/2019    PHOS 2.2 (L) 12/13/2019     Lab Results   Component Value Date    WBC 7.0 07/13/2017    HGB 10.2 (L) 07/13/2017    HCT 31.2 (L) 07/13/2017    MCV 96 07/13/2017     (H) 07/13/2017     Lab Results   Component Value Date    PTT 31 09/29/2015    INR 0.99 06/05/2017     No results found for: BIGG  No components found for: RHEUMATOIDFACTOR,  RF  No results found for: CRP  No components found for: CKTOT, URICACID  No components found for: C3, C4, DSDNAAB, NDNAABIFA  No results found for: MPOAB    Patient reported Quality of Life (QOL) Measures     Patient Supplied Answers To VHI Questionnaire  Voice Handicap Index (VHI-10)  "12/13/2019   My voice makes it difficult for people to hear me 4   People have difficulty understanding me in a noisy room 4   My voice difficulties restrict my personal and social life.  4   I feel left out of conversations because of my voice 4   My voice problem causes me to lose income 4   I feel as though I have to strain to produce voice 4   The clarity of my voice is unpredictable 4   My voice problem upsets me 4   My voice makes me feel handicapped 4   People ask, \"What's wrong with your voice?\" 2   VHI-10 38         Patient Supplied Answers To EAT Questionnaire  Eating Assessment Tool (EAT-10) 12/13/2019   My swallowing problem has caused me to lose weight 4   My swallowing problem interferes with my ability to go out for meals 4   Swallowing liquids takes extra effort 4   Swallowing solids takes extra effort 4   Swallowing pills takes extra effort 4   Swallowing is painful 3   The pleasure of eating is affected by my swallowing 3   When I swallow food sticks in my throat 4   I cough when I eat 3   Swallowing is stressful 4   EAT-10 37         Patient Supplied Answers To CSI Questionnaire  Cough Severity Index (CSI) 12/13/2019   My cough is worse when I lie down 2   My coughing problem causes me to restrict my personal and social life 4   I tend to avoid places because of my cough problem 4   I feel embarrassed because of my coughing problem 4   People ask, ''What's wrong?'' because I cough a lot 2   I run out of air when I cough 3   My coughing problem affects my voice 2   My coughing problem limits my physical activity 3   My coughing problem upsets me 3   People ask me if I am sick because I cough a lot 2   CSI Score 29     Reflux Symptom Index  Within the last month, how did the following problems affect the patient?  0 = no problem; 5= severe problem  Hoarseness or a problem with your voice 5   Clearing your throat  4   Excess throat mucous or postnasal drip 0   Difficulty swallowing food, liquid or " pills 5   Coughing after you ate or after lying down  2   Breathing difficulties or choking episodes 5   Troublesome or annoying cough 4   Sensations of something sticking in your throat or a lump in your throat  4   Heartburn, chest pain, indigestion, or stomach acid coming up 3         Total: 32    Impression & Plan     IMPRESSION: Ms. Armstrong is a 69 year old female who is being seen for the followin. Dyspnea  - episodes of dyspnea due to loss of tone and posterior tongue base collapse. No epiglottic dysfunction seen on exam, no paroxysmal vocal fold motion. However multiple times on exam base of tongue would collapse and it would take a few seconds to move given rigidity. There is no stridor associated with these episodes therefore therapy for paroxysmal vocal fold motion is unclear if it would provide benefit.  - unclear what intervention would be useful other than encouraging her to be active and engage  - she is very lethargic and weak during today's office visit and tires easily   - I don't think these episodes are life threatening given that she is able to self resolve them  - additionally resection of the epiglottis would result in aspiration in her case given her pharyngeal weakness  - if these episodes do increase in frequency and result in hypoxia then a trach would be indicated, this was not mentioned to the family today    2. Dysphagia  - with difficulty maintain weight and even weight loss per   - FEES in clinic today demonstrates slow oral phase, no penetration or aspiration with small boluses, there is mild to moderate pharyngeal residue and with mulitple boluses there is likely penetration and even aspiration   - only liquids and puree tried today, no solid cookie consistency  -she has diminished laryngeal sensation therefore cough reflex would not kick in which was explained to her   - discussed consideration of PEG tube given weight loss. Described procedure, time course and  need for nutrition afterwards to find best tube feed and toleration  - she also complains of stomach pain therefore both can be addressed at the time of PEG evaluation  - will discuss with her neurologist to find the appropriate expedited referral  - she is in the process of changing PCP  - additionally they report very limited caloric intake therefore will check today a basic chemistry panel, vitamin levels. Albumin will be added on to the labs  - would like to proceed with Xray Video Swallow Exam with esophagram however I think given findings on FEES proceeding with PEG tube should take precedence and swallow xray can be used afterwards to determine what pleasure foods can be used safely.  - good oral hygiene    3. Dysphonia  - due to presbylarynx and atypical parkinsonism which prevents her from foreful closure of her vocal fold  - big and loud voice therapy would be very useful however this only when she has improved her nutritional status. Today she tires very easily and would be hard to participate in therapy  - follow up in 3 months for voice evaluation and will provider referral at that time.     RETURN VISIT: follow up 3 months, or earlier as needed.     Thank you for the kind referral and for allowing me to share in the care of Ms. Armstrong. If you have any questions, please do not hesitate to contact me.        Jessica Gaines MD    of Otolaryngology - Head and Neck Surgery   Voice, Airway, and Swallowing Disorders   [facility] at the [facility] [address] [address]  Phone: [phone]  Fax: [phone]    [address] [address]  Phone: [phone]  Fax: [phone]     CC:   Marquez Mahmood Jerrold Lee

## 2019-12-16 NOTE — PROGRESS NOTES
Received form form [facility], form was placed in provider folder for signature    Received from back signed by provider, form was fax to [phone], sent to be scanned

## 2019-12-18 NOTE — PROGRESS NOTES
[facility] now requests orders and shares plan of care/discharge summaries for some patients through [employer].  Please REPLY TO THIS MESSAGE OR ROUTE BACK TO THE AUTHOR in order to give authorization for orders when needed.  This is considered a verbal order, you will still receive a faxed copy of orders for signature.  Thank you for your assistance in improving collaboration for our patients.    Pt. Referred to HC due to recent decline in function/mobility and difficulty for spouse to provided total care. PT eval completed, but pt. Can benefit from bringing in other services.    ORDER    PT 1W1, 2W3 for CGer training with trasnfers/mobility, safety and ROM/HEP.    OT to eval and treat for equip needs/cognitive assessment and CGer training    ST to eval and treat for swallowing difficulties and CGer training/strategies    RN to eval and treat for skin care, risk for breakdown, and driection for current wound care.      Pamela Morales, PT  [email or web address]  [phone]

## 2019-12-18 NOTE — TELEPHONE ENCOUNTER
Health Call Center    Phone Message    May a detailed message be left on voicemail: yes    Reason for Call: Other: Bobby calling to give an update about Zoey to Dr. Melvin. Zoey is still having stomach pains that are concerning to Bobby, and she is moaning. Bobby said the first homecare nurse visit was on Monday (12/16/19), and he has requested the nurse come in a day or two. Bobby mentioned Zoey's throat was scoped by another provider on 12/13/19 at the clinic. Bobby doesn't need a call back, but says Dr. Melvin can if needed. Dr. Mart was also sent a similar message per request of Bobby.     Action Taken: Message routed to:  [facility] SANDRINE Neuro

## 2019-12-20 NOTE — TELEPHONE ENCOUNTER
Ines RN from  hospice calling. Requesting your input if patient should be in hospice and if Dr Sullivan is willing to follow patient in hospice. Ok to call Ines and karen [phone]

## 2019-12-20 NOTE — PROGRESS NOTES
[facility] now requests orders and shares plan of care/discharge summaries for some patients through [email or web address].  Please REPLY TO THIS MESSAGE OR ROUTE BACK TO THE AUTHOR in order to give authorization for orders when needed.  This is considered a verbal order, you will still receive a faxed copy of orders for signature.  Thank you for your assistance in improving collaboration for our patients.    ORDER  Skilled nursing 2x/week for 2 wks to perform assessment with focus on coping, psychosocial status, Parkinsons status, medication management/compliance and reconciliation, pain/symptom management, mental health status and need for referral or change in treatment plan, skin integrity, falls risk.  Instruct on pain/symptom management, disease process, signs/symptoms of complications to report to agency/physician/911, emergency/safety and falls prevention plan, medication effects/SE, new/high risk/changed medications, diet, and activity. Implement interventions to monitor and mitigate pain, prevent pressure ulcers and confirm proper foot care. Treatments/procedures  Venipuncture as ordered by MD.    3 prn visits for lab draws, falls assessment, decline in condition, supervisory visits per agency protocol, recertification assessments.    MD SUMMARY/PLAN OF CARE  SITUATION   Pt is a 69 year old female who is being assessed for Skilled Nursing services following a PT recommendation for assessment of skin breakdown, wound care, and education in medical mangement.  VS...100/70 BP, 108 pulse, 18 resp, 97.3 temp. Lungs clear to auscultation. Bowels sounds are hypoactive. Spouse states pt has a lot of stomach pain yesterday and then a large blow out BM last night and the abd pain resolved.   WOUND DESCRIPTION AND MEASUREMENTS  Pt has 2 pressure ulcers to her coccyx area. Midline coccyx is stage 3, 100% slough base. Right  side of coccyx is stage 1. She has several skin tears and bruises to her bilateral LE and  arms. All skin tears are clean with no signs or symptoms of infections and covered with bandaides. Educated spouse on using a barrier cream to buttocks and coccyx area. Educated on keeping skin tears clean and dry. Pt is incontinent of both bowel and bladder. Spouse is wondering about a andrade catheter. Per the spouse pt has been having trouble with swallowing and it was suggested at some point recently to do a feeding tube but no one has talked with the pt about it yet. Spouse is unsure if she wants this but is having a difficult time with talking with pt about long term plans and wishes. A SW assessment has been ordered for Home Care. SN also educated on Hospice and spouse would like a information consult. SN made a request for a Hospice Consult through . Spouse is also questioning if pt needs or should be taking any pain medication. She does have pain at times but it is not always consistent. Pt denies pain today during eval but did tense up and moaned with position changes and skin assessment. The past couple weeks pt has not been eating as much, spouse is unsure if this is due to the swallowing issues or if she is not hungry. He states when he asks if she is hungry she has been say no. SN educated on use of supplements such as Boost and Ensure as pt is drinking water. Pt lives in a single family home with a lot of DME to assist with care. Pt spouse is main caregiver and with pt 24 hrs daily. Pt has a son and brother in the area that help support the pt and spouse. They also have a neighbor near by who is a nurse that has been helping a lot.   ANALYSIS Pt and family in need of education on home safety, woundcare management, pressure ulcer healing and prevention, Nutrition education and disease management education.     Lexi Diaz [facility]  [email or web address]   Office: [phone]  Cell: [phone]

## 2019-12-20 NOTE — TELEPHONE ENCOUNTER
Health Call Center    Phone Message    May a detailed message be left on voicemail: yes    Reason for Call: Other: .  hospice received referral from home care team - do you feel pt has a more or less than 6 month prognosis - is there anything else hospice should know about regarding this referral?    Would you be willing to follow this pt if she signs into hospice?    Action Taken: Message routed to:  [facility] neuro

## 2019-12-21 NOTE — PROGRESS NOTES
[facility]  Harjinder Duval Jr., M.D., F.A.C.S.  Koki Lerma M.D., M.P.H.  Flor Zayas, Ph.D., CCC/SLP  Desirae Guzman M.M. (voice), M.A., CCC/SLP  Joselito Hernandez M.M. (voice), M.A., CCC/SLP    Evaluation report    Clinician: Desirae Guzman M.M. (voice), M.A., CCC/SLP  Seen in conjunction with: Dr. Gaines  Referring physician:  Self  Patient: Zoey Armstrong  Date of Visit: 12/13/2019    HISTORY  Chief complaint: Zoey Armstrong is a 69 year old presenting today for evaluation of dysphagia and dysphonia.  Salient history: She has a history significant for atypical Parkinson's disease.  She is joined by her  for today's evaluation    Onset: symptoms have been ongoing for a while, but she was diagnosed with atypical parkinsonism one year ago.  Course: gradually worsening    CURRENT SYMPTOMS INCLUDE  VOICE    Severely poor; mostly aphonic.    SWALLOWING    Takes a few bites with assistance from his , and then reports that she wants to stop, or complains about her stomach hurting.    No recent Hx of pneumonia    RESPIRATION    Within functional limits; she does not use assistance to breathe.    Patient denies significant cough and pain.     OTHER PERTINENT HISTORY    Complex medical history: please also refer to Dr. Gaines's dictation.     Past Medical History:   Diagnosis Date     Back pain      CAD (coronary artery disease)      COPD (chronic obstructive pulmonary disease) (H) 02/06/2016     Depression      Depressive disorder Approx 15 yrs     Falls      Hyperlipidemia      Hypertension      Mumps      Parkinsons disease (H)      Smoker      Past Surgical History:   Procedure Laterality Date     ANGIOGRAM  10-    Moderate non-obstructive coronary disease involving all 3 vessels. LAD and RCA lesions are non-flow-limiting. Recommend aggressive risk factor management and possible pulmonary evaluation for dyspnea     ARTHROPLASTY TOE(S) Right 2/24/2016    Procedure: ARTHROPLASTY TOE(S);   "Surgeon: Levar Matias DPM;  Location: [facility]     ARTHROTOMY SHOULDER, ROTATOR CUFF REPAIR, COMBINED Right 7/12/2016    Procedure: COMBINED ARTHROTOMY SHOULDER, ROTATOR CUFF REPAIR;  Surgeon: Reggie Cisse MD;  Location: [facility] OR     AS LUMBAR SPINE FUSION,ANTER APPRCH      L2-S1     BIOPSY  December 2016    Breast cancer     BREAST SURGERY  December 2016    Lumpectomy     HEAD & NECK SURGERY      Jaw surgery     LUMPECTOMY BREAST, SEED LOCALIZATION, SENTINEL NODE Right 12/2/2016    Procedure: LUMPECTOMY BREAST, SEED LOCALIZATION, SENTINEL NODE;  Surgeon: Tiffanie Cabrera MD;  Location:  OR     ORTHOPEDIC SURGERY  1994    left foot surgery / bunion     OSTEOTOMY FOOT Right 2/24/2016    Procedure: OSTEOTOMY FOOT;  Surgeon: Levar Matias DPM;  Location: [facility]     RECONSTRUCT FOREFOOT WITH METATARSOPHALANGEAL (MTP) FUSION Right 2/24/2016    Procedure: RECONSTRUCT FOREFOOT WITH METATARSOPHALANGEAL (MTP) FUSION;  Surgeon: Levar Matias DPM;  Location:  [facility]     REMOVE HARDWARE FOOT Right 1/18/2017    Procedure: REMOVE HARDWARE FOOT;  Surgeon: Levar Matias DPM;  Location: [facility] OR     SOFT TISSUE SURGERY  July 2016/Dec 2016    Rotator cuff repair surgery / Lumpectomy       OBJECTIVE  PATIENT REPORTED MEASURES  Patient Supplied Answers To VHI Questionnaire  Voice Handicap Index (VHI-10) 12/13/2019   My voice makes it difficult for people to hear me 4   People have difficulty understanding me in a noisy room 4   My voice difficulties restrict my personal and social life.  4   I feel left out of conversations because of my voice 4   My voice problem causes me to lose income 4   I feel as though I have to strain to produce voice 4   The clarity of my voice is unpredictable 4   My voice problem upsets me 4   My voice makes me feel handicapped 4   People ask, \"What's wrong with your voice?\" 2   VHI-10 38       Patient Supplied Answers To CSI Questionnaire  Cough Severity Index (CSI) 12/13/2019   My " cough is worse when I lie down 2   My coughing problem causes me to restrict my personal and social life 4   I tend to avoid places because of my cough problem 4   I feel embarrassed because of my coughing problem 4   People ask, ''What's wrong?'' because I cough a lot 2   I run out of air when I cough 3   My coughing problem affects my voice 2   My coughing problem limits my physical activity 3   My coughing problem upsets me 3   People ask me if I am sick because I cough a lot 2   CSI Score 29       Patient Supplied Answers To EAT Questionnaire  Eating Assessment Tool (EAT-10) 12/13/2019   My swallowing problem has caused me to lose weight 4   My swallowing problem interferes with my ability to go out for meals 4   Swallowing liquids takes extra effort 4   Swallowing solids takes extra effort 4   Swallowing pills takes extra effort 4   Swallowing is painful 3   The pleasure of eating is affected by my swallowing 3   When I swallow food sticks in my throat 4   I cough when I eat 3   Swallowing is stressful 4   EAT-10 37       PERCEPTUAL EVALUATION (CPT 25995)  POSTURE / TENSION:     upper body    neck and shoulders    moderate to severe stiffness of the neck; head often positioned with chin elevated    BREATHING:     shallow    phonation is not coordinated with respiration    LARYNGEAL PALPATION:     firm musculature    reduced thyrohyoid space    VOICE:    Roughness: Mild    Breathiness: Moderate to severe    Strain: Moderate to severe    Often aphonic after speaking a short phrase    Loudness    Conversational speech:  Severely reduced    Projected speech:  Unable to project    Pitch:    Conversational speech:  Mild to moderately elevated    Pitch glide: limited    Resonance:    Conversational speech:  laryngeal pharyngeal resonance    Singing vs. Speech:  n/a    CAPE-V Overall Severity:  70/100    COUGH/THROAT CLEARING:    Not observed    LARYNGEAL FUNCTION STUDIES (CPT 43087)  Not completed. Would be difficult  for her to physically and mentally complete        LARYNGEAL EXAMINATION  Procedure: Flexible endoscopy with chip-tip technology with stroboscopy, right nostril; topical anesthesia with 3% Lidocaine and 0.25% phenylephrine was applied.   Performed by: Dr. Gaines   The laryngeal and pharyngeal structures were evaluated for gross appearance, mobility, function, and focal lesions / abnormalities of the associated mucosa.  Stroboscopy was warranted to evaluate closure, symmetry, and vibratory characteristics of the vocal folds.  All findings were within normal limits with the exception of the following salient features:   This exam shows:    Base of tongue would intermittently move posteriorly and reduce, but not obstruct airway    Laryngeal and Vocal Fold Mucosa    Essentially healthy laryngeal mucosa    No remarkable signs of reflux    Within normal limits, with no lesions and straight vibratory margins    Neurological and Functional Integrity of the Larynx    Vocal folds are mobile and meet at midline; movement is brisk and symmetric; exam is neurologically normal     Airway is patent    The addition of stroboscopy allowed evaluation of the mucosal wave:    Amplitude: Unable to visualize     Symmetry: Unable to visualize    Closure pattern: complete.     Closure plane: at glottic level.     Phase distribution: normal.       Pharynx crowded by base of tongue    The laryngeal exam was reviewed with Ms. Armstrong, and I provided pertinent explanations, as well as written and oral information.    CLINICAL SWALLOW EVALUATION (CPT 59268)  Clinician Completing CSE: Desirae Guzman M.M. (voice), M.AZachary, CCC/SLP     ORAL MOTOR EXAMINATION:  Face: Masked  Oral Musculature: generally intact  Structural Abnormalities: none present  Dentition: present and adequate  Secretion Management: WNL  Mucosal Quality: adequate  Mandibular Strength and Mobility: impaired coordination  Oral Labial Strength and Mobility: impaired coordination  Lingual  Strength and Mobility: Impaired coordination  Velar Elevation: intact  Buccal Strength and Mobility: impaired  Laryngeal Function: Cough, Throat Clear, Swallow, Voicing Initiated, Dry swallow palpated  and all are weak    FIBEROPTIC ENDOSCOPIC EVALUATION OF SWALLOWING (FEES)  Procedure: Flexible endoscopy with chip-tip technology without stroboscopy, right nostril; topical anesthesia with 3% Lidocaine and 0.25% phenylephrine was applied.  . Anesthetization spray was applied during laryngeal exam, but not reapplied during FEES.   Performed by:  Dr. Gaines  Indications for FEES:  See above notes for complete history. Patient complains of dysphagia and/or there is suggestion on history of the presence of dysphagia causing medical complaints. Therefore, Dr. Gaines deemed it medically necessary to proceed with the FEES screening protocol. PO trials were evaluated under fiberoptic endoscopy completed by Dr. Gaines.    I provided the PO trials, the protocol of instructions, and therapy probes for the patient. I also provided skilled evaluation of the swallow, and feedback/explanation to the patient.    Swallowing evaluation is being performed to assess the presence and degree of dysphagia, and to recommend a safe diet.    Pre-Swallow Secretions:    Location and Amount: mild presence of thick, bubbly secretions    Thin Liquid Trials:    Amount and Mode of Presentation: stray    Unable to suck from straw today ( reported this as her typical mode of intake of liquids)    Puree Texture Trails:    Amount and Mode of Presentation: spoon    Premature Spillage/Delayed Swallow: base of tongue    Penetration/Aspiration: possible; minimal pharyngeal sensation    Residual Location and Amount: throughout the larynx    Therapy Probes and Efficacy: tried head down and leaning forward; no significant benefit.  Body would freeze and become very rigid for several seconds.    Solid Texture Trials: Not completed    Diet Recommendations: NPO  with tube feedings. Possible pleasure feedings with toothettes.    ASSESSMENT / PLAN  IMPRESSIONS: Zoey Armstrong is a 69 year old female, presenting today with R49.0 (Dysphonia) and R13.10 (Dysphagia) in the context of atypical parkinsonism, as evidenced by evaluation and the laryngeal exam.     RECOMMENDATIONS:     Eval Only    A course of speech therapy is possibly recommended by Dr. Gaines in the future, if her symptoms improved with improved diet, nutrition and hydration.    I provided information regarding lemon-glycerine swabsticks, and toothettes.    We provided education to her  regarding her disease; this was helpful    TOTAL SERVICE TIME: 60 minutes  EVALUATION OF VOICE AND RESONANCE (14764)  NO CHARGE FACILITY FEE (67972)    Desirae Guzman M.M. (voice), M.A., CCC/SLP  Speech-Language Pathologist  [facility] Trained Vocologist  [facility]  [phone]  [email or web address]  Prounouns: she/her

## 2019-12-22 ENCOUNTER — DOCUMENTATION ONLY (OUTPATIENT)
Dept: NEUROLOGY | Facility: CLINIC | Age: 69
End: 2019-12-22

## 2019-12-22 NOTE — PROGRESS NOTES
Dear [facility]:    We are notifying you of a Missed Visit or Hospice Death.    Zoey Armstrong; MRN [id number]   peacefully On date 19  Time 8:15 PM  Sincerely [facility]  Pamela Sanchez  [phone]

## 2019-12-23 NOTE — TELEPHONE ENCOUNTER
Called patient to enquire on patient and to discuss GI consult and PEG placement. I spoke to patient's sister in-law, Maria A. Unfortunately, patient was accepted to hospice last Friday and quickly declined. She passed away on Saturday. We shared our condolences and offered to answer any questions that Bobby (patient's ) or the family may have.     Heidi Melvin DO  Movement Disorders Fellow  [facility]

## 2019-12-24 NOTE — TELEPHONE ENCOUNTER
I have not seen her since July so I can not give any opinion about hospice. Suggest check with her neurologist. I can follow with her neurologist.

## 2020-04-10 ENCOUNTER — DOCUMENTATION ONLY (OUTPATIENT)
Dept: NEUROLOGY | Facility: CLINIC | Age: 70
End: 2020-04-10

## 2020-04-10 NOTE — PROGRESS NOTES
Received Plan Of Care from [place], forms were e-mailed to [place]    Received forms back signed by provider, forms were fax to [phone], sent to be scanned

## 2021-09-27 NOTE — PROGRESS NOTES
09/22/17 0900   Quick Adds   Quick Adds Certification   Type of Visit Initial Outpatient Occupational Therapy Evaluation   General Information   Start Of Care Date 09/22/17   Referring Physician Marisa Hoover   Orders Evaluate and treat as indicated   Other Orders on OT order: memory compensation rehab, R shoulder for improved pain free AROM and strenth   Orders Date 09/06/17   Medical Diagnosis mild cognitive deficits   Onset of Illness/Injury or Date of Surgery 09/06/17  (order date)   Precautions/Limitations Fall precautions   Surgical/Medical History Reviewed Yes   Additional Occupational Profile Info/Pertinent History of Current Problem Patient is s/p posterior spine fusion L3-S1 and bilateral decompression foraminal/lateral L2-S1 by Dr Yoel López on 6/23/17. Following surgery she participated in therapies at [facility] and then home health OT and PT before discharged late August. Outpatient OT was recommended for functional endurance, memory compensation, rehab of R shoulder for improved pain free AROM and strength by home OT. R shoulder - multiple medical complications since rotator cuff repair last summer. Other PMH: HTN, CAD, COPD, Hyperlipidemia, Falls, Depression, Smoker, Back pain. Limitations walking, balance, dizziness prior to surgery: She reports sudden decline in balance and onset of dizziness 5 years ago with progressive sxs over time. Per chart review, in Aug/Sept 2016 she was evaluated at the [facility] with full audiologic and vestibular function testing, and received PT treatment for balance and vestibular rehab. At that time there were no indications of peripheral vestibular dysfunction but there were some central vestibular findings. Patient had a limited number of sessions due to a fall and shoulder injury requiring surgery. 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 Followed-up with PCP  ----- Message from Patricia Almeida earlier this week and has been referred back to neurologist d/t assessment of LE numbness. Pt reports \"feet don't work\" but denies awareness of any numbness/tingling in feet.   Comments/Observations \"J.T.\" - spouse present for appointment and assisted with history.   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)   Community/Avocational Activities youngest son lives with them - works nights so sleeps during the day   Current Living Environment House  (split level)   Number of Stairs to Enter Home 12 from garage with railings on both sides - uses this entry, then on main level   Primary Bathroom Set Up/Equipment Tub/Shower combo;Tub grab bar;Shower/tub chair;Toilet grab bar   Home/Community Accessibility Comments stays on main level   Prior Level - Transfers Independent  (prior to all above)   Prior Level - Ambulation Independent  (prior to all above)   Prior Level - ADLS Independent   Prior Level Comments prior to 2 years ago, was independent with all ADL/IADLs   Current Assistive Devices - Mobility Walker  (no walker (4ww) in morning as more alert; walker rest of day)   Current Assistive Devices - ADL Reacher   Role/Living Environment Comments spouse has been doing all homemaking tasks in the last 2 years, including setting up pateint's medications and doing the finances (patient did these before), patient hasn't driven for ~1 year   Patient/family Goals Statement get back to driving, go shopping by herself, not use a walker, \"clean my own house, cook my own meals,\" be independent   Pain   Pain comments back surgery helped pain; not assessed currently   Fall Risk Screen   Fall screen completed by PT   Have you fallen 2 or more times in the last year? Yes   Have you fallen and had an injury in the past year? Yes   Is the patient a fall risk? Yes   Comments Legs can give " "out some times without warning - \"don't feel that they (legs) are there sometimes;\"  fear of falling - plans to see neurologist soon; these concerns with LE's not fixed with back surgery; quick screen of feeling in feet at MD office recently was \"not good\"    Cognitive Status Examination   Orientation Orientation to person, place and time  (not sure if was 21st or 22nd of the month)   Level of Consciousness Alert   Follows Commands and Answers Questions 100% of the time;Able to follow single-step instructions   Personal Safety and Judgment Intact  (per CAM, however, concerns due to decreased memory)   Memory Impaired   Memory Comments Patient reports memory is \"Pretty good - I rely on notes,\"  spouse feels it is pretty good; [facility]CU and home care did work on memory some   Cognitive Comment Areas of the Cognitive Assessment of [place] (CAM) completed this date due to noted cognitive deficits (see above):  WFLs with immediate memory, motor recall, coin recognition, safety/judgment questions; MILD deficits with backward visual memory and sequencing and mental manipulation with simple money skills; MODERATE deficits with temporal awareness, forward visual memory and sequencing skills, auditory recall; SEVERE deficits with complex concrete problem solving skills   Visual Perception   Visual Perception Wears glasses  (for reading only)   Visual Field appears WFLs   Oculomotor mild to mod difficulty with vertical movement (from superior to primary gaze in midline), otherwise WFLs   Visual Perception Comments no c/o, will monitor   Sensation   Sensation Comments no numbness/tingling in hands   Range of Motion (ROM)   ROM Comments torn R rotator cuff ~1 1/2 years ago - had repair and started therapy but then broke her R UE and stopped therapy; AROM R shoulder flexion: 125 degrees and L: 135 degrees, AROM B shoulder abduction WFLs however R UE more in scaption range as compared to L shoulder; pain in R shoulder \"1\" in end range; B " "elbows distally WFLs.   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, so not using to  pegs but did use to remove pegs; reports her hand freezes with handwriting (end of her last name when writing in cursive) and after typing for short time, this has occurred for last 6-9 months and therefore hasn't been writing as much; patient wondering if this difficulty is related at all to undiagnosed difficulty in LE's   Functional Mobility   Functional Mobility Comments SBA with 4ww with seat   Bathing   Level of [place] - Bathing (modified independent)   Bathing Comments showering without assist - just using AE   Upper Body Dressing   Level of [place]: Dress Upper Body (modified independent)   Upper Body Dressing Comments doesn't usually wear buttons   Lower Body Dressing   Level of [place]: Dress Lower Body (modified independent)   Toileting   Toileting Comments reports no concerns   Grooming   Grooming Comments unknown if difficulty with R UE (\"freezing\") affects hygienes - will address in future session   Eating/Self-Feeding   Eating/Self Feeding Comments unknown if difficulty with R UE (\"freezing\") affects eating - will address in future seesion   Planned Therapy Interventions   Planned Therapy Interventions ADL training;IADL training;ROM;Self care/Home management;Strengthening;Stretching;Visual perception   Adult OT Eval Goals   OT Eval Goals (Adult) 1;2;3;4;5   OT Goal 1   Goal Identifier R shoulder ROM and strength   Goal Description Patient to increase R shoulder flexion AROM to 135 degrees and strength to " 4+/5 for return to baseline for improved R UE function for ADL/IADLs (during dressing and grooming tasks, kitchen tasks).   Target Date 12/15/17   OT Goal 2   Goal Identifier memory skills   Goal Description Patient will demonstrate improved memory skills by completing short-term memory tasks in occupational therapy with 85% accuracy using compensatory strategies for increased safety and independence with ADL/IADLS (remembering to take medications, turn off the stove when done, remember rules/laws for community mobility, recall therapy techniques/exercises).   Target Date 12/15/17   OT Goal 3   Goal Identifier problem solving skills and attention to detail   Goal Description Patient will demonstrate the ability to complete simple to moderately complex tasks requiring numerical reasoning, attention to detail and problem solving skills with 90% accuracy to complete basic home and community tasks (medication setup and management, financial tasks) accurately, for maximum independence to function at or near baseline at home and in community.   Target Date 12/15/17   OT Goal 4   Goal Identifier UE motor and visual reaction time   Goal Description Patient will demonstrate WFLs  UE and visual reaction time for safe independent community mobility (crossing the street, driving) by scoring WNLs on all modes of the [employer].   Target Date 12/15/17   OT Goal 5   Goal Identifier R FM coordination skills   Goal Description Patient will improve R FM coordination skills for increased handwriting legibility and improved performance with FM ADLS by increasing letter size by 25% and completing 9HPT in 25 seconds.   Target Date 12/15/17   Clinical Impression   Criteria for Skilled Therapeutic Interventions Met Yes, treatment indicated   OT Diagnosis decreased ADL/IADL independence   Influenced by the following impairments decreased cognition, decreased functional mobilty, decreased R shoulder ROM and strength, decreased R FM  coordination   Assessment of Occupational Performance 5 or more Performance Deficits   Identified Performance Deficits decreased ability to complete ADLs due to decreased R UE function, decreased ability to do all household tasks (cooking, cleaning, laundry) and other IADLs: driving, medication management   Clinical Decision Making (Complexity) High complexity   Therapy Frequency 2x/week, decreasing in frequency prn   Predicted Duration of Therapy Intervention (days/wks) 12 weeks   Risks and Benefits of Treatment have been explained. Yes   Patient, Family & other staff in agreement with plan of care Yes   Education Assessment   Barriers To Learning Visual;Physical;Cognitive   Preferred Learning Style Listening;Reading;Demonstration;Pictures/video   Therapy Certification   Certification date from 09/22/17   Certification date to 12/15/17   Certification I certify the need for these services furnished under this plan of treatment and while under my care.  (Physician co-signature of this document indicates review and certification of the therapy plan)   Total Evaluation Time   Total Evaluation Time 41

## (undated) RX ORDER — LIDOCAINE HYDROCHLORIDE 20 MG/ML
SOLUTION OROPHARYNGEAL
Status: DISPENSED
Start: 2019-01-01